# Patient Record
Sex: MALE | Race: WHITE | NOT HISPANIC OR LATINO | Employment: FULL TIME | URBAN - METROPOLITAN AREA
[De-identification: names, ages, dates, MRNs, and addresses within clinical notes are randomized per-mention and may not be internally consistent; named-entity substitution may affect disease eponyms.]

---

## 2017-01-09 ENCOUNTER — ALLSCRIPTS OFFICE VISIT (OUTPATIENT)
Dept: OTHER | Facility: OTHER | Age: 47
End: 2017-01-09

## 2017-05-22 ENCOUNTER — ALLSCRIPTS OFFICE VISIT (OUTPATIENT)
Dept: OTHER | Facility: OTHER | Age: 47
End: 2017-05-22

## 2017-05-24 LAB — PROSTATE SPECIFIC ANTIGEN (HISTORICAL): 0.7 NG/ML (ref 0–4)

## 2017-05-25 ENCOUNTER — GENERIC CONVERSION - ENCOUNTER (OUTPATIENT)
Dept: OTHER | Facility: OTHER | Age: 47
End: 2017-05-25

## 2017-12-11 ENCOUNTER — ALLSCRIPTS OFFICE VISIT (OUTPATIENT)
Dept: OTHER | Facility: OTHER | Age: 47
End: 2017-12-11

## 2017-12-13 NOTE — PROGRESS NOTES
Assessment    1  GERD without esophagitis (530 81) (K21 9)   2  Nj's esophagus (530 85) (K22 70)    Plan  Nj's esophagus, GERD without esophagitis    · Esomeprazole Magnesium 40 MG Oral Capsule Delayed Release (NexIUM);take 1 capsule daily   Rx By: Malini Jordan; Dispense: 90 Days ; #:90 Capsule Delayed Release; Refill: 3;Nj's esophagus, GERD without esophagitis; BHAVESH = N; Verified Transmission to [address] [place] [address]; Last Updated By: System, [employer]; 12/11/2017 4:43:30 PM    Discussion/Summary  Discussion Summary:   History of GERD, Nj's esophagus and family history of esophageal cancer in brother  continue Nexium regularly  repeat surveillance EGD next year  Patient was explained about the lifestyle and dietary modifications  Advised to avoid fatty foods, chocolates, caffeine, alcohol and any other triggering foods  Avoid eating for at least 3 hours before going to bed  Counseling Documentation With Imm: The patient was counseled regarding instructions for management,-- risk factor reductions,-- patient and family education,-- risks and benefits of treatment options,-- importance of compliance with treatment  Chief Complaint  Chief Complaint Free Text Note Form: of Nj's, GERD      History of Present Illness  HPI: Dennis Ballrad came in for follow-up  He has history of GERD and also a small Nj's for which he takes Nexium regularly  He reports having symptoms when he forgets to take the medication  Patient has regular bowel movements and denies any blood or mucus in the stool  Appetite is good and denies any recent weight loss  Denies any abdominal pain, nausea, or vomiting  Has no heartburn or acid reflux  Denies any difficulty swallowing  was positive for Nj's in the past but the last couple of EGDs with biopsies were negative for Nj's  History Reviewed: The history was obtained today from the patient and I agree with the documented history        Review of Systems  Complete-Male GI Adult:  Constitutional: No fever or chills, feels well, no tiredness, no recent weight gain or weight loss  Eyes: No complaints of eye pain, no red eyes, no discharge from eyes, no itchy eyes  ENT: no complaints of earache, no hearing loss, no nosebleeds, no nasal discharge, no sore throat, no hoarseness  Cardiovascular: No complaints of slow heart rate, no fast heart rate, no chest pain, no palpitations, no leg claudication, no lower extremity  Respiratory: No complaints of shortness of breath, no wheezing, no cough, no SOB on exertion, no orthopnea or PND  Gastrointestinal: as noted in HPI  Genitourinary: No complaints of dysuria, no incontinence, no hesitancy, no nocturia, no genital lesion, no testicular pain  Musculoskeletal: No complaints of arthralgia, no myalgias, no joint swelling or stiffness, no limb pain or swelling  Integumentary: No complaints of skin rash or skin lesions, no itching, no skin wound, no dry skin  Neurological: No compliants of headache, no confusion, no convulsions, no numbness or tingling, no dizziness or fainting, no limb weakness, no difficulty walking  Psychiatric: Is not suicidal, no sleep disturbances, no anxiety or depression, no change in personality, no emotional problems  Endocrine: No complaints of proptosis, no hot flashes, no muscle weakness, no erectile dysfunction, no deepening of the voice, no feelings of weakness  Hematologic/Lymphatic: No complaints of swollen glands, no swollen glands in the neck, does not bleed easily, no easy bruising  Active Problems    1  Allergic rhinitis (477 9) (J30 9)   2  Nj's esophagus (530 85) (K22 70)   3  Body mass index (BMI) of 32 0 to 32 9 in adult (V85 32) (Z68 32)   4  Cellulitis of toe of right foot (681 10) (L03 031)   5  Colon cancer screening (V76 51) (Z12 11)   6  Diaphragmatic hernia (553 3) (K44 9)   7  Family history of colon cancer (V16 0) (Z80 0)   8   GERD without esophagitis (530 81) (K21 9)   9  Immunization due (V05 9) (Z23)   10  Impaired fasting glucose (790 21) (R73 01)   11  Never A Smoker   12  Pain of right hand (729 5) (M79 641)   13  Prostate cancer screening (V76 44) (Z12 5)   14  Reported Family History Of Heart Disease (V17 4)   15  Screening for cardiovascular, respiratory, and genitourinary diseases  (V81 2,V81 4,V81 6) (Z13 6,Z13 83,Z13 89)   16  Screening for diabetes mellitus (DM) (V77 1) (Z13 1)   17  Well adult on routine health check (V70 0) (Z00 00)    Past Medical History  1  History of Arthralgia of ankle or foot (719 47) (M25 579)   2  Benign essential hypertension (401 1) (I10)   3  History of 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, Including Hip (216 7)   4  History of Difficulty in walking (719 7) (R26 2)   5  History of Dysphagia (787 20) (R13 10)   6  History of Globus sensation (306 4) (F45 8)   7  History of backache (V13 59) (Z87 39)   8  History of esophagitis (V12 79) (Z87 19)   9  History of left flank pain (V13 89) (Z87 898)   10  History of neoplasm of uncertain behavior of skin (V13 3) (Z86 03)   11  History of sebaceous cyst (V13 3) (Z87 2)   12  History of Increased thirst (783 5) (R63 1)   13  History of Intrinsic asthma without status asthmaticus (493 10) (J45 20)   14  History of Lateral epicondylitis of right elbow (726 32) (M77 11)   15  History of Limb pain (729 5) (M79 609)   16  History of Pes planus, unspecified laterality (734) (M21 40)   17  History of Plantar fascial fibromatosis (728 71) (M72 2)   18  History of Right knee pain (719 46) (M25 561)   19  History of Right patellofemoral syndrome (719 46) (M22 2X1)  Active Problems And Past Medical History Reviewed: The active problems and past medical history were reviewed and updated today  Surgical History  1  History of Diagnostic Esophagogastroduodenoscopy  Surgical History Reviewed: The surgical history was reviewed and updated today  Family History  Mother    1  Family history of hypertension (V17 49) (Z82 49)  Father    2  Family history of colon cancer (V16 0) (Z80 0)   3  Family history of hypertension (V17 49) (Z82 49)  Family History    4  Family history of colon cancer (V16 0) (Z80 0)   5  Denied: Family history of Crohn's disease   6  Denied: Family history of liver disease  Family History Reviewed: The family history was reviewed and updated today  Social History     · Never A Smoker   · Denied: History of Smoking   · Social alcohol use (Z78 9)  Social History Reviewed: The social history was reviewed and updated today  The social history was reviewed and is unchanged  Current Meds   1  Esomeprazole Magnesium 40 MG Oral Capsule Delayed Release; Take 1 capsule twice daily  Requested for: 38CBC4227; Last Rx:[id number] Ordered  Medication List Reviewed: The medication list was reviewed and updated today  Allergies  1  No Known Drug Allergies    Vitals  Vital Signs    Recorded: 38Rlx7715 04:30PM   Temperature 95 5 F   Heart Rate 71   Respiration 16   Systolic 959   Diastolic 86   Height 5 ft 10 in   Weight 229 lb 4 oz   BMI Calculated 32 89   BSA Calculated 2 21   O2 Saturation 98       Physical Exam   Constitutional  General appearance: No acute distress, well appearing and well nourished  Eyes  Conjunctiva and lids: No swelling, erythema, or discharge  Pupils and irises: Equal, round and reactive to light  Ears, Nose, Mouth, and Throat  External inspection of ears and nose: Normal    Nasal mucosa, septum, and turbinates: Normal without edema or erythema  Oropharynx: Normal with no erythema, edema, exudate or lesions  Pulmonary  Respiratory effort: No increased work of breathing or signs of respiratory distress  Auscultation of lungs: Clear to auscultation, equal breath sounds bilaterally, no wheezes, no rales, no rhonci  Cardiovascular  Palpation of heart: Normal PMI, no thrills     Auscultation of heart: Normal rate and rhythm, normal S1 and S2, without murmurs  Examination of extremities for edema and/or varicosities: Normal    Carotid pulses: Normal    Abdomen  Abdomen: Non-tender, no masses  Liver and spleen: No hepatomegaly or splenomegaly  Lymphatic  Palpation of lymph nodes in neck: No lymphadenopathy  Musculoskeletal  Gait and station: Normal    Digits and nails: Normal without clubbing or cyanosis  Inspection/palpation of joints, bones, and muscles: Normal    Skin  Skin and subcutaneous tissue: Normal without rashes or lesions  Psychiatric  Orientation to person, place and time: Normal    Mood and affect: Normal          Health Management  Nj's esophagus   EGD; every 2 years; Last 47SKG5335; Next Due: 22TTC5942; Active  Colon cancer screening   COLONOSCOPY; every 5 years; Last 24WXK0172; Next Due: 84MLA3172;  Active    Signatures   Electronically signed by : LORENA Wilkins ; Dec 12 2017 11:19AM EST                       (Author)

## 2018-01-10 NOTE — RESULT NOTES
Discussion/Summary   Prostate level is normal    Dr Jenifer Viveros        Verified Results  (1) PSA (SCREEN) (Dx V76 44 Screen for Prostate Cancer) 11ZIW6704 03:00PM Mayela Arredondo     Test Name Result Flag Reference   Prostate Specific Ag, Serum 0 7 ng/mL  0 0-4 0   Roche ECLIA methodology  According to the American Urological Association, Serum PSA should  decrease and remain at undetectable levels after radical  prostatectomy  The AUA defines biochemical recurrence as an initial  PSA value 0 2 ng/mL or greater followed by a subsequent confirmatory  PSA value 0 2 ng/mL or greater  Values obtained with different assay methods or kits cannot be used  interchangeably  Results cannot be interpreted as absolute evidence  of the presence or absence of malignant disease

## 2018-01-12 VITALS
HEIGHT: 70 IN | DIASTOLIC BLOOD PRESSURE: 80 MMHG | WEIGHT: 219 LBS | RESPIRATION RATE: 20 BRPM | SYSTOLIC BLOOD PRESSURE: 124 MMHG | TEMPERATURE: 97.3 F | BODY MASS INDEX: 31.35 KG/M2 | HEART RATE: 64 BPM

## 2018-01-12 NOTE — MISCELLANEOUS
Message  GI Reminder Recall Marton Halsted:   Date: 02/29/2016   Dear Wang Bullock:     Review of our records shows you are due for the following: EGD  Please call the following office to schedule your appointment:   [address] [place] [address], [address], [address] [place] [address]  [phone]  We look forward to hearing from you! Sincerely,         Signatures   Electronically signed by :  Yakov Crisostomo, ; Feb 29 2016  1:02PM EST                       (Author)

## 2018-01-13 VITALS
DIASTOLIC BLOOD PRESSURE: 70 MMHG | WEIGHT: 224 LBS | HEART RATE: 78 BPM | RESPIRATION RATE: 18 BRPM | TEMPERATURE: 98.6 F | SYSTOLIC BLOOD PRESSURE: 130 MMHG | BODY MASS INDEX: 32.07 KG/M2 | HEIGHT: 70 IN

## 2018-01-16 NOTE — RESULT NOTES
Message    Distal esophageal biopsies are benign and negative for Nj's mucosa this time  Patient to continue Nexium and schedule for repeat EGD in 2 years   called PT - Proivjean claude Stevens results - input reminder - [address]         Verified Results  (1) TISSUE EXAM [id number] 11:16AM Kamilah Door     Test Name Result Flag Reference   LAB AP CASE REPORT (Report)     Surgical Pathology Report             Case: [id number]                   Authorizing Provider: Jose Antonio Navarro MD     Collected:      10/07/2016 1116        Ordering Location:   [facility]  Received:      10/07/2016 3491 Tesfaye Huynh                                     Pathologist:      Milo Ramirez MD                             Specimen:  Esophagus, Cold bx  distal esophagus Barretts   LAB AP FINAL DIAGNOSIS (Report)     A  Distal esophagus, biopsy:    - Squamocolumnar gastroesophageal junction type mucosa with minimally   active chronic inflammation and      reactive epithelial changes  - Features suggestive of reflux     - Occasional squamous intraepithelial eosinophils are identified     - Negative for intestinal metaplasia, dysplasia and malignancy  Electronically signed by Milo Ramirez MD on 10/11/2016 at 1:12 PM   LAB AP NOTE      Interpretation performed at [facility], [address] [place] [address]  LAB AP SURGICAL ADDITIONAL INFORMATION (Report)     These tests were developed and their performance characteristics   determined by [facility] or [employer]  They may not be cleared or approved by the [place]  Food and   Drug Administration  The FDA has determined that such clearance or   approval is not necessary  These tests are used for clinical purposes  They should not be regarded as investigational or for research  This   laboratory has been approved by CLIA 88, designated as a high-complexity   laboratory and is qualified to perform these tests     LAB AP GROSS DESCRIPTION (Report)     A  The specimen is received in formalin, labeled with the patient's name   and hospital number, and is designated distal esophageal biopsy,   Nj's  The specimen consists of multiple white to tan soft tissue   fragments measuring in aggregate 1 0 x 0 7 x 0 2 cm in greatest dimension  Entirely submitted  One cassette  Note: The estimated total formalin fixation time based upon information   provided by the submitting clinician and the standard processing schedule   is over 72 hours        ACL

## 2018-01-22 VITALS
HEART RATE: 71 BPM | BODY MASS INDEX: 32.82 KG/M2 | HEIGHT: 70 IN | SYSTOLIC BLOOD PRESSURE: 130 MMHG | OXYGEN SATURATION: 98 % | RESPIRATION RATE: 16 BRPM | WEIGHT: 229.25 LBS | DIASTOLIC BLOOD PRESSURE: 86 MMHG | TEMPERATURE: 95.5 F

## 2018-05-22 ENCOUNTER — OFFICE VISIT (OUTPATIENT)
Dept: FAMILY MEDICINE CLINIC | Facility: CLINIC | Age: 48
End: 2018-05-22
Payer: COMMERCIAL

## 2018-05-22 VITALS
WEIGHT: 224 LBS | HEART RATE: 64 BPM | TEMPERATURE: 96.7 F | HEIGHT: 70 IN | RESPIRATION RATE: 16 BRPM | BODY MASS INDEX: 32.07 KG/M2 | SYSTOLIC BLOOD PRESSURE: 126 MMHG | DIASTOLIC BLOOD PRESSURE: 78 MMHG

## 2018-05-22 DIAGNOSIS — Z13.83 SCREENING FOR CARDIOVASCULAR, RESPIRATORY, AND GENITOURINARY DISEASES: ICD-10-CM

## 2018-05-22 DIAGNOSIS — Z13.89 SCREENING FOR CARDIOVASCULAR, RESPIRATORY, AND GENITOURINARY DISEASES: ICD-10-CM

## 2018-05-22 DIAGNOSIS — Z13.1 SCREENING FOR DIABETES MELLITUS (DM): ICD-10-CM

## 2018-05-22 DIAGNOSIS — E66.9 OBESITY (BMI 30-39.9): ICD-10-CM

## 2018-05-22 DIAGNOSIS — Z13.6 SCREENING FOR CARDIOVASCULAR, RESPIRATORY, AND GENITOURINARY DISEASES: ICD-10-CM

## 2018-05-22 DIAGNOSIS — Z12.5 PROSTATE CANCER SCREENING: ICD-10-CM
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
## 2018-05-22 PROCEDURE — 99213 OFFICE O/P EST LOW 20 MIN: CPT | Performed by: FAMILY MEDICINE

## 2018-05-22 RX ORDER — DOXYCYCLINE 100 MG/1
CAPSULE ORAL
Qty: 2 CAPSULE | Refills: 0 | Status: SHIPPED | OUTPATIENT
Start: 2018-05-22 | End: 2018-05-23

## 2018-05-22 RX ORDER — ESOMEPRAZOLE MAGNESIUM 40 MG/1
40 CAPSULE, DELAYED RELEASE ORAL DAILY
COMMUNITY
End: 2018-12-30 | Stop reason: SDUPTHER

## 2018-05-22 NOTE — PROGRESS NOTES
Assessment/Plan:    No problem-specific Assessment & Plan notes found for this encounter  Doxy for prevention  Labs for cpe given  gerd stable, could try to minimize dose for risk reduction  Recent data suggesting increased risk of ischemic CVA and chronic kidney damage with PPI use was advised  Diagnoses and all orders for this visit:    Tick bite, initial encounter    Screening for cardiovascular, respiratory, and genitourinary diseases    Screening for diabetes mellitus (DM)    Prostate cancer screening    Other orders  -     esomeprazole (NexIUM) 40 MG capsule; Take 1 capsule by mouth daily              No Follow-up on file  Subjective:      Patient ID: Veronique Stanley is a 51 y o  male  Chief Complaint   Patient presents with    Tick Removal     last Monday on lower right side of back, red and itchy       HPI  Bloodwork in past at [facility], last April    Tick bite  1w ago  On right buttock  Pulled off easily  In a hotel room? In [address] [place] [address] [address] at time  Not engorged  Less than 48hrs  No fevers/arthralgias  No hx of lyme pos in past  Could eat better  Exercises daily    The following portions of the patient's history were reviewed and updated as appropriate: allergies, current medications, past family history, past medical history, past social history, past surgical history and problem list     Review of Systems   Constitutional: Negative for fatigue  Cardiovascular: Negative for chest pain  Musculoskeletal: Negative for myalgias  Current Outpatient Prescriptions   Medication Sig Dispense Refill    esomeprazole (NexIUM) 40 MG capsule Take 1 capsule by mouth daily       No current facility-administered medications for this visit  Objective:    /78   Pulse 64   Temp (!) 96 7 °F (35 9 °C)   Resp 16   Ht 5' 10" (1 778 m)   Wt 102 kg (224 lb)   BMI 32 14 kg/m²        Physical Exam   Constitutional: He appears well-developed  HENT:   Head: Normocephalic     Eyes: Conjunctivae are normal    Neck: Neck supple  Cardiovascular: Normal rate and intact distal pulses  Pulmonary/Chest: Effort normal  No respiratory distress  Abdominal: Soft  Musculoskeletal: He exhibits no edema or deformity  Neurological: He is alert  Skin: Skin is warm and dry  Psychiatric: His behavior is normal  Thought content normal    Nursing note and vitals reviewed      local red area at tick site, no ecm         Joycelyn Kruger, DO

## 2018-05-26 LAB
ALBUMIN SERPL-MCNC: 4.5 G/DL (ref 3.5–5.5)
ALBUMIN/GLOB SERPL: 1.6 {RATIO} (ref 1.2–2.2)
ALP SERPL-CCNC: 78 IU/L (ref 39–117)
ALT SERPL-CCNC: 47 IU/L (ref 0–44)
AMBIG ABBREV DEFAULT: NORMAL
AST SERPL-CCNC: 33 IU/L (ref 0–40)
B BURGDOR IGG+IGM SER-ACNC: <0.91 ISR (ref 0–0.9)
B BURGDOR IGM SER IA-ACNC: <0.8 INDEX (ref 0–0.79)
BILIRUB SERPL-MCNC: 0.6 MG/DL (ref 0–1.2)
BUN SERPL-MCNC: 21 MG/DL (ref 6–24)
BUN/CREAT SERPL: 20 (ref 9–20)
CALCIUM SERPL-MCNC: 9.8 MG/DL (ref 8.7–10.2)
CHLORIDE SERPL-SCNC: 99 MMOL/L (ref 96–106)
CHOLEST SERPL-MCNC: 224 MG/DL (ref 100–199)
CO2 SERPL-SCNC: 23 MMOL/L (ref 18–29)
CREAT SERPL-MCNC: 1.06 MG/DL (ref 0.76–1.27)
GLOBULIN SER-MCNC: 2.8 G/DL (ref 1.5–4.5)
GLUCOSE SERPL-MCNC: 109 MG/DL (ref 65–99)
HDLC SERPL-MCNC: 63 MG/DL
LDLC SERPL CALC-MCNC: 147 MG/DL (ref 0–99)
MICRODELETION SYND BLD/T FISH: NORMAL
POTASSIUM SERPL-SCNC: 4.6 MMOL/L (ref 3.5–5.2)
PROT SERPL-MCNC: 7.3 G/DL (ref 6–8.5)
PSA SERPL-MCNC: 0.7 NG/ML (ref 0–4)
SL AMB EGFR AFRICAN AMERICAN: 95 ML/MIN/1.73
SL AMB EGFR NON AFRICAN AMERICAN: 82 ML/MIN/1.73
SODIUM SERPL-SCNC: 140 MMOL/L (ref 134–144)
TRIGL SERPL-MCNC: 72 MG/DL (ref 0–149)

## 2018-06-26 ENCOUNTER — OFFICE VISIT (OUTPATIENT)
Dept: FAMILY MEDICINE CLINIC | Facility: CLINIC | Age: 48
End: 2018-06-26
Payer: COMMERCIAL

## 2018-06-26 VITALS
WEIGHT: 216 LBS | BODY MASS INDEX: 30.92 KG/M2 | HEIGHT: 70 IN | SYSTOLIC BLOOD PRESSURE: 130 MMHG | TEMPERATURE: 96.7 F | DIASTOLIC BLOOD PRESSURE: 84 MMHG | RESPIRATION RATE: 16 BRPM | HEART RATE: 70 BPM

## 2018-06-26 DIAGNOSIS — Z91.89 FRAMINGHAM CARDIAC RISK <10% IN NEXT 10 YEARS: ICD-10-CM

## 2018-06-26 DIAGNOSIS — E66.9 OBESITY (BMI 30-39.9): ICD-10-CM

## 2018-06-26 DIAGNOSIS — R73.01 IMPAIRED FASTING GLUCOSE: ICD-10-CM

## 2018-06-26 DIAGNOSIS — Z00.00 HEALTHCARE MAINTENANCE: Primary | ICD-10-CM
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
## 2018-06-26 PROCEDURE — 99396 PREV VISIT EST AGE 40-64: CPT | Performed by: FAMILY MEDICINE

## 2018-06-26 NOTE — PROGRESS NOTES
Assessment/Plan:    No problem-specific Assessment & Plan notes found for this encounter  Diagnoses and all orders for this visit:    Healthcare maintenance    [place] cardiac risk <10% in next 10 years    Impaired fasting glucose    Obesity (BMI 30-39  9)              Return if symptoms worsen or fail to improve  Subjective:      Patient ID: Tito Stephenson is a 51 y o  male  Chief Complaint   Patient presents with    Physical Exam     no forms drhlpn       HPI    The following portions of the patient's history were reviewed and updated as appropriate: allergies, current medications, past family history, past medical history, past social history, past surgical history and problem list     Review of Systems      Current Outpatient Prescriptions   Medication Sig Dispense Refill    esomeprazole (NexIUM) 40 MG capsule Take 1 capsule by mouth daily       No current facility-administered medications for this visit          Objective:    /84   Pulse 70   Temp (!) 96 7 °F (35 9 °C)   Resp 16   Ht 5' 10" (1 778 m)   Wt 98 kg (216 lb)   BMI 30 99 kg/m²        Physical Exam           Jaylon Figueroa DO

## 2018-06-27 NOTE — PROGRESS NOTES
Assessment/Plan:    No problem-specific Assessment & Plan notes found for this encounter  Diet/exercise/weight loss is recommended for IFG  Cont PPI since tried wean but sx worse  Recent data suggesting increased risk of ischemic CVA and chronic kidney damage with PPI use was advised  Consider zantac 150mg bid  framingham score <7 5% this year     Diagnoses and all orders for this visit:    Healthcare maintenance    [place] cardiac risk <10% in next 10 years    Impaired fasting glucose    Obesity (BMI 30-39  9)              No Follow-up on file  Subjective:      Patient ID: Johanna Rinne is a 51 y o  male  Chief Complaint   Patient presents with    Physical Exam     no forms drhlpn       HPI  Feels ok  Could do more with diet  Exercises often  gerd worse w/o nexium even skipping doses   Had EGD at Dr Ryna Sandoval, no barretts per pt  IFG new but actually had in old reports  Slight elevation ALT, worse in past, suspect fatty liver    The following portions of the patient's history were reviewed and updated as appropriate: allergies, current medications, past family history, past medical history, past social history, past surgical history and problem list     Review of Systems   HENT: Negative for trouble swallowing  Respiratory: Negative for shortness of breath  Cardiovascular: Negative for chest pain  Gastrointestinal: Negative for abdominal pain  Current Outpatient Prescriptions   Medication Sig Dispense Refill    esomeprazole (NexIUM) 40 MG capsule Take 1 capsule by mouth daily       No current facility-administered medications for this visit  Objective:    /84   Pulse 70   Temp (!) 96 7 °F (35 9 °C)   Resp 16   Ht 5' 10" (1 778 m)   Wt 98 kg (216 lb)   BMI 30 99 kg/m²        Physical Exam   Constitutional: He appears well-developed  HENT:   Head: Normocephalic  Eyes: Conjunctivae are normal    Neck: Neck supple  Cardiovascular: Normal rate and intact distal pulses  Pulmonary/Chest: Effort normal  No respiratory distress  Abdominal: Soft  He exhibits no mass  There is no tenderness  There is no rebound and no guarding  No hernia  Hernia confirmed negative in the right inguinal area and confirmed negative in the left inguinal area  Genitourinary: Rectum normal, prostate normal and penis normal  No penile tenderness  Musculoskeletal: He exhibits no edema or deformity  Neurological: He is alert  Skin: Skin is warm and dry  No rash noted  No pallor  Psychiatric: His behavior is normal  Thought content normal    Nursing note and vitals reviewed               Pura Mckenna DO

## 2018-08-15 ENCOUNTER — HOSPITAL ENCOUNTER (INPATIENT)
Facility: HOSPITAL | Age: 48
LOS: 2 days | Discharge: HOME | DRG: 299 | End: 2018-08-17
Attending: EMERGENCY MEDICINE | Admitting: FAMILY MEDICINE
Payer: COMMERCIAL

## 2018-08-15 ENCOUNTER — APPOINTMENT (EMERGENCY)
Dept: RADIOLOGY | Facility: HOSPITAL | Age: 48
DRG: 299 | End: 2018-08-15
Payer: COMMERCIAL

## 2018-08-15 ENCOUNTER — HOSPITAL ENCOUNTER (OUTPATIENT)
Dept: RADIOLOGY | Facility: HOSPITAL | Age: 48
Discharge: HOME | DRG: 299 | End: 2018-08-15
Payer: COMMERCIAL

## 2018-08-15 ENCOUNTER — APPOINTMENT (OUTPATIENT)
Dept: RADIOLOGY | Facility: CLINIC | Age: 48
DRG: 299 | End: 2018-08-15
Payer: COMMERCIAL

## 2018-08-15 VITALS
HEIGHT: 70 IN | SYSTOLIC BLOOD PRESSURE: 135 MMHG | DIASTOLIC BLOOD PRESSURE: 94 MMHG | BODY MASS INDEX: 31.98 KG/M2 | HEART RATE: 105 BPM | WEIGHT: 223.4 LBS

## 2018-08-15 DIAGNOSIS — M79.89 LEFT LEG SWELLING: ICD-10-CM

## 2018-08-15 DIAGNOSIS — I26.99 PULMONARY EMBOLISM (HCC): Primary | ICD-10-CM

## 2018-08-15 DIAGNOSIS — M25.562 LEFT KNEE PAIN, UNSPECIFIED CHRONICITY: ICD-10-CM

## 2018-08-15 DIAGNOSIS — M22.2X2 PATELLOFEMORAL SYNDROME OF LEFT KNEE: Primary | ICD-10-CM

## 2018-08-15 DIAGNOSIS — M25.462 EFFUSION OF LEFT KNEE: ICD-10-CM

## 2018-08-15 PROBLEM — I82.409 DVT (DEEP VENOUS THROMBOSIS) (HCC): Status: ACTIVE | Noted: 2018-08-15

## 2018-08-15 LAB
ALBUMIN SERPL BCP-MCNC: 3.8 G/DL (ref 3.5–5)
ALP SERPL-CCNC: 97 U/L (ref 46–116)
ALT SERPL W P-5'-P-CCNC: 45 U/L (ref 12–78)
ANION GAP SERPL CALCULATED.3IONS-SCNC: 7 MMOL/L (ref 4–13)
APTT PPP: 25 SECONDS (ref 24–33)
AST SERPL W P-5'-P-CCNC: 29 U/L (ref 5–45)
BASOPHILS # BLD AUTO: 0.05 THOUSANDS/ΜL (ref 0–0.1)
BASOPHILS NFR BLD AUTO: 0 % (ref 0–1)
BILIRUB SERPL-MCNC: 0.5 MG/DL (ref 0.2–1)
BUN SERPL-MCNC: 16 MG/DL (ref 5–25)
CALCIUM SERPL-MCNC: 9.1 MG/DL (ref 8.3–10.1)
CHLORIDE SERPL-SCNC: 102 MMOL/L (ref 100–108)
CO2 SERPL-SCNC: 30 MMOL/L (ref 21–32)
CREAT SERPL-MCNC: 1.04 MG/DL (ref 0.6–1.3)
EOSINOPHIL # BLD AUTO: 0.49 THOUSAND/ΜL (ref 0–0.61)
EOSINOPHIL NFR BLD AUTO: 4 % (ref 0–6)
ERYTHROCYTE [DISTWIDTH] IN BLOOD BY AUTOMATED COUNT: 12.1 % (ref 11.6–15.1)
GFR SERPL CREATININE-BSD FRML MDRD: 84 ML/MIN/1.73SQ M
GLUCOSE SERPL-MCNC: 110 MG/DL (ref 65–140)
HCT VFR BLD AUTO: 42.7 % (ref 36.5–49.3)
HGB BLD-MCNC: 14.1 G/DL (ref 12–17)
IMM GRANULOCYTES # BLD AUTO: 0.04 THOUSAND/UL (ref 0–0.2)
IMM GRANULOCYTES NFR BLD AUTO: 0 % (ref 0–2)
INR PPP: 1.16 (ref 0.86–1.16)
LYMPHOCYTES # BLD AUTO: 2.32 THOUSANDS/ΜL (ref 0.6–4.47)
LYMPHOCYTES NFR BLD AUTO: 21 % (ref 14–44)
MCH RBC QN AUTO: 29.9 PG (ref 26.8–34.3)
MCHC RBC AUTO-ENTMCNC: 33 G/DL (ref 31.4–37.4)
MCV RBC AUTO: 91 FL (ref 82–98)
MONOCYTES # BLD AUTO: 1.04 THOUSAND/ΜL (ref 0.17–1.22)
MONOCYTES NFR BLD AUTO: 9 % (ref 4–12)
NEUTROPHILS # BLD AUTO: 7.33 THOUSANDS/ΜL (ref 1.85–7.62)
NEUTS SEG NFR BLD AUTO: 66 % (ref 43–75)
NRBC BLD AUTO-RTO: 0 /100 WBCS
PLATELET # BLD AUTO: 274 THOUSANDS/UL (ref 149–390)
PMV BLD AUTO: 9.7 FL (ref 8.9–12.7)
POTASSIUM SERPL-SCNC: 4.4 MMOL/L (ref 3.5–5.3)
PROT SERPL-MCNC: 8.1 G/DL (ref 6.4–8.2)
PROTHROMBIN TIME: 12.1 SECONDS (ref 9.4–11.7)
RBC # BLD AUTO: 4.72 MILLION/UL (ref 3.88–5.62)
SODIUM SERPL-SCNC: 139 MMOL/L (ref 136–145)
TROPONIN I SERPL-MCNC: <0.02 NG/ML
WBC # BLD AUTO: 11.27 THOUSAND/UL (ref 4.31–10.16)

## 2018-08-15 PROCEDURE — 80053 COMPREHEN METABOLIC PANEL: CPT | Performed by: EMERGENCY MEDICINE

## 2018-08-15 PROCEDURE — 1111F DSCHRG MED/CURRENT MED MERGE: CPT | Performed by: ORTHOPAEDIC SURGERY

## 2018-08-15 PROCEDURE — 85610 PROTHROMBIN TIME: CPT | Performed by: EMERGENCY MEDICINE

## 2018-08-15 PROCEDURE — 99222 1ST HOSP IP/OBS MODERATE 55: CPT | Performed by: NURSE PRACTITIONER

## 2018-08-15 PROCEDURE — 96372 THER/PROPH/DIAG INJ SC/IM: CPT

## 2018-08-15 PROCEDURE — 93970 EXTREMITY STUDY: CPT | Performed by: SURGERY

## 2018-08-15 PROCEDURE — 99285 EMERGENCY DEPT VISIT HI MDM: CPT

## 2018-08-15 PROCEDURE — 71275 CT ANGIOGRAPHY CHEST: CPT

## 2018-08-15 PROCEDURE — 99213 OFFICE O/P EST LOW 20 MIN: CPT | Performed by: ORTHOPAEDIC SURGERY

## 2018-08-15 PROCEDURE — 85025 COMPLETE CBC W/AUTO DIFF WBC: CPT | Performed by: EMERGENCY MEDICINE

## 2018-08-15 PROCEDURE — 87081 CULTURE SCREEN ONLY: CPT | Performed by: NURSE PRACTITIONER

## 2018-08-15 PROCEDURE — 73562 X-RAY EXAM OF KNEE 3: CPT

## 2018-08-15 PROCEDURE — 84484 ASSAY OF TROPONIN QUANT: CPT | Performed by: EMERGENCY MEDICINE

## 2018-08-15 PROCEDURE — 93005 ELECTROCARDIOGRAM TRACING: CPT

## 2018-08-15 PROCEDURE — 36415 COLL VENOUS BLD VENIPUNCTURE: CPT | Performed by: EMERGENCY MEDICINE

## 2018-08-15 PROCEDURE — 93970 EXTREMITY STUDY: CPT

## 2018-08-15 PROCEDURE — 85730 THROMBOPLASTIN TIME PARTIAL: CPT | Performed by: EMERGENCY MEDICINE

## 2018-08-15 RX ORDER — ACETAMINOPHEN 325 MG/1
650 TABLET ORAL EVERY 6 HOURS PRN
Status: DISCONTINUED | OUTPATIENT
Start: 2018-08-15 | End: 2018-08-17 | Stop reason: HOSPADM

## 2018-08-15 RX ORDER — PANTOPRAZOLE SODIUM 40 MG/1
40 TABLET, DELAYED RELEASE ORAL
Status: DISCONTINUED | OUTPATIENT
Start: 2018-08-16 | End: 2018-08-17 | Stop reason: HOSPADM

## 2018-08-15 RX ADMIN — ENOXAPARIN SODIUM 150 MG: 150 INJECTION SUBCUTANEOUS at 18:16

## 2018-08-15 RX ADMIN — IOHEXOL 85 ML: 350 INJECTION, SOLUTION INTRAVENOUS at 17:33

## 2018-08-15 NOTE — PROGRESS NOTES
Assessment/Plan:  1  Patellofemoral syndrome of left knee     2  Left leg swelling  VAS lower limb venous duplex study, unilateral/limited   3  Effusion of left knee  VAS lower limb venous duplex study, unilateral/limited   4  Left knee pain, unspecified chronicity  XR knee 3 vw left non injury    VAS lower limb venous duplex study, unilateral/limited     Henri Aponte is a very pleasant 50-year-old gentleman presenting for left knee pain and left lower extremity swelling  His knee pain is most likely due to patellofemoral symptoms, which she has had a past   It is unclear what the source of his left lower leg swelling is  We have referred him for a stat duplex ultrasound to rule out a DVT, as his calf is tender and he did recently have a long plate from [facility] ([place]   However, a ruptured Baker cyst is higher on our differential based on his history of a pop subsequent swelling  This could also be responsible for the paresthesias that are extending to his left foot  If his ultrasound does not show a DVT, we would like him to return in 2 days for re-evaluation with an aspiration of his effusion and steroid injection for both therapeutic and diagnostic purposes  In the interim, he can continue with ice, ibuprofen, and Tylenol  All of his questions were addressed today  Subjective: Left knee pain    Patient ID: Katia Bynum is a 51 y o  male  Henri Aponte is a pleasant 50-year-old gentleman presenting for evaluation of 1 month of left knee pain and lower extremity swelling  He does report a history of left knee patellofemoral symptoms that were treated conservatively by Dr Adrienne Mukherjee 2 years ago with a knee brace and home exercise program   He did very well up until a month ago when he began to notice aching in the anterior knee  Subsequently, he felt a pop in the anterior knee while walking up a step    He has had a toothache type discomfort in the knee since then as well as significant pain in the posterior aspect of the leg and calf  The pain does radiate up towards his groin  He does have occasional paresthesias in the left lower extremity  He denies any history of blood clots, cancer, smoking, familial clotting disorders  He did have a long plane flight from [facility] about a month ago when the symptoms began  Ibuprofen and ice if not been helping  Review of Systems   Constitutional: Negative  HENT: Negative  Eyes: Negative  Respiratory: Negative  Cardiovascular: Positive for leg swelling  Gastrointestinal: Negative  Endocrine: Negative  Genitourinary: Negative  Musculoskeletal: Positive for arthralgias, joint swelling and myalgias  Skin: Negative  Allergic/Immunologic: Negative  Neurological: Negative  Hematological: Negative  Psychiatric/Behavioral: Negative  Past Medical History:   Diagnosis Date    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 07/12/2006    Dysphagia     Last Assessed: 8/12/2014     GERD (gastroesophageal reflux disease)     Globus sensation     Last Assessed: 6/4/2014     H/O neoplasm of uncertain behavior of skin 06/06/2006    Hypertension 02/14/2006    Intrinsic asthma without status asthmaticus 05/02/2006    Lateral epicondylitis of right elbow     Last Assessed: 10/23/2015     Pes planus     last assessed: 10/23/2013     Plantar fascial fibromatosis     Last Assessed: 10/23/2013     Right patellofemoral syndrome     Last Assessed: 4/15/2016     Sebaceous cyst 11/03/2007       Past Surgical History:   Procedure Laterality Date    ESOPHAGOGASTRODUODENOSCOPY N/A 10/7/2016    Procedure: ESOPHAGOGASTRODUODENOSCOPY (EGD); Surgeon: Husam Bustos MD;  Location: [facility] GI [facility];   Service:     NASAL SEPTUM SURGERY  1995       Family History   Problem Relation Age of Onset    Heart disease Mother     Hypertension Mother     Cancer Father         colon    Colon cancer Father     Hypertension Father     Heart disease Brother         MI exp age 37    Cancer Brother         esophageal CA exp age 40    Colon cancer Family        Social History     Occupational History    Not on file  Social History Main Topics    Smoking status: Never Smoker    Smokeless tobacco: Never Used    Alcohol use Yes      Comment: socially    Drug use: No    Sexual activity: Not on file         Current Outpatient Prescriptions:     esomeprazole (NexIUM) 40 MG capsule, Take 1 capsule by mouth daily, Disp: , Rfl:     ibuprofen (MOTRIN) 100 mg/5 mL suspension, Take 200 mg by mouth every 4 (four) hours as needed for mild pain, Disp: , Rfl:     No Known Allergies    Objective:  Vitals:    08/15/18 1414   BP: 135/94   Pulse: 105       Body mass index is 32 05 kg/m²  Left Knee Exam     Tenderness   The patient is experiencing tenderness in the patella and patellar tendon (medial patellar facet)  Range of Motion   Extension:  0 (minor anterior discomfort) normal   Flexion:  120 (limited by effusion and leg edema) abnormal     Muscle Strength     The patient has normal left knee strength  Tests   Fernando:  Medial - negative Lateral - negative  Lachman:  Anterior - negative      Drawer:       Anterior - negative       Varus: negative  Valgus: negative  Patellar Apprehension: negative    Other   Erythema: absent  Scars: absent  Sensation: normal  Pulse: present  Swelling: moderate  Effusion: effusion (1+) present    Comments:  Calf tender to palpation  Fullness in popliteal space which is tender  Equivocal Valarie's  Ambulates with slightly antalgic gait on the left  Collateral ligaments stable at 0, 30 and 90  No tenderness of hamstring/quadricep          Observations   Left Knee   Positive for effusion (1+)  Physical Exam   Constitutional: He is oriented to person, place, and time  He appears well-developed and well-nourished  Body mass index is 32 05 kg/m²  HENT:   Head: Normocephalic and atraumatic  Eyes: EOM are normal    Neck: Normal range of motion  Cardiovascular: Intact distal pulses  Pulmonary/Chest: Effort normal    Musculoskeletal:        Left knee: He exhibits effusion (1+)  See ortho exam   Neurological: He is alert and oriented to person, place, and time  Skin: Skin is warm and dry  Psychiatric: He has a normal mood and affect  His behavior is normal  Judgment and thought content normal        I have personally reviewed pertinent films in PACS of the weightbearing x-rays of his left knee which are negative for fracture, dislocation, or evidence of degenerative changes

## 2018-08-15 NOTE — ED PROCEDURE NOTE
PROCEDURE  ECG 12 Lead Documentation  Date/Time: 8/15/2018 4:46 PM  Performed by: Frida Watson  Authorized by: Gaetano PINEDA     ECG reviewed by me, the ED Provider: yes    Patient location:  ED  Interpretation:     Interpretation: normal    Rate:     ECG rate:  90    ECG rate assessment: normal    Rhythm:     Rhythm: sinus rhythm    Ectopy:     Ectopy: none    QRS:     QRS axis:  Normal    QRS intervals:  Normal  Conduction:     Conduction: normal    ST segments:     ST segments:  Normal  T waves:     T waves: normal           Guerita Bergeron DO  08/15/18 1646

## 2018-08-15 NOTE — ASSESSMENT & PLAN NOTE
· Traveled to [facility] June 30th-July 14th for wedding, while on vacation noticed pain and swelling to left lower extremity  Attributed the discomfort to chronic knee problems  Went to the gym and tried exercises with no relief  He is does a lot of travel for work and frequently takes long car rides  Today, went to see ortho and had a venous doppler  Acute DVT in the right popliteal and posterior tibial veins; left proximal to distal femoral vein, popliteal, gastrocnemius, posterior tibial and peroneal veins    · He had an episode of dyspnea on exertion 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 so CT of the chest was done - bilateral pulmonary emboli, right basilar subpleural opacity likely a pulmonary infarct, hiatal hernia  · Admit to telemetry for acute PE/DVT  · Continue therapeutic lovenox  · Hematology consultation  · Echo to assess for RV strain

## 2018-08-15 NOTE — LETTER
[address] [facility] [id number]  Dept: [phone]    August 17, 2018     Patient: Micki Saez   YOB: 1970   Date of Visit: 8/15/2018       To Whom it May Concern:    Any Tyler is under my professional care  He was seen in the hospital from 8/15/2018   to 08/17/18  He may return to work on 8/27/18  If you have any questions or concerns, please don't hesitate to call           Sincerely,          MIRNA Villalba

## 2018-08-16 ENCOUNTER — APPOINTMENT (INPATIENT)
Dept: NON INVASIVE DIAGNOSTICS | Facility: HOSPITAL | Age: 48
DRG: 299 | End: 2018-08-16
Payer: COMMERCIAL

## 2018-08-16 LAB
ANION GAP SERPL CALCULATED.3IONS-SCNC: 9 MMOL/L (ref 4–13)
ATRIAL RATE: 90 BPM
BUN SERPL-MCNC: 18 MG/DL (ref 5–25)
CALCIUM SERPL-MCNC: 8.7 MG/DL (ref 8.3–10.1)
CHLORIDE SERPL-SCNC: 100 MMOL/L (ref 100–108)
CO2 SERPL-SCNC: 28 MMOL/L (ref 21–32)
CREAT SERPL-MCNC: 1.11 MG/DL (ref 0.6–1.3)
ERYTHROCYTE [DISTWIDTH] IN BLOOD BY AUTOMATED COUNT: 12.2 % (ref 11.6–15.1)
GFR SERPL CREATININE-BSD FRML MDRD: 78 ML/MIN/1.73SQ M
GLUCOSE SERPL-MCNC: 106 MG/DL (ref 65–140)
HCT VFR BLD AUTO: 40.8 % (ref 36.5–49.3)
HGB BLD-MCNC: 13.4 G/DL (ref 12–17)
MCH RBC QN AUTO: 29.7 PG (ref 26.8–34.3)
MCHC RBC AUTO-ENTMCNC: 32.8 G/DL (ref 31.4–37.4)
MCV RBC AUTO: 91 FL (ref 82–98)
P AXIS: 33 DEGREES
PLATELET # BLD AUTO: 251 THOUSANDS/UL (ref 149–390)
PMV BLD AUTO: 9.7 FL (ref 8.9–12.7)
POTASSIUM SERPL-SCNC: 4.1 MMOL/L (ref 3.5–5.3)
PR INTERVAL: 158 MS
QRS AXIS: -13 DEGREES
QRSD INTERVAL: 88 MS
QT INTERVAL: 344 MS
QTC INTERVAL: 420 MS
RBC # BLD AUTO: 4.51 MILLION/UL (ref 3.88–5.62)
SODIUM SERPL-SCNC: 137 MMOL/L (ref 136–145)
T WAVE AXIS: 45 DEGREES
VENTRICULAR RATE: 90 BPM
WBC # BLD AUTO: 10.05 THOUSAND/UL (ref 4.31–10.16)

## 2018-08-16 PROCEDURE — 85027 COMPLETE CBC AUTOMATED: CPT | Performed by: NURSE PRACTITIONER

## 2018-08-16 PROCEDURE — 99252 IP/OBS CONSLTJ NEW/EST SF 35: CPT | Performed by: INTERNAL MEDICINE

## 2018-08-16 PROCEDURE — 80048 BASIC METABOLIC PNL TOTAL CA: CPT | Performed by: NURSE PRACTITIONER

## 2018-08-16 PROCEDURE — 93010 ELECTROCARDIOGRAM REPORT: CPT | Performed by: INTERNAL MEDICINE

## 2018-08-16 PROCEDURE — 93306 TTE W/DOPPLER COMPLETE: CPT | Performed by: INTERNAL MEDICINE

## 2018-08-16 PROCEDURE — 93306 TTE W/DOPPLER COMPLETE: CPT

## 2018-08-16 PROCEDURE — 99232 SBSQ HOSP IP/OBS MODERATE 35: CPT | Performed by: FAMILY MEDICINE

## 2018-08-16 RX ADMIN — PANTOPRAZOLE SODIUM 40 MG: 40 TABLET, DELAYED RELEASE ORAL at 05:46

## 2018-08-16 RX ADMIN — RIVAROXABAN 15 MG: 15 TABLET, FILM COATED ORAL at 17:46

## 2018-08-16 RX ADMIN — ENOXAPARIN SODIUM 100 MG: 100 INJECTION SUBCUTANEOUS at 05:46

## 2018-08-16 RX ADMIN — ACETAMINOPHEN 650 MG: 325 TABLET, FILM COATED ORAL at 16:14

## 2018-08-16 NOTE — PROGRESS NOTES
Progress Note - Rolo Owens 1970, 51 y o  male MRN: [id number]    Unit/Bed#: [address] [place] [address] Encounter: [id number]    Primary Care Provider: Shante Santizo DO   Date and time admitted to hospital: 8/15/2018  4:20 PM        * Pulmonary embolism [facility])   Assessment & Plan    · Traveled to [place] [place] June 30th-July 14th for wedding, while on vacation noticed pain and swelling to left lower extremity  Attributed the discomfort to chronic knee problems  Went to the gym and tried exercises with no relief  He is does a lot of travel for work and frequently takes long car rides  Today, went to see ortho and had a venous doppler  Acute DVT in the right popliteal and posterior tibial veins; left proximal to distal femoral vein, popliteal, gastrocnemius, posterior tibial and peroneal veins  · He had an episode of dyspnea on exertion 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 so CT of the chest was done - bilateral pulmonary emboli, right basilar subpleural opacity likely a pulmonary infarct, hiatal hernia  · Admit to telemetry for acute PE/DVT  · Treated with therapeutic lovenox  · Hematology consultation appreciated   · Echo to assess for RV strain, pending   · Per hematology, discontinue Lovenox and start Xarelto tonight  · If patient tolerates Xarelto well, ok to discharge tomorrow         DVT (deep venous thrombosis) ([facility] [address] )   Assessment & Plan    · Plan as per above        Obesity (BMI 30-39  9)   Assessment & Plan    · Dietary and lifestyle modifications        GERD without esophagitis   Assessment & Plan    · Continue PPI            VTE Pharmacologic Prophylaxis:   Pharmacologic: Enoxaparin (Lovenox) - transition to Xarelto   Mechanical VTE Prophylaxis in Place: Yes    Patient Centered Rounds: I have performed bedside rounds with nursing staff today      Discussions with Specialists or Other Care Team Provider: Nursing, CM, hematology     Education and Discussions with Family / Patient: I have answered all questions to the best of my ability  Time Spent for Care: 20 minutes  More than 50% of total time spent on counseling and coordination of care as described above  Current Length of Stay: 1 day(s)    Current Patient Status: Inpatient   Certification Statement: The patient will continue to require additional inpatient hospital stay due to acute bilateral DVT and PE    Discharge Plan: Patient is not medically stable for discharge today, likely tomorrow am      Code Status: Level 1 - Full Code      Subjective:   Resting upright in bed on room air  Ambulating well on room air, 98%  Denies chest tightness, palpitations, or shortness of breath  Willing to try Xarelto or Eliquis on discharge  Denies HA, lightheadedness, dizziness, abdominal pain, N/V/D  Objective:     Vitals:   Temp (24hrs), Av 2 °F (36 8 °C), Min:97 2 °F (36 2 °C), Max:98 8 °F (37 1 °C)    HR:  [] 76  Resp:  [20-26] 20  BP: (126-149)/() 149/90  SpO2:  [94 %-98 %] 97 %  Body mass index is 33 58 kg/m²  Input and Output Summary (last 24 hours): Intake/Output Summary (Last 24 hours) at 18 1557  Last data filed at 18 1225   Gross per 24 hour   Intake              880 ml   Output              500 ml   Net              380 ml       Physical Exam:     Physical Exam   Constitutional: He is oriented to person, place, and time  He appears well-developed  No distress  HENT:   Head: Normocephalic  Neck: Normal range of motion  Cardiovascular: Normal rate and regular rhythm  Pulmonary/Chest: Effort normal  No respiratory distress  He has no decreased breath sounds  He has no wheezes  He has no rhonchi  He has no rales  Abdominal: Soft  Bowel sounds are normal  He exhibits no distension  There is no tenderness  Musculoskeletal: Normal range of motion  He exhibits no edema or tenderness  Neurological: He is alert and oriented to person, place, and time  Skin: Skin is warm and dry  He is not diaphoretic  Psychiatric: He has a normal mood and affect  His behavior is normal    Nursing note and vitals reviewed  Additional Data:     Labs:      Results from last 7 days  Lab Units 08/16/18  0528 08/15/18  1645   WBC Thousand/uL 10 05 11 27*   HEMOGLOBIN g/dL 13 4 14 1   HEMATOCRIT % 40 8 42 7   PLATELETS Thousands/uL 251 274   NEUTROS PCT %  --  66   LYMPHS PCT %  --  21   MONOS PCT %  --  9   EOS PCT %  --  4       Results from last 7 days  Lab Units 08/16/18  0528 08/15/18  1645   SODIUM mmol/L 137 139   POTASSIUM mmol/L 4 1 4 4   CHLORIDE mmol/L 100 102   CO2 mmol/L 28 30   BUN mg/dL 18 16   CREATININE mg/dL 1 11 1 04   CALCIUM mg/dL 8 7 9 1   TOTAL PROTEIN g/dL  --  8 1   BILIRUBIN TOTAL mg/dL  --  0 50   ALK PHOS U/L  --  97   ALT U/L  --  45   AST U/L  --  29   GLUCOSE RANDOM mg/dL 106 110       Results from last 7 days  Lab Units 08/15/18  1645   INR  1 16       * I Have Reviewed All Lab Data Listed Above  * Additional Pertinent Lab Tests Reviewed: All Labs Within Last 24 Hours Reviewed    Imaging:    Imaging Reports Reviewed Today Include: CTA chest  Imaging Personally Reviewed by Myself Includes:  none    Recent Cultures (last 7 days):           Last 24 Hours Medication List:     Current Facility-Administered Medications:  acetaminophen 650 mg Oral Q6H PRN MIRNA Ortiz   pantoprazole 40 mg Oral Early Morning MIRNA Ortiz   rivaroxaban 15 mg Oral BID With Meals MIRNA York        Today, Patient Was Seen By: MIRNA York    ** Please Note: Dictation voice to text software may have been used in the creation of this document   **

## 2018-08-16 NOTE — PLAN OF CARE
CARDIOVASCULAR - ADULT     Maintains optimal cardiac output and hemodynamic stability Progressing     Absence of cardiac dysrhythmias or at baseline rhythm Progressing        HEMATOLOGIC - ADULT     Maintains hematologic stability Progressing        PAIN - ADULT     Verbalizes/displays adequate comfort level or baseline comfort level Progressing        RESPIRATORY - ADULT     Achieves optimal ventilation and oxygenation Progressing

## 2018-08-16 NOTE — ED PROVIDER NOTES
History  Chief Complaint   Patient presents with    Leg Pain     c/o leg pain, seen by Dr Cici Whatley, sent for vascular test   dx with dvt in both legs, having exhertional shortness of breath     Patient presents for evaluation after positive outpatient DVT study  Doppler study showed bilateral DVT  Patient returned from [facility] on July 15th  States while there he had clicking and poping in his left knee  Upon return the pain progressed to his calf and he went to his orthopedist  Christa Vicente for DVT study  Patient also reports exertional dyspnea  Denies chest pain   used: No    Leg Pain       Prior to Admission Medications   Prescriptions Last Dose Informant Patient Reported? Taking?   esomeprazole (NexIUM) 40 MG capsule   Yes No   Sig: Take 1 capsule by mouth daily   ibuprofen (MOTRIN) 100 mg/5 mL suspension  Self Yes No   Sig: Take 200 mg by mouth every 4 (four) hours as needed for mild pain      Facility-Administered Medications: None       Past Medical History:   Diagnosis Date    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 07/12/2006    Dysphagia     Last Assessed: 8/12/2014     GERD (gastroesophageal reflux disease)     Globus sensation     Last Assessed: 6/4/2014     H/O neoplasm of uncertain behavior of skin 06/06/2006    Hyperlipidemia     Hypertension 02/14/2006    Intrinsic asthma without status asthmaticus 05/02/2006    Lateral epicondylitis of right elbow     Last Assessed: 10/23/2015     Pes planus     last assessed: 10/23/2013     Plantar fascial fibromatosis     Last Assessed: 10/23/2013     Right patellofemoral syndrome     Last Assessed: 4/15/2016     Sebaceous cyst 11/03/2007       Past Surgical History:   Procedure Laterality Date    ESOPHAGOGASTRODUODENOSCOPY N/A 10/7/2016    Procedure: ESOPHAGOGASTRODUODENOSCOPY (EGD); Surgeon: Carlotta Escalante MD;  Location: [facility] GI [facility];   Service:     NASAL SEPTUM SURGERY  1995       Family History   Problem Relation Age of Onset  Heart disease Mother         valve 76s    Hypertension Mother     Cancer Father         colon    Colon cancer Father     Hypertension Father     Coronary artery disease Father         CABG    Heart disease Brother         MI exp age 39    Colon cancer Family     Cancer Brother         esophageal CA exp age 40     I have reviewed and agree with the history as documented  Social History   Substance Use Topics    Smoking status: Never Smoker    Smokeless tobacco: Never Used    Alcohol use Yes      Comment: socially        Review of Systems   Respiratory: Positive for shortness of breath  Cardiovascular: Positive for leg swelling  Negative for chest pain  Musculoskeletal: Positive for arthralgias  All other systems reviewed and are negative  Physical Exam  Physical Exam   Constitutional: He is oriented to person, place, and time  No distress  HENT:   Mouth/Throat: Oropharynx is clear and moist    Eyes: Pupils are equal, round, and reactive to light  Neck: Normal range of motion  Cardiovascular: Regular rhythm and intact distal pulses  Tachycardia present  Pulmonary/Chest: Effort normal and breath sounds normal  No respiratory distress  Abdominal: Soft  There is no tenderness  Musculoskeletal: Normal range of motion  Neurological: He is alert and oriented to person, place, and time  Skin: Capillary refill takes less than 2 seconds  He is not diaphoretic  Nursing note and vitals reviewed        Vital Signs  ED Triage Vitals [08/15/18 1618]   Temperature Pulse Respirations Blood Pressure SpO2   98 5 °F (36 9 °C) (!) 110 (!) 24 (!) 142/104 98 %      Temp Source Heart Rate Source Patient Position - Orthostatic VS BP Location FiO2 (%)   Tympanic Monitor Sitting Right arm --      Pain Score       8           Vitals:    08/15/18 1900 08/15/18 1915 08/15/18 1930 08/15/18 2002   BP:    140/97   Pulse: 86 86 86 87   Patient Position - Orthostatic VS:    Sitting       Visual Acuity      ED Medications  Medications   pantoprazole (PROTONIX) EC tablet 40 mg (not administered)   enoxaparin (LOVENOX) subcutaneous injection 100 mg (not administered)   enoxaparin (LOVENOX) subcutaneous injection 150 mg (150 mg Subcutaneous Given 8/15/18 1816)   iohexol (OMNIPAQUE) 350 MG/ML injection (MULTI-DOSE) 85 mL (85 mL Intravenous Given 8/15/18 1733)       Diagnostic Studies  Results Reviewed     Procedure Component Value Units Date/Time    Protime-INR [[id number]]  (Abnormal) Collected:  08/15/18 1645    Lab Status:  Final result Specimen:  Blood from Arm, Left Updated:  08/15/18 1717     Protime 12 1 (H) seconds      INR 1 16    APTT [[id number]]  (Normal) Collected:  08/15/18 1645    Lab Status:  Final result Specimen:  Blood from Arm, Left Updated:  08/15/18 1717     PTT 25 seconds     Troponin I [[id number]]  (Normal) Collected:  08/15/18 1645    Lab Status:  Final result Specimen:  Blood from Arm, Left Updated:  08/15/18 1715     Troponin I <0 02 ng/mL     Comprehensive metabolic panel [[id number]] Collected:  08/15/18 1645    Lab Status:  Final result Specimen:  Blood from Arm, Left Updated:  08/15/18 1710     Sodium 139 mmol/L      Potassium 4 4 mmol/L      Chloride 102 mmol/L      CO2 30 mmol/L      Anion Gap 7 mmol/L      BUN 16 mg/dL      Creatinine 1 04 mg/dL      Glucose 110 mg/dL      Calcium 9 1 mg/dL      AST 29 U/L      ALT 45 U/L      Alkaline Phosphatase 97 U/L      Total Protein 8 1 g/dL      Albumin 3 8 g/dL      Total Bilirubin 0 50 mg/dL      eGFR 84 ml/min/1 73sq m     Narrative:         National Kidney Disease Education Program recommendations are as follows:  GFR calculation is accurate only with a steady state creatinine  Chronic Kidney disease less than 60 ml/min/1 73 sq  meters  Kidney failure less than 15 ml/min/1 73 sq  meters      CBC and differential [[id number]]  (Abnormal) Collected:  08/15/18 1645    Lab Status:  Final result Specimen:  Blood from Arm, Left Updated:  08/15/18 1654     WBC 11 27 (H) Thousand/uL      RBC 4 72 Million/uL      Hemoglobin 14 1 g/dL      Hematocrit 42 7 %      MCV 91 fL      MCH 29 9 pg      MCHC 33 0 g/dL      RDW 12 1 %      MPV 9 7 fL      Platelets 881 Thousands/uL      nRBC 0 /100 WBCs      Neutrophils Relative 66 %      Immat GRANS % 0 %      Lymphocytes Relative 21 %      Monocytes Relative 9 %      Eosinophils Relative 4 %      Basophils Relative 0 %      Neutrophils Absolute 7 33 Thousands/µL      Immature Grans Absolute 0 04 Thousand/uL      Lymphocytes Absolute 2 32 Thousands/µL      Monocytes Absolute 1 04 Thousand/µL      Eosinophils Absolute 0 49 Thousand/µL      Basophils Absolute 0 05 Thousands/µL                  CTA ED chest PE study   Final Result by Rasheed Valencia MD (08/15 1744)      Bilateral pulmonary emboli  No evidence of right heart strain  Moderate-sized hiatal hernia  2 cm right basilar subpleural opacity/consolidation likely pulmonary infarct           I personally discussed this study with Dr Gus Flores on 8/15/2018 at 5:40 PM                      Workstation performed: [id number]                    Procedures  Procedures       Phone Contacts  ED Phone Contact    ED Course                               MDM  Number of Diagnoses or Management Options  Pulmonary embolism [facility]):   Diagnosis management comments: Pulse ox 96% on RA indicating adequate oxygenation       Amount and/or Complexity of Data Reviewed  Clinical lab tests: ordered and reviewed  Tests in the radiology section of CPT®: ordered and reviewed  Decide to obtain previous medical records or to obtain history from someone other than the patient: yes  Review and summarize past medical records: yes    Patient Progress  Patient progress: stable    CritCare Time    Disposition  Final diagnoses:   Pulmonary embolism ([address] )     Time reflects when diagnosis was documented in both MDM as applicable and the Disposition within this note     Time User Action Codes Description Comment    8/15/2018  6:24 PM Henrry Aparicio Add [I26 99] Pulmonary embolism [facility])       ED Disposition     ED Disposition Condition Comment    Admit  Case was discussed with Dr Clementina Leyden and the patient's admission status was agreed to be admission to the service of Dr Clementina Leyden  Follow-up Information    None         Current Discharge Medication List      CONTINUE these medications which have NOT CHANGED    Details   esomeprazole (NexIUM) 40 MG capsule Take 1 capsule by mouth daily      ibuprofen (MOTRIN) 100 mg/5 mL suspension Take 200 mg by mouth every 4 (four) hours as needed for mild pain           No discharge procedures on file      ED Provider  Electronically Signed by           Jeevan Mitchell DO  08/15/18 3079

## 2018-08-16 NOTE — PLAN OF CARE
Problem: DISCHARGE PLANNING - CARE MANAGEMENT  Goal: Discharge to post-acute care or home with appropriate resources  INTERVENTIONS:  - Conduct assessment to determine patient/family and health care team treatment goals, and need for post-acute services based on payer coverage, community resources, and patient preferences, and barriers to discharge  - Address psychosocial, clinical, and financial barriers to discharge as identified in assessment in conjunction with the patient/family and health care team  - Arrange appropriate level of post-acute services according to patient's   needs and preference and payer coverage in collaboration with the physician and health care team  - Communicate with and update the patient/family, physician, and health care team regarding progress on the discharge plan  - Arrange appropriate transportation to post-acute venues  Outcome: Adequate for Discharge  Plan: Discharge to home with no needs

## 2018-08-16 NOTE — ASSESSMENT & PLAN NOTE
· Traveled to [place], [place] June 30th-July 14th for wedding, while on vacation noticed pain and swelling to left lower extremity  Attributed the discomfort to chronic knee problems  Went to the gym and tried exercises with no relief  He is does a lot of travel for work and frequently takes long car rides  Today, went to see ortho and had a venous doppler  Acute DVT in the right popliteal and posterior tibial veins; left proximal to distal femoral vein, popliteal, gastrocnemius, posterior tibial and peroneal veins    · He had an episode of dyspnea on exertion 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 so CT of the chest was done - bilateral pulmonary emboli, right basilar subpleural opacity likely a pulmonary infarct, hiatal hernia  · Admit to telemetry for acute PE/DVT  · Treated with therapeutic lovenox  · Hematology consultation appreciated   · Echo to assess for RV strain, pending   · Per hematology, discontinue Lovenox and start Xarelto tonight  · If patient tolerates Xarelto well, ok to discharge tomorrow

## 2018-08-16 NOTE — PROGRESS NOTES
08/16/18 1100   Vital Signs   Pulse 100  (while walking)   Oxygen Therapy   SpO2 98 %  (while walking)   SpO2 Activity During Exercise   O2 Device None (Room air)   Walked with patient around the unit for 10 minutes  Highest hear trate was 100 bpm, oxygen saturation between 95-98% on room air  Denied SOB,CP or dizziness

## 2018-08-16 NOTE — CONSULTS
Jasmina Navarrete  1970    HEMATOLOGY/ONCOLOGY CONSULTATION REPORT  [facility]    DISCUSSION/SUMMARY:    51year old male recently diagnosed with bilateral DVTs and PE  Mr Susannah Katz has a history of long car rides and a recent airplane ride  Patient also had lower extremity swelling and pain for number of weeks before pulmonary issues  Presently patient seems stable from a clinical standpoint; Lovenox is ongoing  From a hematology standpoint, patient can begin either a direct thrombin inhibitor or vitamin K antagonist   We discussed the differences between Coumadin verses Xarelto/Eliquis and their potential benefits and side effects  We also discussed what to monitor for in regard to worsening pulmonary issues, trouble breathing, worsening lower extremity pain or swelling, cords etc  Patient understands that he will need anticoagulation for a number of months  At this moment, I am not sure if a thrombophilia evaluation is necessary  There is no family history of thromboses and patient has a provoking incident  The above was discussed with the patient and wife; all questions were answered  Will follow with you  Please do not hesitate to contact me if you have any questions or need additional information  Thank you for this consult     ____________________________________________________________________________________________________    Chief Complaint   Patient presents with    Leg Pain     c/o leg pain, seen by Dr Yari Sweeney, sent for vascular test   dx with dvt in both legs, having exhertional shortness of breath     History of Present Illness:    43-year-old male with relatively good past medical history recently traveling to [facility]   Patient also takes long car rides  Mr Susannah Katz recently developed right lower extremity pain   Pain would come and go but not persist   Patient had taken some time off from working out at the gym but recently went back and began to have persistent right calf pain as well as shortness of breath and dyspnea on exertion  Patient was seen by Orthopedics and Dopplers were requested  Results demonstrated BL lower extremity DVTs  Patient was seen in the emergency room and found to have bilateral PE   Mr Bard Guido has been started on Lovenox  Presently patient states feeling slightly better than before  Still with some dyspnea on exertion and pain with deep breathing  No persistent chest pain or pressure  No cough, sputum or hemoptysis  No problems with excessive bruising or bleeding  Left knee pain is about the same as before  No other body aches  Appetite has been good, weight has been stable  No other GI or  issues  No recent fevers, chills or sweats  Review of Systems   Constitutional: Positive for fatigue  HENT: Negative  Eyes: Negative  Respiratory: Negative  Cardiovascular: Negative  Gastrointestinal: Negative  Endocrine: Negative  Genitourinary: Negative  Musculoskeletal: Positive for joint swelling  Skin: Negative  Allergic/Immunologic: Negative  Neurological: Negative  Hematological: Negative  Psychiatric/Behavioral: Negative  All other systems reviewed and are negative  Patient Active Problem List   Diagnosis    Allergic rhinitis    Diaphragmatic hernia    GERD without esophagitis    Impaired fasting glucose    Screening for cardiovascular, respiratory, and genitourinary diseases    Screening for diabetes mellitus (DM)    Prostate cancer screening    Obesity (BMI 30-39  9)    [place] cardiac risk <10% in next 10 years    Healthcare maintenance    Hyperlipidemia    GERD (gastroesophageal reflux disease)    Pulmonary embolism ([address] )    DVT (deep venous thrombosis) (HCC)     Past Medical History:   Diagnosis Date    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 07/12/2006    Dysphagia     Last Assessed: 8/12/2014     GERD (gastroesophageal reflux disease)     Globus sensation     Last Assessed: 6/4/2014     H/O neoplasm of uncertain behavior of skin 06/06/2006    Hyperlipidemia     Hypertension 02/14/2006    Intrinsic asthma without status asthmaticus 05/02/2006    Lateral epicondylitis of right elbow     Last Assessed: 10/23/2015     Pes planus     last assessed: 10/23/2013     Plantar fascial fibromatosis     Last Assessed: 10/23/2013     Right patellofemoral syndrome     Last Assessed: 4/15/2016     Sebaceous cyst 11/03/2007     Past Surgical History:   Procedure Laterality Date    ESOPHAGOGASTRODUODENOSCOPY N/A 10/7/2016    Procedure: ESOPHAGOGASTRODUODENOSCOPY (EGD); Surgeon: Sweta Davila MD;  Location: [facility]; Service:     NASAL SEPTUM SURGERY  1995     Family History   Problem Relation Age of Onset    Heart disease Mother         valve 76s    Hypertension Mother     Cancer Father         colon    Colon cancer Father     Hypertension Father     Coronary artery disease Father         CABG    Heart disease Brother         MI exp age 39   Danita Reddy Colon cancer Family     Cancer Brother         esophageal CA exp age 40     Social History     Social History    Marital status: /Civil Union     Spouse name: N/A    Number of children: N/A    Years of education: N/A     Occupational History    Not on file       Social History Main Topics    Smoking status: Never Smoker    Smokeless tobacco: Never Used    Alcohol use Yes      Comment: socially    Drug use: No    Sexual activity: Not on file     Other Topics Concern    Not on file     Social History Narrative    No narrative on file       Current Facility-Administered Medications:     acetaminophen (TYLENOL) tablet 650 mg, 650 mg, Oral, Q6H PRN, Richelle Scrivener, CRNP    enoxaparin (LOVENOX) subcutaneous injection 100 mg, 1 mg/kg, Subcutaneous, Q12H, Richelle Scrivener, CRNP, 100 mg at 08/16/18 0546    pantoprazole (PROTONIX) EC tablet 40 mg, 40 mg, Oral, Early Morning, Richelle Scrivener, CRNP, 40 mg at 08/16/18 0546    No Known Allergies    Vitals:    08/16/18 0357   BP: 126/84   Pulse: 76   Resp: 20   Temp: 98 8 °F (37 1 °C)   SpO2: 94%     Physical Exam   Constitutional: He is oriented to person, place, and time  He appears well-developed and well-nourished  HENT:   Head: Normocephalic and atraumatic  Right Ear: External ear normal    Left Ear: External ear normal    Nose: Nose normal    Mouth/Throat: Oropharynx is clear and moist    Eyes: Conjunctivae and EOM are normal  Pupils are equal, round, and reactive to light  Neck: Normal range of motion  Neck supple  Cardiovascular: Normal rate, regular rhythm, normal heart sounds and intact distal pulses  Pulmonary/Chest: Effort normal and breath sounds normal    Good air entry bilaterally, clear   Abdominal: Soft  Bowel sounds are normal    Obese, soft, nontender, cannot palpate liver or spleen   Musculoskeletal: Normal range of motion  Neurological: He is alert and oriented to person, place, and time  He has normal reflexes  Skin: Skin is warm  Warm, good color, moist, no petechiae or ecchymoses   Psychiatric: He has a normal mood and affect  His behavior is normal  Judgment and thought content normal    Extremities:  1+ BL lower extremity swelling, no obvious cords, pulses are 1+  Lymphatics:  No adenopathy in the neck, supraclavicular region, axilla and groin bilaterally    Labs:    Results for Jacquie Golden (MRN [id number]) as of 8/16/2018 18:43   Ref   Range 8/16/2018 05:28   WBC Latest Ref Range: 4 31 - 10 16 Thousand/uL 10 05   RBC Latest Ref Range: 3 88 - 5 62 Million/uL 4 51   Hemoglobin Latest Ref Range: 12 0 - 17 0 g/dL 13 4   HCT Latest Ref Range: 36 5 - 49 3 % 40 8   MCV Latest Ref Range: 82 - 98 fL 91   MCH Latest Ref Range: 26 8 - 34 3 pg 29 7   MCHC Latest Ref Range: 31 4 - 37 4 g/dL 32 8   RDW Latest Ref Range: 11 6 - 15 1 % 12 2   Platelets Latest Ref Range: 149 - 390 Thousands/uL 251       Results for Jacquie Golden (MRN [id number]) as of 8/16/2018 18:43 Ref  Range 8/15/2018 16:45   Sodium Latest Ref Range: 136 - 145 mmol/L 139   Potassium Latest Ref Range: 3 5 - 5 3 mmol/L 4 4   Chloride Latest Ref Range: 100 - 108 mmol/L 102   CO2 Latest Ref Range: 21 - 32 mmol/L 30   Anion Gap Latest Ref Range: 4 - 13 mmol/L 7   BUN Latest Ref Range: 5 - 25 mg/dL 16   Creatinine Latest Ref Range: 0 60 - 1 30 mg/dL 1 04   Glucose Latest Ref Range: 65 - 140 mg/dL 110   Calcium Latest Ref Range: 8 3 - 10 1 mg/dL 9 1   AST (SGOT) Latest Ref Range: 5 - 45 U/L 29   ALT Latest Ref Range: 12 - 78 U/L 45   ALK PHOS Latest Ref Range: 46 - 116 U/L 97   Total Protein Latest Ref Range: 6 4 - 8 2 g/dL 8 1   Albumin Latest Ref Range: 3 5 - 5 0 g/dL 3 8   Total Bilirubin Latest Ref Range: 0 20 - 1 00 mg/dL 0 50   eGFR Latest Units: ml/min/1 73sq m 84       Imaging    08/15/2018 CTA chest PE study    Bilateral pulmonary emboli  No evidence of right heart strain  Moderate-sized hiatal hernia  2 cm right basilar subpleural opacity/consolidation likely pulmonary infarct  08/15/2018 vascular lower limb venous duplex study bilateral    RIGHT LOWER LIMB:  Acute deep vein thrombosis is noted in the popliteal vein, and posterior tibial  veins  No evidence of superficial thrombophlebitis noted  Doppler evaluation shows a normal response to augmentation maneuvers  Popliteal, posterior tibial and anterior tibial arterial Doppler waveforms are  triphasic  LEFT LOWER LIMB:  Acute deep vein thrombosis is noted in the proximal to distal femoral vein,  popliteal vein, gastrocnemius veins, posterior tibial veins and peroneal veins  No evidence of superficial thrombophlebitis noted  Doppler evaluation shows a normal response to augmentation maneuvers    Popliteal, posterior tibial and anterior tibial arterial Doppler waveforms are  triphasic

## 2018-08-16 NOTE — CASE MANAGEMENT
Continued Stay Review    Date: 8/16/18    Vital Signs: /84 (BP Location: Right arm)   Pulse 76   Temp 98 8 °F (37 1 °C) (Oral)   Resp 20   Ht 5' 10" (1 778 m)   Wt 106 kg (234 lb)   SpO2 94%   BMI 33 58 kg/m²     Medications:   Scheduled Meds:   Current Facility-Administered Medications:  acetaminophen 650 mg Oral Q6H PRN Preston Shutter, CRNP   enoxaparin 1 mg/kg Subcutaneous Q12H Preston Shutter, CRNP   pantoprazole 40 mg Oral Early Morning Preston Shutter, CRNP     Continuous Infusions:    PRN Meds:   acetaminophen    Age/Sex: 51 y o  male       HEMATOLOGY  51year old male recently diagnosed with bilateral DVTs and PE  Mr Bard Guido has a history of long car rides and a recent airplane ride  Patient also had lower extremity swelling and pain for number of weeks before pulmonary issues  Presently patient seems stable from a clinical standpoint; Lovenox is ongoing    From a hematology standpoint, patient can begin either a direct thrombin inhibitor or vitamin K antagonist   We discussed the differences between Coumadin verses Xarelto/Eliquis and their potential benefits and side effects      ATTENDING  Pulmonary embolism   · Admit to telemetry for acute PE/DVT  · Treated with therapeutic lovenox  · Hematology consultation appreciated   · Echo to assess for RV strain, pending   · Per hematology, discontinue Lovenox and start Xarelto tonight     Certification Statement: The patient will continue to require additional inpatient hospital stay due to acute bilateral DVT and PE

## 2018-08-16 NOTE — SOCIAL WORK
Met with pt and wife Natalie  Resides in a house that pt has no difficulty with mobilization  Has no dme  No hhc  Has been independent and driving  Works  Explained cm may provide him with free 30 day supply of xarelto or eliquis if prescribed  Will also check copay for ordered medication  Will also have medication delivered to hospital from [facility] if not going home today  Explained role of cm, no other d/c needs  CM reviewed d/c planning process including the following: identifying help at home, patient preference for d/c planning needs, and availability of the treatment team to discuss questions or concerns patient and/or family may have regarding understanding of medications and recognizing signs and symptoms once discharged  CM also encouraged patient to follow up with all recommended appointments after discharge  Patient advised of importance for patient and family to participate in managing patient's medical well being

## 2018-08-16 NOTE — H&P
H&P- María Valera 1970, 51 y o  male MRN: [id number]    Unit/Bed#: [address] [place] [address] Encounter: [id number]    Primary Care Provider: Karla Leon DO   Date and time admitted to hospital: 8/15/2018  4:20 PM        * Pulmonary embolism [facility])   Assessment & Plan    · Traveled to [facility] June 30th-July 14th for wedding, while on vacation noticed pain and swelling to left lower extremity  Attributed the discomfort to chronic knee problems  Went to the gym and tried exercises with no relief  He is does a lot of travel for work and frequently takes long car rides  Today, went to see ortho and had a venous doppler  Acute DVT in the right popliteal and posterior tibial veins; left proximal to distal femoral vein, popliteal, gastrocnemius, posterior tibial and peroneal veins  · He had an episode of dyspnea on exertion 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 so CT of the chest was done - bilateral pulmonary emboli, right basilar subpleural opacity likely a pulmonary infarct, hiatal hernia  · Admit to telemetry for acute PE/DVT  · Continue therapeutic lovenox  · Hematology consultation  · Echo to assess for RV strain        DVT (deep venous thrombosis) ([facility] [address] )   Assessment & Plan    · Plan as per above        Obesity (BMI 30-39  9)   Assessment & Plan    · Dietary and lifestyle modifications        GERD without esophagitis   Assessment & Plan    · Continue PPI            VTE Prophylaxis: Enoxaparin (Lovenox)  / reason for no mechanical VTE prophylaxis acute DVT   Code Status: Level 1 - Full Code    Anticipated Length of Stay:  Patient will be admitted on an Inpatient basis with an anticipated length of stay of  GREATER THAN 2 midnights  Justification for Hospital Stay:  Acute PE, acute DVT    Total Time for Visit, including Counseling / Coordination of Care: 30 minutes  Greater than 50% of this total time spent on direct patient counseling and coordination of care      Chief Complaint:   Leg Pain (c/o leg pain, seen by Dr Uri Sanchez, sent for vascular test   dx with dvt in both legs, having exhertional shortness of breath)      History of Present Illness:    Tha Gibson is a 51 y o  male with a PMH of [place] esophagitis, obesity, diet controlled hyperlipidemia who presents with leg pain  Patient travel to [facility] from June 30th to July 14th for a wedding and after arrival to [facility] noticed that he had left lower extremity pain and swelling  The pain extended from his ankle all the way up to his thigh  He attributed it to an orthopedic cause and went to see an orthopedic doctor today who sent him for venous Dopplers to rule out DVT  Venous Dopplers revealed bilateral acute lower extremity DVTs  Patient also reports some dyspnea on 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 so CTA was done which also revealed bilateral pulmonary embolism  Patient is admitted for further management  Review of Systems:    Review of Systems   Constitutional: Negative  HENT: Negative  Eyes: Negative  Respiratory: Positive for shortness of breath  Cardiovascular: Positive for leg swelling  Gastrointestinal: Negative  Endocrine: Negative  Genitourinary: Negative  Musculoskeletal: Negative  Skin: Negative  Allergic/Immunologic: Negative  Neurological: Negative  Hematological: Negative  Psychiatric/Behavioral: Negative          Past Medical and Surgical History:     Past Medical History:   Diagnosis Date    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 07/12/2006    Dysphagia     Last Assessed: 8/12/2014     GERD (gastroesophageal reflux disease)     Globus sensation     Last Assessed: 6/4/2014     H/O neoplasm of uncertain behavior of skin 06/06/2006    Hyperlipidemia     Hypertension 02/14/2006    Intrinsic asthma without status asthmaticus 05/02/2006    Lateral epicondylitis of right elbow     Last Assessed: 10/23/2015     Pes planus     last assessed: 10/23/2013     Plantar fascial fibromatosis     Last Assessed: 10/23/2013     Right patellofemoral syndrome     Last Assessed: 4/15/2016     Sebaceous cyst 11/03/2007       Past Surgical History:   Procedure Laterality Date    ESOPHAGOGASTRODUODENOSCOPY N/A 10/7/2016    Procedure: ESOPHAGOGASTRODUODENOSCOPY (EGD); Surgeon: Jolanta Lorenzo MD;  Location: [facility]; Service:    [facility]       Meds/Allergies:    Prior to Admission medications    Medication Sig Start Date End Date Taking? Authorizing Provider   esomeprazole (NexIUM) 40 MG capsule Take 1 capsule by mouth daily    Historical Provider, MD   ibuprofen (MOTRIN) 100 mg/5 mL suspension Take 200 mg by mouth every 4 (four) hours as needed for mild pain    Historical Provider, MD       Allergies: No Known Allergies    Social History:     Marital Status: /Civil Union   Substance Use History:   History   Alcohol Use    Yes     Comment: socially     History   Smoking Status    Never Smoker   Smokeless Tobacco    Never Used     History   Drug Use No       Family History:    Family History   Problem Relation Age of Onset    Heart disease Mother         valve 76s    Hypertension Mother     Cancer Father         colon    Colon cancer Father     Hypertension Father     Coronary artery disease Father         CABG    Heart disease Brother         MI exp age 39    Colon cancer Family     Cancer Brother         esophageal CA exp age 40       Physical Exam:     Vitals:   Blood Pressure: 140/97 (08/15/18 2002)  Pulse: 87 (08/15/18 2002)  Temperature: 98 5 °F (36 9 °C) (08/15/18 2002)  Temp Source: Oral (08/15/18 2002)  Respirations: 20 (08/15/18 2002)  Height: 5' 10" (177 8 cm) (08/15/18 2002)  Weight - Scale: 106 kg (234 lb) (08/15/18 2002)  SpO2: 96 % (08/15/18 2002)    Physical Exam   Constitutional: He is oriented to person, place, and time  He appears well-developed and well-nourished  HENT:   Head: Normocephalic and atraumatic     Eyes: Pupils are equal, round, and reactive to light  Neck: Normal range of motion  Neck supple  Cardiovascular: Normal rate, regular rhythm and normal heart sounds  Pulmonary/Chest: Effort normal and breath sounds normal    Abdominal: Soft  Bowel sounds are normal    Musculoskeletal: Normal range of motion  2+ pedal pulses bilaterally   Neurological: He is alert and oriented to person, place, and time  Skin: Skin is warm and dry  Nursing note and vitals reviewed  Additional Data:     Lab Results: I have personally reviewed pertinent reports  Results from last 7 days  Lab Units 08/15/18  1645   WBC Thousand/uL 11 27*   HEMOGLOBIN g/dL 14 1   HEMATOCRIT % 42 7   PLATELETS Thousands/uL 274   NEUTROS PCT % 66   LYMPHS PCT % 21   MONOS PCT % 9   EOS PCT % 4       Results from last 7 days  Lab Units 08/15/18  1645   SODIUM mmol/L 139   POTASSIUM mmol/L 4 4   CHLORIDE mmol/L 102   CO2 mmol/L 30   BUN mg/dL 16   CREATININE mg/dL 1 04   CALCIUM mg/dL 9 1   TOTAL PROTEIN g/dL 8 1   BILIRUBIN TOTAL mg/dL 0 50   ALK PHOS U/L 97   ALT U/L 45   AST U/L 29   GLUCOSE RANDOM mg/dL 110       Results from last 7 days  Lab Units 08/15/18  1645   INR  1 16       Imaging: I have personally reviewed pertinent reports  CTA ED chest PE study   Final Result by Oliver Butler MD (08/15 0435)      Bilateral pulmonary emboli  No evidence of right heart strain  Moderate-sized hiatal hernia  2 cm right basilar subpleural opacity/consolidation likely pulmonary infarct  I personally discussed this study with Dr Emma Owens on 8/15/2018 at 5:40 PM                      Workstation performed: [id number]             CTA ED chest PE study   Final Result      Bilateral pulmonary emboli  No evidence of right heart strain  Moderate-sized hiatal hernia  2 cm right basilar subpleural opacity/consolidation likely pulmonary infarct           I personally discussed this study with Dr Emma Owens on 8/15/2018 at 5:40 PM                      Workstation performed: [id number]             EKG, Pathology, and Other Studies Reviewed on Admission:   · EKG: NSR     Allscripts / Epic Records Reviewed: Yes     ** Please Note: This note has been constructed using a voice recognition system   **

## 2018-08-16 NOTE — CASE MANAGEMENT
Initial Clinical Review    Admission: Date/Time/Statement: 8/15/18 @ 1825     Orders Placed This Encounter   Procedures    Inpatient Admission (expected length of stay for this patient is greater than two midnights)     Standing Status:   Standing     Number of Occurrences:   1     Order Specific Question:   Admitting Physician     Answer:   Ashley Caal [[id number]]     Order Specific Question:   Level of Care     Answer:   Med Surg [[id number]]     Order Specific Question:   Estimated length of stay     Answer:   More than 2 Midnights     Order Specific Question:   Certification     Answer:   I certify that inpatient services are medically necessary for this patient for a duration of greater than two midnights  See H&P and MD Progress Notes for additional information about the patient's course of treatment  ED: Date/Time/Mode of Arrival:   ED Arrival Information     Expected Arrival Acuity Means of Arrival Escorted By Service Admission Type    - 8/15/2018 16:02 Urgent Walk-In Self General Medicine Urgent    Arrival Complaint    Blood Clots (came from outpatient)          Chief Complaint:   Chief Complaint   Patient presents with    Leg Pain     c/o leg pain, seen by Dr Berna Greer, sent for vascular test   dx with dvt in both legs, having exhertional shortness of breath       History of Illness: Curt Rivas is a 51 y o  male with a PMH of Nj's esophagitis, obesity, diet controlled hyperlipidemia who presents with leg pain  Patient travel to [facility] from June 30th to July 14th for a wedding and after arrival to [facility] noticed that he had left lower extremity pain and swelling  The pain extended from his ankle all the way up to his thigh  He attributed it to an orthopedic cause and went to see an orthopedic doctor today who sent him for venous Dopplers to rule out DVT  Venous Dopplers revealed bilateral acute lower extremity DVTs    Patient also reports some dyspnea on 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 so CTA was done which also revealed bilateral pulmonary embolism  Patient is admitted for further management  2+ pedal pulses bilaterally   ED Vital Signs:   ED Triage Vitals [08/15/18 1618]   Temperature Pulse Respirations Blood Pressure SpO2   98 5 °F (36 9 °C) (!) 110 (!) 24 (!) 142/104 98 %      Temp Source Heart Rate Source Patient Position - Orthostatic VS BP Location FiO2 (%)   Tympanic Monitor Sitting Right arm --      Pain Score       8        Wt Readings from Last 1 Encounters:   08/15/18 106 kg (234 lb)       Abnormal Labs/Diagnostic Test Results: TROPONIN <0 02, PT 12 1   WBC Thousand/uL 11 27*     CTA ED chest PE study   Final Result by Payal Vital MD (08/15 8280)   Bilateral pulmonary emboli  No evidence of right heart strain  Moderate-sized hiatal hernia  2 cm right basilar subpleural opacity/consolidation likely pulmonary infarct  VASCULAR DUPLEX B/L  RIGHT LOWER LIMB:  Acute deep vein thrombosis is noted in the popliteal vein, and posterior tibial  veins  LEFT LOWER LIMB:  Acute deep vein thrombosis is noted in the proximal to distal femoral vein,  popliteal vein, gastrocnemius veins, posterior tibial veins and peroneal veins  ED Treatment:   Medication Administration from 08/15/2018 1602 to 08/15/2018 2000       Date/Time Order Dose Route Action Action by Comments     08/15/2018 1816 enoxaparin (LOVENOX) subcutaneous injection 150 mg 150 mg Subcutaneous Given Piter Simms RN      08/15/2018 1733 iohexol (OMNIPAQUE) 350 MG/ML injection (MULTI-DOSE) 85 mL 85 mL Intravenous Given Erika Cardenas           Past Medical/Surgical History:    Active Ambulatory Problems     Diagnosis Date Noted    Allergic rhinitis 09/04/2012    Diaphragmatic hernia 09/04/2012    GERD without esophagitis 09/04/2012    Impaired fasting glucose 09/04/2012    Screening for cardiovascular, respiratory, and genitourinary diseases 05/22/2018    Screening for diabetes mellitus (DM) 05/22/2018    Prostate cancer screening 05/22/2018    Obesity (BMI 30-39 9) 05/22/2018    [place] cardiac risk <10% in next 10 years 06/26/2018   [address] maintenance 06/26/2018    Hyperlipidemia     GERD (gastroesophageal reflux disease)      Resolved Ambulatory Problems     Diagnosis Date Noted    Nj's esophagus 08/12/2014    Tick bite 05/22/2018     Past Medical History:   Diagnosis Date    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 07/12/2006    Dysphagia     GERD (gastroesophageal reflux disease)     Globus sensation     H/O neoplasm of uncertain behavior of skin 06/06/2006    Hyperlipidemia     Hypertension 02/14/2006    Intrinsic asthma without status asthmaticus 05/02/2006    Lateral epicondylitis of right elbow     Pes planus     Plantar fascial fibromatosis     Right patellofemoral syndrome     Sebaceous cyst 11/03/2007       Admitting Diagnosis: Leg pain [M79 606]  Pulmonary embolism ([facility] ) [I26 99]    Age/Sex: 51 y o  male    Assessment/Plan:   [facility] ([facility] )   Assessment & Plan     · Traveled to [facility] June 30th-July 14th for wedding, while on vacation noticed pain and swelling to left lower extremity  Attributed the discomfort to chronic knee problems  Went to the gym and tried exercises with no relief  He is does a lot of travel for work and frequently takes long car rides  Today, went to see ortho and had a venous doppler  Acute DVT in the right popliteal and posterior tibial veins; left proximal to distal femoral vein, popliteal, gastrocnemius, posterior tibial and peroneal veins    · He had an episode of dyspnea on exertion 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 so CT of the chest was done - bilateral pulmonary emboli, right basilar subpleural opacity likely a pulmonary infarct, hiatal hernia  · Admit to telemetry for acute PE/DVT  · Continue therapeutic lovenox  · Hematology consultation  · Echo to assess for RV strain   DVT (deep venous thrombosis) ([facility] [address] ) Assessment & Plan     · Plan as per above   Obesity (BMI 30-39  9)   Assessment & Plan     · Dietary and lifestyle modifications   GERD without esophagitis   Assessment & Plan     · Continue PPI     VTE Prophylaxis: Enoxaparin (Lovenox)  / reason for no mechanical VTE prophylaxis acute DVT   Code Status: Level 1 - Full Code  Anticipated Length of Stay:  Patient will be admitted on an Inpatient basis with an anticipated length of stay of  GREATER THAN 2 midnights     Justification for Hospital Stay:  Acute PE, acute DVT    Admission Orders:  TELE MON  CONSULT HEMATOLOGY  ECHO  NEUROVASCULAR CHECKS    Scheduled Meds:   Current Facility-Administered Medications:  acetaminophen 650 mg Oral Q6H PRN TONIE HuntleyNP   enoxaparin 1 mg/kg Subcutaneous Q12H TONIE HuntleyNP   pantoprazole 40 mg Oral Early Morning MIRNA Huntley     Continuous Infusions:    PRN Meds:   acetaminophen

## 2018-08-17 ENCOUNTER — TRANSITIONAL CARE MANAGEMENT (OUTPATIENT)
Dept: FAMILY MEDICINE CLINIC | Facility: CLINIC | Age: 48
End: 2018-08-17

## 2018-08-17 VITALS
RESPIRATION RATE: 20 BRPM | DIASTOLIC BLOOD PRESSURE: 86 MMHG | SYSTOLIC BLOOD PRESSURE: 138 MMHG | BODY MASS INDEX: 33.5 KG/M2 | HEART RATE: 76 BPM | HEIGHT: 70 IN | WEIGHT: 234 LBS | TEMPERATURE: 97.8 F | OXYGEN SATURATION: 94 %

## 2018-08-17 LAB
ERYTHROCYTE [DISTWIDTH] IN BLOOD BY AUTOMATED COUNT: 12.5 % (ref 11.6–15.1)
HCT VFR BLD AUTO: 41.4 % (ref 36.5–49.3)
HGB BLD-MCNC: 13.4 G/DL (ref 12–17)
MCH RBC QN AUTO: 29.6 PG (ref 26.8–34.3)
MCHC RBC AUTO-ENTMCNC: 32.4 G/DL (ref 31.4–37.4)
MCV RBC AUTO: 91 FL (ref 82–98)
MRSA NOSE QL CULT: NORMAL
PLATELET # BLD AUTO: 255 THOUSANDS/UL (ref 149–390)
PMV BLD AUTO: 9.4 FL (ref 8.9–12.7)
RBC # BLD AUTO: 4.53 MILLION/UL (ref 3.88–5.62)
WBC # BLD AUTO: 8.69 THOUSAND/UL (ref 4.31–10.16)

## 2018-08-17 PROCEDURE — 99239 HOSP IP/OBS DSCHRG MGMT >30: CPT | Performed by: NURSE PRACTITIONER

## 2018-08-17 PROCEDURE — 99232 SBSQ HOSP IP/OBS MODERATE 35: CPT | Performed by: INTERNAL MEDICINE

## 2018-08-17 PROCEDURE — 85027 COMPLETE CBC AUTOMATED: CPT | Performed by: NURSE PRACTITIONER

## 2018-08-17 RX ADMIN — RIVAROXABAN 15 MG: 15 TABLET, FILM COATED ORAL at 08:32

## 2018-08-17 NOTE — PLAN OF CARE
CARDIOVASCULAR - ADULT     Maintains optimal cardiac output and hemodynamic stability Progressing     Absence of cardiac dysrhythmias or at baseline rhythm Progressing        DISCHARGE PLANNING - CARE MANAGEMENT     Discharge to post-acute care or home with appropriate resources Progressing        HEMATOLOGIC - ADULT     Maintains hematologic stability Progressing        PAIN - ADULT     Verbalizes/displays adequate comfort level or baseline comfort level Progressing        RESPIRATORY - ADULT     Achieves optimal ventilation and oxygenation Progressing

## 2018-08-17 NOTE — DISCHARGE INSTRUCTIONS
Bilateral DVT and PE:  Start Xarelto 15 mg twice daily with meals for 21 days followed by 20 mg daily with largest meal of day   You may return to work a week from Monday   Follow-up with Dr Aditi Danielle       Deep Venous Thrombosis   CARLA Oconnell:   Deep venous thrombosis (DVT) is a blood clot that forms in a deep vein of the body  The deep veins in the legs, thighs, and hips are the most common sites for DVT  DVT can also occur in a deep vein within your arms  The clot prevents the normal flow of blood in the vein  The blood backs up and causes pain and swelling  The DVT can break into smaller pieces and travel to your lungs and cause a blockage called a pulmonary embolism  A pulmonary embolism can become life-threatening  DISCHARGE INSTRUCTIONS:   Call 911 for any of the following:   · You feel lightheaded, short of breath, and have chest pain  · You cough up blood  Seek care immediately if:   · Your symptoms get worse  · You develop new DVT symptoms in another leg or arm  Contact your healthcare provider if:   · Your gums or nose bleed  · You see blood in your urine or bowel movements  · Your bowel movements are black or darker than normal     · You have questions or concerns about your conditions or care  Medicines:   · Blood thinners  help prevent the DVT from getting bigger and prevent new clots from forming  Examples of blood thinners include heparin, rivaroxaban, apixiban, and warfarin  The following are general safety guidelines to follow while you are taking a blood thinner:     ¨ Watch for bleeding and bruising  Watch for bleeding from your gums or nose  Watch for blood in your urine and bowel movements  Use a soft washcloth on your skin, and a soft toothbrush to brush your teeth  This can keep your skin and gums from bleeding  If you shave, use an electric shaver  Do not play contact sports  ¨ Tell your dentist and other healthcare providers that you take a blood thinner  Wear a bracelet or necklace that says you take this medicine  ¨ Do not start or stop any medicines unless your healthcare provider tells you to  Many medicines cannot be used with blood thinners  ¨ Tell your healthcare provider right away if you forget to take the blood thinner , or if you take too much  ¨ Warfarin  is a blood thinner that you may need to take  The following are additional things you should be aware of if you take warfarin:    § Foods and medicines can affect the amount of warfarin in your blood  Do not make major changes to your diet  Warfarin works best when you eat about the same amount of vitamin K every day  Vitamin K is found in green leafy vegetables and certain other foods  Ask for more information about what to eat or not to eat  § You will need to see your healthcare provider for follow-up visits  You will need regular blood tests to decide how much warfarin you need  · Take your medicine as directed  Contact your healthcare provider if you think your medicine is not helping or if you have side effects  Tell him or her if you are allergic to any medicine  Keep a list of the medicines, vitamins, and herbs you take  Include the amounts, and when and why you take them  Bring the list or the pill bottles to follow-up visits  Carry your medicine list with you in case of an emergency  Manage your DVT:   · Wear pressure stockings  The stockings are tight and put pressure on your legs  This improves blood flow and helps prevent clots  Wear the stockings during the day  Do not wear them when you sleep  · Elevate your legs  above the level of your heart as often as you can  This will help decrease swelling and pain  Prop your legs on pillows or blankets to keep them elevated comfortably  · Exercise regularly  to help increase your blood flow  Walking is a good low-impact exercise  Talk to your healthcare provider about the best exercise plan for you       · Change body positions often  If you travel by car or work at a desk, move and stretch in your seat several times each hour  In an airplane, get up and walk every hour  If you are bedridden, ask for help to change your position every 1 to 2 hours  · Maintain a healthy weight  Ask your healthcare provider how much you should weigh  Ask him to help you create a weight loss plan if you are overweight  · Do not smoke  Nicotine and other chemicals in cigarettes and cigars can damage blood vessels and make it more difficult to manage your DVT  Ask your healthcare provider for information if you currently smoke and need help to quit  E-cigarettes or smokeless tobacco still contain nicotine  Talk to your healthcare provider before you use these products  Follow up with your healthcare provider as directed:  Write down your questions so you remember to ask them during your visits  © 2017 [facility] Information is for End User's use only and may not be sold, redistributed or otherwise used for commercial purposes  All illustrations and images included in CareNotes® are the copyrighted property of A D A M [employer] Inc  or Bill Szymanski  The above information is an  only  It is not intended as medical advice for individual conditions or treatments  Talk to your doctor, nurse or pharmacist before following any medical regimen to see if it is safe and effective for you  Pulmonary Embolism   WHAT YOU NEED TO KNOW:   What is a pulmonary embolism? A pulmonary embolism (PE) is the sudden blockage of a blood vessel in the lungs by an embolus  An embolus is a small piece of blood clot, fat, air, or tumor cells  The embolus cuts off the blood supply to your lungs  A pulmonary embolism can become life-threatening         What increases my risk for a PE?   · Obesity    · Smoking    · Birth control pills    · Certain blood diseases, such as thrombophilia and hyperhomocysteinemia    · Medical conditions, such as a deep venous thrombosis (DVT) or cancer    · Pregnancy and childbirth    · Recent surgery    · Sitting or lying in one position for a long time, such as when you travel by plane  What are the signs and symptoms of a PE?   · Sudden shortness of breath or fast breathing    · Sudden chest pain that is worse when you take a deep breath    · Fast heartbeat    · Fever and coughing up blood    · Bluish nails    · Cold, pale, clammy skin    · Fainting  How is a PE diagnosed? Ask your healthcare provider about these and other tests you may need:  · Blood tests  may show signs of the PE or how well your organs are working  · An EKG  test records your heart rhythm and how fast your heart beats  It is used to check for abnormal heart function  · A chest x-ray  may show signs of a lung infection or other damage  · A CT scan  may show the PE  You may be given contrast liquid to help your lungs show up better in the pictures  Tell the healthcare provider if you have ever had an allergic reaction to contrast liquid  · A lung scan , or V/Q scan, may show how well blood and oxygen flow in your lungs  A small amount of contrast liquid is used to study your airflow (V) and blood flow (Q)  First, you breathe in medical gas  Then, contrast liquid is injected into a vein  Pictures are taken to see how well your lungs take in oxygen  How is a PE treated? · Medicines:      ¨ Clot busters  are emergency medicines that work to dissolve blood clots  They cannot be used during pregnancy or in people with medical conditions that increase their risk of bleeding  ¨ Blood thinners  help treat the PE and prevent new clots from forming  Examples of blood thinners include heparin, rivaroxaban, apixiban, and warfarin  The following are general safety guidelines to follow while you are taking a blood thinner:     § Watch for bleeding and bruising  Watch for bleeding from your gums or nose   Watch for blood in your urine and bowel movements  Use a soft washcloth on your skin, and a soft toothbrush to brush your teeth  This can keep your skin and gums from bleeding  If you shave, use an electric shaver  Do not play contact sports  § Tell your dentist and other healthcare providers that you take a blood thinner  Wear a bracelet or necklace that says you take this medicine  § Do not start or stop any medicines unless your healthcare provider tells you to  Many medicines cannot be used with blood thinners  § Tell your healthcare provider right away if you forget to take the blood thinner , or if you take too much  § Warfarin  is a blood thinner that you may need to take  The following are additional things you should be aware of if you take warfarin:    § Foods and medicines can affect the amount of warfarin in your blood  Do not make major changes to your diet  Warfarin works best when you eat about the same amount of vitamin K every day  Vitamin K is found in green leafy vegetables and certain other foods  Ask for more information about what to eat or not to eat  § You will need to see your healthcare provider for follow-up visits  You will need regular blood tests to decide how much warfarin you need  · A vena cava filter  may be placed inside your vena cava to prevent another PE  The vena cava is a large vein that brings blood from your lower body up to your heart  The filter traps blood clots and prevents them from going into your lungs  · Surgery , called a thrombectomy, may be done to remove the PE  A procedure called thrombolysis may instead be done to inject a clot buster that helps break the clot apart  How can I decrease my risk for another PE? · Wear pressure stockings  The stockings are tight and put pressure on your legs  This improves blood flow and helps prevent clots  Wear the stockings during the day  Do not wear them when you sleep  · Exercise regularly    Ask about the best exercise plan for you  When you travel by car or work at a desk, take breaks to stand up and move around as much as possible  Rotate your feet in circles often if you sit for a long period of time  · Maintain a healthy weight  Ask your healthcare provider how much you should weigh  Ask him to help you create a weight loss plan if you are overweight  · Do not smoke  Nicotine and other chemicals in cigarettes and cigars can damage blood vessels and increase your risk for another PE  Ask your healthcare provider for information if you currently smoke and need help to quit  E-cigarettes or smokeless tobacco still contain nicotine  Talk to your healthcare provider before you use these products  Call 911 for any of the following:   · You feel lightheaded, short of breath, and have chest pain  · You cough up blood  · You have a seizure  · You have slurred speech, increased sleepiness, or problems seeing, talking, or thinking  · You have weakness or cannot move your arm or leg on one side of your body  When should I seek immediate care? · You feel faint  · You have a severe headache  · Your heart is beating faster than normal   When should I contact my healthcare provider? · The skin on any part of your legs or hips turns purple  · Your gums or nose bleed  · You see blood in your urine or bowel movements  · Your bowel movements are black or darker than normal     · You have questions or concerns about your condition or care  CARE AGREEMENT:   You have the right to help plan your care  Learn about your health condition and how it may be treated  Discuss treatment options with your caregivers to decide what care you want to receive  You always have the right to refuse treatment  The above information is an  only  It is not intended as medical advice for individual conditions or treatments   Talk to your doctor, nurse or pharmacist before following any medical regimen to see if it is safe and effective for you  © 2017 [address] Information is for End User's use only and may not be sold, redistributed or otherwise used for commercial purposes  All illustrations and images included in CareNotes® are the copyrighted property of A D A M , Inc  or Bill Szymanski

## 2018-08-17 NOTE — NURSING NOTE
Pt discharged at this time with wife  Ambulatory to [facility], gait steady  IV d/c'd, intact  Left with all belongings  Discharge instructions reviewed and explained, Encouraged to keep follow up appointments, New RX given, written materials reviewed and explained, verbalized understanding  Work note provided

## 2018-08-17 NOTE — DISCHARGE SUMMARY
Discharge- Yrn Juan 1970, 51 y o  male MRN: [id number]    Unit/Bed#: [address] 419-02 Encounter: [id number]    Primary Care Provider: Dmitri Reyes DO   Date and time admitted to hospital: 8/15/2018  4:20 PM        * Pulmonary embolism [facility])   Assessment & Plan    Traveled to [facility] June 30th-July 14th for wedding, while on vacation noticed pain and swelling to left lower extremity  Attributed the discomfort to chronic knee problems  Went to the gym and tried exercises with no relief  He is does a lot of travel for work and frequently takes long car rides  Went to see ortho and had a venous doppler  Lower extremity Doppler, acute DVT in the right popliteal and posterior tibial veins; left proximal to distal femoral vein, popliteal, gastrocnemius, posterior tibial and peroneal veins  CTA chest, bilateral pulmonary emboli, right basilar subpleural opacity likely a pulmonary infarct, hiatal hernia  No signs of right heart strain  Echo, LVEF 60%  · Admitted to telemetry for acute PE/DVT  · Hematology consultation appreciated   · Treated with therapeutic lovenox then later transitioned to Xarelto   · Patient tolerated 2 doses of Xarelto and discharged home with follow-up with outpatient Hematology        DVT (deep venous thrombosis) (HCC)   Assessment & Plan    · Plan as per above        Obesity (BMI 30-39  9)   Assessment & Plan    · Dietary and lifestyle modifications        GERD without esophagitis   Assessment & Plan    · Continue PPI            Discharging Physician / Practitioner: MIRNA Monterroso  PCP: Dmitri Reyes DO  Admission Date:   Admission Orders     Ordered        08/15/18 1825  Inpatient Admission (expected length of stay for this patient is greater than two midnights)  Once             Discharge Date: 08/17/18    Resolved Problems  Date Reviewed: 8/17/2018    None          Consultations During Hospital Stay:  · Hematology - Dr Burroughs Salvage    Procedures Performed:     Ashtyn Busch extremity doppler:   RIGHT LOWER LIMB:  Acute deep vein thrombosis is noted in the popliteal vein, and posterior tibial  veins  LEFT LOWER LIMB:  Acute deep vein thrombosis is noted in the proximal to distal femoral vein,  popliteal vein, gastrocnemius veins, posterior tibial veins and peroneal veins  CTA chest: Bilateral pulmonary emboli  No evidence of right heart strain  Moderate-sized hiatal hernia  2 cm right basilar subpleural opacity/consolidation likely pulmonary infarct  Significant Findings / Test Results:     Bilateral pulmonary emboli  Bilateral acute DVTs    Incidental Findings:   · None     Test Results Pending at Discharge (will require follow up): · None     Outpatient Tests Requested:  · None    Complications:  None     Reason for Admission: Leg pain, c/o leg swelling, seen by ortho and referred for doppler  Dx with dvt in both legs  Hospital Course:     Ailyn Stack is a 51 y o  male patient who originally presented to the hospital on 8/15/2018 due to bilateral DVT and PE  Patient frequently takes long car rides for his job and recently traveled to [facility]   Patient was admitted for further evaluation and treatment  He was managed with therapeutic Lovenox  A Hematology consultation was obtained  Hematology discussed the plan with the patient and his wife to either begin a direct thrombin inhibitor or vitamin K antagonist   Patient and his wife discuss the differences between Coumadin versus Xarelto/Eliquis with Hematology  The patient opted for Xarelto or Eliquis  Patient was transitioned to Xarelto for which he had 2 doses prior to discharge  Patient is to follow up with Hematology  Hematology will determine if a thrombophilia evaluation is necessary  The patient was given a work excuse for the next week and advised to rest and take it easy  Please see above list of diagnoses and related plan for additional information       Condition at Discharge: stable Discharge Day Visit / Exam:     Subjective:  Overall, patient declines to offer any complaints  Denies HA, dizziness, shortness of breath at rest or with exertion, chest pain, abdominal pain, nausea, vomiting, or diarrhea  All questions answered with patient and wife  Vitals: Blood Pressure: 138/86 (08/17/18 1220)  Pulse: 76 (08/17/18 1220)  Temperature: 97 8 °F (36 6 °C) (08/17/18 0845)  Temp Source: Oral (08/17/18 0845)  Respirations: 20 (08/17/18 0845)  Height: 5' 10" (177 8 cm) (08/15/18 2002)  Weight - Scale: 106 kg (234 lb) (08/15/18 2002)  SpO2: 94 % (08/17/18 0845)  Exam:   Physical Exam   Constitutional: He is oriented to person, place, and time  He appears well-developed  No distress  HENT:   Head: Normocephalic  Neck: Normal range of motion  Cardiovascular: Normal rate and regular rhythm  Pulmonary/Chest: Effort normal and breath sounds normal  No respiratory distress  He has no wheezes  He has no rhonchi  He has no rales  Abdominal: Soft  Bowel sounds are normal  He exhibits no distension  There is no tenderness  Musculoskeletal: Normal range of motion  He exhibits no edema or tenderness  Neurological: He is alert and oriented to person, place, and time  Skin: Skin is warm and dry  No rash noted  He is not diaphoretic  Psychiatric: He has a normal mood and affect  Judgment normal    Nursing note and vitals reviewed  Discussion with Family: Wife at bedside     Discharge instructions/Information to patient and family:   See after visit summary for information provided to patient and family  Provisions for Follow-Up Care:  See after visit summary for information related to follow-up care and any pertinent home health orders  Disposition:     Home    For Discharges to [facility]:   · Not Applicable to this Patient - Not Applicable to this Patient    Planned Readmission: None     Discharge Statement:  I spent > 30 minutes discharging the patient   This time was spent on the day of discharge  I had direct contact with the patient on the day of discharge  Greater than 50% of the total time was spent examining patient, answering all patient questions, arranging and discussing plan of care with patient as well as directly providing post-discharge instructions  Additional time then spent on discharge activities  Discharge Medications:  See after visit summary for reconciled discharge medications provided to patient and family        ** Please Note: This note has been constructed using a voice recognition system **

## 2018-08-17 NOTE — PROGRESS NOTES
Hematology - Oncology Progress Note    Edra [facility] 1970, [id number]  [address] [place] [address] 419/4 [address] 18-*      Impression and plan:    51year old male recently diagnosed with bilateral DVTs and PE  Mr Bob Camacho has a history of long car rides and a recent airplane ride  Patient also had lower extremity swelling and pain for number of weeks before pulmonary issues  Presently patient seems stable from a clinical standpoint; patient is now on Xarelto  Mr Bob Camacho is scheduled for discharge  We rediscussed the reason for the anticoagulation and why patient needs to monitor for any worsening pulmonary symptoms, lower extremity swelling or problems with excessive bruising or bleeding  Patient will follow up in the office  Discussions regarding performing a thrombophilia evaluation will take place in the office     __________________________________________________________________________________________      Chief complaint:  Leg pain, acute pulmonary issues, DVT, P    History of present illness: 79-year-old male with relatively good past medical history recently traveling to [facility]   Patient also takes long car rides  Mr Bob Camacho recently developed right lower extremity pain  Pain would come and go but not persist   Patient had taken some time off from working out at the gym but recently went back and began to have persistent right calf pain as well as shortness of breath and dyspnea on exertion  Patient was seen by Orthopedics and Dopplers were requested  Results demonstrated BL lower extremity DVTs  Patient was seen in the emergency room and found to have bilateral PE   Mr Bob Camacho has been started on Lovenox      Presently patient states feeling better than before  No problems with excessive bruising or bleeding  No splinting, no chest pain or pressure  No cough, sputum or hemoptysis  Knee pain is the same as before, activities are better than before  No GI or  symptoms  Activities are better    No shortness of breath or dyspnea on exertion  Hospital medications list:    Current Facility-Administered Medications:  acetaminophen 650 mg Oral Q6H PRN MIRNA Manzanares   pantoprazole 40 mg Oral Early Morning MIRNA Manzanares   rivaroxaban 15 mg Oral BID With Meals MIRNA Mccall     Physical exam  /86   Pulse 76   Temp 97 8 °F (36 6 °C) (Oral)   Resp 20   Ht 5' 10" (1 778 m)   Wt 106 kg (234 lb)   SpO2 94%   BMI 33 58 kg/m²   Constitutional: Well formed, well-nourished  in no apparent distress  HENT:   Head: Normocephalic and atraumatic  Right Ear: External ear normal    Left Ear: External ear normal    Nose: Nose normal    Mouth/Throat: Oropharynx is clear and moist    Eyes: Conjunctivae and EOM are normal  Pupils are equal, round, and reactive to light  Neck: Normal range of motion  Neck supple  Cardiovascular: Normal rate, regular rhythm, normal heart sounds and intact distal pulses  Pulmonary/Chest: Effort normal and breath sounds normal    Good air entry bilaterally, clear   Abdominal: Soft  Bowel sounds are normal    Obese, soft, nontender, cannot palpate liver or spleen   Musculoskeletal: Normal range of motion  Neurological: He is alert and oriented to person, place, and time  He has normal reflexes  Skin: Skin is warm  Warm, good color, moist, no petechiae or ecchymoses   Psychiatric: He has a normal mood and affect  His behavior is normal  Judgment and thought content normal    Extremities:  1+ BL lower extremity swelling, no obvious cords, pulses are 1+  Lymphatics:  No adenopathy in the neck, supraclavicular region, axilla and groin bilaterally    Imaging     08/15/2018 CTA chest PE study     Bilateral pulmonary emboli     No evidence of right heart strain  Moderate-sized hiatal hernia    2 cm right basilar subpleural opacity/consolidation likely pulmonary infarct        08/15/2018 vascular lower limb venous duplex study bilateral     RIGHT LOWER LIMB:  Acute deep vein thrombosis is noted in the popliteal vein, and posterior tibial  veins  No evidence of superficial thrombophlebitis noted  Doppler evaluation shows a normal response to augmentation maneuvers  Popliteal, posterior tibial and anterior tibial arterial Doppler waveforms are  triphasic      LEFT LOWER LIMB:  Acute deep vein thrombosis is noted in the proximal to distal femoral vein,  popliteal vein, gastrocnemius veins, posterior tibial veins and peroneal veins  No evidence of superficial thrombophlebitis noted  Doppler evaluation shows a normal response to augmentation maneuvers    Popliteal, posterior tibial and anterior tibial arterial Doppler waveforms are  triphasic

## 2018-08-17 NOTE — ASSESSMENT & PLAN NOTE
Traveled to [facility] June 30th-July 14th for wedding, while on vacation noticed pain and swelling to left lower extremity  Attributed the discomfort to chronic knee problems  Went to the gym and tried exercises with no relief  He is does a lot of travel for work and frequently takes long car rides  Went to see ortho and had a venous doppler  Lower extremity Doppler, acute DVT in the right popliteal and posterior tibial veins; left proximal to distal femoral vein, popliteal, gastrocnemius, posterior tibial and peroneal veins  CTA chest, bilateral pulmonary emboli, right basilar subpleural opacity likely a pulmonary infarct, hiatal hernia  No signs of right heart strain    Echo, LVEF 60%  · Admitted to telemetry for acute PE/DVT  · Hematology consultation appreciated   · Treated with therapeutic lovenox then later transitioned to Xarelto   · Patient tolerated 2 doses of Xarelto and discharged home with follow-up with outpatient Hematology

## 2018-08-20 NOTE — CASE MANAGEMENT
Auth:   [id number]    Notification of Discharge  This is a Notification of Discharge from our facility [address]  Please be advised that this patient has been discharge from our facility  Below you will find the admission and discharge date and time including the patients disposition  PRESENTATION DATE: 8/15/2018  4:20 PM  IP ADMISSION DATE: 8/15/18 1825  DISCHARGE DATE: 8/17/2018  1:15 PM  DISPOSITION: [address] [place] [address] in the [facility] Utilization Review Department  Phone: [phone]; Fax [phone]  ATTENTION: The Network Utilization Review Department is now centralized for our 9 Facilities  Make a note that we have a new phone and fax numbers for our Department  Please call with any questions or concerns to [phone] and carefully follow the prompts so that you are directed to the right person  All voicemails are confidential  Fax any determinations, approvals, denials, and requests for initial or continue stay review clinical to [phone]  Due to HIGH CALL volume, it would be easier if you could please send faxed requests to expedite your requests and in part, help us provide discharge notifications faster

## 2018-08-22 ENCOUNTER — OFFICE VISIT (OUTPATIENT)
Dept: FAMILY MEDICINE CLINIC | Facility: CLINIC | Age: 48
End: 2018-08-22
Payer: COMMERCIAL

## 2018-08-22 VITALS
DIASTOLIC BLOOD PRESSURE: 82 MMHG | WEIGHT: 220 LBS | SYSTOLIC BLOOD PRESSURE: 130 MMHG | BODY MASS INDEX: 31.5 KG/M2 | HEART RATE: 76 BPM | HEIGHT: 70 IN | RESPIRATION RATE: 16 BRPM | TEMPERATURE: 98.2 F

## 2018-08-22 DIAGNOSIS — I26.99 OTHER ACUTE PULMONARY EMBOLISM WITHOUT ACUTE COR PULMONALE (HCC): ICD-10-CM

## 2018-08-22 DIAGNOSIS — I82.403 ACUTE DEEP VEIN THROMBOSIS (DVT) OF BOTH LOWER EXTREMITIES, UNSPECIFIED VEIN (HCC): Primary | ICD-10-CM

## 2018-08-22 PROCEDURE — 99496 TRANSJ CARE MGMT HIGH F2F 7D: CPT | Performed by: FAMILY MEDICINE

## 2018-08-22 NOTE — PROGRESS NOTES
Assessment/Plan:    No problem-specific Assessment & Plan notes found for this encounter  Records reviewed  Await hematology eval for NOAC duration for current problem but if has a long term indication  Tylenol prn pain suggested  Feels more short of breath lately  ER if much worse, r/o PE extension but already on anticoagulation     Diagnoses and all orders for this visit:    Acute deep vein thrombosis (DVT) of both lower extremities, unspecified vein ([address] )    Other acute pulmonary embolism without acute cor pulmonale (HCC)      will be on 20mg/d of xarelto after first 21 days    Return if symptoms worsen or fail to improve  Subjective:      Patient ID: Tha Gibson is a 51 y o  male      Chief Complaint   Patient presents with    Transition of Care Management     SLW Blood Clot prcma       HPI  Date and time hospital follow up call was made:  8/17/2018  2:13 PM  Hospital care reviewed:  Records reviewed  Patient was hopsitalized at:  [facility]  Date of admission:  8/15/18  Date of discharge:  8/17/18  Diagnosis:  PE  Disposition:  Home  Were the patients medicaitons reviewed and updated:  Yes  Current symptoms:  None  Post hospital issues:  None  Should patient be enrolled in anticoag monitoring?:  No  Scheduled for follow up?:  Yes  Patients specialists:  Other (comment)  Other specialists Name:  Dr Silvano Mcdonald Hematology- he will call to make an appt  Did you obtain your prescribed medications:  Yes  Do you need help managing your perscriptions or medications:  No  Is transportation to your appointments needed:  No  I have advised the patient to call PCP with any new or worsening symptoms (please type in name along with any credentials):  Tee Douglas LPN  Living Arrangements:  Spouse or Significiant other  Support System:  Family, Friends  The type of support provided:  Physical, Emotional  Do you have social support:  Yes, as much as I need  Are you recieving outpatient services:  No  Are you recieving home care services:  No  Are you using any community resources:  No  Current waiver service:  No  Have you fallen in the last 12 months:  No  Interperter language line required?:  No  Counseling:  Patient  Counseling topics:  Activities of daily living, Importance of RX compliance, patient and family education, instructions for management, Risk factor reduction  Comments:  Cheri Parkinson states that he is doing fine  He denies any complaints at this time  He knows to go to the ER if any chest pain, dyspnea, weakness, dizziness, palpitations, fevers, severe leg pains, etc Catrachita SHEPPARD       Femoral DVT  On xarelto  Actually had sx for 4w  No bleeding noted  Stool is brown  No fam hx of DVT, just esophageal CA  Did have a distant abdominal wall venous thrombosis 9/22/2009  Still some sob at rest  Still with LOWRY  Has been on treadmill    The following portions of the patient's history were reviewed and updated as appropriate: allergies, current medications, past family history, past medical history, past social history, past surgical history and problem list     Review of Systems   Gastrointestinal: Negative for abdominal pain, anal bleeding and blood in stool  Current Outpatient Prescriptions   Medication Sig Dispense Refill    esomeprazole (NexIUM) 40 MG capsule Take 1 capsule by mouth daily      rivaroxaban (XARELTO) 15 mg tablet Take 1 tablet (15 mg total) by mouth 2 (two) times a day with meals for 21 days 42 tablet 0    [START ON 9/7/2018] rivaroxaban (XARELTO) 20 mg tablet Take 1 tablet (20 mg total) by mouth daily with breakfast 30 tablet 0    ibuprofen (MOTRIN) 100 mg/5 mL suspension Take 200 mg by mouth every 4 (four) hours as needed for mild pain       No current facility-administered medications for this visit          Objective:    /82   Pulse 76   Temp 98 2 °F (36 8 °C)   Resp 16   Ht 5' 10" (1 778 m)   Wt 99 8 kg (220 lb)   BMI 31 57 kg/m²        Physical Exam   Constitutional: He appears well-developed  HENT:   Head: Normocephalic  Mouth/Throat: No oropharyngeal exudate  Eyes: Conjunctivae are normal  No scleral icterus  Neck: Neck supple  Cardiovascular: Normal rate and intact distal pulses  No murmur heard  Pulmonary/Chest: Effort normal  No respiratory distress  He has no wheezes  He has no rales  Abdominal: Soft  He exhibits no mass  There is no tenderness  Musculoskeletal: He exhibits no edema or deformity  Neurological: He is alert  Skin: Skin is warm and dry  No rash noted  No pallor  Psychiatric: His behavior is normal  Thought content normal    Nursing note and vitals reviewed      no conversational dyspnea           Adolfo Anderson DO

## 2018-08-27 ENCOUNTER — APPOINTMENT (EMERGENCY)
Dept: RADIOLOGY | Facility: HOSPITAL | Age: 48
End: 2018-08-27
Payer: COMMERCIAL

## 2018-08-27 ENCOUNTER — HOSPITAL ENCOUNTER (EMERGENCY)
Facility: HOSPITAL | Age: 48
Discharge: HOME | End: 2018-08-27
Attending: EMERGENCY MEDICINE | Admitting: EMERGENCY MEDICINE
Payer: COMMERCIAL

## 2018-08-27 VITALS
WEIGHT: 223 LBS | OXYGEN SATURATION: 97 % | HEART RATE: 70 BPM | TEMPERATURE: 98.8 F | RESPIRATION RATE: 20 BRPM | BODY MASS INDEX: 32 KG/M2 | SYSTOLIC BLOOD PRESSURE: 144 MMHG | DIASTOLIC BLOOD PRESSURE: 92 MMHG

## 2018-08-27 DIAGNOSIS — I82.409 DVT (DEEP VENOUS THROMBOSIS) (HCC): Primary | ICD-10-CM

## 2018-08-27 LAB
ANION GAP SERPL CALCULATED.3IONS-SCNC: 7 MMOL/L (ref 4–13)
APTT PPP: 26 SECONDS (ref 24–33)
BASOPHILS # BLD AUTO: 0.06 THOUSANDS/ΜL (ref 0–0.1)
BASOPHILS NFR BLD AUTO: 1 % (ref 0–1)
BUN SERPL-MCNC: 19 MG/DL (ref 5–25)
CALCIUM SERPL-MCNC: 9.2 MG/DL (ref 8.3–10.1)
CHLORIDE SERPL-SCNC: 101 MMOL/L (ref 100–108)
CO2 SERPL-SCNC: 28 MMOL/L (ref 21–32)
CREAT SERPL-MCNC: 1.05 MG/DL (ref 0.6–1.3)
EOSINOPHIL # BLD AUTO: 0.51 THOUSAND/ΜL (ref 0–0.61)
EOSINOPHIL NFR BLD AUTO: 6 % (ref 0–6)
ERYTHROCYTE [DISTWIDTH] IN BLOOD BY AUTOMATED COUNT: 12 % (ref 11.6–15.1)
GFR SERPL CREATININE-BSD FRML MDRD: 83 ML/MIN/1.73SQ M
GLUCOSE SERPL-MCNC: 99 MG/DL (ref 65–140)
HCT VFR BLD AUTO: 42.3 % (ref 36.5–49.3)
HGB BLD-MCNC: 13.7 G/DL (ref 12–17)
IMM GRANULOCYTES # BLD AUTO: 0.02 THOUSAND/UL (ref 0–0.2)
IMM GRANULOCYTES NFR BLD AUTO: 0 % (ref 0–2)
INR PPP: 1.15 (ref 0.86–1.16)
LYMPHOCYTES # BLD AUTO: 2.9 THOUSANDS/ΜL (ref 0.6–4.47)
LYMPHOCYTES NFR BLD AUTO: 33 % (ref 14–44)
MCH RBC QN AUTO: 29.4 PG (ref 26.8–34.3)
MCHC RBC AUTO-ENTMCNC: 32.4 G/DL (ref 31.4–37.4)
MCV RBC AUTO: 91 FL (ref 82–98)
MONOCYTES # BLD AUTO: 0.89 THOUSAND/ΜL (ref 0.17–1.22)
MONOCYTES NFR BLD AUTO: 10 % (ref 4–12)
NEUTROPHILS # BLD AUTO: 4.45 THOUSANDS/ΜL (ref 1.85–7.62)
NEUTS SEG NFR BLD AUTO: 50 % (ref 43–75)
NRBC BLD AUTO-RTO: 0 /100 WBCS
PLATELET # BLD AUTO: 302 THOUSANDS/UL (ref 149–390)
PMV BLD AUTO: 9.7 FL (ref 8.9–12.7)
POTASSIUM SERPL-SCNC: 4.3 MMOL/L (ref 3.5–5.3)
PROTHROMBIN TIME: 12 SECONDS (ref 9.4–11.7)
RBC # BLD AUTO: 4.66 MILLION/UL (ref 3.88–5.62)
SODIUM SERPL-SCNC: 136 MMOL/L (ref 136–145)
TROPONIN I SERPL-MCNC: <0.02 NG/ML
WBC # BLD AUTO: 8.83 THOUSAND/UL (ref 4.31–10.16)

## 2018-08-27 PROCEDURE — 85610 PROTHROMBIN TIME: CPT | Performed by: EMERGENCY MEDICINE

## 2018-08-27 PROCEDURE — 93005 ELECTROCARDIOGRAM TRACING: CPT

## 2018-08-27 PROCEDURE — 36415 COLL VENOUS BLD VENIPUNCTURE: CPT | Performed by: EMERGENCY MEDICINE

## 2018-08-27 PROCEDURE — 96360 HYDRATION IV INFUSION INIT: CPT

## 2018-08-27 PROCEDURE — 80048 BASIC METABOLIC PNL TOTAL CA: CPT | Performed by: EMERGENCY MEDICINE

## 2018-08-27 PROCEDURE — 84484 ASSAY OF TROPONIN QUANT: CPT | Performed by: EMERGENCY MEDICINE

## 2018-08-27 PROCEDURE — 96361 HYDRATE IV INFUSION ADD-ON: CPT

## 2018-08-27 PROCEDURE — 99284 EMERGENCY DEPT VISIT MOD MDM: CPT

## 2018-08-27 PROCEDURE — 71275 CT ANGIOGRAPHY CHEST: CPT

## 2018-08-27 PROCEDURE — 93971 EXTREMITY STUDY: CPT

## 2018-08-27 PROCEDURE — 85025 COMPLETE CBC W/AUTO DIFF WBC: CPT | Performed by: EMERGENCY MEDICINE

## 2018-08-27 PROCEDURE — 85730 THROMBOPLASTIN TIME PARTIAL: CPT | Performed by: EMERGENCY MEDICINE

## 2018-08-27 RX ADMIN — SODIUM CHLORIDE 1000 ML: 0.9 INJECTION, SOLUTION INTRAVENOUS at 19:16

## 2018-08-27 RX ADMIN — IOHEXOL 85 ML: 350 INJECTION, SOLUTION INTRAVENOUS at 20:23

## 2018-08-27 RX ADMIN — RIVAROXABAN 15 MG: 15 TABLET, FILM COATED ORAL at 20:00

## 2018-08-28 ENCOUNTER — VBI (OUTPATIENT)
Dept: ADMINISTRATIVE | Facility: OTHER | Age: 48
End: 2018-08-28

## 2018-08-28 LAB
ATRIAL RATE: 62 BPM
P AXIS: 33 DEGREES
PR INTERVAL: 182 MS
QRS AXIS: -1 DEGREES
QRSD INTERVAL: 94 MS
QT INTERVAL: 402 MS
QTC INTERVAL: 408 MS
T WAVE AXIS: 31 DEGREES
VENTRICULAR RATE: 62 BPM

## 2018-08-28 PROCEDURE — 93010 ELECTROCARDIOGRAM REPORT: CPT | Performed by: INTERNAL MEDICINE

## 2018-08-28 PROCEDURE — 93971 EXTREMITY STUDY: CPT | Performed by: SURGERY

## 2018-08-28 NOTE — ED PROVIDER NOTES
History  Chief Complaint   Patient presents with    Leg Swelling     august 15 was diagnosed with bilateral DVT and PE  today has left calf swelling  also has been having SOB for several days     HPI    This is a 51 year male that presents today with left leg swelling along with shortness of breath  Patient was recently admitted to the hospital on the 15th was discharged was diagnosed with bilateral DVTs along with bilateral PEs  Patient was put on Xarelto which she tolerated well  Patient states he has been doing well for the past week but today noticed shortness of breath has worsened along wit the swelling of left catheterization has significantly gotten worse  He states pain localized to the calf area  States today he feels very winded and short of breath  States he went to work today and could not walk up stairs and had taken elevator up stairs  States he has not missed any doses of his relative  States he has been very compliant  No with complaints of chest pain  No abdominal pain  This is a 51 year male male that has already diagnosed DVT and PE  Patient comes in with shortness of breath worsening along with worsening leg swelling  I spoke with Dr Laquita Romano from [facility]ology [facility] who was worried and sent him over to the ED regarding propagation of his clot  He is still needs workup for cutting disorders  Dr Laquita Romano was worried about failed anticoagulation  I would swelling has worsened  Patient does have prominent swelling of his left calf with tenderness to palpation  He does have soft compartments  Normal cap refill normal color  Normal strength and sensation  Will repeat scan since patient is experiencing dyspnea on exertion/shortness of breath  Prior to Admission Medications   Prescriptions Last Dose Informant Patient Reported?  Taking?   esomeprazole (NexIUM) 40 MG capsule 8/26/2018 at Unknown time  Yes Yes   Sig: Take 1 capsule by mouth daily   rivaroxaban (XARELTO) 15 mg tablet 8/27/2018 at Unknown time  No Yes   Sig: Take 1 tablet (15 mg total) by mouth 2 (two) times a day with meals for 21 days   rivaroxaban (XARELTO) 20 mg tablet   No No   Sig: Take 1 tablet (20 mg total) by mouth daily with breakfast      Facility-Administered Medications: None       Past Medical History:   Diagnosis Date    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 07/12/2006    Dysphagia     Last Assessed: 8/12/2014     GERD (gastroesophageal reflux disease)     Globus sensation     Last Assessed: 6/4/2014     H/O neoplasm of uncertain behavior of skin 06/06/2006    Hyperlipidemia     Hypertension 02/14/2006    Intrinsic asthma without status asthmaticus 05/02/2006    Lateral epicondylitis of right elbow     Last Assessed: 10/23/2015     Pes planus     last assessed: 10/23/2013     Plantar fascial fibromatosis     Last Assessed: 10/23/2013     Right patellofemoral syndrome     Last Assessed: 4/15/2016     Sebaceous cyst 11/03/2007       Past Surgical History:   Procedure Laterality Date    ESOPHAGOGASTRODUODENOSCOPY N/A 10/7/2016    Procedure: ESOPHAGOGASTRODUODENOSCOPY (EGD); Surgeon: Valerie Caal MD;  [facility]; Service:     NASAL SEPTUM SURGERY  1995       Family History   Problem Relation Age of Onset    Heart disease Mother         valve 76s    Hypertension Mother     Cancer Father         colon    Colon cancer Father     Hypertension Father     Coronary artery disease Father         CABG    Heart disease Brother         MI exp age 39    Colon cancer Family     Cancer Brother         esophageal CA exp age 40     I have reviewed and agree with the history as documented  Social History   Substance Use Topics    Smoking status: Never Smoker    Smokeless tobacco: Never Used    Alcohol use Yes      Comment: socially        Review of Systems   Constitutional: Negative  Negative for diaphoresis and fever  HENT: Negative  Respiratory: Positive for shortness of breath  Negative for cough and wheezing  Cardiovascular: Positive for leg swelling  Negative for chest pain and palpitations  Gastrointestinal: Negative for abdominal distention, abdominal pain, nausea and vomiting  Genitourinary: Negative  Musculoskeletal: Negative  Skin: Negative  Neurological: Negative  Psychiatric/Behavioral: Negative  All other systems reviewed and are negative  Physical Exam  Physical Exam   Constitutional: He is oriented to person, place, and time  He appears well-developed and well-nourished  No distress  HENT:   Head: Normocephalic and atraumatic  Nose: Nose normal    Mouth/Throat: Oropharynx is clear and moist    Eyes: Conjunctivae and EOM are normal  Pupils are equal, round, and reactive to light  Neck: Normal range of motion  Neck supple  Cardiovascular: Normal rate, regular rhythm and normal heart sounds  No murmur heard  Pulmonary/Chest: Effort normal and breath sounds normal  No respiratory distress  He has no wheezes  He has no rales  Abdominal: Soft  Bowel sounds are normal  He exhibits no distension  There is no tenderness  There is no rebound and no guarding  Musculoskeletal: Normal range of motion  He exhibits edema  He exhibits no tenderness or deformity  Left calf significant swelling of the calf with tenderness  Soft compartments 2+ DP pulses  Normal strength and station  No discoloration  Normal color and cap refill  Neurological: He is alert and oriented to person, place, and time  No cranial nerve deficit  Skin: Skin is warm and dry  No rash noted  He is not diaphoretic  No pallor  Psychiatric: He has a normal mood and affect  Vitals reviewed        Vital Signs  ED Triage Vitals [08/27/18 1841]   Temperature Pulse Respirations Blood Pressure SpO2   98 8 °F (37 1 °C) 70 20 144/100 97 %      Temp src Heart Rate Source Patient Position - Orthostatic VS BP Location FiO2 (%)   -- -- -- -- --      Pain Score       4           Vitals: 08/27/18 1841 08/27/18 1930 08/27/18 2000   BP: 144/100 141/100 144/92   Pulse: 70 66 70       Visual Acuity      ED Medications  Medications   sodium chloride 0 9 % bolus 1,000 mL (0 mL Intravenous Stopped 8/27/18 2100)   rivaroxaban (XARELTO) tablet 15 mg (15 mg Oral Given 8/27/18 2000)   iohexol (OMNIPAQUE) 350 MG/ML injection (MULTI-DOSE) 85 mL (85 mL Intravenous Given 8/27/18 2023)       Diagnostic Studies  Results Reviewed     Procedure Component Value Units Date/Time    Troponin I [[id number]]  (Normal) Collected:  08/27/18 1910    Lab Status:  Final result Specimen:  Blood from Arm, Left Updated:  08/27/18 1944     Troponin I <0 02 ng/mL     Protime-INR [[id number]]  (Abnormal) Collected:  08/27/18 1910    Lab Status:  Final result Specimen:  Blood from Arm, Left Updated:  08/27/18 1939     Protime 12 0 (H) seconds      INR 1 15    APTT [[id number]]  (Normal) Collected:  08/27/18 1910    Lab Status:  Final result Specimen:  Blood from Arm, Left Updated:  08/27/18 1938     PTT 26 seconds     Basic metabolic panel [[id number]] Collected:  08/27/18 1910    Lab Status:  Final result Specimen:  Blood from Arm, Left Updated:  08/27/18 1934     Sodium 136 mmol/L      Potassium 4 3 mmol/L      Chloride 101 mmol/L      CO2 28 mmol/L      ANION GAP 7 mmol/L      BUN 19 mg/dL      Creatinine 1 05 mg/dL      Glucose 99 mg/dL      Calcium 9 2 mg/dL      eGFR 83 ml/min/1 73sq m     Narrative:         National Kidney Disease Education Program recommendations are as follows:  GFR calculation is accurate only with a steady state creatinine  Chronic Kidney disease less than 60 ml/min/1 73 sq  meters  Kidney failure less than 15 ml/min/1 73 sq  meters      CBC and differential [[id number]] Collected:  08/27/18 1910    Lab Status:  Final result Specimen:  Blood from Arm, Left Updated:  08/27/18 1924     WBC 8 83 Thousand/uL      RBC 4 66 Million/uL      Hemoglobin 13 7 g/dL      Hematocrit 42 3 %      MCV 91 fL      MCH 29 4 pg MCHC 32 4 g/dL      RDW 12 0 %      MPV 9 7 fL      Platelets 457 Thousands/uL      nRBC 0 /100 WBCs      Neutrophils Relative 50 %      Immat GRANS % 0 %      Lymphocytes Relative 33 %      Monocytes Relative 10 %      Eosinophils Relative 6 %      Basophils Relative 1 %      Neutrophils Absolute 4 45 Thousands/µL      Immature Grans Absolute 0 02 Thousand/uL      Lymphocytes Absolute 2 90 Thousands/µL      Monocytes Absolute 0 89 Thousand/µL      Eosinophils Absolute 0 51 Thousand/µL      Basophils Absolute 0 06 Thousands/µL                  VAS lower limb venous duplex study, unilateral/limited   Final Result by DO Courtney (08/28 1035)      CTA ED chest PE study   Final Result by Bhupendra Umanzor MD (08/27 2040)      Resolution of all previously seen pulmonary emboli with the exception of a right lower lobe segmental pulmonary embolism seen on image 2/130  Stable scarring versus pulmonary infarct right lower lobe  Stable hiatal hernia  Workstation performed: [id number]                    Procedures  Procedures       Phone Contacts  ED Phone Contact    ED Course  ED Course as of Aug 28 1501   Mon Aug 27, 2018   2059 Vascular duplex study:     venous duplex: much improved from prior  No clot in proximal femoral vein  Normal flow    Will dishcarge patient since improving and no worsing clot progression   Advised to return if any worsening of symptoms                             MDM  CritCare Time    Disposition  Final diagnoses:   DVT (deep venous thrombosis) ([facility] ) left leg     Time reflects when diagnosis was documented in both MDM as applicable and the Disposition within this note     Time User Action Codes Description Comment    8/27/2018  9:17 PM July Salinas U  8  [I82 409] DVT (deep venous thrombosis) ([facility] )     8/27/2018  9:17 PM Ajit Trotter Modify [I82 409] DVT (deep venous thrombosis) ([facility] ) left leg       ED Disposition     ED Disposition Condition Comment    Discharge  Vessie Meigs [place] discharge to home/self care  Condition at discharge: Good        Follow-up Information     Follow up With Specialties Details Why [facility],  Family Medicine Schedule an appointment as soon as possible for a visit  [address] [place] [address]  [phone]            Discharge Medication List as of 8/27/2018  9:18 PM      CONTINUE these medications which have NOT CHANGED    Details   esomeprazole (NexIUM) 40 MG capsule Take 1 capsule by mouth daily, Historical Med      !! rivaroxaban (XARELTO) 15 mg tablet Take 1 tablet (15 mg total) by mouth 2 (two) times a day with meals for 21 days, Starting Thu 8/16/2018, Until Thu 9/6/2018, Normal      !! rivaroxaban (XARELTO) 20 mg tablet Take 1 tablet (20 mg total) by mouth daily with breakfast, Starting Fri 9/7/2018, Normal       !! - Potential duplicate medications found  Please discuss with provider  No discharge procedures on file      ED Provider  Electronically Signed by           Koffi Jacobson MD  08/28/18 4282

## 2018-08-28 NOTE — DISCHARGE INSTRUCTIONS
Continue taking your Xarelto  followup with pcp     Deep Venous Thrombosis   WHAT YOU NEED TO KNOW:   Deep venous thrombosis (DVT) is a blood clot that forms in a deep vein of the body  The deep veins in the legs, thighs, and hips are the most common sites for DVT  DVT can also occur in a deep vein within your arms  The clot prevents the normal flow of blood in the vein  The blood backs up and causes pain and swelling  The DVT can break into smaller pieces and travel to your lungs and cause a blockage called a pulmonary embolism  A pulmonary embolism can become life-threatening  DISCHARGE INSTRUCTIONS:   Call 911 for any of the following:   · You feel lightheaded, short of breath, and have chest pain  · You cough up blood  Seek care immediately if:   · Your symptoms get worse  · You develop new DVT symptoms in another leg or arm  Contact your healthcare provider if:   · Your gums or nose bleed  · You see blood in your urine or bowel movements  · Your bowel movements are black or darker than normal     · You have questions or concerns about your conditions or care  Medicines:   · Blood thinners  help prevent the DVT from getting bigger and prevent new clots from forming  Examples of blood thinners include heparin, rivaroxaban, apixiban, and warfarin  The following are general safety guidelines to follow while you are taking a blood thinner:     ¨ Watch for bleeding and bruising  Watch for bleeding from your gums or nose  Watch for blood in your urine and bowel movements  Use a soft washcloth on your skin, and a soft toothbrush to brush your teeth  This can keep your skin and gums from bleeding  If you shave, use an electric shaver  Do not play contact sports  ¨ Tell your dentist and other healthcare providers that you take a blood thinner  Wear a bracelet or necklace that says you take this medicine  ¨ Do not start or stop any medicines unless your healthcare provider tells you to   Many medicines cannot be used with blood thinners  ¨ Tell your healthcare provider right away if you forget to take the blood thinner , or if you take too much  ¨ Warfarin  is a blood thinner that you may need to take  The following are additional things you should be aware of if you take warfarin:    § Foods and medicines can affect the amount of warfarin in your blood  Do not make major changes to your diet  Warfarin works best when you eat about the same amount of vitamin K every day  Vitamin K is found in green leafy vegetables and certain other foods  Ask for more information about what to eat or not to eat  § You will need to see your healthcare provider for follow-up visits  You will need regular blood tests to decide how much warfarin you need  · Take your medicine as directed  Contact your healthcare provider if you think your medicine is not helping or if you have side effects  Tell him or her if you are allergic to any medicine  Keep a list of the medicines, vitamins, and herbs you take  Include the amounts, and when and why you take them  Bring the list or the pill bottles to follow-up visits  Carry your medicine list with you in case of an emergency  Manage your DVT:   · Wear pressure stockings  The stockings are tight and put pressure on your legs  This improves blood flow and helps prevent clots  Wear the stockings during the day  Do not wear them when you sleep  · Elevate your legs  above the level of your heart as often as you can  This will help decrease swelling and pain  Prop your legs on pillows or blankets to keep them elevated comfortably  · Exercise regularly  to help increase your blood flow  Walking is a good low-impact exercise  Talk to your healthcare provider about the best exercise plan for you  · Change body positions often  If you travel by car or work at a desk, move and stretch in your seat several times each hour   In an airplane, get up and walk every hour  If you are bedridden, ask for help to change your position every 1 to 2 hours  · Maintain a healthy weight  Ask your healthcare provider how much you should weigh  Ask him to help you create a weight loss plan if you are overweight  · Do not smoke  Nicotine and other chemicals in cigarettes and cigars can damage blood vessels and make it more difficult to manage your DVT  Ask your healthcare provider for information if you currently smoke and need help to quit  E-cigarettes or smokeless tobacco still contain nicotine  Talk to your healthcare provider before you use these products  Follow up with your healthcare provider as directed:  Write down your questions so you remember to ask them during your visits  © 2017 [facility] Information is for End User's use only and may not be sold, redistributed or otherwise used for commercial purposes  All illustrations and images included in CareNotes® are the copyrighted property of [employer]elia Fel[employer] , [employer]  or Bill Szymanski  The above information is an  only  It is not intended as medical advice for individual conditions or treatments  Talk to your doctor, nurse or pharmacist before following any medical regimen to see if it is safe and effective for you

## 2018-08-30 ENCOUNTER — OFFICE VISIT (OUTPATIENT)
Dept: HEMATOLOGY ONCOLOGY | Facility: MEDICAL CENTER | Age: 48
End: 2018-08-30
Payer: COMMERCIAL

## 2018-08-30 VITALS
RESPIRATION RATE: 17 BRPM | DIASTOLIC BLOOD PRESSURE: 78 MMHG | TEMPERATURE: 97.9 F | WEIGHT: 225 LBS | HEART RATE: 87 BPM | HEIGHT: 70 IN | SYSTOLIC BLOOD PRESSURE: 138 MMHG | BODY MASS INDEX: 32.21 KG/M2 | OXYGEN SATURATION: 96 %

## 2018-08-30 DIAGNOSIS — I26.99 OTHER ACUTE PULMONARY EMBOLISM WITHOUT ACUTE COR PULMONALE (HCC): ICD-10-CM

## 2018-08-30 DIAGNOSIS — I82.4Y2 ACUTE DEEP VEIN THROMBOSIS (DVT) OF PROXIMAL VEIN OF LEFT LOWER EXTREMITY (HCC): Primary | ICD-10-CM

## 2018-08-30 PROCEDURE — 99214 OFFICE O/P EST MOD 30 MIN: CPT | Performed by: INTERNAL MEDICINE

## 2018-08-30 NOTE — PROGRESS NOTES
Jasmina Navarrete  1970  [facility] [place] [facility] [place] [address] [id number]    DISCUSSION/SUMMARY:    76-year-old male recently seen and evaluated in the hospital   Patient presented with leg pain and acute pulmonary issues  Workup demonstrated bilateral lower extremity DVTs and bilateral PE  Patient was started on Xarelto  Mr Susannah Katz was discharged and had 1 issue with left lower extremity swelling  This may have been due to the fact that the patient went back to work for the 1st time since being discharged and was more active/on his feet  A trip to the emergency room did not demonstrate any evidence of progression and in fact there was resolution of the thrombotic lesions in the lungs as well as in the right lower extremity  Mr Susannah Katz is to continue with Xarelto; at day 22 he will begin 20 mg a day  Patient will monitor for excessive bruising or bleeding  We also discussed what to monitor for in regard to acute pulmonary symptoms  Patient has a wedding to go to an approximately 4 weeks; from hematology standpoint patient can go on the airplane  Patient will be diligent about being as active as possible  Patient is also to be fitted for compression stockings  Patient is to return in 2 months  At that time, patient will be sent for a thrombophilia evaluation (non provoking incident)  Patient knows to call the hematology/oncology office if there are any other questions or concerns  Carefully review your medication list and verify that the list is accurate and up-to-date  Please call the hematology/oncology office if there are medications missing from the list, medications on the list that you are not currently taking or if there is a dosage or instruction that is different from how you're taking that medication      Patient goals and areas of care:   Start wearing compression stocking, continue with anticoagulation  Patient is able to self-care   _____________________________________________________________________________________    Chief Complaint   Patient presents with    Follow-up     Bilateral lower extremity DVT, bilateral PE     History of Present Illness:    65-year-old male recently admitted to the hospital with BL LE DVTs and BL PE   Mr Romeo Gudino could not remember any specific traumatic episode or provoking incident but remembers having lower extremity pain for a number of weeks before having the pulmonary issues  Patient was placed on Xarelto and discharged  Mr Romeo Gudino called 2 nights ago complaining of worsening lower extremity swelling  Patient was sent to the emergency room  Repeat Dopplers on August 27, 2018 did not demonstrate any evidence within the right lower extremity (limited)  The left lower limb demonstrated acute occlusive deep vein thrombosis in the distal femoral vein, popliteal vein, gastroc veins, soleal veins and posterior tibial veins  No evidence of superficial thrombophlebitis  In comparison to the prior study, there had been resolution of thrombus in the proximal to mid femoral vein and peroneal veins  Presently Mr Conrad states feeling okay, close to baseline  Patient is wondering if part of the issue was anxiety  Lower extremity swelling is minimal, patient continues to be active  No shortness of breath or dyspnea on exertion  No chest pain or pressure  No problems with excessive bruising or bleeding  No signs of infection or fevers  Review of Systems   Constitutional: Negative  HENT: Negative  Eyes: Negative  Respiratory: Negative  Cardiovascular: Negative  Gastrointestinal: Negative  Endocrine: Negative  Genitourinary: Negative  Musculoskeletal: Negative  Skin: Negative  Allergic/Immunologic: Negative  Neurological: Negative  Hematological: Negative  Psychiatric/Behavioral: Negative  All other systems reviewed and are negative       Patient Active Problem List   Diagnosis    Allergic rhinitis    Diaphragmatic hernia    GERD without esophagitis    Impaired fasting glucose    Screening for cardiovascular, respiratory, and genitourinary diseases    Screening for diabetes mellitus (DM)    Prostate cancer screening    Obesity (BMI 30-39  9)    [place] cardiac risk <10% in next 10 years   [address] maintenance    Hyperlipidemia    GERD (gastroesophageal reflux disease)    Pulmonary embolism ([address] )    DVT [facility]deep venous thrombosis) ([facility])     Past Medical History:   Diagnosis Date    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 07/12/2006    Dysphagia     Last Assessed: 8/12/2014     GERD (gastroesophageal reflux disease)     Globus sensation     Last Assessed: 6/4/2014     H/O neoplasm of uncertain behavior of skin 06/06/2006    Hyperlipidemia     Hypertension 02/14/2006    Intrinsic asthma without status asthmaticus 05/02/2006    Lateral epicondylitis of right elbow     Last Assessed: 10/23/2015     Pes planus     last assessed: 10/23/2013     Plantar fascial fibromatosis     Last Assessed: 10/23/2013     Right patellofemoral syndrome     Last Assessed: 4/15/2016     Sebaceous cyst 11/03/2007     Past Surgical History:   Procedure Laterality Date    ESOPHAGOGASTRODUODENOSCOPY N/A 10/7/2016    Procedure: ESOPHAGOGASTRODUODENOSCOPY (EGD); Surgeon: Sri Patel MD;  Location: [facility];   Service:     NASAL SEPTUM SURGERY  1995     Family History   Problem Relation Age of Onset    Heart disease Mother         valve 76s    Hypertension Mother     Cancer Father         colon    Colon cancer Father     Hypertension Father     Coronary artery disease Father         CABG    Heart disease Brother         MI exp age 39   Alicja Sicks Colon cancer Family     Cancer Brother         esophageal CA exp age 40     Social History     Social History    Marital status: /Civil Union     Spouse name: N/A    Number of children: N/A    Years of education: N/A     Occupational History    Not on file  Social History Main Topics    Smoking status: Never Smoker    Smokeless tobacco: Never Used    Alcohol use Yes      Comment: socially    Drug use: No    Sexual activity: Not on file     Other Topics Concern    Not on file     Social History Narrative    No narrative on file       Current Outpatient Prescriptions:     Acetaminophen (TYLENOL) 325 MG CAPS, Take by mouth as needed For headaches, Disp: , Rfl:     esomeprazole (NexIUM) 40 MG capsule, Take 1 capsule by mouth daily, Disp: , Rfl:     rivaroxaban (XARELTO) 15 mg tablet, Take 1 tablet (15 mg total) by mouth 2 (two) times a day with meals for 21 days, Disp: 42 tablet, Rfl: 0    [START ON 9/7/2018] rivaroxaban (XARELTO) 20 mg tablet, Take 1 tablet (20 mg total) by mouth daily with breakfast (Patient not taking: Reported on 8/30/2018 ), Disp: 30 tablet, Rfl: 0    Allergies   Allergen Reactions    Seasonal Ic [Cholestatin]        Vitals:    08/30/18 1545   BP: 138/78   Pulse: 87   Resp: 17   Temp: 97 9 °F (36 6 °C)   SpO2: 96%     Physical Exam   Constitutional: He is oriented to person, place, and time  He appears well-developed and well-nourished  HENT:   Head: Normocephalic and atraumatic  Right Ear: External ear normal    Left Ear: External ear normal    Nose: Nose normal    Mouth/Throat: Oropharynx is clear and moist    Eyes: Conjunctivae and EOM are normal  Pupils are equal, round, and reactive to light  Neck: Normal range of motion  Neck supple  Cardiovascular: Normal rate, regular rhythm, normal heart sounds and intact distal pulses  Pulmonary/Chest: Effort normal and breath sounds normal    Abdominal: Soft  Bowel sounds are normal    Soft, nontender, +bowel sounds, no hepatosplenomegaly   Musculoskeletal: Normal range of motion  Neurological: He is alert and oriented to person, place, and time  He has normal reflexes  Skin: Skin is warm     Good color, warm, moist, no petechiae or ecchymoses   Psychiatric: He has a normal mood and affect  His behavior is normal  Judgment and thought content normal    Extremities:  Very minimal left lower extremity edema, no right lower extremity edema, no cords, pulses are 2+  Lymphatics:  No adenopathy in the neck, supraclavicular region, axilla and groin bilaterally    Labs:    08/27/2018 WBC = 8 8 hemoglobin = 13 7 hematocrit = 42 3 platelet = 414 neutrophil = 50%  08/15/2018 BUN = 16 creatinine = 1 04 calcium = 9 1 AST = 29 ALT = 45 alkaline phosphatase = 97 total bilirubin = 0 50 PTT = 25 PT = 12 1 INR = 1 16    Imaging    08/27/2018 vascular lower limb venous duplex study    RIGHT LOWER LIMB LIMITED:  Evaluation shows no evidence of thrombus in the common femoral vein  Doppler evaluation shows a normal response to augmentation maneuvers  LEFT LOWER LIMB:  Acute, occlusive deep vein thrombosis is noted in the distal femoral vein,  popliteal vein, gastrocnemius veins, soleal veins and posterior tibial veins  No evidence of superficial thrombophlebitis noted  Doppler evaluation shows a normal response to augmentation maneuvers  Popliteal, posterior tibial and anterior tibial arterial Doppler waveforms are  triphasic     08/27/2018 CTA chest PE study    Resolution of all previously seen pulmonary emboli with the exception of a right lower lobe segmental pulmonary embolism seen on image 2/130  Stable scarring versus pulmonary infarct right lower lobe  Stable hiatal hernia  08/15/2018 CTA chest PE study    Bilateral pulmonary emboli  No evidence of right heart strain  Moderate-sized hiatal hernia  2 cm right basilar subpleural opacity/consolidation likely pulmonary infarct  08/15/2018 vascular lower limb venous duplex study bilateral    RIGHT LOWER LIMB:  Acute deep vein thrombosis is noted in the popliteal vein, and posterior tibial  veins  No evidence of superficial thrombophlebitis noted    Doppler evaluation shows a normal response to augmentation maneuvers  Popliteal, posterior tibial and anterior tibial arterial Doppler waveforms are  triphasic  LEFT LOWER LIMB:  Acute deep vein thrombosis is noted in the proximal to distal femoral vein,  popliteal vein, gastrocnemius veins, posterior tibial veins and peroneal veins  No evidence of superficial thrombophlebitis noted  Doppler evaluation shows a normal response to augmentation maneuvers    Popliteal, posterior tibial and anterior tibial arterial Doppler waveforms are  triphasic

## 2018-08-31 ENCOUNTER — TELEPHONE (OUTPATIENT)
Dept: HEMATOLOGY ONCOLOGY | Facility: MEDICAL CENTER | Age: 48
End: 2018-08-31

## 2018-09-24 DIAGNOSIS — I26.99 OTHER PULMONARY EMBOLISM WITHOUT ACUTE COR PULMONALE, UNSPECIFIED CHRONICITY (HCC): ICD-10-CM

## 2018-10-31 ENCOUNTER — OFFICE VISIT (OUTPATIENT)
Dept: HEMATOLOGY ONCOLOGY | Facility: MEDICAL CENTER | Age: 48
End: 2018-10-31
Payer: COMMERCIAL

## 2018-10-31 VITALS
OXYGEN SATURATION: 96 % | HEIGHT: 70 IN | DIASTOLIC BLOOD PRESSURE: 100 MMHG | SYSTOLIC BLOOD PRESSURE: 150 MMHG | TEMPERATURE: 96.9 F | BODY MASS INDEX: 33.64 KG/M2 | WEIGHT: 235 LBS | HEART RATE: 88 BPM | RESPIRATION RATE: 18 BRPM

## 2018-10-31 DIAGNOSIS — I26.99 OTHER ACUTE PULMONARY EMBOLISM WITHOUT ACUTE COR PULMONALE (HCC): Primary | ICD-10-CM

## 2018-10-31 PROCEDURE — 99213 OFFICE O/P EST LOW 20 MIN: CPT | Performed by: INTERNAL MEDICINE

## 2018-10-31 NOTE — PROGRESS NOTES
Melony Skaggs  1970  [facility] [place] [facility] [place]  [address] [facility] [id number]    DISCUSSION/SUMMARY:    41-year-old male recently seen and evaluated in the hospital   Patient presented with leg pain and acute pulmonary issues  Workup demonstrated bilateral lower extremity DVTs and bilateral PE  There was no clear provoking incident  Patient was started on Xarelto  Mr Skylar Brothers feels relatively well but still has trouble taking deep breaths and has some dyspnea on exertion  Left lower extremity swelling is less/better than before but still persistent  We discussed options  Mr Skylar Brothers is to continue with Xarelto 20 mg daily  Patient will continue to monitor for excessive bruising/bleeding  Patient will also monitor for any acute pulmonary symptoms  From hematology standpoint, patient can go on an airplane  Patient is to continue with the compression stockings while at work  Mr Skylar Brothers is to return in approximately 2 months with a thrombophilia evaluation and repeat scanning before  Patient knows to call the hematology/oncology office if there are any other questions or concerns  Carefully review your medication list and verify that the list is accurate and up-to-date  Please call the hematology/oncology office if there are medications missing from the list, medications on the list that you are not currently taking or if there is a dosage or instruction that is different from how you're taking that medication      Patient goals and areas of care:   Start wearing compression stocking, continue with anticoagulation  Patient is able to self-care   _____________________________________________________________________________________    Chief Complaint   Patient presents with    Follow-up     Bilateral DVT, bilateral PE on Xarelto     History of Present Illness:    41-year-old male recently admitted to the hospital with BL LE DVTs and BL PE   Mr Laureano Echols could not remember any specific traumatic episode or provoking incident but remembers having lower extremity pain for a number of weeks before having the pulmonary issues  Patient was placed on Xarelto and discharged  Mr Laureano Echols called 2 nights ago complaining of worsening lower extremity swelling  Patient was sent to the emergency room  Repeat Dopplers on August 27, 2018 did not demonstrate any evidence within the right lower extremity (limited)  The left lower limb demonstrated acute occlusive deep vein thrombosis in the distal femoral vein, popliteal vein, gastroc veins, soleal veins and posterior tibial veins  No evidence of superficial thrombophlebitis  In comparison to the prior study, there had been resolution of thrombus in the proximal to mid femoral vein and peroneal veins  Presently Mr Conrad states feeling okay  Patient still has some dyspnea on exertion and difficulty taking a deep breath  No acute pulmonary issues or chest pain  No problems with excessive bruising or bleeding  Left lower extremity swells towards the end of the day  Appetite is good, weight is stable  Patient's activities are not back to baseline  Mr Laureano Echols used to IV miles a day before the diagnosis - patient is finding it hard not to be as active as he was before  Review of Systems   Constitutional: Positive for fatigue  HENT: Negative  Eyes: Negative  Respiratory: Negative          +dyspnea on exertion   Cardiovascular: Positive for leg swelling  Gastrointestinal: Negative  Endocrine: Negative  Genitourinary: Negative  Musculoskeletal: Negative  Skin: Negative  Allergic/Immunologic: Negative  Neurological: Negative  Hematological: Negative  Psychiatric/Behavioral: Negative  All other systems reviewed and are negative       Patient Active Problem List   Diagnosis    Allergic rhinitis    Diaphragmatic hernia    GERD without esophagitis    Impaired fasting glucose    Screening for cardiovascular, respiratory, and genitourinary diseases    Screening for diabetes mellitus (DM)    Prostate cancer screening    Obesity (BMI 30-39  9)    [place] cardiac risk <10% in next 10 years   [address] maintenance    Hyperlipidemia    GERD (gastroesophageal reflux disease)    Pulmonary embolism ([address] )    DVT (deep venous thrombosis) (HCC)     Past Medical History:   Diagnosis Date    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 07/12/2006    Dysphagia     Last Assessed: 8/12/2014     GERD (gastroesophageal reflux disease)     Globus sensation     Last Assessed: 6/4/2014     H/O neoplasm of uncertain behavior of skin 06/06/2006    Hyperlipidemia     Hypertension 02/14/2006    Intrinsic asthma without status asthmaticus 05/02/2006    Lateral epicondylitis of right elbow     Last Assessed: 10/23/2015     Pes planus     last assessed: 10/23/2013     Plantar fascial fibromatosis     Last Assessed: 10/23/2013     Right patellofemoral syndrome     Last Assessed: 4/15/2016     Sebaceous cyst 11/03/2007     Past Surgical History:   Procedure Laterality Date    ESOPHAGOGASTRODUODENOSCOPY N/A 10/7/2016    Procedure: ESOPHAGOGASTRODUODENOSCOPY (EGD); Surgeon: Dakota Veras MD;  Location: [facility]; Service:     NASAL SEPTUM SURGERY  1995     Family History   Problem Relation Age of Onset    Heart disease Mother         valve 76s    Hypertension Mother     Cancer Father         colon    Colon cancer Father     Hypertension Father     Coronary artery disease Father         CABG    Heart disease Brother         MI exp age 39   Jose Polio Colon cancer Family     Cancer Brother         esophageal CA exp age 40     Social History     Social History    Marital status: /Civil Union     Spouse name: N/A    Number of children: N/A    Years of education: N/A     Occupational History    Not on file       Social History Main Topics    Smoking status: Never Smoker    Smokeless tobacco: Never Used    Alcohol use Yes      Comment: socially    Drug use: No    Sexual activity: Not on file     Other Topics Concern    Not on file     Social History Narrative    No narrative on file       Current Outpatient Prescriptions:     esomeprazole (NexIUM) 40 MG capsule, Take 1 capsule by mouth daily, Disp: , Rfl:     rivaroxaban (XARELTO) 20 mg tablet, Take 1 tablet (20 mg total) by mouth daily with breakfast, Disp: 30 tablet, Rfl: 2    Acetaminophen (TYLENOL) 325 MG CAPS, Take by mouth as needed For headaches, Disp: , Rfl:     rivaroxaban (XARELTO) 15 mg tablet, Take 1 tablet (15 mg total) by mouth 2 (two) times a day with meals for 21 days, Disp: 42 tablet, Rfl: 0    Allergies   Allergen Reactions    Seasonal Ic [Cholestatin]        Vitals:    10/31/18 1558   BP: 150/100   Pulse: 88   Resp: 18   Temp: (!) 96 9 °F (36 1 °C)   SpO2: 96%     Physical Exam   Constitutional: He is oriented to person, place, and time  He appears well-developed and well-nourished  HENT:   Head: Normocephalic and atraumatic  Right Ear: External ear normal    Left Ear: External ear normal    Nose: Nose normal    Mouth/Throat: Oropharynx is clear and moist    Eyes: Pupils are equal, round, and reactive to light  Conjunctivae and EOM are normal    Neck: Normal range of motion  Neck supple  Cardiovascular: Normal rate, regular rhythm, normal heart sounds and intact distal pulses  Pulmonary/Chest: Effort normal and breath sounds normal    Abdominal: Soft  Bowel sounds are normal    Soft, nontender, +bowel sounds, no hepatosplenomegaly   Musculoskeletal: Normal range of motion  Neurological: He is alert and oriented to person, place, and time  He has normal reflexes  Skin: Skin is warm  Good color, warm, moist, no petechiae or ecchymoses   Psychiatric: He has a normal mood and affect   His behavior is normal  Judgment and thought content normal    Extremities:  1+ left lower extremity edema, no right lower extremity edema, no cords, pulses are 2+  Lymphatics:  No adenopathy in the neck, supraclavicular region, axilla and groin bilaterally    Labs:    08/27/2018 WBC = 8 8 hemoglobin = 13 7 hematocrit = 42 3 platelet = 594 neutrophil = 50%  08/15/2018 BUN = 16 creatinine = 1 04 calcium = 9 1 AST = 29 ALT = 45 alkaline phosphatase = 97 total bilirubin = 0 50 PTT = 25 PT = 12 1 INR = 1 16    Imaging    08/27/2018 vascular lower limb venous duplex study    RIGHT LOWER LIMB LIMITED:  Evaluation shows no evidence of thrombus in the common femoral vein  Doppler evaluation shows a normal response to augmentation maneuvers  LEFT LOWER LIMB:  Acute, occlusive deep vein thrombosis is noted in the distal femoral vein,  popliteal vein, gastrocnemius veins, soleal veins and posterior tibial veins  No evidence of superficial thrombophlebitis noted  Doppler evaluation shows a normal response to augmentation maneuvers  Popliteal, posterior tibial and anterior tibial arterial Doppler waveforms are  triphasic     08/27/2018 CTA chest PE study    Resolution of all previously seen pulmonary emboli with the exception of a right lower lobe segmental pulmonary embolism seen on image 2/130  Stable scarring versus pulmonary infarct right lower lobe  Stable hiatal hernia  08/15/2018 CTA chest PE study    Bilateral pulmonary emboli  No evidence of right heart strain  Moderate-sized hiatal hernia  2 cm right basilar subpleural opacity/consolidation likely pulmonary infarct  08/15/2018 vascular lower limb venous duplex study bilateral    RIGHT LOWER LIMB:  Acute deep vein thrombosis is noted in the popliteal vein, and posterior tibial  veins  No evidence of superficial thrombophlebitis noted  Doppler evaluation shows a normal response to augmentation maneuvers  Popliteal, posterior tibial and anterior tibial arterial Doppler waveforms are  triphasic       LEFT LOWER LIMB:  Acute deep vein thrombosis is noted in the proximal to distal femoral vein,  popliteal vein, gastrocnemius veins, posterior tibial veins and peroneal veins  No evidence of superficial thrombophlebitis noted  Doppler evaluation shows a normal response to augmentation maneuvers    Popliteal, posterior tibial and anterior tibial arterial Doppler waveforms are  triphasic

## 2018-11-02 ENCOUNTER — TELEPHONE (OUTPATIENT)
Dept: HEMATOLOGY ONCOLOGY | Facility: MEDICAL CENTER | Age: 48
End: 2018-11-02

## 2018-11-02 NOTE — TELEPHONE ENCOUNTER
I called and explained to [facility] that I have to check with insurance to see if the patient is contracted to a certain lab for blood work  Once I have that information I will generate the order and contact the patient with instructions

## 2018-11-06 ENCOUNTER — TRANSCRIBE ORDERS (OUTPATIENT)
Dept: HEMATOLOGY ONCOLOGY | Facility: MEDICAL CENTER | Age: 48
End: 2018-11-06

## 2018-11-06 DIAGNOSIS — I26.99 BILATERAL PULMONARY EMBOLISM (HCC): Primary | ICD-10-CM

## 2018-11-06 DIAGNOSIS — I82.403 DEEP VEIN THROMBOSIS (DVT) OF BOTH LOWER EXTREMITIES, UNSPECIFIED CHRONICITY, UNSPECIFIED VEIN (HCC): ICD-10-CM

## 2018-11-06 NOTE — PROGRESS NOTES
Rolan Dupont at [facility] by [facility] is not required for thrombophilia profile  CPT codes provided  Patient is contracted to [facility]  Call reference # [id number]  Left message informing patient and asked that he call me with the location of the [facility] he would like to use so I can send the script

## 2018-11-14 ENCOUNTER — TELEPHONE (OUTPATIENT)
Dept: GASTROENTEROLOGY | Facility: AMBULARY SURGERY CENTER | Age: 48
End: 2018-11-14

## 2018-11-15 PROBLEM — K22.70 BARRETT'S ESOPHAGUS WITHOUT DYSPLASIA: Status: ACTIVE | Noted: 2018-11-15

## 2018-11-23 DIAGNOSIS — I26.99 OTHER PULMONARY EMBOLISM WITHOUT ACUTE COR PULMONALE, UNSPECIFIED CHRONICITY (HCC): ICD-10-CM

## 2018-11-26 ENCOUNTER — TELEPHONE (OUTPATIENT)
Dept: HEMATOLOGY ONCOLOGY | Facility: MEDICAL CENTER | Age: 48
End: 2018-11-26

## 2018-11-26 RX ORDER — RIVAROXABAN 20 MG/1
20 TABLET, FILM COATED ORAL
Qty: 30 TABLET | Refills: 2 | Status: SHIPPED | OUTPATIENT
Start: 2018-11-26 | End: 2019-02-21 | Stop reason: SDUPTHER

## 2018-11-26 NOTE — TELEPHONE ENCOUNTER
Returned call, left message for patient  He will need to stop xarelto 48 hours prior to procedure and may restart the next day if no surgical complications  Dr Ana Foster should fax surgical clearance to our office for Dr Yecenia Longo

## 2018-12-05 ENCOUNTER — ANESTHESIA EVENT (OUTPATIENT)
Dept: GASTROENTEROLOGY | Facility: AMBULARY SURGERY CENTER | Age: 48
End: 2018-12-05
Payer: COMMERCIAL

## 2018-12-05 DIAGNOSIS — I82.4Y9 DEEP VEIN THROMBOSIS (DVT) OF PROXIMAL LOWER EXTREMITY, UNSPECIFIED CHRONICITY, UNSPECIFIED LATERALITY (HCC): Primary | ICD-10-CM

## 2018-12-05 NOTE — PRE-PROCEDURE INSTRUCTIONS
Pre-Surgery Instructions:   Medication Instructions    esomeprazole (NexIUM) 40 MG capsule Patient was instructed by Physician and understands   XARELTO 20 MG tablet Patient was instructed by Physician and understands

## 2018-12-06 ENCOUNTER — HOSPITAL ENCOUNTER (OUTPATIENT)
Facility: AMBULARY SURGERY CENTER | Age: 48
Setting detail: OUTPATIENT SURGERY
Discharge: HOME | End: 2018-12-06
Attending: INTERNAL MEDICINE | Admitting: INTERNAL MEDICINE
Payer: COMMERCIAL

## 2018-12-06 ENCOUNTER — ANESTHESIA (OUTPATIENT)
Dept: GASTROENTEROLOGY | Facility: AMBULARY SURGERY CENTER | Age: 48
End: 2018-12-06
Payer: COMMERCIAL

## 2018-12-06 VITALS
SYSTOLIC BLOOD PRESSURE: 125 MMHG | TEMPERATURE: 96.6 F | WEIGHT: 230 LBS | HEIGHT: 70 IN | RESPIRATION RATE: 16 BRPM | OXYGEN SATURATION: 96 % | DIASTOLIC BLOOD PRESSURE: 91 MMHG | BODY MASS INDEX: 32.93 KG/M2 | HEART RATE: 69 BPM

## 2018-12-06 DIAGNOSIS — K22.70 BARRETT'S ESOPHAGUS WITHOUT DYSPLASIA: ICD-10-CM

## 2018-12-06 PROCEDURE — 88305 TISSUE EXAM BY PATHOLOGIST: CPT | Performed by: PATHOLOGY

## 2018-12-06 PROCEDURE — 43239 EGD BIOPSY SINGLE/MULTIPLE: CPT | Performed by: INTERNAL MEDICINE

## 2018-12-06 RX ORDER — PROPOFOL 10 MG/ML
INJECTION, EMULSION INTRAVENOUS CONTINUOUS PRN
Status: DISCONTINUED | OUTPATIENT
Start: 2018-12-06 | End: 2018-12-06 | Stop reason: SURG

## 2018-12-06 RX ORDER — PROPOFOL 10 MG/ML
INJECTION, EMULSION INTRAVENOUS AS NEEDED
Status: DISCONTINUED | OUTPATIENT
Start: 2018-12-06 | End: 2018-12-06 | Stop reason: SURG

## 2018-12-06 RX ORDER — SODIUM CHLORIDE 9 MG/ML
125 INJECTION, SOLUTION INTRAVENOUS CONTINUOUS
Status: DISCONTINUED | OUTPATIENT
Start: 2018-12-06 | End: 2018-12-06 | Stop reason: HOSPADM

## 2018-12-06 RX ADMIN — PROPOFOL 100 MG: 10 INJECTION, EMULSION INTRAVENOUS at 08:47

## 2018-12-06 RX ADMIN — PROPOFOL 150 MCG/KG/MIN: 10 INJECTION, EMULSION INTRAVENOUS at 08:47

## 2018-12-06 RX ADMIN — SODIUM CHLORIDE 125 ML/HR: 0.9 INJECTION, SOLUTION INTRAVENOUS at 08:10

## 2018-12-06 NOTE — H&P
History and Physical - [facility]  Amairani Daja 51 y o  male MRN: [id number]        HPI:  40-year-old male with history of GERD and Nj's esophagus came in for surveillance EGD  His brother  of esophageal cancer  Patient's last couple of EGDs were negative for Nj's on biopsies  He takes Nexium regularly  Historical Information   Past Medical History:   Diagnosis Date    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 2006    Dysphagia     Last Assessed: 2014     GERD (gastroesophageal reflux disease)     Globus sensation     Last Assessed: 2014     H/O neoplasm of uncertain behavior of skin 2006    Hyperlipidemia     Hypertension 2006    Lateral epicondylitis of right elbow     Last Assessed: 10/23/2015     Pes planus     last assessed: 10/23/2013     Plantar fascial fibromatosis     Last Assessed: 10/23/2013     Right patellofemoral syndrome     Last Assessed: 4/15/2016     Sebaceous cyst 2007     Past Surgical History:   Procedure Laterality Date    ESOPHAGOGASTRODUODENOSCOPY N/A 10/7/2016    Procedure: ESOPHAGOGASTRODUODENOSCOPY (EGD); Surgeon: William Torrez MD;  Location: [facility];   Service:    [address] [place] [address]     Social History   History   Alcohol Use    Yes     Comment: socially     History   Drug Use No     History   Smoking Status    Never Smoker   Smokeless Tobacco    Never Used     Family History   Problem Relation Age of Onset    Heart disease Mother         valve 76s    Hypertension Mother     Cancer Father         colon    Colon cancer Father     Hypertension Father     Coronary artery disease Father         CABG    Heart disease Brother         MI exp age 39    Colon cancer Family     Cancer Brother         esophageal CA exp age 40       Meds/Allergies     Prescriptions Prior to Admission   Medication    esomeprazole (NexIUM) 40 MG capsule    XARELTO 20 MG tablet       No Known Allergies    Objective     Blood pressure 156/98, pulse 64, temperature (!) 96 6 °F (35 9 °C), temperature source Tympanic, resp  rate 16, height 5' 9 5" (1 765 m), weight 104 kg (230 lb), SpO2 96 %      PHYSICAL EXAM:    Gen: NAD  CV: S1 & S2 normal, RRR  CHEST: Clear to auscultate  ABD: soft, NT/ND, good bowel sounds  EXT: no edema    ASSESSMENT:     History of Nj's    PLAN:    EGD

## 2018-12-06 NOTE — ANESTHESIA PREPROCEDURE EVALUATION
Review of Systems/Medical History  Patient summary reviewed  Chart reviewed  No history of anesthetic complications     Cardiovascular  Hyperlipidemia, Hypertension , DVT  PE,  Pulmonary       GI/Hepatic    GERD ,   Comment: Diaphragmatic hernia  GERD without esophagitis              Endo/Other    Obesity    GYN       Hematology   Musculoskeletal    Arthritis     Neurology   Psychology           Physical Exam    Airway    Mallampati score: II  TM Distance: >3 FB  Neck ROM: full     Dental   No notable dental hx     Cardiovascular  Cardiovascular exam normal    Pulmonary  Pulmonary exam normal     Other Findings        Anesthesia Plan  ASA Score- 2     Anesthesia Type- IV sedation with anesthesia with ASA Monitors  Additional Monitors:   Airway Plan:         Plan Factors-    Induction-     Postoperative Plan-     Informed Consent- Anesthetic plan and risks discussed with patient

## 2018-12-06 NOTE — OP NOTE
ESOPHAGOGASTRODUODENOSCOPY    PROCEDURE: EGD/ Biopsy    INDICATIONS: Barretts Esophagus, Surveillance    POST-OP DIAGNOSIS: See the impression below    SEDATION: Monitored anesthesia care, check anesthesia records    PHYSICAL EXAM:    Vitals:    12/06/18 0757   BP: 156/98   Pulse: 64   Resp: 16   Temp: (!) 96 6 °F (35 9 °C)   SpO2: 96%    Body mass index is 33 48 kg/m²  General: NAD  Heart: S1 & S2 normal, RRR  Lungs: CTA, No rales or rhonchi  Abdomen: Soft, nontender, nondistended, good bowel sounds    CONSENT:  Informed consent was obtained for the procedure, including sedation after explaining the risks and benefits of the procedure  Risks including but not limited to bleeding, perforation, infection, aspiration were discussed in detail  Also explained about less than 100% sensitivity with the exam and other alternatives  PREPARATION:   EKG tracing, pulse oximetry, blood pressure were monitored throughout the procedure  Patient was identified by myself both verbally and by visual inspection of ID band  DESCRIPTION:   Patient was placed in the left lateral decubitus position and was sedated with the above medication  The gastroscope was introduced in to the oropharynx and the esophagus was intubated under direct visualization  Scope was passed down the esophagus up to 2nd part of the duodenum  A careful inspection was made as the gastroscope was withdrawn, including a retroflexed view of the stomach; findings and interventions are described below  FINDINGS:    #1  Esophagus and GEJ- about 4 cm hiatal hernia was seen along with columnar mucosal extensions for about 1 cm from which multiple biopsies were obtained  #2  Stomach- normal mucosa    #3  Duodenum- normal         IMPRESSIONS:      As above    RECOMMENDATIONS:     Check pathology    Continue Nexium    COMPLICATIONS:  None; patient tolerated the procedure well            DISPOSITION: PACU           CONDITION: Stable

## 2018-12-18 ENCOUNTER — TELEPHONE (OUTPATIENT)
Dept: HEMATOLOGY ONCOLOGY | Facility: MEDICAL CENTER | Age: 48
End: 2018-12-18

## 2018-12-19 NOTE — TELEPHONE ENCOUNTER
Per Dr Raz Hancock we will hold off on getting the CTA at this time  Patient will keep his follow up as scheduled

## 2018-12-27 ENCOUNTER — APPOINTMENT (OUTPATIENT)
Dept: RADIOLOGY | Facility: HOSPITAL | Age: 48
End: 2018-12-27
Attending: INTERNAL MEDICINE
Payer: COMMERCIAL

## 2018-12-27 ENCOUNTER — HOSPITAL ENCOUNTER (OUTPATIENT)
Dept: RADIOLOGY | Facility: HOSPITAL | Age: 48
Discharge: HOME | End: 2018-12-27
Attending: INTERNAL MEDICINE
Payer: COMMERCIAL

## 2018-12-27 DIAGNOSIS — I26.99 OTHER ACUTE PULMONARY EMBOLISM WITHOUT ACUTE COR PULMONALE (HCC): ICD-10-CM

## 2018-12-27 PROCEDURE — 93970 EXTREMITY STUDY: CPT | Performed by: SURGERY

## 2018-12-27 PROCEDURE — 93970 EXTREMITY STUDY: CPT

## 2018-12-30 DIAGNOSIS — K22.70 BARRETT'S ESOPHAGUS WITHOUT DYSPLASIA: Primary | ICD-10-CM

## 2019-01-03 RX ORDER — ESOMEPRAZOLE MAGNESIUM 40 MG/1
CAPSULE, DELAYED RELEASE ORAL
Qty: 90 CAPSULE | Refills: 3 | Status: SHIPPED | OUTPATIENT
Start: 2019-01-03 | End: 2019-02-21 | Stop reason: SDUPTHER

## 2019-01-10 ENCOUNTER — OFFICE VISIT (OUTPATIENT)
Dept: HEMATOLOGY ONCOLOGY | Facility: MEDICAL CENTER | Age: 49
End: 2019-01-10
Payer: COMMERCIAL

## 2019-01-10 VITALS
HEART RATE: 79 BPM | DIASTOLIC BLOOD PRESSURE: 72 MMHG | RESPIRATION RATE: 16 BRPM | OXYGEN SATURATION: 99 % | WEIGHT: 230 LBS | SYSTOLIC BLOOD PRESSURE: 142 MMHG | BODY MASS INDEX: 32.93 KG/M2 | HEIGHT: 70 IN | TEMPERATURE: 97.1 F

## 2019-01-10 DIAGNOSIS — I26.99 OTHER PULMONARY EMBOLISM WITHOUT ACUTE COR PULMONALE, UNSPECIFIED CHRONICITY (HCC): ICD-10-CM

## 2019-01-10 PROCEDURE — 99213 OFFICE O/P EST LOW 20 MIN: CPT | Performed by: INTERNAL MEDICINE

## 2019-01-10 NOTE — PROGRESS NOTES
Gregg Koehler  1970  [facility] [place] [id number]    DISCUSSION/SUMMARY:    70-year-old male recently seen and evaluated in the hospital   Patient presented with leg pain and acute pulmonary issues  Workup demonstrated bilateral lower extremity DVTs and bilateral PE  There was no clear provoking incident  Patient was started on Xarelto - has completed 6 months of treatment  Mr Rhonda Sheffield feels relatively well but still has trouble taking deep breaths and has some dyspnea on exertion  Left lower extremity swelling is less/better than before but still persistent  We discussed options  The thrombophilia evaluation is not presently available  For the time being, patient is to continue with the Xarelto 20 mg a day  Patient will continue to monitor for excessive bruising/bleeding  From hematology standpoint, patient can take long trips, airplane rides etc as long as he continues to be active  Patient will continue with the compression stockings while at work or on his feet for any length of time  The plan was to call the patient with the thrombophilia evaluation results when they were available  Mr Rhonda Sheffield would rather come back to the office with his wife so that we can discuss the results face to face  Additionally, the plan had been to repeat the CTA/chest demonstrated resolution  Patient's insurance would not allow an additional CT scan  If the pulmonary issues continue, patient will need to be referred to Pulmonary  Patient knows to call the hematology/oncology office if there are any other questions or concerns  Carefully review your medication list and verify that the list is accurate and up-to-date   Please call the hematology/oncology office if there are medications missing from the list, medications on the list that you are not currently taking or if there is a dosage or instruction that is different from how you're taking that medication  Patient goals and areas of care:   Start wearing compression stocking, continue with anticoagulation  Barriers to care:  None  Patient is able to self-care   ___________________________________________________________________________________________________________    Chief Complaint   Patient presents with    Follow-up     Bilateral lower extremity DVTs, bilateral PEs, on Xarelto     History of Present Illness:    76-year-old male recently admitted to the hospital with BL LE DVTs and BL PE   Mr Joaquina Avalos could not remember any specific traumatic episode or provoking incident but remembers having lower extremity pain for a number of weeks before having the pulmonary issues  Patient was placed on Xarelto and discharged  Mr Joaquina Avalos called 2 nights ago complaining of worsening lower extremity swelling  Patient was sent to the emergency room  Repeat Dopplers on August 27, 2018 did not demonstrate any evidence within the right lower extremity (limited)  The left lower limb demonstrated acute occlusive deep vein thrombosis in the distal femoral vein, popliteal vein, gastroc veins, soleal veins and posterior tibial veins  No evidence of superficial thrombophlebitis  In comparison to the prior study, there had been resolution of thrombus in the proximal to mid femoral vein and peroneal veins  Presently Mr Conrad states feeling more less okay, same as before  Patient's biggest complaint is continued left > right lower extremity swelling especially towards the end of the day  Compression stockings are very uncomfortable and patient is concerned that he gets a very bad odor after wearing the compression stockings all day  Patient last were the compression stockings approximately 2 weeks ago  No shortness of breath or dyspnea on exertion, no chest pain or pressure  Patient has not been very active for the last 6 months other than walking on the treadmill    No problems with excessive bruising or bleeding  No headaches, blurred vision or dizziness  No fevers, chills or sweats  Patient is working full-time  Review of Systems   Constitutional: Positive for fatigue  HENT: Negative  Eyes: Negative  Respiratory: Negative  Cardiovascular: Positive for leg swelling  Gastrointestinal: Negative  Endocrine: Negative  Genitourinary: Negative  Musculoskeletal: Negative  Skin: Negative  Allergic/Immunologic: Negative  Neurological: Negative  Hematological: Negative  Psychiatric/Behavioral: Negative  All other systems reviewed and are negative  Patient Active Problem List   Diagnosis    Allergic rhinitis    Diaphragmatic hernia    GERD without esophagitis    Impaired fasting glucose    Screening for cardiovascular, respiratory, and genitourinary diseases    Screening for diabetes mellitus (DM)    Prostate cancer screening    Obesity (BMI 30-39  9)    Kersey cardiac risk <10% in next 10 years   [address] maintenance    Hyperlipidemia    GERD (gastroesophageal reflux disease)    [facility] embolism ([facility]    DVT (deep venous thrombosis) ([facility]    Nj's esophagus without dysplasia     Past Medical History:   Diagnosis Date    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 07/12/2006    Dysphagia     Last Assessed: 8/12/2014     GERD (gastroesophageal reflux disease)     Globus sensation     Last Assessed: 6/4/2014     H/O neoplasm of uncertain behavior of skin 06/06/2006    Hyperlipidemia     Hypertension 02/14/2006    Lateral epicondylitis of right elbow     Last Assessed: 10/23/2015     Pes planus     last assessed: 10/23/2013     Plantar fascial fibromatosis     Last Assessed: 10/23/2013     Right patellofemoral syndrome     Last Assessed: 4/15/2016     Sebaceous cyst 11/03/2007     Past Surgical History:   Procedure Laterality Date    ESOPHAGOGASTRODUODENOSCOPY N/A 10/7/2016    Procedure: ESOPHAGOGASTRODUODENOSCOPY (EGD); Surgeon: Linda Malone MD;  Location: [facility]; Service:     NASAL SEPTUM SURGERY  1995    [place] ESOPHAGOGASTRODUODENOSCOPY TRANSORAL DIAGNOSTIC N/A 12/6/2018    Procedure: ESOPHAGOGASTRODUODENOSCOPY (EGD); Surgeon: Linda Malone MD;  Location: [facility]; Service: Gastroenterology     Family History   Problem Relation Age of Onset    Heart disease Mother         valve 76s    Hypertension Mother     Cancer Father         colon    Colon cancer Father     Hypertension Father     Coronary artery disease Father         CABG    Heart disease Brother         MI exp age 39   [place] Colon cancer Family     Cancer Brother         esophageal CA exp age 40     Social History     Social History    Marital status: /Civil Union     Spouse name: N/A    Number of children: N/A    Years of education: N/A     Occupational History    Not on file  Social History Main Topics    Smoking status: Never Smoker    Smokeless tobacco: Never Used    Alcohol use Yes      Comment: socially    Drug use: No    Sexual activity: Not on file     Other Topics Concern    Not on file     Social History Narrative    No narrative on file       Current Outpatient Prescriptions:     esomeprazole (NexIUM) 40 MG capsule, TAKE ONE CAPSULE DAILY, Disp: 90 capsule, Rfl: 3    XARELTO 20 MG tablet, TAKE 1 TABLET (20 MG TOTAL) BY MOUTH DAILY WITH BREAKFAST, Disp: 30 tablet, Rfl: 2    No Known Allergies    Vitals:    01/10/19 1635   BP: 142/72   Pulse: 79   Resp: 16   Temp: (!) 97 1 °F (36 2 °C)   SpO2: 99%     Physical Exam   Constitutional: He is oriented to person, place, and time  He appears well-developed and well-nourished  HENT:   Head: Normocephalic and atraumatic  Right Ear: External ear normal    Left Ear: External ear normal    Nose: Nose normal    Mouth/Throat: Oropharynx is clear and moist    Eyes: Pupils are equal, round, and reactive to light   Conjunctivae and EOM are normal  Neck: Normal range of motion  Neck supple  Cardiovascular: Normal rate, regular rhythm, normal heart sounds and intact distal pulses  Pulmonary/Chest: Effort normal and breath sounds normal    Abdominal: Soft  Bowel sounds are normal    Soft, nontender, +bowel sounds, no hepatosplenomegaly   Musculoskeletal: Normal range of motion  Neurological: He is alert and oriented to person, place, and time  He has normal reflexes  Skin: Skin is warm  Good color, warm, moist, no petechiae or ecchymoses   Psychiatric: He has a normal mood and affect  His behavior is normal  Judgment and thought content normal    Extremities:  1+ left lower extremity edema, no right lower extremity edema, no cords, pulses are 2+  Lymphatics:  No adenopathy in the neck, supraclavicular region, axilla and groin bilaterally    Labs    08/27/2018 WBC = 8 8 hemoglobin = 13 7 hematocrit = 42 3 platelet = 074 neutrophil = 50%  08/15/2018 BUN = 16 creatinine = 1 04 calcium = 9 1 AST = 29 ALT = 45 alkaline phosphatase = 97 total bilirubin = 0 50 PTT = 25 PT = 12 1 INR = 1 16    Imaging    12/27/2018 vascular lower limb venous duplex study bilateral    RIGHT LOWER LIMB:  Evidence of subacute or chronic deep vein thrombosis in the popliteal vein and  posterior tibial vein  No evidence of superficial thrombophlebitis noted  Doppler evaluation shows a normal response to augmentation maneuvers  Popliteal, posterior tibial and anterior tibial arterial Doppler waveforms are  triphasic  LEFT LOWER LIMB:  Evidence of subacute or chronic deep vein thrombosis in the mid/distal femoral  vein, popliteal vein, 1 gastrucnemius vein and posterior tibial vein  Resolution of the soleal deep vein  No evidence of superficial thrombophlebitis noted  Doppler evaluation shows a normal response to augmentation maneuvers    Popliteal, posterior tibial and anterior tibial arterial Doppler waveforms are  triphasic     08/27/2018 vascular lower limb venous duplex study    RIGHT LOWER LIMB LIMITED:  Evaluation shows no evidence of thrombus in the common femoral vein  Doppler evaluation shows a normal response to augmentation maneuvers  LEFT LOWER LIMB:  Acute, occlusive deep vein thrombosis is noted in the distal femoral vein,  popliteal vein, gastrocnemius veins, soleal veins and posterior tibial veins  No evidence of superficial thrombophlebitis noted  Doppler evaluation shows a normal response to augmentation maneuvers  Popliteal, posterior tibial and anterior tibial arterial Doppler waveforms are  triphasic     08/27/2018 CTA chest PE study    Resolution of all previously seen pulmonary emboli with the exception of a right lower lobe segmental pulmonary embolism seen on image 2/130  Stable scarring versus pulmonary infarct right lower lobe  Stable hiatal hernia  08/15/2018 CTA chest PE study    Bilateral pulmonary emboli  No evidence of right heart strain  Moderate-sized hiatal hernia  2 cm right basilar subpleural opacity/consolidation likely pulmonary infarct  08/15/2018 vascular lower limb venous duplex study bilateral    RIGHT LOWER LIMB:  Acute deep vein thrombosis is noted in the popliteal vein, and posterior tibial  veins  No evidence of superficial thrombophlebitis noted  Doppler evaluation shows a normal response to augmentation maneuvers  Popliteal, posterior tibial and anterior tibial arterial Doppler waveforms are  triphasic  LEFT LOWER LIMB:  Acute deep vein thrombosis is noted in the proximal to distal femoral vein,  popliteal vein, gastrocnemius veins, posterior tibial veins and peroneal veins  No evidence of superficial thrombophlebitis noted  Doppler evaluation shows a normal response to augmentation maneuvers    Popliteal, posterior tibial and anterior tibial arterial Doppler waveforms are  triphasic

## 2019-01-15 ENCOUNTER — OFFICE VISIT (OUTPATIENT)
Dept: HEMATOLOGY ONCOLOGY | Facility: MEDICAL CENTER | Age: 49
End: 2019-01-15
Payer: COMMERCIAL

## 2019-01-15 VITALS
BODY MASS INDEX: 33.79 KG/M2 | DIASTOLIC BLOOD PRESSURE: 74 MMHG | RESPIRATION RATE: 17 BRPM | HEART RATE: 78 BPM | WEIGHT: 236 LBS | SYSTOLIC BLOOD PRESSURE: 126 MMHG | OXYGEN SATURATION: 98 % | TEMPERATURE: 98.7 F | HEIGHT: 70 IN

## 2019-01-15 DIAGNOSIS — D68.51 FACTOR 5 LEIDEN MUTATION, HETEROZYGOUS (HCC): ICD-10-CM

## 2019-01-15 DIAGNOSIS — I26.99 OTHER ACUTE PULMONARY EMBOLISM WITHOUT ACUTE COR PULMONALE (HCC): Primary | ICD-10-CM

## 2019-01-15 PROCEDURE — 99213 OFFICE O/P EST LOW 20 MIN: CPT | Performed by: INTERNAL MEDICINE

## 2019-01-15 NOTE — PROGRESS NOTES
David Bueno  1970  [facility] [place] [facility] [id number]    DISCUSSION/SUMMARY:    77-year-old male recently seen and evaluated in the hospital   Patient presented with leg pain and acute pulmonary issues  Workup demonstrated bilateral lower extremity DVTs and bilateral PE  There was no clear provoking incident  Patient was started on Xarelto - has completed 6 months of treatment  Mr Beba Oseguera feels relatively well but still has trouble taking deep breaths and has some dyspnea on exertion  Left lower extremity swelling is less/better than before but still persistent  We discussed options  The thrombophilia evaluation demonstrated that patient was heterozygous for the factor 5 Leiden mutation; APC resistance was elevated  Anti cardiolipin IgM level was 17 (elevated); antiphosphatidylserine IgM level was also elevated (49; 0-25)  Mr Beba Oseguera understands that he is at increased risk for future thrombotic events especially if he should discontinue the anticoagulation  The plan is to continue with the Xarelto, 20 mg a day  We discussed a recent study that demonstrated the Xarelto can be decreased to 10 mg a day after 6 months of treatment  Patient states that since he has no bleeding or bruising issues or other complications with the 20 mg, he would rather keep it at the same dose  Mr Beba Oseguera is to continues routine health maintenance and medical care with Dr John Morales  Patient believes that he and Dr John Morales can monitor for any bruising or bleeding issues or any other thrombotic complications so hematology follow-up is now on a prn basis  I have stated to the patient and wife that he can return here at any time if he has any hematology questions or issues regarding anticoagulation  Patient knows to speak to his PCP or here if he is in need of any invasive procedure or surgery   As before, patient will use the compression stockings while at work or on his feet for any length of time  We rediscussed what to monitor for in regard to lower extremity swelling, pain, cords as well as any acute pulmonary issues  Patient has 1 son, age 22 in good general health and very physically active  A copy of the thrombophilia was given to Mr Nika Ly - he will give a copy to his son to show to his PCP  The reason for the continual inability to take deep breaths and occasional dyspnea on exertion is not clear, possibly lack of recent activity  This has patient and wife concerned  We discussed options - patient has been referred to Pulmonary  Patient knows to call the hematology/oncology office if there are any other questions or concerns  Carefully review your medication list and verify that the list is accurate and up-to-date  Please call the hematology/oncology office if there are medications missing from the list, medications on the list that you are not currently taking or if there is a dosage or instruction that is different from how you're taking that medication  Patient goals and areas of care:  Continue using the compression stockings, continue with anticoagulation, see pulmonary  Barriers to care:  None  Patient is able to self-care   ________________________________________________________________________________________________________    Chief Complaint   Patient presents with    Follow-up     Bilateral lower extremity DVT, bilateral PE     History of Present Illness:    40-year-old male recently admitted to the hospital with BL LE DVTs and BL PE   Mr Nika Ly could not remember any specific traumatic episode or provoking incident but remembers having lower extremity pain for a number of weeks before having the pulmonary issues  Patient was placed on Xarelto and discharged  Mr Nika Ly called 2 nights ago complaining of worsening lower extremity swelling  Patient was sent to the emergency room    Repeat Dopplers on August 27, 2018 did not demonstrate any evidence within the right lower extremity (limited)  The left lower limb demonstrated acute occlusive deep vein thrombosis in the distal femoral vein, popliteal vein, gastroc veins, soleal veins and posterior tibial veins  No evidence of superficial thrombophlebitis  In comparison to the prior study, there had been resolution of thrombus in the proximal to mid femoral vein and peroneal veins  Mr Karla Irizarry states okay, about the same as before  Lower extremity swelling is about the same as before patient uses the compression stockings if he is active  No shortness of breath at rest, occasional minimal dyspnea on exertion  Patient also states that he finds it hard to take deep breaths  No chest pain or pressure  No cough, sputum or hemoptysis  No headaches, blurred vision, dizziness or syncopal episodes  No GI or  issues  No problems with excessive bruising or bleeding  Patient is back to work full-time  Review of Systems   Constitutional: Positive for fatigue  HENT: Negative  Eyes: Negative  Respiratory: Negative  +LOWRY   Cardiovascular: Positive for leg swelling  Gastrointestinal: Negative  Endocrine: Negative  Genitourinary: Negative  Musculoskeletal: Negative  Skin: Negative  Allergic/Immunologic: Negative  Neurological: Negative  Hematological: Negative  Psychiatric/Behavioral: Negative  All other systems reviewed and are negative  Patient Active Problem List   Diagnosis    Allergic rhinitis    Diaphragmatic hernia    GERD without esophagitis    Impaired fasting glucose    Screening for cardiovascular, respiratory, and genitourinary diseases    Screening for diabetes mellitus (DM)    Prostate cancer screening    Obesity (BMI 30-39  9)    [place] cardiac risk <10% in next 10 years    Healthcare maintenance    Hyperlipidemia    GERD (gastroesophageal reflux disease)    [facility]monary embol[facility] ([facility] [address] )    [facility]    Nj's esophagus without dysplasia    Factor 5 Leiden mutation, heterozygous [facility])     Past Medical History:   Diagnosis Date    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 07/12/2006    Dysphagia     Last Assessed: 8/12/2014     GERD (gastroesophageal reflux disease)     Globus sensation     Last Assessed: 6/4/2014     H/O neoplasm of uncertain behavior of skin 06/06/2006    Hyperlipidemia     Hypertension 02/14/2006    Lateral epicondylitis of right elbow     Last Assessed: 10/23/2015     Pes planus     last assessed: 10/23/2013     Plantar fascial fibromatosis     Last Assessed: 10/23/2013     Right patellofemoral syndrome     Last Assessed: 4/15/2016     Sebaceous cyst 11/03/2007     Past Surgical History:   Procedure Laterality Date    ESOPHAGOGASTRODUODENOSCOPY N/A 10/7/2016    Procedure: ESOPHAGOGASTRODUODENOSCOPY (EGD); Surgeon: Kymberly Mello MD;  Location: [facility] GI [facility]; Service:     NASAL SEPTUM SURGERY  1995    GA ESOPHAGOGASTRODUODENOSCOPY TRANSORAL DIAGNOSTIC N/A 12/6/2018    Procedure: ESOPHAGOGASTRODUODENOSCOPY (EGD); Surgeon: Kymberly Mello MD;  Location: [facility]; Service: Gastroenterology     Family History   Problem Relation Age of Onset    Heart disease Mother         valve 76s    Hypertension Mother     Cancer Father         colon    Colon cancer Father     Hypertension Father     Coronary artery disease Father         CABG    Heart disease Brother         MI exp age 39   Syliva Hernan Colon cancer Family     Cancer Brother         esophageal CA exp age 40     Social History     Social History    Marital status: /Civil Union     Spouse name: N/A    Number of children: N/A    Years of education: N/A     Occupational History    Not on file       Social History Main Topics    Smoking status: Never Smoker    Smokeless tobacco: Never Used    Alcohol use Yes      Comment: socially    Drug use: No    Sexual activity: Not on file     Other Topics Concern    Not on file     Social History Narrative    No narrative on file       Current Outpatient Prescriptions:     esomeprazole (NexIUM) 40 MG capsule, TAKE ONE CAPSULE DAILY, Disp: 90 capsule, Rfl: 3    XARELTO 20 MG tablet, TAKE 1 TABLET (20 MG TOTAL) BY MOUTH DAILY WITH BREAKFAST, Disp: 30 tablet, Rfl: 2    No Known Allergies    Vitals:    01/15/19 1554   BP: 126/74   Pulse: 78   Resp: 17   Temp: 98 7 °F (37 1 °C)   SpO2: 98%     Physical Exam   Constitutional: He is oriented to person, place, and time  He appears well-developed and well-nourished  HENT:   Head: Normocephalic and atraumatic  Right Ear: External ear normal    Left Ear: External ear normal    Nose: Nose normal    Mouth/Throat: Oropharynx is clear and moist    Eyes: Pupils are equal, round, and reactive to light  Conjunctivae and EOM are normal    Neck: Normal range of motion  Neck supple  Cardiovascular: Normal rate, regular rhythm, normal heart sounds and intact distal pulses  Pulmonary/Chest: Effort normal and breath sounds normal    Abdominal: Soft  Bowel sounds are normal    Soft, nontender, +bowel sounds, no hepatosplenomegaly   Musculoskeletal: Normal range of motion  Neurological: He is alert and oriented to person, place, and time  He has normal reflexes  Skin: Skin is warm  Good color, warm, moist, no petechiae or ecchymoses   Psychiatric: He has a normal mood and affect   His behavior is normal  Judgment and thought content normal    Extremities:  1+ left lower extremity edema, no right lower extremity edema, no cords, pulses are 2+  Lymphatics:  No adenopathy in the neck, supraclavicular region, axilla and groin bilaterally    Labs    08/27/2018 WBC = 8 8 hemoglobin = 13 7 hematocrit = 42 3 platelet = 266 neutrophil = 50%  08/15/2018 BUN = 16 creatinine = 1 04 calcium = 9 1 AST = 29 ALT = 45 alkaline phosphatase = 97 total bilirubin = 0 50 PTT = 25 PT = 12 1 INR = 1 16    Imaging    12/27/2018 vascular lower limb venous duplex study bilateral    RIGHT LOWER LIMB:  Evidence of subacute or chronic deep vein thrombosis in the popliteal vein and  posterior tibial vein  No evidence of superficial thrombophlebitis noted  Doppler evaluation shows a normal response to augmentation maneuvers  Popliteal, posterior tibial and anterior tibial arterial Doppler waveforms are  triphasic  LEFT LOWER LIMB:  Evidence of subacute or chronic deep vein thrombosis in the mid/distal femoral  vein, popliteal vein, 1 gastrucnemius vein and posterior tibial vein  Resolution of the soleal deep vein  No evidence of superficial thrombophlebitis noted  Doppler evaluation shows a normal response to augmentation maneuvers  Popliteal, posterior tibial and anterior tibial arterial Doppler waveforms are  triphasic     08/27/2018 vascular lower limb venous duplex study    RIGHT LOWER LIMB LIMITED:  Evaluation shows no evidence of thrombus in the common femoral vein  Doppler evaluation shows a normal response to augmentation maneuvers  LEFT LOWER LIMB:  Acute, occlusive deep vein thrombosis is noted in the distal femoral vein,  popliteal vein, gastrocnemius veins, soleal veins and posterior tibial veins  No evidence of superficial thrombophlebitis noted  Doppler evaluation shows a normal response to augmentation maneuvers  Popliteal, posterior tibial and anterior tibial arterial Doppler waveforms are  triphasic     08/27/2018 CTA chest PE study    Resolution of all previously seen pulmonary emboli with the exception of a right lower lobe segmental pulmonary embolism seen on image 2/130  Stable scarring versus pulmonary infarct right lower lobe  Stable hiatal hernia  08/15/2018 CTA chest PE study    Bilateral pulmonary emboli  No evidence of right heart strain  Moderate-sized hiatal hernia  2 cm right basilar subpleural opacity/consolidation likely pulmonary infarct        08/15/2018 vascular lower limb venous duplex study bilateral    RIGHT LOWER LIMB:  Acute deep vein thrombosis is noted in the popliteal vein, and posterior tibial  veins  No evidence of superficial thrombophlebitis noted  Doppler evaluation shows a normal response to augmentation maneuvers  Popliteal, posterior tibial and anterior tibial arterial Doppler waveforms are  triphasic  LEFT LOWER LIMB:  Acute deep vein thrombosis is noted in the proximal to distal femoral vein,  popliteal vein, gastrocnemius veins, posterior tibial veins and peroneal veins  No evidence of superficial thrombophlebitis noted  Doppler evaluation shows a normal response to augmentation maneuvers    Popliteal, posterior tibial and anterior tibial arterial Doppler waveforms are  triphasic      Pathology    12/15/2018 [facility] thrombophilia evaluation    Homocystine = 13 0  Plasminogen = 114%  Antithrombin activity = 108%  Protein C, functional = 123%  Protein S, free = 134%  Activated protein C resistance = 1 7 = low (2 2-3 5)  Factor 5 Leiden demonstrated single R506Q mutation (heterozygote)  Lupus anticoagulant profile did not demonstrated a LA  Dilute PT = 66 6 = elevated  Dilute PT confirm ratio = 0 76 = WNL  Anti cardiolipin IgM = 17 = elevated  Anti cardiolipin IgA WNL  Beta 2 glycoprotein 1 antibody IgG, IgM and IgA WNL  Prothrombin IgG WNL  Antiphosphatidylserine IgM = 49 = elevated (0-25)  Antiphosphatidylserine IgG and IgA WNL  Factor 2 DNA analysis = no mutation identified

## 2019-01-21 ENCOUNTER — OFFICE VISIT (OUTPATIENT)
Dept: PODIATRY | Facility: CLINIC | Age: 49
End: 2019-01-21
Payer: COMMERCIAL

## 2019-01-21 VITALS
DIASTOLIC BLOOD PRESSURE: 86 MMHG | WEIGHT: 236 LBS | HEIGHT: 70 IN | RESPIRATION RATE: 17 BRPM | HEART RATE: 76 BPM | SYSTOLIC BLOOD PRESSURE: 139 MMHG | BODY MASS INDEX: 33.79 KG/M2

## 2019-01-21 DIAGNOSIS — M21.969 ACQUIRED DEFORMITY OF FOOT, UNSPECIFIED LATERALITY: Primary | ICD-10-CM

## 2019-01-21 DIAGNOSIS — M79.671 PAIN IN BOTH FEET: ICD-10-CM

## 2019-01-21 DIAGNOSIS — B35.3 TINEA PEDIS OF BOTH FEET: ICD-10-CM

## 2019-01-21 DIAGNOSIS — Q66.52 CONGENITAL PES PLANUS OF LEFT FOOT: ICD-10-CM

## 2019-01-21 DIAGNOSIS — Q66.51 CONGENITAL PES PLANUS OF RIGHT FOOT: ICD-10-CM

## 2019-01-21 DIAGNOSIS — B35.1 ONYCHOMYCOSIS: ICD-10-CM

## 2019-01-21 DIAGNOSIS — M79.672 PAIN IN BOTH FEET: ICD-10-CM

## 2019-01-21 PROCEDURE — 99203 OFFICE O/P NEW LOW 30 MIN: CPT | Performed by: PODIATRIST

## 2019-01-21 PROCEDURE — L3000 FT INSERT UCB BERKELEY SHELL: HCPCS | Performed by: PODIATRIST

## 2019-01-21 RX ORDER — CLOTRIMAZOLE AND BETAMETHASONE DIPROPIONATE 10; .64 MG/G; MG/G
CREAM TOPICAL 2 TIMES DAILY
Qty: 30 G | Refills: 2 | Status: SHIPPED | OUTPATIENT
Start: 2019-01-21 | End: 2019-06-11 | Stop reason: ALTCHOICE

## 2019-01-21 RX ORDER — TERBINAFINE HYDROCHLORIDE 250 MG/1
250 TABLET ORAL DAILY
Qty: 30 TABLET | Refills: 0 | Status: SHIPPED | OUTPATIENT
Start: 2019-01-21 | End: 2019-02-25 | Stop reason: SDUPTHER

## 2019-01-21 NOTE — PROGRESS NOTES
Assessment/Plan:  Plantar fasciitis  Pes planus  Foot deformity  Foot pain bilateral   Mycosis of nail  Plan  Patient educated on condition  Patient's feet have been casted for custom molded foot orthotics  Patient will be placed on terbinafine  We will add topical antifungal   He will stretch daily  No problem-specific Assessment & Plan notes found for this encounter  There are no diagnoses linked to this encounter  Subjective:  Patient has pain in his feet with ambulation  He has pain in his heels upon rising  No history of trauma  This has been ongoing for several weeks  Patient is also concerned with discoloration of toenail  Past Medical History:   Diagnosis Date    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 07/12/2006    Dysphagia     Last Assessed: 8/12/2014     GERD (gastroesophageal reflux disease)     Globus sensation     Last Assessed: 6/4/2014     H/O neoplasm of uncertain behavior of skin 06/06/2006    Hyperlipidemia     Hypertension 02/14/2006    Lateral epicondylitis of right elbow     Last Assessed: 10/23/2015     Pes planus     last assessed: 10/23/2013     Plantar fascial fibromatosis     Last Assessed: 10/23/2013     Right patellofemoral syndrome     Last Assessed: 4/15/2016     Sebaceous cyst 11/03/2007       Past Surgical History:   Procedure Laterality Date    ESOPHAGOGASTRODUODENOSCOPY N/A 10/7/2016    Procedure: ESOPHAGOGASTRODUODENOSCOPY (EGD); Surgeon: William Jiménez MD;  Location: [facility]; Service:     NASAL SEPTUM SURGERY  1995    [place] ESOPHAGOGASTRODUODENOSCOPY TRANSORAL DIAGNOSTIC N/A 12/6/2018    Procedure: ESOPHAGOGASTRODUODENOSCOPY (EGD); Surgeon: William Jiménez MD;  Location: [facility];   Service: Gastroenterology       No Known Allergies      Current Outpatient Prescriptions:     esomeprazole (NexIUM) 40 MG capsule, TAKE ONE CAPSULE DAILY, Disp: 90 capsule, Rfl: 3    XARELTO 20 MG tablet, TAKE 1 TABLET (20 MG TOTAL) BY MOUTH DAILY WITH BREAKFAST, Disp: 30 tablet, Rfl: 2    Patient Active Problem List   Diagnosis    Allergic rhinitis    Diaphragmatic hernia    GERD without esophagitis    Impaired fasting glucose    Screening for cardiovascular, respiratory, and genitourinary diseases    Screening for diabetes mellitus (DM)    Prostate cancer screening    Obesity (BMI 30-39  9)    [place] cardiac risk <10% in next 10 years   [address] maintenance    Hyperlipidemia    GERD (gastroesophageal reflux disease)    Pulmonary embolism (HCC)    DVT (deep venous thrombosis) ([place] [address] )    Nj's esophagus without dysplasia    Factor 5 Leiden mutation, heterozygous ([place] [address] )          Patient ID: Que Randolph is a 51 y o  male  HPI    The following portions of the patient's history were reviewed and updated as appropriate: allergies, current medications, past family history, past medical history, past social history, past surgical history and problem list     Review of Systems      Objective:  Patient's shoes and socks removed  Foot Exam    General  General Appearance: appears stated age and healthy   Orientation: alert and oriented to person, place, and time   Affect: appropriate   Gait: unimpaired       Right Foot/Ankle     Inspection and Palpation  Ecchymosis: none  Tenderness: calcaneus tenderness, bony tenderness and plantar fascia   Swelling: plantar fascia   Arch: pes planus  Hammertoes: fifth toe  Hallux limitus: yes  Skin Exam: callus, dry skin and tinea;      Neurovascular  Dorsalis pedis: 3+  Posterior tibial: 3+  Saphenous nerve sensation: normal  Tibial nerve sensation: normal  Superficial peroneal nerve sensation: normal  Deep peroneal nerve sensation: normal  Sural nerve sensation: normal  Achilles reflex: 2+  Babinski reflex: 2+    Muscle Strength  Ankle dorsiflexion: 4+    Tests  PT Tinel's sign: negative    Too many toes: positive       Left Foot/Ankle      Inspection and Palpation  Ecchymosis: none  Tenderness: bony tenderness, plantar fascia and calcaneus tenderness   Swelling: plantar fascia   Arch: pes planus  Hammertoes: fifth toe  Hallux valgus: no  Skin Exam: callus, dry skin and tinea; Neurovascular  Dorsalis pedis: 3+  Posterior tibial: 3+  Saphenous nerve sensation: normal  Tibial nerve sensation: normal  Superficial peroneal nerve sensation: normal  Deep peroneal nerve sensation: normal  Sural nerve sensation: normal  Achilles reflex: 2+  Babinski reflex: 2+    Muscle Strength  Ankle dorsiflexion: 4+    Tests  PT Tinel's sign: negative  Too many toes: positive         Physical Exam   Constitutional: He appears well-developed and well-nourished  Cardiovascular: Normal rate and regular rhythm  Pulses:       Dorsalis pedis pulses are 3+ on the right side, and 3+ on the left side  Posterior tibial pulses are 3+ on the right side, and 3+ on the left side  Musculoskeletal:        Right foot: There is bony tenderness  Left foot: There is bony tenderness  Feet:   Right Foot:   Skin Integrity: Positive for callus and dry skin  Left Foot:   Skin Integrity: Positive for callus and dry skin  Neurological:   Reflex Scores:       Achilles reflexes are 2+ on the right side and 2+ on the left side  Vitals reviewed

## 2019-02-18 ENCOUNTER — CONSULT (OUTPATIENT)
Dept: PULMONOLOGY | Facility: MEDICAL CENTER | Age: 49
End: 2019-02-18
Payer: COMMERCIAL

## 2019-02-18 VITALS
WEIGHT: 235 LBS | RESPIRATION RATE: 12 BRPM | OXYGEN SATURATION: 96 % | SYSTOLIC BLOOD PRESSURE: 122 MMHG | HEIGHT: 71 IN | BODY MASS INDEX: 32.9 KG/M2 | DIASTOLIC BLOOD PRESSURE: 82 MMHG | HEART RATE: 70 BPM

## 2019-02-18 DIAGNOSIS — R06.02 SOBOE (SHORTNESS OF BREATH ON EXERTION): Primary | ICD-10-CM

## 2019-02-18 DIAGNOSIS — I26.99 OTHER ACUTE PULMONARY EMBOLISM WITHOUT ACUTE COR PULMONALE (HCC): ICD-10-CM

## 2019-02-18 PROCEDURE — 99244 OFF/OP CNSLTJ NEW/EST MOD 40: CPT | Performed by: INTERNAL MEDICINE

## 2019-02-18 PROCEDURE — 94010 BREATHING CAPACITY TEST: CPT | Performed by: INTERNAL MEDICINE

## 2019-02-18 NOTE — PROGRESS NOTES
Assessment/Plan:       Problem List Items Addressed This Visit        Respiratory    Pulmonary embolism [facility])     Patient has a history of bilateral pulmonary emboli  Last CT was performed in August of 2018 that showed right lower lobe pulmonary embolus with pulmonary infarct  Patient has ongoing shortness of breath and occasional chest pain  This may be in relation to patient's pulmonary infarct  Patient also has evidence of factor 5 Leiden abnormality  Therefore recommend he undergo a CT angiogram to reassess pulmonary emboli and pulmonary infarct and rule out progression of emboli  Relevant Orders    CTA chest pe study    Basic metabolic panel       Other    SOBOE (shortness of breath on exertion) - Primary     Patient also has evidence of obstructive lung disease on pulmonary function testing done here in the office today  Prior pulmonary function testing is reviewed and patient had similar lung function at that time  Will obtain complete pulmonary function test with bronchodilator response and diffusion capacity to completely assess underlying lung function and rule out small airways disease and bronchodilator response at the level of small airways disease  Depending on CT angiogram results and pulmonary function testing will assess need for inhalers  Relevant Orders    POCT spirometry (Completed)    Complete pulmonary function test            F/u 2 weeks  All questions are answered to the patient's satisfaction and understanding  He verbalizes understanding  He is encouraged to call with any further questions or concerns  Portions of the record may have been created with voice recognition software  Occasional wrong word or "sound a like" substitutions may have occurred due to the inherent limitations of voice recognition software  Read the chart carefully and recognize, using context, where substitutions have occurred  a    Electronically Signed by Sury Bill MD    ______________________________________________________________________    Chief Complaint:   Chief Complaint   Patient presents with    Pulmonary Embolism     per Dr Hernandez Blowers of Breath     occurs during exercising     Cough     dry cough        Patient ID: Esperanza Joseph is a 51 y o  y o  male has a past medical history of 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 (07/12/2006), Dysphagia, GERD (gastroesophageal reflux disease), Globus sensation, H/O neoplasm of uncertain behavior of skin (06/06/2006), Hyperlipidemia, Hypertension (02/14/2006), Lateral epicondylitis of right elbow, Pes planus, Plantar fascial fibromatosis, Right patellofemoral syndrome, and Sebaceous cyst (11/03/2007)  2/18/2019  Patient presents today for initial visit  Has a history of Factor V Leiden  Was very active prior to travel to [facility]- this past August 2018  Now he is not able to do anything    +shortness of breath- when he exerts himself and on an incline  No wheezing  No cough  No chest tightness    +obstructive lung disease- was given asthma medication  Was not short of breath at that time  He was given Pulmicort- did not feel any different  No nighttime awakening with shortness of breath      +snoring, does not feel refreshed in the morning  No daytime sleepiness  Seasonal allergies- spring- Claritin, nasal spray- as needed  GERD- occasionally- takes Nexium and attempting to wean  Might be diet related- Nexium is because he was seen by GI- shai Dave  Brother had esophageal cancer  No family history of lung disease  HPI    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  Pets/Enviroment: none    Review of Systems   Constitutional: Negative for appetite change and fever  HENT: Negative for ear pain, postnasal drip, rhinorrhea, sneezing, sore throat and trouble swallowing  Respiratory: Positive for cough and shortness of breath      Cardiovascular: Positive for chest pain  Musculoskeletal: Negative for myalgias  Neurological: Positive for headaches  All other systems reviewed and are negative  Answers for HPI/ROS submitted by the patient on 2/12/2019   Primary symptoms  Do you have difficulty breathing?: Yes  Chronicity: new  When did you first notice your symptoms?: more than 1 month ago  How often do your symptoms occur?: every several days  Since you first noticed this problem, how has it changed?: unchanged  Do you have shortness of breath that occurs with effort or exertion?: Yes  Do you have ear congestion?: No  Do you have heartburn?: Yes  Do you have fatigue?: Yes  Do you have nasal congestion?: No  Do you have shortness of breath when lying flat?: No  Do you have shortness of breath when you wake up?: No  Do you have sweats?: No  Have you experienced weight loss?: No  Which of the following makes your symptoms worse?: exercise, strenuous activity  Which of the following makes your symptoms better?: rest      Social history: He reports that he has never smoked  He quit smokeless tobacco use about 24 years ago  He reports that he drinks alcohol  He reports that he does not use drugs  + second hand smoke exposure as a child    Past surgical history:   Past Surgical History:   Procedure Laterality Date    ESOPHAGOGASTRODUODENOSCOPY N/A 10/7/2016    Procedure: ESOPHAGOGASTRODUODENOSCOPY (EGD); Surgeon: Guicho Guerin MD;  Location: [facility] GI LAB; Service:     NASAL SEPTUM SURGERY  1995    [place] ESOPHAGOGASTRODUODENOSCOPY TRANSORAL DIAGNOSTIC N/A 12/6/2018    Procedure: ESOPHAGOGASTRODUODENOSCOPY (EGD); Surgeon: Guicho Guerin MD;  Location: [facility] GI LAB;   Service: Gastroenterology     Family history:   Family History   Problem Relation Age of Onset    Heart disease Mother         valve 76s    Hypertension Mother     Cancer Father         colon    Colon cancer Father     Hypertension Father     Coronary artery disease Father         CABG    Heart disease Brother         MI exp age 39    Colon cancer Family     Cancer Brother         esophageal CA exp age 40       Immunization History   Administered Date(s) Administered    Tdap 01/01/2014     Current Outpatient Medications   Medication Sig Dispense Refill    clotrimazole-betamethasone (LOTRISONE) 1-0 05 % cream Apply topically 2 (two) times a day for 30 days 30 g 2    esomeprazole (NexIUM) 40 MG capsule TAKE ONE CAPSULE DAILY 90 capsule 3    terbinafine (LamISIL) 250 mg tablet Take 1 tablet (250 mg total) by mouth daily for 30 days 30 tablet 0    XARELTO 20 MG tablet TAKE 1 TABLET (20 MG TOTAL) BY MOUTH DAILY WITH BREAKFAST 30 tablet 2     No current facility-administered medications for this visit  Allergies: Patient has no known allergies  Objective:  Vitals:    02/18/19 1406   BP: 122/82   BP Location: Right arm   Patient Position: Sitting   Cuff Size: Extra-Large   Pulse: 70   Resp: 12   SpO2: 96%   Weight: 107 kg (235 lb)   Height: 5' 10 5" (1 791 m)   Oxygen Therapy  SpO2: 96 %    Wt Readings from Last 3 Encounters:   02/18/19 107 kg (235 lb)   01/21/19 107 kg (236 lb)   01/15/19 107 kg (236 lb)     Body mass index is 33 24 kg/m²  Physical Exam   Constitutional: He is oriented to person, place, and time  He appears well-developed and well-nourished  No distress  HENT:   Head: Normocephalic and atraumatic  Mouth/Throat: Oropharynx is clear and moist  No oropharyngeal exudate  Eyes: Pupils are equal, round, and reactive to light  EOM are normal    Neck: Normal range of motion  Neck supple  Cardiovascular: Normal rate and regular rhythm  No murmur heard  Pulmonary/Chest: Effort normal and breath sounds normal  No respiratory distress  He has no wheezes  He has no rales  He exhibits no tenderness  Abdominal: Soft  Bowel sounds are normal  He exhibits no distension  There is no tenderness  Musculoskeletal: Normal range of motion  He exhibits no edema     Neurological: He is alert and oriented to person, place, and time  No cranial nerve deficit  Skin: Skin is warm and dry  He is not diaphoretic  Psychiatric: He has a normal mood and affect  His behavior is normal    Vitals reviewed  Lab Review:   Reviewed    Diagnostics:  I have personally reviewed pertinent reports     and I have personally reviewed pertinent films in PACS  ESS: Total score: 8

## 2019-02-18 NOTE — ASSESSMENT & PLAN NOTE
Patient also has evidence of obstructive lung disease on pulmonary function testing done here in the office today  Prior pulmonary function testing is reviewed and patient had similar lung function at that time  Will obtain complete pulmonary function test with bronchodilator response and diffusion capacity to completely assess underlying lung function and rule out small airways disease and bronchodilator response at the level of small airways disease  Depending on CT angiogram results and pulmonary function testing will assess need for inhalers

## 2019-02-18 NOTE — LETTER
February 18, 2019     Oleg Atkinson, [address] [facility][1] [id number]    Patient: Vishnu Silva   YOB: 1970   Date of Visit: 2/18/2019       Dear Dr Gely Kiran: Thank you for referring Bubba White to me for evaluation  Below are my notes for this consultation  If you have questions, please do not hesitate to call me  I look forward to following your patient along with you  Sincerely,        Antonio Solano MD        CC: MD Antonio Guzman MD  2/18/2019  3:12 PM  Sign at close encounter  Assessment/Plan:       Problem List Items Addressed This Visit        Respiratory    Pulmonary embolism [facility])     Patient has a history of bilateral pulmonary emboli  Last CT was performed in August of 2018 that showed right lower lobe pulmonary embolus with pulmonary infarct  Patient has ongoing shortness of breath and occasional chest pain  This may be in relation to patient's pulmonary infarct  Patient also has evidence of factor 5 Leiden abnormality  Therefore recommend he undergo a CT angiogram to reassess pulmonary emboli and pulmonary infarct and rule out progression of emboli  Relevant Orders    CTA chest pe study    Basic metabolic panel       Other    SOBOE (shortness of breath on exertion) - Primary     Patient also has evidence of obstructive lung disease on pulmonary function testing done here in the office today  Prior pulmonary function testing is reviewed and patient had similar lung function at that time  Will obtain complete pulmonary function test with bronchodilator response and diffusion capacity to completely assess underlying lung function and rule out small airways disease and bronchodilator response at the level of small airways disease  Depending on CT angiogram results and pulmonary function testing will assess need for inhalers           Relevant Orders    POCT spirometry (Completed)    Complete pulmonary function test            F/u 2 weeks  All questions are answered to the patient's satisfaction and understanding  He verbalizes understanding  He is encouraged to call with any further questions or concerns  Portions of the record may have been created with voice recognition software  Occasional wrong word or "sound a like" substitutions may have occurred due to the inherent limitations of voice recognition software  Read the chart carefully and recognize, using context, where substitutions have occurred  a    Electronically Signed by Jose Antonio Fabian MD    ______________________________________________________________________    Chief Complaint:   Chief Complaint   Patient presents with    Pulmonary Embolism     per Dr Bjorn Humphrey of Breath     occurs during exercising     Cough     dry cough        Patient ID: Debra Restrepo is a 51 y o  y o  male has a past medical history of 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 (07/12/2006), Dysphagia, GERD (gastroesophageal reflux disease), Globus sensation, H/O neoplasm of uncertain behavior of skin (06/06/2006), Hyperlipidemia, Hypertension (02/14/2006), Lateral epicondylitis of right elbow, Pes planus, Plantar fascial fibromatosis, Right patellofemoral syndrome, and Sebaceous cyst (11/03/2007)  2/18/2019  Patient presents today for initial visit  Has a history of Factor V Leiden  Was very active prior to travel to [facility]- this past August 2018  Now he is not able to do anything    +shortness of breath- when he exerts himself and on an incline  No wheezing  No cough  No chest tightness    +obstructive lung disease- was given asthma medication  Was not short of breath at that time  He was given Pulmicort- did not feel any different  No nighttime awakening with shortness of breath      +snoring, does not feel refreshed in the morning  No daytime sleepiness  Seasonal allergies- spring- Claritin, nasal spray- as needed  GERD- occasionally- takes Nexium and attempting to wean   Might be diet related- Nexium is because he was seen by GI- had Barretts  Brother had esophageal cancer  No family history of lung disease  HPI    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  Pets/Enviroment: none    Review of Systems   Constitutional: Negative for appetite change and fever  HENT: Negative for ear pain, postnasal drip, rhinorrhea, sneezing, sore throat and trouble swallowing  Respiratory: Positive for cough and shortness of breath  Cardiovascular: Positive for chest pain  Musculoskeletal: Negative for myalgias  Neurological: Positive for headaches  All other systems reviewed and are negative  Answers for HPI/ROS submitted by the patient on 2/12/2019   Primary symptoms  Do you have difficulty breathing?: Yes  Chronicity: new  When did you first notice your symptoms?: more than 1 month ago  How often do your symptoms occur?: every several days  Since you first noticed this problem, how has it changed?: unchanged  Do you have shortness of breath that occurs with effort or exertion?: Yes  Do you have ear congestion?: No  Do you have heartburn?: Yes  Do you have fatigue?: Yes  Do you have nasal congestion?: No  Do you have shortness of breath when lying flat?: No  Do you have shortness of breath when you wake up?: No  Do you have sweats?: No  Have you experienced weight loss?: No  Which of the following makes your symptoms worse?: exercise, strenuous activity  Which of the following makes your symptoms better?: rest      Social history: He reports that he has never smoked  He quit smokeless tobacco use about 24 years ago  He reports that he drinks alcohol  He reports that he does not use drugs  + second hand smoke exposure as a child    Past surgical history:   Past Surgical History:   Procedure Laterality Date    ESOPHAGOGASTRODUODENOSCOPY N/A 10/7/2016    Procedure: ESOPHAGOGASTRODUODENOSCOPY (EGD);   Surgeon: Aliya Franklin MD;  Location: [facility] GI LAB; Service:     NASAL SEPTUM SURGERY  1995    [place] ESOPHAGOGASTRODUODENOSCOPY TRANSORAL DIAGNOSTIC N/A 12/6/2018    Procedure: ESOPHAGOGASTRODUODENOSCOPY (EGD); Surgeon: Acacia Monroe MD;  Location: [facility] GI LAB; Service: Gastroenterology     Family history:   Family History   Problem Relation Age of Onset    Heart disease Mother         valve 76s    Hypertension Mother     Cancer Father         colon    Colon cancer Father     Hypertension Father     Coronary artery disease Father         CABG    Heart disease Brother         MI exp age 39    Colon cancer Family     Cancer Brother         esophageal CA exp age 40       Immunization History   Administered Date(s) Administered    Tdap 01/01/2014     Current Outpatient Medications   Medication Sig Dispense Refill    clotrimazole-betamethasone (LOTRISONE) 1-0 05 % cream Apply topically 2 (two) times a day for 30 days 30 g 2    esomeprazole (NexIUM) 40 MG capsule TAKE ONE CAPSULE DAILY 90 capsule 3    terbinafine (LamISIL) 250 mg tablet Take 1 tablet (250 mg total) by mouth daily for 30 days 30 tablet 0    XARELTO 20 MG tablet TAKE 1 TABLET (20 MG TOTAL) BY MOUTH DAILY WITH BREAKFAST 30 tablet 2     No current facility-administered medications for this visit  Allergies: Patient has no known allergies  Objective:  Vitals:    02/18/19 1406   BP: 122/82   BP Location: Right arm   Patient Position: Sitting   Cuff Size: Extra-Large   Pulse: 70   Resp: 12   SpO2: 96%   Weight: 107 kg (235 lb)   Height: 5' 10 5" (1 791 m)   Oxygen Therapy  SpO2: 96 %    Wt Readings from Last 3 Encounters:   02/18/19 107 kg (235 lb)   01/21/19 107 kg (236 lb)   01/15/19 107 kg (236 lb)     Body mass index is 33 24 kg/m²  Physical Exam   Constitutional: He is oriented to person, place, and time  He appears well-developed and well-nourished  No distress  HENT:   Head: Normocephalic and atraumatic     Mouth/Throat: Oropharynx is clear and moist  No oropharyngeal exudate  Eyes: Pupils are equal, round, and reactive to light  EOM are normal    Neck: Normal range of motion  Neck supple  Cardiovascular: Normal rate and regular rhythm  No murmur heard  Pulmonary/Chest: Effort normal and breath sounds normal  No respiratory distress  He has no wheezes  He has no rales  He exhibits no tenderness  Abdominal: Soft  Bowel sounds are normal  He exhibits no distension  There is no tenderness  Musculoskeletal: Normal range of motion  He exhibits no edema  Neurological: He is alert and oriented to person, place, and time  No cranial nerve deficit  Skin: Skin is warm and dry  He is not diaphoretic  Psychiatric: He has a normal mood and affect  His behavior is normal    Vitals reviewed  Lab Review:   Reviewed    Diagnostics:  I have personally reviewed pertinent reports     and I have personally reviewed pertinent films in PACS  ESS: Total score: 8

## 2019-02-18 NOTE — ASSESSMENT & PLAN NOTE
Patient has a history of bilateral pulmonary emboli  Last CT was performed in August of 2018 that showed right lower lobe pulmonary embolus with pulmonary infarct  Patient has ongoing shortness of breath and occasional chest pain  This may be in relation to patient's pulmonary infarct  Patient also has evidence of factor 5 Leiden abnormality  Therefore recommend he undergo a CT angiogram to reassess pulmonary emboli and pulmonary infarct and rule out progression of emboli

## 2019-02-21 DIAGNOSIS — I26.99 OTHER PULMONARY EMBOLISM WITHOUT ACUTE COR PULMONALE, UNSPECIFIED CHRONICITY (HCC): ICD-10-CM

## 2019-02-21 DIAGNOSIS — K22.70 BARRETT'S ESOPHAGUS WITHOUT DYSPLASIA: ICD-10-CM

## 2019-02-22 RX ORDER — RIVAROXABAN 20 MG/1
20 TABLET, FILM COATED ORAL
Qty: 30 TABLET | Refills: 2 | Status: SHIPPED | OUTPATIENT
Start: 2019-02-22 | End: 2019-03-29 | Stop reason: SDUPTHER

## 2019-02-22 RX ORDER — ESOMEPRAZOLE MAGNESIUM 40 MG/1
CAPSULE, DELAYED RELEASE ORAL
Qty: 90 CAPSULE | Refills: 3 | Status: SHIPPED | OUTPATIENT
Start: 2019-02-22 | End: 2020-04-17 | Stop reason: SDUPTHER

## 2019-02-25 ENCOUNTER — OFFICE VISIT (OUTPATIENT)
Dept: PODIATRY | Facility: CLINIC | Age: 49
End: 2019-02-25
Payer: COMMERCIAL

## 2019-02-25 VITALS
WEIGHT: 235 LBS | DIASTOLIC BLOOD PRESSURE: 98 MMHG | HEIGHT: 71 IN | BODY MASS INDEX: 32.9 KG/M2 | SYSTOLIC BLOOD PRESSURE: 150 MMHG

## 2019-02-25 DIAGNOSIS — M79.672 PAIN IN BOTH FEET: ICD-10-CM

## 2019-02-25 DIAGNOSIS — B35.1 ONYCHOMYCOSIS: ICD-10-CM

## 2019-02-25 DIAGNOSIS — M21.969 ACQUIRED DEFORMITY OF FOOT, UNSPECIFIED LATERALITY: Primary | ICD-10-CM

## 2019-02-25 DIAGNOSIS — B35.3 TINEA PEDIS OF BOTH FEET: ICD-10-CM

## 2019-02-25 DIAGNOSIS — M79.671 PAIN IN BOTH FEET: ICD-10-CM

## 2019-02-25 PROCEDURE — 99213 OFFICE O/P EST LOW 20 MIN: CPT | Performed by: PODIATRIST

## 2019-02-25 RX ORDER — TERBINAFINE HYDROCHLORIDE 250 MG/1
250 TABLET ORAL DAILY
Qty: 30 TABLET | Refills: 0 | Status: SHIPPED | OUTPATIENT
Start: 2019-02-25 | End: 2019-03-27

## 2019-02-25 NOTE — PROGRESS NOTES
Procedures   Foot Exam     Signed  Encounter Date: 1/21/2019   Assessment/Plan:  Plantar fasciitis  Pes planus  Foot deformity  Foot pain bilateral   Mycosis of nail      Plan    patient educated on conditions  He will use orthotics daily     Patient will be placed on terbinafine  We will add topical antifungal   He will stretch daily      No problem-specific Assessment & Plan notes found for this encounter          There are no diagnoses linked to this encounter        Subjective:  Patient has pain in his feet with ambulation  He has pain in his heels upon rising  No history of trauma  This has been ongoing for several weeks  Patient is also concerned with discoloration of toenail           Past Medical History:   Diagnosis Date    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 07/12/2006    Dysphagia       Last Assessed: 8/12/2014     GERD (gastroesophageal reflux disease)      Globus sensation       Last Assessed: 6/4/2014     H/O neoplasm of uncertain behavior of skin 06/06/2006    Hyperlipidemia      Hypertension 02/14/2006    Lateral epicondylitis of right elbow       Last Assessed: 10/23/2015     Pes planus       last assessed: 10/23/2013     Plantar fascial fibromatosis       Last Assessed: 10/23/2013     Right patellofemoral syndrome       Last Assessed: 4/15/2016     Sebaceous cyst 11/03/2007               Past Surgical History:   Procedure Laterality Date    ESOPHAGOGASTRODUODENOSCOPY N/A 10/7/2016     Procedure: ESOPHAGOGASTRODUODENOSCOPY (EGD); Surgeon: Efrem Stock MD;  Location: [facility] GI LAB; Service:     NASAL SEPTUM SURGERY   1995    [place] ESOPHAGOGASTRODUODENOSCOPY TRANSORAL DIAGNOSTIC N/A 12/6/2018     Procedure: ESOPHAGOGASTRODUODENOSCOPY (EGD); Surgeon: Efrem Stock MD;  Location: [facility] GI LAB;   Service: Gastroenterology         No Known Allergies        Current Outpatient Prescriptions:     esomeprazole (NexIUM) 40 MG capsule, TAKE ONE CAPSULE DAILY, Disp: 90 capsule, Rfl: 3    XARELTO 20 MG tablet, TAKE 1 TABLET (20 MG TOTAL) BY MOUTH DAILY WITH BREAKFAST, Disp: 30 tablet, Rfl: 2         Patient Active Problem List   Diagnosis    Allergic rhinitis    Diaphragmatic hernia    GERD without esophagitis    Impaired fasting glucose    Screening for cardiovascular, respiratory, and genitourinary diseases    Screening for diabetes mellitus (DM)    Prostate cancer screening    Obesity (BMI 30-39  9)    [place] cardiac risk <10% in next 10 years   [address] maintenance    Hyperlipidemia    GERD (gastroesophageal reflux disease)    Pulmonary embolism (HCC)    DVT (deep venous thrombosis) ([facility] )    Nj's esophagus without dysplasia    Factor 5 Leiden mutation, heterozygous [facility])             Patient ID: Alta Adams is a 51 y o  male      HPI     The following portions of the patient's history were reviewed and updated as appropriate: allergies, current medications, past family history, past medical history, past social history, past surgical history and problem list      Review of Systems       Objective:  Patient's shoes and socks removed     Foot Exam     General  General Appearance: appears stated age and healthy   Orientation: alert and oriented to person, place, and time   Affect: appropriate   Gait: unimpaired         Right Foot/Ankle      Inspection and Palpation  Ecchymosis: none  Tenderness: calcaneus tenderness, bony tenderness and plantar fascia   Swelling: plantar fascia   Arch: pes planus  Hammertoes: fifth toe  Hallux limitus: yes  Skin Exam: callus, dry skin and tinea;      Neurovascular  Dorsalis pedis: 3+  Posterior tibial: 3+  Saphenous nerve sensation: normal  Tibial nerve sensation: normal  Superficial peroneal nerve sensation: normal  Deep peroneal nerve sensation: normal  Sural nerve sensation: normal  Achilles reflex: 2+  Babinski reflex: 2+     Muscle Strength  Ankle dorsiflexion: 4+     Tests  PT Tinel's sign: negative    Too many toes: positive       Left Foot/Ankle       Inspection and Palpation  Ecchymosis: none  Tenderness: bony tenderness, plantar fascia and calcaneus tenderness   Swelling: plantar fascia   Arch: pes planus  Hammertoes: fifth toe  Hallux valgus: no  Skin Exam: callus, dry skin and tinea;      Neurovascular  Dorsalis pedis: 3+  Posterior tibial: 3+  Saphenous nerve sensation: normal  Tibial nerve sensation: normal  Superficial peroneal nerve sensation: normal  Deep peroneal nerve sensation: normal  Sural nerve sensation: normal  Achilles reflex: 2+  Babinski reflex: 2+     Muscle Strength  Ankle dorsiflexion: 4+     Tests  PT Tinel's sign: negative  Too many toes: positive            Physical Exam   Constitutional: He appears well-developed and well-nourished  Cardiovascular: Normal rate and regular rhythm  Pulses:       Dorsalis pedis pulses are 3+ on the right side, and 3+ on the left side  Posterior tibial pulses are 3+ on the right side, and 3+ on the left side  Musculoskeletal:        Right foot: There is bony tenderness  Left foot: There is bony tenderness  Feet:   Right Foot:   Skin Integrity: Positive for callus and dry skin  Left Foot:   Skin Integrity: Positive for callus and dry skin  Neurological:   Reflex Scores:       Achilles reflexes are 2+ on the right side and 2+ on the left side  Vitals reviewed    Mycosis of nail resolving slowly

## 2019-03-05 ENCOUNTER — HOSPITAL ENCOUNTER (OUTPATIENT)
Dept: PULMONOLOGY | Facility: HOSPITAL | Age: 49
Discharge: HOME | End: 2019-03-05
Attending: INTERNAL MEDICINE
Payer: COMMERCIAL

## 2019-03-05 ENCOUNTER — HOSPITAL ENCOUNTER (OUTPATIENT)
Dept: RADIOLOGY | Facility: HOSPITAL | Age: 49
Discharge: HOME | End: 2019-03-05
Attending: INTERNAL MEDICINE
Payer: COMMERCIAL

## 2019-03-05 DIAGNOSIS — R06.02 SOBOE (SHORTNESS OF BREATH ON EXERTION): ICD-10-CM

## 2019-03-05 DIAGNOSIS — I26.99 OTHER ACUTE PULMONARY EMBOLISM WITHOUT ACUTE COR PULMONALE (HCC): ICD-10-CM

## 2019-03-05 PROCEDURE — 71275 CT ANGIOGRAPHY CHEST: CPT

## 2019-03-05 PROCEDURE — 94760 N-INVAS EAR/PLS OXIMETRY 1: CPT

## 2019-03-05 PROCEDURE — 94726 PLETHYSMOGRAPHY LUNG VOLUMES: CPT | Performed by: INTERNAL MEDICINE

## 2019-03-05 PROCEDURE — 94726 PLETHYSMOGRAPHY LUNG VOLUMES: CPT

## 2019-03-05 PROCEDURE — 94060 EVALUATION OF WHEEZING: CPT | Performed by: INTERNAL MEDICINE

## 2019-03-05 PROCEDURE — 94060 EVALUATION OF WHEEZING: CPT

## 2019-03-05 PROCEDURE — 94729 DIFFUSING CAPACITY: CPT

## 2019-03-05 PROCEDURE — 94729 DIFFUSING CAPACITY: CPT | Performed by: INTERNAL MEDICINE

## 2019-03-05 RX ORDER — ALBUTEROL SULFATE 2.5 MG/3ML
2.5 SOLUTION RESPIRATORY (INHALATION) ONCE
Status: DISCONTINUED | OUTPATIENT
Start: 2019-03-05 | End: 2019-03-09 | Stop reason: HOSPADM

## 2019-03-05 RX ADMIN — IOHEXOL 85 ML: 350 INJECTION, SOLUTION INTRAVENOUS at 17:19

## 2019-03-06 ENCOUNTER — TELEPHONE (OUTPATIENT)
Dept: PULMONOLOGY | Facility: MEDICAL CENTER | Age: 49
End: 2019-03-06

## 2019-03-29 DIAGNOSIS — I26.99 OTHER PULMONARY EMBOLISM WITHOUT ACUTE COR PULMONALE, UNSPECIFIED CHRONICITY (HCC): ICD-10-CM

## 2019-05-13 DIAGNOSIS — I26.99 OTHER PULMONARY EMBOLISM WITHOUT ACUTE COR PULMONALE, UNSPECIFIED CHRONICITY (HCC): ICD-10-CM

## 2019-05-13 RX ORDER — RIVAROXABAN 20 MG/1
20 TABLET, FILM COATED ORAL
Qty: 30 TABLET | Refills: 2 | Status: SHIPPED | OUTPATIENT
Start: 2019-05-13 | End: 2019-06-11 | Stop reason: SDUPTHER

## 2019-06-09 PROBLEM — M21.969 ACQUIRED DEFORMITY OF FOOT: Status: RESOLVED | Noted: 2019-01-21 | Resolved: 2019-06-09

## 2019-06-09 PROBLEM — B35.3 TINEA PEDIS OF BOTH FEET: Status: RESOLVED | Noted: 2019-01-21 | Resolved: 2019-06-09

## 2019-06-11 ENCOUNTER — OFFICE VISIT (OUTPATIENT)
Dept: FAMILY MEDICINE CLINIC | Facility: CLINIC | Age: 49
End: 2019-06-11
Payer: COMMERCIAL

## 2019-06-11 VITALS
HEART RATE: 76 BPM | RESPIRATION RATE: 16 BRPM | HEIGHT: 71 IN | DIASTOLIC BLOOD PRESSURE: 80 MMHG | WEIGHT: 229 LBS | TEMPERATURE: 97.9 F | SYSTOLIC BLOOD PRESSURE: 138 MMHG | BODY MASS INDEX: 32.06 KG/M2

## 2019-06-11 DIAGNOSIS — Z13.1 SCREENING FOR DIABETES MELLITUS (DM): ICD-10-CM

## 2019-06-11 DIAGNOSIS — Z00.00 HEALTHCARE MAINTENANCE: Primary | ICD-10-CM

## 2019-06-11 DIAGNOSIS — R73.01 IMPAIRED FASTING GLUCOSE: ICD-10-CM

## 2019-06-11 DIAGNOSIS — Z13.83 SCREENING FOR CARDIOVASCULAR, RESPIRATORY, AND GENITOURINARY DISEASES: ICD-10-CM

## 2019-06-11 DIAGNOSIS — Z13.89 SCREENING FOR CARDIOVASCULAR, RESPIRATORY, AND GENITOURINARY DISEASES: ICD-10-CM

## 2019-06-11 DIAGNOSIS — Z12.5 PROSTATE CANCER SCREENING: ICD-10-CM

## 2019-06-11 DIAGNOSIS — K21.9 GERD WITHOUT ESOPHAGITIS: ICD-10-CM

## 2019-06-11 DIAGNOSIS — D68.59 HYPERCOAGULABLE STATE (HCC): ICD-10-CM

## 2019-06-11 DIAGNOSIS — I26.99 OTHER PULMONARY EMBOLISM WITHOUT ACUTE COR PULMONALE, UNSPECIFIED CHRONICITY (HCC): ICD-10-CM

## 2019-06-11 DIAGNOSIS — Z13.6 SCREENING FOR CARDIOVASCULAR, RESPIRATORY, AND GENITOURINARY DISEASES: ICD-10-CM

## 2019-06-11 DIAGNOSIS — E66.9 OBESITY (BMI 30-39.9): ICD-10-CM

## 2019-06-11 PROBLEM — D68.51 ACTIVATED PROTEIN C RESISTANCE (HCC): Status: ACTIVE | Noted: 2019-06-11

## 2019-06-11 PROCEDURE — 99396 PREV VISIT EST AGE 40-64: CPT | Performed by: FAMILY MEDICINE

## 2019-08-23 LAB
ALBUMIN SERPL-MCNC: 4.5 G/DL (ref 3.5–5.5)
ALBUMIN/GLOB SERPL: 1.7 {RATIO} (ref 1.2–2.2)
ALP SERPL-CCNC: 85 IU/L (ref 39–117)
ALT SERPL-CCNC: 47 IU/L (ref 0–44)
AST SERPL-CCNC: 34 IU/L (ref 0–40)
BILIRUB SERPL-MCNC: 0.5 MG/DL (ref 0–1.2)
BUN SERPL-MCNC: 17 MG/DL (ref 6–24)
BUN/CREAT SERPL: 17 (ref 9–20)
CALCIUM SERPL-MCNC: 9.7 MG/DL (ref 8.7–10.2)
CHLORIDE SERPL-SCNC: 101 MMOL/L (ref 96–106)
CHOLEST SERPL-MCNC: 234 MG/DL (ref 100–199)
CO2 SERPL-SCNC: 20 MMOL/L (ref 20–29)
CREAT SERPL-MCNC: 1.03 MG/DL (ref 0.76–1.27)
GLOBULIN SER-MCNC: 2.7 G/DL (ref 1.5–4.5)
GLUCOSE SERPL-MCNC: 127 MG/DL (ref 65–99)
HBA1C MFR BLD: 6.1 % (ref 4.8–5.6)
HDLC SERPL-MCNC: 65 MG/DL
LDLC SERPL CALC-MCNC: 150 MG/DL (ref 0–99)
MICRODELETION SYND BLD/T FISH: NORMAL
POTASSIUM SERPL-SCNC: 4.3 MMOL/L (ref 3.5–5.2)
PROT SERPL-MCNC: 7.2 G/DL (ref 6–8.5)
PSA SERPL-MCNC: 0.7 NG/ML (ref 0–4)
SL AMB EGFR AFRICAN AMERICAN: 97 ML/MIN/1.73
SL AMB EGFR NON AFRICAN AMERICAN: 84 ML/MIN/1.73
SODIUM SERPL-SCNC: 137 MMOL/L (ref 134–144)
TRIGL SERPL-MCNC: 93 MG/DL (ref 0–149)

## 2019-11-29 DIAGNOSIS — I26.99 OTHER PULMONARY EMBOLISM WITHOUT ACUTE COR PULMONALE, UNSPECIFIED CHRONICITY (HCC): ICD-10-CM

## 2019-12-01 RX ORDER — RIVAROXABAN 20 MG/1
TABLET, FILM COATED ORAL
Qty: 90 TABLET | Refills: 1 | Status: SHIPPED | OUTPATIENT
Start: 2019-12-01 | End: 2019-12-10 | Stop reason: SDUPTHER

## 2019-12-10 ENCOUNTER — OFFICE VISIT (OUTPATIENT)
Dept: FAMILY MEDICINE CLINIC | Facility: CLINIC | Age: 49
End: 2019-12-10
Payer: COMMERCIAL

## 2019-12-10 VITALS
BODY MASS INDEX: 31.64 KG/M2 | WEIGHT: 226 LBS | TEMPERATURE: 97.2 F | HEIGHT: 71 IN | HEART RATE: 76 BPM | RESPIRATION RATE: 16 BRPM | SYSTOLIC BLOOD PRESSURE: 118 MMHG | DIASTOLIC BLOOD PRESSURE: 84 MMHG

## 2019-12-10 DIAGNOSIS — I26.99 OTHER ACUTE PULMONARY EMBOLISM WITHOUT ACUTE COR PULMONALE (HCC): Primary | ICD-10-CM

## 2019-12-10 DIAGNOSIS — I26.99 OTHER PULMONARY EMBOLISM WITHOUT ACUTE COR PULMONALE, UNSPECIFIED CHRONICITY (HCC): ICD-10-CM

## 2019-12-10 DIAGNOSIS — Z12.5 PROSTATE CANCER SCREENING: ICD-10-CM

## 2019-12-10 DIAGNOSIS — Z13.89 SCREENING FOR CARDIOVASCULAR, RESPIRATORY, AND GENITOURINARY DISEASES: ICD-10-CM

## 2019-12-10 DIAGNOSIS — Z13.6 SCREENING FOR CARDIOVASCULAR, RESPIRATORY, AND GENITOURINARY DISEASES: ICD-10-CM

## 2019-12-10 DIAGNOSIS — R73.01 IMPAIRED FASTING GLUCOSE: ICD-10-CM

## 2019-12-10 DIAGNOSIS — Z13.83 SCREENING FOR CARDIOVASCULAR, RESPIRATORY, AND GENITOURINARY DISEASES: ICD-10-CM

## 2019-12-10 DIAGNOSIS — Z13.1 SCREENING FOR DIABETES MELLITUS (DM): ICD-10-CM

## 2019-12-10 DIAGNOSIS — D68.51 ACTIVATED PROTEIN C RESISTANCE (HCC): ICD-10-CM

## 2019-12-10 PROCEDURE — 1036F TOBACCO NON-USER: CPT | Performed by: FAMILY MEDICINE

## 2019-12-10 PROCEDURE — 3008F BODY MASS INDEX DOCD: CPT | Performed by: FAMILY MEDICINE

## 2019-12-10 PROCEDURE — 99214 OFFICE O/P EST MOD 30 MIN: CPT | Performed by: FAMILY MEDICINE

## 2019-12-10 RX ORDER — RIVAROXABAN 20 MG/1
20 TABLET, FILM COATED ORAL DAILY
Qty: 90 TABLET | Refills: 1 | Status: SHIPPED | OUTPATIENT
Start: 2019-12-10 | End: 2019-12-11 | Stop reason: SDUPTHER

## 2019-12-10 NOTE — PROGRESS NOTES
Assessment/Plan:    No problem-specific Assessment & Plan notes found for this encounter  PE with FVL, cont NOAC started by hematology  CBC yearly  GERD, PPI risks aware, f/u GI  Pt advised to contact GI as due for colonoscopy per records  Abnormal glucose aware, TLC strongly advised     Diagnoses and all orders for this visit:    Other acute pulmonary embolism without acute cor pulmonale (HCC)  -     CBC and differential; Future    Activated protein C resistance ([facility] [address] )    Screening for cardiovascular, respiratory, and genitourinary diseases  -     Lipid Panel with Direct LDL reflex; Future    Screening for diabetes mellitus (DM)    Prostate cancer screening  -     PSA, Total Screen; Future    Impaired fasting glucose  -     Comprehensive metabolic panel; Future  -     Hemoglobin A1C; Future  -     Comprehensive metabolic panel; Future  -     Hemoglobin A1C; Future    Other pulmonary embolism without acute cor pulmonale, unspecified chronicity (HCC)  -     XARELTO 20 MG tablet; Take 1 tablet (20 mg total) by mouth daily              Return in about 9 months (around 8/31/2020) for Annual physical     Subjective:      Patient ID: Chiquita Winters is a 47 y o  male  Chief Complaint   Patient presents with    Follow-up     6month f/u   prcma       HPI  On xarelto for life per hematology  On nexium  egd by Dr Forest Dixon  Last egd was normal per pt  Gets gerd sx with missing dose of PPI  On xarelto, no bleeding or bruising noted    Eating w/o restriction  Last a1c and fbs reviewed    The following portions of the patient's history were reviewed and updated as appropriate: allergies, current medications, past family history, past medical history, past social history, past surgical history and problem list     Review of Systems   Constitutional: Negative for fever  Respiratory: Positive for shortness of breath  Cardiovascular: Negative for chest pain           Current Outpatient Medications   Medication Sig Dispense Refill    esomeprazole (NexIUM) 40 MG capsule TAKE ONE CAPSULE DAILY 90 capsule 3    XARELTO 20 MG tablet Take 1 tablet (20 mg total) by mouth daily 90 tablet 1     No current facility-administered medications for this visit  Objective:    /84   Pulse 76   Temp (!) 97 2 °F (36 2 °C)   Resp 16   Ht 5' 10 5" (1 791 m)   Wt 103 kg (226 lb)   BMI 31 97 kg/m²        Physical Exam   Constitutional: He appears well-developed  No distress  HENT:   Head: Normocephalic  Right Ear: External ear normal    Left Ear: External ear normal    Mouth/Throat: No oropharyngeal exudate  Eyes: Conjunctivae are normal  No scleral icterus  Neck: Neck supple  Cardiovascular: Normal rate, regular rhythm, normal heart sounds and intact distal pulses  No murmur heard  Pulmonary/Chest: Effort normal and breath sounds normal  No respiratory distress  He has no wheezes  Abdominal: Soft  Bowel sounds are normal  There is no tenderness  There is no guarding  Musculoskeletal: He exhibits edema  He exhibits no deformity  B/L LE   Neurological: He is alert  He exhibits normal muscle tone  Skin: Skin is warm and dry  No pallor  Psychiatric: His behavior is normal  Thought content normal    Nursing note and vitals reviewed               Doneta Bolus, DO

## 2019-12-11 DIAGNOSIS — I26.99 OTHER PULMONARY EMBOLISM WITHOUT ACUTE COR PULMONALE, UNSPECIFIED CHRONICITY (HCC): ICD-10-CM

## 2019-12-11 RX ORDER — RIVAROXABAN 20 MG/1
20 TABLET, FILM COATED ORAL DAILY
Qty: 90 TABLET | Refills: 1 | Status: SHIPPED | OUTPATIENT
Start: 2019-12-11 | End: 2020-03-12 | Stop reason: SDUPTHER

## 2019-12-29 LAB
ALBUMIN SERPL-MCNC: 4.3 G/DL (ref 3.5–5.5)
ALBUMIN/GLOB SERPL: 1.5 {RATIO} (ref 1.2–2.2)
ALP SERPL-CCNC: 85 IU/L (ref 39–117)
ALT SERPL-CCNC: 33 IU/L (ref 0–44)
AST SERPL-CCNC: 25 IU/L (ref 0–40)
BILIRUB SERPL-MCNC: 0.5 MG/DL (ref 0–1.2)
BUN SERPL-MCNC: 21 MG/DL (ref 6–24)
BUN/CREAT SERPL: 20 (ref 9–20)
CALCIUM SERPL-MCNC: 9.4 MG/DL (ref 8.7–10.2)
CHLORIDE SERPL-SCNC: 101 MMOL/L (ref 96–106)
CO2 SERPL-SCNC: 23 MMOL/L (ref 20–29)
CREAT SERPL-MCNC: 1.03 MG/DL (ref 0.76–1.27)
GLOBULIN SER-MCNC: 2.8 G/DL (ref 1.5–4.5)
GLUCOSE SERPL-MCNC: 119 MG/DL (ref 65–99)
HBA1C MFR BLD: 6.2 % (ref 4.8–5.6)
POTASSIUM SERPL-SCNC: 4.2 MMOL/L (ref 3.5–5.2)
PROT SERPL-MCNC: 7.1 G/DL (ref 6–8.5)
SL AMB EGFR AFRICAN AMERICAN: 97 ML/MIN/1.73
SL AMB EGFR NON AFRICAN AMERICAN: 84 ML/MIN/1.73
SODIUM SERPL-SCNC: 138 MMOL/L (ref 134–144)

## 2020-01-20 ENCOUNTER — HOSPITAL ENCOUNTER (OUTPATIENT)
Dept: RADIOLOGY | Facility: HOSPITAL | Age: 50
Discharge: HOME | End: 2020-01-20
Attending: PODIATRIST
Payer: COMMERCIAL

## 2020-01-20 ENCOUNTER — OFFICE VISIT (OUTPATIENT)
Dept: PODIATRY | Facility: CLINIC | Age: 50
End: 2020-01-20
Payer: COMMERCIAL

## 2020-01-20 ENCOUNTER — TRANSCRIBE ORDERS (OUTPATIENT)
Dept: ADMINISTRATIVE | Facility: HOSPITAL | Age: 50
End: 2020-01-20

## 2020-01-20 VITALS
HEIGHT: 71 IN | WEIGHT: 226 LBS | RESPIRATION RATE: 16 BRPM | DIASTOLIC BLOOD PRESSURE: 76 MMHG | SYSTOLIC BLOOD PRESSURE: 128 MMHG | BODY MASS INDEX: 31.64 KG/M2 | HEART RATE: 76 BPM

## 2020-01-20 DIAGNOSIS — M54.16 RADICULOPATHY OF LUMBAR REGION: ICD-10-CM

## 2020-01-20 DIAGNOSIS — M79.671 PAIN IN BOTH FEET: ICD-10-CM

## 2020-01-20 DIAGNOSIS — Q66.51 CONGENITAL PES PLANUS OF RIGHT FOOT: ICD-10-CM

## 2020-01-20 DIAGNOSIS — M79.672 PAIN IN BOTH FEET: ICD-10-CM

## 2020-01-20 DIAGNOSIS — Q66.52 CONGENITAL PES PLANUS OF LEFT FOOT: ICD-10-CM

## 2020-01-20 DIAGNOSIS — M21.969 ACQUIRED DEFORMITY OF FOOT, UNSPECIFIED LATERALITY: Primary | ICD-10-CM

## 2020-01-20 PROCEDURE — 72114 X-RAY EXAM L-S SPINE BENDING: CPT

## 2020-01-20 PROCEDURE — L3000 FT INSERT UCB BERKELEY SHELL: HCPCS | Performed by: PODIATRIST

## 2020-01-20 PROCEDURE — 99213 OFFICE O/P EST LOW 20 MIN: CPT | Performed by: PODIATRIST

## 2020-01-20 RX ORDER — PREGABALIN 75 MG/1
75 CAPSULE ORAL 2 TIMES DAILY
Qty: 60 CAPSULE | Refills: 0 | Status: SHIPPED | OUTPATIENT
Start: 2020-01-20 | End: 2020-09-08

## 2020-01-20 NOTE — PROGRESS NOTES
Assessment/Plan:  Deformity of foot  Pes planus  Pain upon ambulation  Rule out radiculopathy  Plan  Foot exam performed  Spine x-rays ordered  Patient has been referred to neurology to rule out radiculopathy  With patient will be started on empiric course of Lyrica  We will control is abnormal pronation with orthotics  No problem-specific Assessment & Plan notes found for this encounter  Diagnoses and all orders for this visit:    Acquired deformity of foot, unspecified laterality    Congenital pes planus of right foot    Pain in both feet  -     pregabalin (LYRICA) 75 mg capsule; Take 1 capsule (75 mg total) by mouth 2 (two) times a day  -     XR spine lumbar complete w bending minimum 6 views; Future    Congenital pes planus of left foot    Radiculopathy of lumbar region  -     pregabalin (LYRICA) 75 mg capsule; Take 1 capsule (75 mg total) by mouth 2 (two) times a day  -     XR spine lumbar complete w bending minimum 6 views; Future  -     Ambulatory referral to Neurology; Future          Subjective:  Patient presents for foot evaluation  Patient now has discomfort in the ball of the left foot  This has been ongoing for weeks  No history of trauma      Past Medical History:   Diagnosis Date    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 07/12/2006    Dysphagia     Last Assessed: 8/12/2014     GERD (gastroesophageal reflux disease)     Globus sensation     Last Assessed: 6/4/2014     H/O neoplasm of uncertain behavior of skin 06/06/2006    Hyperlipidemia     Hypertension 02/14/2006    Lateral epicondylitis of right elbow     Last Assessed: 10/23/2015     Pes planus     last assessed: 10/23/2013     Plantar fascial fibromatosis     Last Assessed: 10/23/2013     Right patellofemoral syndrome     Last Assessed: 4/15/2016     Sebaceous cyst 11/03/2007       Past Surgical History:   Procedure Laterality Date    ESOPHAGOGASTRODUODENOSCOPY N/A 10/7/2016    Procedure: ESOPHAGOGASTRODUODENOSCOPY (EGD); Surgeon: Rocio Quesada MD;  Location: [facility]; Service:     NASAL SEPTUM SURGERY  1995    [place] ESOPHAGOGASTRODUODENOSCOPY TRANSORAL DIAGNOSTIC N/A 12/6/2018    Procedure: ESOPHAGOGASTRODUODENOSCOPY (EGD); Surgeon: Rocio Quesada MD;  Location: [facility]; Service: Gastroenterology       No Known Allergies      Current Outpatient Medications:     esomeprazole (NexIUM) 40 MG capsule, TAKE ONE CAPSULE DAILY, Disp: 90 capsule, Rfl: 3    pregabalin (LYRICA) 75 mg capsule, Take 1 capsule (75 mg total) by mouth 2 (two) times a day, Disp: 60 capsule, Rfl: 0    XARELTO 20 MG tablet, Take 1 tablet (20 mg total) by mouth daily, Disp: 90 tablet, Rfl: 1    Patient Active Problem List   Diagnosis    Allergic rhinitis    Diaphragmatic hernia    GERD without esophagitis    Impaired fasting glucose    Screening for cardiovascular, respiratory, and genitourinary diseases    Screening for diabetes mellitus (DM)    Prostate cancer screening    Obesity (BMI 30-39  9)    [place] cardiac risk <10% in next 10 years   [address] maintenance    Hyperlipidemia    Pulmonary embolism ([address] )    DVT (deep venous thrombosis) ([facility] [address] )    Nj's esophagus without dysplasia    Factor 5 Leiden mutation, heterozygous ([facility] [address] )    Pain in both feet    Congenital pes planus of right foot    Congenital pes planus of left foot    Onychomycosis    SOBOE (shortness of breath on exertion)    Hypercoagulable state ([facility] [address] )    Activated protein C resistance (HCC)          Patient ID: Pieter Sun is a 47 y o  male  HPI    The following portions of the patient's history were reviewed and updated as appropriate:     family history includes Cancer in his brother and father; Colon cancer in his family and father; Coronary artery disease in his father; Heart disease in his brother and mother; Hypertension in his father and mother  reports that he has never smoked   He quit smokeless tobacco use about 24 years ago  He reports that he drinks alcohol  He reports that he does not use drugs  Vitals:    01/20/20 1431   BP: 128/76   Pulse: 76   Resp: 16       Review of Systems      Objective:  Patient's shoes and socks removed  Foot Exam    General  General Appearance: appears stated age and healthy   Orientation: alert and oriented to person, place, and time   Affect: appropriate   Gait: unimpaired       Right Foot/Ankle     Inspection and Palpation  Ecchymosis: none  Swelling: dorsum and metatarsals   Arch: pes planus  Hammertoes: fifth toe  Hallux valgus: no  Hallux limitus: yes    Neurovascular  Dorsalis pedis: 3+  Posterior tibial: 3+  Achilles reflex: 2+    Muscle Strength  Ankle dorsiflexion: 4+      Left Foot/Ankle      Inspection and Palpation  Ecchymosis: none  Swelling: dorsum and metatarsals   Arch: pes planus  Hammertoes: fifth toe  Hallux valgus: no  Hallux limitus: yes    Neurovascular  Dorsalis pedis: 3+  Posterior tibial: 3+  Superficial peroneal nerve sensation: diminished  Deep peroneal nerve sensation: diminished  Achilles reflex: 1+    Muscle Strength  Ankle dorsiflexion: 4        Physical Exam   Constitutional: He is oriented to person, place, and time  He appears well-developed and well-nourished  Cardiovascular: Normal rate and regular rhythm  Pulses:       Dorsalis pedis pulses are 3+ on the right side, and 3+ on the left side  Posterior tibial pulses are 3+ on the right side, and 3+ on the left side  Musculoskeletal: He exhibits deformity  Patient is pronated in stance and gait  Bilateral pes planus  Early hallux limitus noted bilateral   Neurological: He is alert and oriented to person, place, and time  Reflex Scores:       Achilles reflexes are 2+ on the right side and 1+ on the left side  Skin: Skin is warm  Capillary refill takes less than 2 seconds  Psychiatric: He has a normal mood and affect   His behavior is normal  Judgment and thought content normal    Vitals reviewed          Procedures

## 2020-01-28 ENCOUNTER — TELEPHONE (OUTPATIENT)
Dept: PODIATRY | Facility: CLINIC | Age: 50
End: 2020-01-28

## 2020-02-27 ENCOUNTER — APPOINTMENT (EMERGENCY)
Dept: RADIOLOGY | Facility: HOSPITAL | Age: 50
End: 2020-02-27
Payer: COMMERCIAL

## 2020-02-27 ENCOUNTER — TELEPHONE (OUTPATIENT)
Dept: FAMILY MEDICINE CLINIC | Facility: CLINIC | Age: 50
End: 2020-02-27

## 2020-02-27 ENCOUNTER — HOSPITAL ENCOUNTER (EMERGENCY)
Facility: HOSPITAL | Age: 50
Discharge: HOME | End: 2020-02-27
Attending: EMERGENCY MEDICINE | Admitting: EMERGENCY MEDICINE
Payer: COMMERCIAL

## 2020-02-27 VITALS
HEIGHT: 69 IN | OXYGEN SATURATION: 97 % | RESPIRATION RATE: 16 BRPM | HEART RATE: 53 BPM | DIASTOLIC BLOOD PRESSURE: 89 MMHG | BODY MASS INDEX: 29.92 KG/M2 | SYSTOLIC BLOOD PRESSURE: 135 MMHG | WEIGHT: 202 LBS

## 2020-02-27 DIAGNOSIS — R42 VERTIGO: Primary | ICD-10-CM

## 2020-02-27 LAB
ALBUMIN SERPL BCP-MCNC: 4.2 G/DL (ref 3.5–5)
ALP SERPL-CCNC: 89 U/L (ref 46–116)
ALT SERPL W P-5'-P-CCNC: 44 U/L (ref 12–78)
ANION GAP SERPL CALCULATED.3IONS-SCNC: 8 MMOL/L (ref 4–13)
AST SERPL W P-5'-P-CCNC: 26 U/L (ref 5–45)
BASOPHILS # BLD AUTO: 0.03 THOUSANDS/ΜL (ref 0–0.1)
BASOPHILS NFR BLD AUTO: 1 % (ref 0–1)
BILIRUB SERPL-MCNC: 0.8 MG/DL (ref 0.2–1)
BUN SERPL-MCNC: 21 MG/DL (ref 5–25)
CALCIUM SERPL-MCNC: 9 MG/DL (ref 8.3–10.1)
CHLORIDE SERPL-SCNC: 102 MMOL/L (ref 100–108)
CO2 SERPL-SCNC: 29 MMOL/L (ref 21–32)
CREAT SERPL-MCNC: 1.03 MG/DL (ref 0.6–1.3)
EOSINOPHIL # BLD AUTO: 0.22 THOUSAND/ΜL (ref 0–0.61)
EOSINOPHIL NFR BLD AUTO: 4 % (ref 0–6)
ERYTHROCYTE [DISTWIDTH] IN BLOOD BY AUTOMATED COUNT: 13.6 % (ref 11.6–15.1)
GFR SERPL CREATININE-BSD FRML MDRD: 84 ML/MIN/1.73SQ M
GLUCOSE SERPL-MCNC: 133 MG/DL (ref 65–140)
HCT VFR BLD AUTO: 44.9 % (ref 36.5–49.3)
HGB BLD-MCNC: 14.3 G/DL (ref 12–17)
IMM GRANULOCYTES # BLD AUTO: 0.01 THOUSAND/UL (ref 0–0.2)
IMM GRANULOCYTES NFR BLD AUTO: 0 % (ref 0–2)
LYMPHOCYTES # BLD AUTO: 1.37 THOUSANDS/ΜL (ref 0.6–4.47)
LYMPHOCYTES NFR BLD AUTO: 25 % (ref 14–44)
MCH RBC QN AUTO: 28.8 PG (ref 26.8–34.3)
MCHC RBC AUTO-ENTMCNC: 31.8 G/DL (ref 31.4–37.4)
MCV RBC AUTO: 90 FL (ref 82–98)
MONOCYTES # BLD AUTO: 0.47 THOUSAND/ΜL (ref 0.17–1.22)
MONOCYTES NFR BLD AUTO: 9 % (ref 4–12)
NEUTROPHILS # BLD AUTO: 3.37 THOUSANDS/ΜL (ref 1.85–7.62)
NEUTS SEG NFR BLD AUTO: 61 % (ref 43–75)
NRBC BLD AUTO-RTO: 0 /100 WBCS
PLATELET # BLD AUTO: 220 THOUSANDS/UL (ref 149–390)
PMV BLD AUTO: 10.3 FL (ref 8.9–12.7)
POTASSIUM SERPL-SCNC: 3.9 MMOL/L (ref 3.5–5.3)
PROT SERPL-MCNC: 8 G/DL (ref 6.4–8.2)
RBC # BLD AUTO: 4.97 MILLION/UL (ref 3.88–5.62)
SODIUM SERPL-SCNC: 139 MMOL/L (ref 136–145)
TROPONIN I SERPL-MCNC: <0.02 NG/ML
WBC # BLD AUTO: 5.47 THOUSAND/UL (ref 4.31–10.16)

## 2020-02-27 PROCEDURE — 96360 HYDRATION IV INFUSION INIT: CPT

## 2020-02-27 PROCEDURE — 36415 COLL VENOUS BLD VENIPUNCTURE: CPT | Performed by: EMERGENCY MEDICINE

## 2020-02-27 PROCEDURE — 70496 CT ANGIOGRAPHY HEAD: CPT

## 2020-02-27 PROCEDURE — 85025 COMPLETE CBC W/AUTO DIFF WBC: CPT | Performed by: EMERGENCY MEDICINE

## 2020-02-27 PROCEDURE — 99285 EMERGENCY DEPT VISIT HI MDM: CPT | Performed by: EMERGENCY MEDICINE

## 2020-02-27 PROCEDURE — 96361 HYDRATE IV INFUSION ADD-ON: CPT

## 2020-02-27 PROCEDURE — 70498 CT ANGIOGRAPHY NECK: CPT

## 2020-02-27 PROCEDURE — 84484 ASSAY OF TROPONIN QUANT: CPT | Performed by: EMERGENCY MEDICINE

## 2020-02-27 PROCEDURE — 80053 COMPREHEN METABOLIC PANEL: CPT | Performed by: EMERGENCY MEDICINE

## 2020-02-27 PROCEDURE — 93005 ELECTROCARDIOGRAM TRACING: CPT

## 2020-02-27 PROCEDURE — 71045 X-RAY EXAM CHEST 1 VIEW: CPT

## 2020-02-27 PROCEDURE — 99285 EMERGENCY DEPT VISIT HI MDM: CPT

## 2020-02-27 RX ORDER — MECLIZINE HYDROCHLORIDE 25 MG/1
25 TABLET ORAL ONCE
Status: COMPLETED | OUTPATIENT
Start: 2020-02-27 | End: 2020-02-27

## 2020-02-27 RX ORDER — DIAZEPAM 5 MG/1
5 TABLET ORAL 2 TIMES DAILY
Qty: 20 TABLET | Refills: 0 | Status: SHIPPED | OUTPATIENT
Start: 2020-02-27 | End: 2020-09-08

## 2020-02-27 RX ORDER — MECLIZINE HYDROCHLORIDE 25 MG/1
25 TABLET ORAL 3 TIMES DAILY PRN
Qty: 30 TABLET | Refills: 0 | Status: SHIPPED | OUTPATIENT
Start: 2020-02-27 | End: 2020-09-08

## 2020-02-27 RX ORDER — DIAZEPAM 5 MG/1
5 TABLET ORAL ONCE
Status: COMPLETED | OUTPATIENT
Start: 2020-02-27 | End: 2020-02-27

## 2020-02-27 RX ADMIN — MECLIZINE HYDROCHLORIDE 25 MG: 25 TABLET ORAL at 07:17

## 2020-02-27 RX ADMIN — DIAZEPAM 5 MG: 5 TABLET ORAL at 08:26

## 2020-02-27 RX ADMIN — IOHEXOL 85 ML: 350 INJECTION, SOLUTION INTRAVENOUS at 07:51

## 2020-02-27 RX ADMIN — SODIUM CHLORIDE 1000 ML: 0.9 INJECTION, SOLUTION INTRAVENOUS at 07:27

## 2020-02-27 NOTE — TELEPHONE ENCOUNTER
Edward's wife Natalie calling stating Danita Donato was in ER today for vertigo and they are recommending [facility]  Wife is calling for an Order to be put in chart  I told her her I would send Dr Melanie Giang a message and whether or not he needs to see patient before doing an order      Please call wife back   Thank you

## 2020-02-27 NOTE — ED PROVIDER NOTES
History  Chief Complaint   Patient presents with    Dizziness     Pt c/o sudden onset of dizziness, nausea and diaphoresis this am      HPI    47 year male that presents with dizziness  Patient states last night he had some dizziness which she in [place] with this morning also he had some dizziness when he was getting up along with nausea and became diaphoretic  Patient states this is never happened before  Dizziness he describes as the room is spinning  Patient states no chest pain shortness of breath with this  Patient states he has a strong history of blood clots and currently on anticoagulation  Patient states whenever he turns his head it feels like the room is spinning  Patient states he also feels very nauseous  Also states his vision is a little blurry  Currently his vision is back to normal   States it happened when he gets up  79-year-old male that presents with dizziness    Prior to Admission Medications   Prescriptions Last Dose Informant Patient Reported? Taking?    XARELTO 20 MG tablet 2/26/2020 at Unknown time  No Yes   Sig: Take 1 tablet (20 mg total) by mouth daily   esomeprazole (NexIUM) 40 MG capsule Not Taking at Unknown time  No No   Sig: TAKE ONE CAPSULE DAILY   Patient not taking: Reported on 2/27/2020   pregabalin (LYRICA) 75 mg capsule 2/26/2020 at Unknown time  No Yes   Sig: Take 1 capsule (75 mg total) by mouth 2 (two) times a day      Facility-Administered Medications: None       Past Medical History:   Diagnosis Date    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 07/12/2006    Dysphagia     Last Assessed: 8/12/2014     GERD (gastroesophageal reflux disease)     Globus sensation     Last Assessed: 6/4/2014     H/O neoplasm of uncertain behavior of skin 06/06/2006    Hyperlipidemia     Hypertension 02/14/2006    Lateral epicondylitis of right elbow     Last Assessed: 10/23/2015     Pes planus     last assessed: 10/23/2013     Plantar fascial fibromatosis     Last Assessed: 10/23/2013     Right patellofemoral syndrome     Last Assessed: 4/15/2016     Sebaceous cyst 11/03/2007       Past Surgical History:   Procedure Laterality Date    ESOPHAGOGASTRODUODENOSCOPY N/A 10/7/2016    Procedure: ESOPHAGOGASTRODUODENOSCOPY (EGD); Surgeon: Tiesha White MD;  Location: [facility]; Service:     NASAL SEPTUM SURGERY  1995    [place] ESOPHAGOGASTRODUODENOSCOPY TRANSORAL DIAGNOSTIC N/A 12/6/2018    Procedure: ESOPHAGOGASTRODUODENOSCOPY (EGD); Surgeon: Tiesha White MD;  Location: [facility]; Service: Gastroenterology       Family History   Problem Relation Age of Onset    Heart disease Mother         valve 76s    Hypertension Mother     Cancer Father         colon    Colon cancer Father     Hypertension Father     Coronary artery disease Father         CABG    Heart disease Brother         MI exp age 39    Colon cancer Family     Cancer Brother         esophageal CA exp age 40     I have reviewed and agree with the history as documented  E-Cigarette/Vaping     E-Cigarette/Vaping Substances     Social History     Tobacco Use    Smoking status: Never Smoker    Smokeless tobacco: Former User   Substance Use Topics    Alcohol use: Yes     Comment: socially    Drug use: No       Review of Systems   Constitutional: Negative  Negative for diaphoresis and fever  HENT: Negative  Respiratory: Negative  Negative for cough, shortness of breath and wheezing  Cardiovascular: Negative  Negative for chest pain, palpitations and leg swelling  Gastrointestinal: Positive for nausea  Negative for abdominal distention, abdominal pain and vomiting  Genitourinary: Negative  Musculoskeletal: Negative  Skin: Negative  Neurological: Positive for dizziness  Psychiatric/Behavioral: Negative  All other systems reviewed and are negative  Physical Exam  Physical Exam   Constitutional: He is oriented to person, place, and time   He appears well-developed and well-nourished  No distress  HENT:   Head: Normocephalic and atraumatic  Nose: Nose normal    Mouth/Throat: Oropharynx is clear and moist    Eyes: Pupils are equal, round, and reactive to light  Conjunctivae and EOM are normal    Neck: Normal range of motion  Neck supple  Cardiovascular: Normal rate, regular rhythm and normal heart sounds  No murmur heard  Pulmonary/Chest: Effort normal and breath sounds normal  No respiratory distress  He has no wheezes  He has no rales  Abdominal: Soft  Bowel sounds are normal  He exhibits no distension  There is no tenderness  There is no rebound and no guarding  Musculoskeletal: Normal range of motion  He exhibits no edema, tenderness or deformity  Neurological: He is alert and oriented to person, place, and time  No cranial nerve deficit  Normal motor and sensory exam   Skin: Skin is warm and dry  No rash noted  He is not diaphoretic  No pallor  Psychiatric: He has a normal mood and affect  Vitals reviewed        Vital Signs  ED Triage Vitals [02/27/20 0654]   Temp Pulse Respirations Blood Pressure SpO2   -- 62 16 154/99 96 %      Temp src Heart Rate Source Patient Position - Orthostatic VS BP Location FiO2 (%)   -- Monitor Lying Left arm --      Pain Score       No Pain           Vitals:    02/27/20 0710 02/27/20 0713 02/27/20 0826 02/27/20 0936   BP: 136/88 137/92 137/86 135/89   Pulse: 74 71 (!) 54 (!) 53   Patient Position - Orthostatic VS: Standing - Orthostatic VS Standing for 3 minutes - Orthostatic VS Lying Sitting         Visual Acuity  Visual Acuity      Most Recent Value   L Pupil Size (mm)  3   R Pupil Size (mm)  3          ED Medications  Medications   meclizine (ANTIVERT) tablet 25 mg (25 mg Oral Given 2/27/20 0717)   sodium chloride 0 9 % bolus 1,000 mL (0 mL Intravenous Stopped 2/27/20 0936)   iohexol (OMNIPAQUE) 350 MG/ML injection (MULTI-DOSE) 85 mL (85 mL Intravenous Given 2/27/20 0751)   diazepam (VALIUM) tablet 5 mg (5 mg Oral Given 2/27/20 0826)       Diagnostic Studies  Results Reviewed     Procedure Component Value Units Date/Time    Troponin I [[id number]]  (Normal) Collected:  02/27/20 0700    Lab Status:  Final result Specimen:  Blood from Arm, Right Updated:  02/27/20 0736     Troponin I <0 02 ng/mL     Comprehensive metabolic panel [[id number]] Collected:  02/27/20 0700    Lab Status:  Final result Specimen:  Blood from Arm, Right Updated:  02/27/20 0732     Sodium 139 mmol/L      Potassium 3 9 mmol/L      Chloride 102 mmol/L      CO2 29 mmol/L      ANION GAP 8 mmol/L      BUN 21 mg/dL      Creatinine 1 03 mg/dL      Glucose 133 mg/dL      Calcium 9 0 mg/dL      AST 26 U/L      ALT 44 U/L      Alkaline Phosphatase 89 U/L      Total Protein 8 0 g/dL      Albumin 4 2 g/dL      Total Bilirubin 0 80 mg/dL      eGFR 84 ml/min/1 73sq m     Narrative:       [place] guidelines for Chronic Kidney Disease (CKD):     Stage 1 with normal or high GFR (GFR > 90 mL/min/1 73 square meters)    Stage 2 Mild CKD (GFR = 60-89 mL/min/1 73 square meters)    Stage 3A Moderate CKD (GFR = 45-59 mL/min/1 73 square meters)    Stage 3B Moderate CKD (GFR = 30-44 mL/min/1 73 square meters)    Stage 4 Severe CKD (GFR = 15-29 mL/min/1 73 square meters)    Stage 5 End Stage CKD (GFR <15 mL/min/1 73 square meters)  Note: GFR calculation is accurate only with a steady state creatinine    CBC and differential [[id number]] Collected:  02/27/20 0700    Lab Status:  Final result Specimen:  Blood from Arm, Right Updated:  02/27/20 0712     WBC 5 47 Thousand/uL      RBC 4 97 Million/uL      Hemoglobin 14 3 g/dL      Hematocrit 44 9 %      MCV 90 fL      MCH 28 8 pg      MCHC 31 8 g/dL      RDW 13 6 %      MPV 10 3 fL      Platelets 962 Thousands/uL      nRBC 0 /100 WBCs      Neutrophils Relative 61 %      Immat GRANS % 0 %      Lymphocytes Relative 25 %      Monocytes Relative 9 %      Eosinophils Relative 4 %      Basophils Relative 1 % Neutrophils Absolute 3 37 Thousands/µL      Immature Grans Absolute 0 01 Thousand/uL      Lymphocytes Absolute 1 37 Thousands/µL      Monocytes Absolute 0 47 Thousand/µL      Eosinophils Absolute 0 22 Thousand/µL      Basophils Absolute 0 03 Thousands/µL                  CTA head and neck with and without contrast   Final Result by Yousuf Turk MD (02/27 4222)      1  No acute intracranial CT abnormality  2   Unremarkable CT angiogram of the head and neck          Workstation performed: [id number]         XR chest 1 view portable   Final Result by Saida Lim MD (02/27 0802)      No acute cardiopulmonary disease  Workstation performed: [id number]                    Procedures  Procedures         ED Course  ED Course as of Feb 29 2057   Thu Feb 27, 2020   0715 Procedure Note: EKG  Date/Time: 02/27/20 7:15 AM   Performed by: Rachel Ramirez   Authorized by: Rachel Ramirez   Indications / Diagnosis: dizziness  ECG reviewed by me, the ED Provider: yes   The EKG demonstrates:  Rhythm: normal sinus  Intervals: normal intervals  Axis: normal axis  QRS/Blocks: normal QRS  ST Changes: No acute ST Changes, no STD/LIZY       8878 Spoke with Neurology Dr Aruna Mishra who recommended treat vertigo with meclizine and valium  Patient already on anticoagulation  5641 Discussed with patient to admit which patient prefers to go home  Will discharge       4475 Patient advised to return if any worsening of symptoms                                  MDM      Disposition  Final diagnoses:   Vertigo     Time reflects when diagnosis was documented in both MDM as applicable and the Disposition within this note     Time User Action Codes Description Comment    2/27/2020  9:14 AM Carlton Reason Add [R42] Vertigo       ED Disposition     ED Disposition Condition Date/Time Comment    Discharge Stable u Feb 27, 2020  9:14 AM Tressa Pickett discharge to home/self care              Follow-up Information     Follow up With Specialties Details Why [address] Schedule an appointment as soon as possible for a visit   [address] [facility]            Discharge Medication List as of 2/27/2020  9:15 AM      START taking these medications    Details   diazepam (VALIUM) 5 mg tablet Take 1 tablet (5 mg total) by mouth 2 (two) times a day for 10 days, Starting Thu 2/27/2020, Until Sun 3/8/2020, Print      meclizine (ANTIVERT) 25 mg tablet Take 1 tablet (25 mg total) by mouth 3 (three) times a day as needed for dizziness, Starting Thu 2/27/2020, Print         CONTINUE these medications which have NOT CHANGED    Details   esomeprazole (NexIUM) 40 MG capsule TAKE ONE CAPSULE DAILY, Normal      pregabalin (LYRICA) 75 mg capsule Take 1 capsule (75 mg total) by mouth 2 (two) times a day, Starting Mon 1/20/2020, Until Thu 2/27/2020, Normal      XARELTO 20 MG tablet Take 1 tablet (20 mg total) by mouth daily, Starting Wed 12/11/2019, Normal           No discharge procedures on file      PDMP Review     None          ED Provider  Electronically Signed by           Grupo Yang MD  02/29/20 2144

## 2020-02-28 ENCOUNTER — EVALUATION (OUTPATIENT)
Dept: PHYSICAL THERAPY | Facility: CLINIC | Age: 50
End: 2020-02-28
Payer: COMMERCIAL

## 2020-02-28 DIAGNOSIS — R42 DIZZINESS: ICD-10-CM

## 2020-02-28 DIAGNOSIS — H81.10 BENIGN PAROXYSMAL POSITIONAL VERTIGO, UNSPECIFIED LATERALITY: Primary | ICD-10-CM

## 2020-02-28 PROCEDURE — 97163 PT EVAL HIGH COMPLEX 45 MIN: CPT

## 2020-02-28 NOTE — PROGRESS NOTES
PT Evaluation     Today's date: 2020  Patient name: Catrina Alegria  : 1970  MRN: [id number]  Referring provider: Clive Rock DO  Dx:   Encounter Diagnosis     ICD-10-CM    1  Benign paroxysmal positional vertigo, unspecified laterality H81 10    2  Dizziness R42                   Assessment  Assessment details: Patient is a 47 y o  male who presents to skilled PT with complaints of "spinning" dizziness that began 2 nights ago as a result of varying positional changes, especially with turning his head in bed and getting up out of bed  Patient coordination is Normal per alterate toe tapping and heel to shin test  Patient cervical spine integrity intact per normal and negative results of VBI and Sharp Sterling respectively  Patient's oculomotor screen was unremarkable with no increase in dizziness with any aspect of the VOR screen  Patient was negative for all positional testing  Patient subjective report notes the following functional limitation with positional changes especially turning his head in bed and getting up out of bed, however notes he has not had "an episode" since yesterday morning  Due to patient report in lightheadedness with positional changes, PT took BP in supine and sitting in opposite UEs with 5 minutes between due to patient's Factor 5 diagnosis; his BP values were as follows:122/79 (L arm - supine) and 133/88 (R arm - sitting)  Due to inability to reproduce patient's symptoms, PT advised patient to notify her PCP and give the clinic a call to schedule an appointment same day if symptoms are to arise again and she was in good verbal understanding  Please contact me if you have any questions or recommendations  Thank you for the referral and the opportunity to share in Edward's care      Plan  Patient would benefit from: skilled physical therapy  Planned modality interventions: biofeedback, cryotherapy, TENS and thermotherapy: hydrocollator packs  Planned therapy interventions: abdominal trunk stabilization, balance, breathing training, coordination, therapeutic activities, stretching, strengthening, postural training, patient education, neuromuscular re-education, motor coordination training, Black taping, manual therapy, joint mobilization, therapeutic exercise, therapeutic training, transfer training, home exercise program, graded motor, graded exercise, graded activity, gait training, functional ROM exercises and flexibility  Frequency: 2x week  Duration in weeks: 12  Plan of Care beginning date: 2/28/2020  Plan of Care expiration date: 5/22/2020  Treatment plan discussed with: patient and family        Subjective Evaluation    History of Present Illness  Mechanism of injury: Patient is a 47 y o  Male who reports to skilled outpatient PT with complaints of "spinning" dizziness that started 2 nights ago when he turned his head to watch TV   He noted a second occurrence when he stood up from the couch yesterday morning and reported "the whole room went whacky " Patient's wife states that "he broke out into a sweat and the color drained from his face and they went to the ED "   Pain  No pain reported    Social Support  Steps to enter house: yes (0 HR)  Stairs in house: yes (1 HR)   Lives in: multiple-level home  Lives with: spouse    Employment status: working  Hand dominance: right      Diagnostic Tests  X-ray: normal  CT scan: normal  Treatments  Current treatment: physical therapy        Objective     General Comments:      Cervical/Thoracic Comments  VBI: normal bilateral  Sharp Sterling: negative  Cervical Spine AROM: WFL    Oculomotor Screen   -Smooth Pursuits- Normal Bilaterally  -[place] Convergence- Normal  -Saccades- Normal  -VOR Screen- Normal Tracking  -VOR Cancel- Normal  -Head Thrust- Normal bilaterally    Coordination Screen- All Tests intact  -Dysmetria  -Dysdiadochokinesia  -Alternating Toe Taps    Positional Testing  -[place]-Hallpike- Negative Bilaterally  -Roll Test- Negative Bilaterally    Orthostatics:  Supine: 122/79  Supine to sittin/88             Precautions:   Past Medical History:   Diagnosis Date    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 2006    Dysphagia     Last Assessed: 2014     GERD (gastroesophageal reflux disease)     Globus sensation     Last Assessed: 2014     H/O neoplasm of uncertain behavior of skin 2006    Hyperlipidemia     Hypertension 2006    Lateral epicondylitis of right elbow     Last Assessed: 10/23/2015     Pes planus     last assessed: 10/23/2013     Plantar fascial fibromatosis     Last Assessed: 10/23/2013     Right patellofemoral syndrome     Last Assessed: 4/15/2016     Sebaceous cyst 2007 no

## 2020-03-02 LAB
ATRIAL RATE: 54 BPM
ATRIAL RATE: 62 BPM
P AXIS: 16 DEGREES
P AXIS: 45 DEGREES
PR INTERVAL: 168 MS
PR INTERVAL: 184 MS
QRS AXIS: 14 DEGREES
QRS AXIS: 15 DEGREES
QRSD INTERVAL: 90 MS
QRSD INTERVAL: 92 MS
QT INTERVAL: 416 MS
QT INTERVAL: 438 MS
QTC INTERVAL: 415 MS
QTC INTERVAL: 422 MS
T WAVE AXIS: 31 DEGREES
T WAVE AXIS: 36 DEGREES
VENTRICULAR RATE: 54 BPM
VENTRICULAR RATE: 62 BPM

## 2020-03-02 PROCEDURE — 93010 ELECTROCARDIOGRAM REPORT: CPT | Performed by: INTERNAL MEDICINE

## 2020-03-12 DIAGNOSIS — I26.99 OTHER PULMONARY EMBOLISM WITHOUT ACUTE COR PULMONALE, UNSPECIFIED CHRONICITY (HCC): ICD-10-CM

## 2020-03-12 RX ORDER — RIVAROXABAN 20 MG/1
20 TABLET, FILM COATED ORAL DAILY
Qty: 90 TABLET | Refills: 1 | Status: SHIPPED | OUTPATIENT
Start: 2020-03-12 | End: 2020-05-30

## 2020-03-25 NOTE — PROGRESS NOTES
Self Discharge     Patient has not participated in therapy for approximately 1 month, attempts to call back and schedule have previously been made but no scheduling has occurred  Patient will be considered a self discharge at this time and will be discharged from skilled PT services per hospital policy

## 2020-04-17 ENCOUNTER — TELEPHONE (OUTPATIENT)
Dept: GASTROENTEROLOGY | Facility: AMBULARY SURGERY CENTER | Age: 50
End: 2020-04-17

## 2020-04-17 DIAGNOSIS — K22.70 BARRETT'S ESOPHAGUS WITHOUT DYSPLASIA: ICD-10-CM

## 2020-04-17 RX ORDER — ESOMEPRAZOLE MAGNESIUM 40 MG/1
40 CAPSULE, DELAYED RELEASE ORAL DAILY
Qty: 90 CAPSULE | Refills: 3 | Status: SHIPPED | OUTPATIENT
Start: 2020-04-17 | End: 2020-04-24 | Stop reason: SDUPTHER

## 2020-04-21 ENCOUNTER — TELEPHONE (OUTPATIENT)
Dept: GASTROENTEROLOGY | Facility: CLINIC | Age: 50
End: 2020-04-21

## 2020-04-24 ENCOUNTER — TELEMEDICINE (OUTPATIENT)
Dept: GASTROENTEROLOGY | Facility: CLINIC | Age: 50
End: 2020-04-24
Payer: COMMERCIAL

## 2020-04-24 DIAGNOSIS — Z80.0 FAMILY HISTORY OF COLON CANCER: ICD-10-CM

## 2020-04-24 DIAGNOSIS — K22.70 BARRETT'S ESOPHAGUS WITHOUT DYSPLASIA: Primary | ICD-10-CM

## 2020-04-24 DIAGNOSIS — Z79.01 ANTICOAGULANT LONG-TERM USE: ICD-10-CM

## 2020-04-24 PROCEDURE — 99213 OFFICE O/P EST LOW 20 MIN: CPT | Performed by: INTERNAL MEDICINE

## 2020-04-24 RX ORDER — ESOMEPRAZOLE MAGNESIUM 40 MG/1
40 CAPSULE, DELAYED RELEASE ORAL DAILY
Qty: 90 CAPSULE | Refills: 3 | Status: SHIPPED | OUTPATIENT
Start: 2020-04-24 | End: 2021-12-17 | Stop reason: SDUPTHER

## 2020-05-30 DIAGNOSIS — I26.99 OTHER PULMONARY EMBOLISM WITHOUT ACUTE COR PULMONALE, UNSPECIFIED CHRONICITY (HCC): ICD-10-CM

## 2020-05-30 RX ORDER — RIVAROXABAN 20 MG/1
TABLET, FILM COATED ORAL
Qty: 90 TABLET | Refills: 0 | Status: SHIPPED | OUTPATIENT
Start: 2020-05-30 | End: 2020-08-22

## 2020-06-19 ENCOUNTER — TELEPHONE (OUTPATIENT)
Dept: GASTROENTEROLOGY | Facility: AMBULARY SURGERY CENTER | Age: 50
End: 2020-06-19

## 2020-08-22 DIAGNOSIS — I26.99 OTHER PULMONARY EMBOLISM WITHOUT ACUTE COR PULMONALE, UNSPECIFIED CHRONICITY (HCC): ICD-10-CM

## 2020-08-22 RX ORDER — RIVAROXABAN 20 MG/1
TABLET, FILM COATED ORAL
Qty: 90 TABLET | Refills: 1 | Status: SHIPPED | OUTPATIENT
Start: 2020-08-22 | End: 2020-09-22 | Stop reason: SDUPTHER

## 2020-09-08 ENCOUNTER — OFFICE VISIT (OUTPATIENT)
Dept: FAMILY MEDICINE CLINIC | Facility: CLINIC | Age: 50
End: 2020-09-08
Payer: COMMERCIAL

## 2020-09-08 VITALS
OXYGEN SATURATION: 97 % | WEIGHT: 203 LBS | HEART RATE: 73 BPM | TEMPERATURE: 96 F | RESPIRATION RATE: 18 BRPM | HEIGHT: 69 IN | DIASTOLIC BLOOD PRESSURE: 84 MMHG | BODY MASS INDEX: 30.07 KG/M2 | SYSTOLIC BLOOD PRESSURE: 138 MMHG

## 2020-09-08 DIAGNOSIS — Z00.00 HEALTHCARE MAINTENANCE: Primary | ICD-10-CM

## 2020-09-08 DIAGNOSIS — K21.9 GERD WITHOUT ESOPHAGITIS: ICD-10-CM

## 2020-09-08 DIAGNOSIS — K22.70 BARRETT'S ESOPHAGUS WITHOUT DYSPLASIA: ICD-10-CM

## 2020-09-08 DIAGNOSIS — D68.51 FACTOR 5 LEIDEN MUTATION, HETEROZYGOUS (HCC): ICD-10-CM

## 2020-09-08 PROCEDURE — 1036F TOBACCO NON-USER: CPT | Performed by: FAMILY MEDICINE

## 2020-09-08 PROCEDURE — 99396 PREV VISIT EST AGE 40-64: CPT | Performed by: FAMILY MEDICINE

## 2020-09-08 PROCEDURE — 3725F SCREEN DEPRESSION PERFORMED: CPT | Performed by: FAMILY MEDICINE

## 2020-09-08 NOTE — PROGRESS NOTES
Assessment/Plan:    No problem-specific Assessment & Plan notes found for this encounter  cpe done    GI f/u  Barretts? Colonoscopy f/u planned     Diagnoses and all orders for this visit:    Healthcare maintenance    GERD without esophagitis    Nj's esophagus without dysplasia    Factor 5 Leiden mutation, heterozygous ([address] )              Return if symptoms worsen or fail to improve  Subjective:      Patient ID: Melony Skaggs is a 45 y o  male  Chief Complaint   Patient presents with    Physical Exam     Nikko Pike       HPI  Had colonoscopy 2015 per pt, due this year  Dr Chriss Maldonado is good per pt  Lean meat  Limits carbs  Avoids late meals and fast food  Wt same  Occasional beer    Exercising per pt  Walking for exercise    The following portions of the patient's history were reviewed and updated as appropriate: allergies, current medications, past family history, past medical history, past social history, past surgical history and problem list     Review of Systems   Respiratory: Negative for shortness of breath  Cardiovascular: Negative for chest pain  Current Outpatient Medications   Medication Sig Dispense Refill    esomeprazole (NexIUM) 40 MG capsule Take 1 capsule (40 mg total) by mouth daily 90 capsule 3    Xarelto 20 MG tablet TAKE 1 TABLET BY MOUTH EVERY DAY 90 tablet 1     No current facility-administered medications for this visit  Objective:    /84   Pulse 73   Temp (!) 96 °F (35 6 °C)   Resp 18   Ht 5' 9" (1 753 m)   Wt 92 1 kg (203 lb)   SpO2 97%   BMI 29 98 kg/m²        Physical Exam  Vitals signs and nursing note reviewed  Constitutional:       Appearance: Normal appearance  He is well-developed  He is not diaphoretic  HENT:      Head: Normocephalic  Right Ear: Tympanic membrane normal       Left Ear: Tympanic membrane normal       Nose: No rhinorrhea  Mouth/Throat:      Pharynx: No posterior oropharyngeal erythema     Eyes:      General: No scleral icterus  Conjunctiva/sclera: Conjunctivae normal    Neck:      Musculoskeletal: Neck supple  Vascular: No carotid bruit  Cardiovascular:      Rate and Rhythm: Normal rate and regular rhythm  Heart sounds: No murmur  Pulmonary:      Effort: Pulmonary effort is normal  No respiratory distress  Abdominal:      General: There is no distension  Palpations: Abdomen is soft  There is no mass  Tenderness: There is no abdominal tenderness  Hernia: No hernia is present  Genitourinary:     Penis: Normal        Scrotum/Testes: Normal       Prostate: Normal       Rectum: Normal    Musculoskeletal:         General: No deformity  Skin:     General: Skin is warm and dry  Coloration: Skin is not pale  Neurological:      Mental Status: He is alert  Gait: Gait normal    Psychiatric:         Mood and Affect: Mood normal          Behavior: Behavior normal          Thought Content: Thought content normal        BMI Counseling: Body mass index is 29 98 kg/m²  The BMI is above normal  Nutrition recommendations include decreasing portion sizes and moderation in carbohydrate intake  Exercise recommendations include exercising 3-5 times per week  No pharmacotherapy was ordered                  Kyleigh Montaño DO

## 2020-09-09 ENCOUNTER — TELEPHONE (OUTPATIENT)
Dept: ADMINISTRATIVE | Facility: OTHER | Age: 50
End: 2020-09-09

## 2020-09-09 NOTE — TELEPHONE ENCOUNTER
----- Message from Torres Mora DO sent at 9/8/2020 11:03 PM EDT -----  09/08/20 11:03 PM    Linda, our patient Greg Friday has had CRC: Colonoscopy completed/performed  Please assist in updating the patient chart by making an External outreach to Dr Shyla Gaspar facility located in [place]  The date of service is 1      Thank you,  Torres Mora DO  [place] [phone]

## 2020-09-09 NOTE — TELEPHONE ENCOUNTER
----- Message from Tracee Chambers DO sent at 9/8/2020 11:03 PM EDT -----  09/08/20 11:03 PM    Hello, our patient Gilmar Man has had CRC: Colonoscopy completed/performed  Please assist in updating the patient chart by making an External outreach to Dr Blas Anders facility located in [place]  The date of service is 1      Thank you,  Tracee Chambers DO  [place] [phone]

## 2020-09-09 NOTE — TELEPHONE ENCOUNTER
Upon review of your request/inquiry, we  Have a colon resulted in chart by Dr Anne Lam 6/13/20 - found in media sections under operative note 6/13/14    Any additional questions or concerns should be emailed to the Practice Liaisons via [email or web address] email, please do not reply via In Basket       Thank you  Haley Dodge MA

## 2020-09-16 LAB
ALBUMIN SERPL-MCNC: 4.7 G/DL (ref 3.8–4.9)
ALBUMIN/GLOB SERPL: 1.6 {RATIO} (ref 1.2–2.2)
ALP SERPL-CCNC: 82 IU/L (ref 39–117)
ALT SERPL-CCNC: 26 IU/L (ref 0–44)
AST SERPL-CCNC: 24 IU/L (ref 0–40)
BASOPHILS # BLD AUTO: 0 X10E3/UL (ref 0–0.2)
BASOPHILS NFR BLD AUTO: 1 %
BILIRUB SERPL-MCNC: 0.6 MG/DL (ref 0–1.2)
BUN SERPL-MCNC: 16 MG/DL (ref 6–24)
BUN/CREAT SERPL: 16 (ref 9–20)
CALCIUM SERPL-MCNC: 9.9 MG/DL (ref 8.7–10.2)
CHLORIDE SERPL-SCNC: 100 MMOL/L (ref 96–106)
CHOLEST SERPL-MCNC: 250 MG/DL (ref 100–199)
CO2 SERPL-SCNC: 25 MMOL/L (ref 20–29)
CREAT SERPL-MCNC: 1 MG/DL (ref 0.76–1.27)
EOSINOPHIL # BLD AUTO: 0.3 X10E3/UL (ref 0–0.4)
EOSINOPHIL NFR BLD AUTO: 5 %
ERYTHROCYTE [DISTWIDTH] IN BLOOD BY AUTOMATED COUNT: 13 % (ref 11.6–15.4)
GLOBULIN SER-MCNC: 2.9 G/DL (ref 1.5–4.5)
GLUCOSE SERPL-MCNC: 112 MG/DL (ref 65–99)
HBA1C MFR BLD: 5.9 % (ref 4.8–5.6)
HCT VFR BLD AUTO: 43 % (ref 37.5–51)
HDLC SERPL-MCNC: 73 MG/DL
HGB BLD-MCNC: 14.6 G/DL (ref 13–17.7)
IMM GRANULOCYTES # BLD: 0 X10E3/UL (ref 0–0.1)
IMM GRANULOCYTES NFR BLD: 0 %
LDLC SERPL CALC-MCNC: 156 MG/DL (ref 0–99)
LYMPHOCYTES # BLD AUTO: 2.3 X10E3/UL (ref 0.7–3.1)
LYMPHOCYTES NFR BLD AUTO: 33 %
MCH RBC QN AUTO: 29.8 PG (ref 26.6–33)
MCHC RBC AUTO-ENTMCNC: 34 G/DL (ref 31.5–35.7)
MCV RBC AUTO: 88 FL (ref 79–97)
MICRODELETION SYND BLD/T FISH: NORMAL
MONOCYTES # BLD AUTO: 0.7 X10E3/UL (ref 0.1–0.9)
MONOCYTES NFR BLD AUTO: 11 %
NEUTROPHILS # BLD AUTO: 3.5 X10E3/UL (ref 1.4–7)
NEUTROPHILS NFR BLD AUTO: 50 %
PLATELET # BLD AUTO: 254 X10E3/UL (ref 150–450)
POTASSIUM SERPL-SCNC: 4.6 MMOL/L (ref 3.5–5.2)
PROT SERPL-MCNC: 7.6 G/DL (ref 6–8.5)
PSA SERPL-MCNC: 0.7 NG/ML (ref 0–4)
RBC # BLD AUTO: 4.9 X10E6/UL (ref 4.14–5.8)
SL AMB EGFR AFRICAN AMERICAN: 100 ML/MIN/1.73
SL AMB EGFR NON AFRICAN AMERICAN: 87 ML/MIN/1.73
SODIUM SERPL-SCNC: 137 MMOL/L (ref 134–144)
TRIGL SERPL-MCNC: 121 MG/DL (ref 0–149)
WBC # BLD AUTO: 6.9 X10E3/UL (ref 3.4–10.8)

## 2020-09-22 DIAGNOSIS — I26.99 OTHER PULMONARY EMBOLISM WITHOUT ACUTE COR PULMONALE, UNSPECIFIED CHRONICITY (HCC): ICD-10-CM

## 2020-09-22 RX ORDER — RIVAROXABAN 20 MG/1
20 TABLET, FILM COATED ORAL DAILY
Qty: 90 TABLET | Refills: 1 | Status: SHIPPED | OUTPATIENT
Start: 2020-09-22 | End: 2021-03-30 | Stop reason: SDUPTHER

## 2020-12-30 ENCOUNTER — TRANSCRIBE ORDERS (OUTPATIENT)
Dept: ADMINISTRATIVE | Facility: HOSPITAL | Age: 50
End: 2020-12-30

## 2020-12-30 DIAGNOSIS — N50.819 TESTICULAR PAIN, UNSPECIFIED: ICD-10-CM

## 2020-12-30 DIAGNOSIS — N45.1 EPIDIDYMITIS: Primary | ICD-10-CM

## 2021-01-04 ENCOUNTER — HOSPITAL ENCOUNTER (OUTPATIENT)
Dept: RADIOLOGY | Facility: HOSPITAL | Age: 51
Discharge: HOME | End: 2021-01-04
Attending: UROLOGY
Payer: COMMERCIAL

## 2021-01-04 DIAGNOSIS — N45.1 EPIDIDYMITIS: ICD-10-CM

## 2021-01-04 DIAGNOSIS — N50.819 TESTICULAR PAIN, UNSPECIFIED: ICD-10-CM

## 2021-01-04 PROCEDURE — 76870 US EXAM SCROTUM: CPT

## 2021-03-23 ENCOUNTER — APPOINTMENT (EMERGENCY)
Dept: RADIOLOGY | Facility: HOSPITAL | Age: 51
End: 2021-03-23
Payer: COMMERCIAL

## 2021-03-23 ENCOUNTER — HOSPITAL ENCOUNTER (EMERGENCY)
Facility: HOSPITAL | Age: 51
Discharge: HOME | End: 2021-03-23
Attending: EMERGENCY MEDICINE
Payer: COMMERCIAL

## 2021-03-23 VITALS
SYSTOLIC BLOOD PRESSURE: 127 MMHG | RESPIRATION RATE: 18 BRPM | HEART RATE: 71 BPM | DIASTOLIC BLOOD PRESSURE: 79 MMHG | OXYGEN SATURATION: 97 % | WEIGHT: 210 LBS | TEMPERATURE: 96.7 F | BODY MASS INDEX: 31.01 KG/M2

## 2021-03-23 DIAGNOSIS — N20.0 KIDNEY STONE: Primary | ICD-10-CM

## 2021-03-23 LAB
ALBUMIN SERPL BCP-MCNC: 4 G/DL (ref 3.5–5)
ALP SERPL-CCNC: 74 U/L (ref 46–116)
ALT SERPL W P-5'-P-CCNC: 45 U/L (ref 12–78)
ANION GAP SERPL CALCULATED.3IONS-SCNC: 9 MMOL/L (ref 4–13)
AST SERPL W P-5'-P-CCNC: 30 U/L (ref 5–45)
BASOPHILS # BLD AUTO: 0.05 THOUSANDS/ΜL (ref 0–0.1)
BASOPHILS NFR BLD AUTO: 1 % (ref 0–1)
BILIRUB SERPL-MCNC: 0.6 MG/DL (ref 0.2–1)
BUN SERPL-MCNC: 19 MG/DL (ref 5–25)
CALCIUM SERPL-MCNC: 9.3 MG/DL (ref 8.3–10.1)
CHLORIDE SERPL-SCNC: 101 MMOL/L (ref 100–108)
CO2 SERPL-SCNC: 28 MMOL/L (ref 21–32)
CREAT SERPL-MCNC: 1.01 MG/DL (ref 0.6–1.3)
EOSINOPHIL # BLD AUTO: 0.38 THOUSAND/ΜL (ref 0–0.61)
EOSINOPHIL NFR BLD AUTO: 4 % (ref 0–6)
ERYTHROCYTE [DISTWIDTH] IN BLOOD BY AUTOMATED COUNT: 14.5 % (ref 11.6–15.1)
GFR SERPL CREATININE-BSD FRML MDRD: 86 ML/MIN/1.73SQ M
GLUCOSE SERPL-MCNC: 122 MG/DL (ref 65–140)
HCT VFR BLD AUTO: 38.3 % (ref 36.5–49.3)
HGB BLD-MCNC: 11.4 G/DL (ref 12–17)
IMM GRANULOCYTES # BLD AUTO: 0.03 THOUSAND/UL (ref 0–0.2)
IMM GRANULOCYTES NFR BLD AUTO: 0 % (ref 0–2)
LYMPHOCYTES # BLD AUTO: 4.27 THOUSANDS/ΜL (ref 0.6–4.47)
LYMPHOCYTES NFR BLD AUTO: 42 % (ref 14–44)
MCH RBC QN AUTO: 26 PG (ref 26.8–34.3)
MCHC RBC AUTO-ENTMCNC: 29.8 G/DL (ref 31.4–37.4)
MCV RBC AUTO: 87 FL (ref 82–98)
MONOCYTES # BLD AUTO: 1.21 THOUSAND/ΜL (ref 0.17–1.22)
MONOCYTES NFR BLD AUTO: 12 % (ref 4–12)
NEUTROPHILS # BLD AUTO: 4.29 THOUSANDS/ΜL (ref 1.85–7.62)
NEUTS SEG NFR BLD AUTO: 41 % (ref 43–75)
NRBC BLD AUTO-RTO: 0 /100 WBCS
PLATELET # BLD AUTO: 299 THOUSANDS/UL (ref 149–390)
PMV BLD AUTO: 10.3 FL (ref 8.9–12.7)
POTASSIUM SERPL-SCNC: 3.9 MMOL/L (ref 3.5–5.3)
PROT SERPL-MCNC: 8.1 G/DL (ref 6.4–8.2)
RBC # BLD AUTO: 4.38 MILLION/UL (ref 3.88–5.62)
SODIUM SERPL-SCNC: 138 MMOL/L (ref 136–145)
WBC # BLD AUTO: 10.23 THOUSAND/UL (ref 4.31–10.16)

## 2021-03-23 PROCEDURE — 99284 EMERGENCY DEPT VISIT MOD MDM: CPT

## 2021-03-23 PROCEDURE — G1004 CDSM NDSC: HCPCS

## 2021-03-23 PROCEDURE — 85025 COMPLETE CBC W/AUTO DIFF WBC: CPT | Performed by: EMERGENCY MEDICINE

## 2021-03-23 PROCEDURE — 36415 COLL VENOUS BLD VENIPUNCTURE: CPT | Performed by: EMERGENCY MEDICINE

## 2021-03-23 PROCEDURE — 99285 EMERGENCY DEPT VISIT HI MDM: CPT | Performed by: EMERGENCY MEDICINE

## 2021-03-23 PROCEDURE — 96375 TX/PRO/DX INJ NEW DRUG ADDON: CPT

## 2021-03-23 PROCEDURE — 80053 COMPREHEN METABOLIC PANEL: CPT | Performed by: EMERGENCY MEDICINE

## 2021-03-23 PROCEDURE — 96361 HYDRATE IV INFUSION ADD-ON: CPT

## 2021-03-23 PROCEDURE — 74176 CT ABD & PELVIS W/O CONTRAST: CPT

## 2021-03-23 PROCEDURE — 96374 THER/PROPH/DIAG INJ IV PUSH: CPT

## 2021-03-23 RX ORDER — KETOROLAC TROMETHAMINE 30 MG/ML
15 INJECTION, SOLUTION INTRAMUSCULAR; INTRAVENOUS ONCE
Status: COMPLETED | OUTPATIENT
Start: 2021-03-23 | End: 2021-03-23

## 2021-03-23 RX ORDER — OXYCODONE HYDROCHLORIDE AND ACETAMINOPHEN 5; 325 MG/1; MG/1
2 TABLET ORAL ONCE
Status: COMPLETED | OUTPATIENT
Start: 2021-03-23 | End: 2021-03-23

## 2021-03-23 RX ORDER — OXYCODONE HYDROCHLORIDE AND ACETAMINOPHEN 5; 325 MG/1; MG/1
1 TABLET ORAL EVERY 6 HOURS PRN
Qty: 10 TABLET | Refills: 0 | Status: SHIPPED | OUTPATIENT
Start: 2021-03-23 | End: 2021-04-02

## 2021-03-23 RX ORDER — ONDANSETRON 2 MG/ML
4 INJECTION INTRAMUSCULAR; INTRAVENOUS ONCE
Status: COMPLETED | OUTPATIENT
Start: 2021-03-23 | End: 2021-03-23

## 2021-03-23 RX ORDER — HYDROMORPHONE HCL/PF 1 MG/ML
1 SYRINGE (ML) INJECTION ONCE
Status: COMPLETED | OUTPATIENT
Start: 2021-03-23 | End: 2021-03-23

## 2021-03-23 RX ADMIN — ONDANSETRON 4 MG: 2 INJECTION INTRAMUSCULAR; INTRAVENOUS at 20:51

## 2021-03-23 RX ADMIN — OXYCODONE HYDROCHLORIDE AND ACETAMINOPHEN 2 TABLET: 5; 325 TABLET ORAL at 22:54

## 2021-03-23 RX ADMIN — MORPHINE SULFATE 2 MG: 2 INJECTION, SOLUTION INTRAMUSCULAR; INTRAVENOUS at 20:47

## 2021-03-23 RX ADMIN — HYDROMORPHONE HYDROCHLORIDE 1 MG: 1 INJECTION, SOLUTION INTRAMUSCULAR; INTRAVENOUS; SUBCUTANEOUS at 21:51

## 2021-03-23 RX ADMIN — KETOROLAC TROMETHAMINE 15 MG: 30 INJECTION, SOLUTION INTRAMUSCULAR at 20:46

## 2021-03-23 RX ADMIN — SODIUM CHLORIDE 1000 ML: 0.9 INJECTION, SOLUTION INTRAVENOUS at 20:53

## 2021-03-23 NOTE — Clinical Note
accompanied Mirella Jenkins to the emergency department on 3/23/2021  Return date if applicable: 06/33/7006        If you have any questions or concerns, please don't hesitate to call        Michael Almanza RN

## 2021-03-24 NOTE — ED NOTES
Patient now sitting up in bed talking with son on the phone   States his pain went from 10/10 to 2/10      Noel Marroquin RN  03/23/21 2125

## 2021-03-24 NOTE — DISCHARGE INSTRUCTIONS
Strain your urine  Drink extra fluids  Use the pain medicine as needed and you can also take Advil in addition

## 2021-03-24 NOTE — ED PROVIDER NOTES
History  Chief Complaint   Patient presents with    Flank Pain     L sided pain started today  46 yo male c/o sudden onset left flank pain 2 hours ago   + difficulty urinating and associated nausea, dry mouth  No vomiting or diarrhea  No fever, cough  Never had before  Pain is severe and radiates around to front of abdomen  History provided by:  Patient   used: No    Flank Pain  Associated symptoms: nausea    Associated symptoms: no chest pain, no chills, no cough, no diarrhea, no dysuria, no fever, no hematuria, no shortness of breath, no sore throat and no vomiting        Prior to Admission Medications   Prescriptions Last Dose Informant Patient Reported? Taking? Xarelto 20 MG tablet 3/23/2021 at 1700  No Yes   Sig: Take 1 tablet (20 mg total) by mouth daily   esomeprazole (NexIUM) 40 MG capsule 3/23/2021 at Unknown time  No Yes   Sig: Take 1 capsule (40 mg total) by mouth daily      Facility-Administered Medications: None       Past Medical History:   Diagnosis Date    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 07/12/2006    Dysphagia     Last Assessed: 8/12/2014     GERD (gastroesophageal reflux disease)     Globus sensation     Last Assessed: 6/4/2014     H/O neoplasm of uncertain behavior of skin 06/06/2006    Hyperlipidemia     Hypertension 02/14/2006    Lateral epicondylitis of right elbow     Last Assessed: 10/23/2015     Pes planus     last assessed: 10/23/2013     Plantar fascial fibromatosis     Last Assessed: 10/23/2013     Right patellofemoral syndrome     Last Assessed: 4/15/2016     Sebaceous cyst 11/03/2007       Past Surgical History:   Procedure Laterality Date    ESOPHAGOGASTRODUODENOSCOPY N/A 10/7/2016    Procedure: ESOPHAGOGASTRODUODENOSCOPY (EGD); Surgeon: Frank Mensah MD;  Location: [facility] GI LAB;   Service:    [address] [place] [address] ESOPHAGOGASTRODUODENOSCOPY TRANSORAL DIAGNOSTIC N/A 12/6/2018    Procedure: ESOPHAGOGASTRODUODENOSCOPY (EGD); Surgeon: Sanam Lam MD;  Location: [facility] GI LAB; Service: Gastroenterology       Family History   Problem Relation Age of Onset    Heart disease Mother         valve 76s    Hypertension Mother     Cancer Father         colon    Colon cancer Father     Hypertension Father     Coronary artery disease Father         CABG    Heart disease Brother         MI exp age 39    Colon cancer Family     Cancer Brother         esophageal CA exp age 40     I have reviewed and agree with the history as documented  E-Cigarette/Vaping    E-Cigarette Use Never User      E-Cigarette/Vaping Substances     Social History     Tobacco Use    Smoking status: Never Smoker    Smokeless tobacco: Former User   Substance Use Topics    Alcohol use: Yes     Comment: socially    Drug use: No       Review of Systems   Constitutional: Negative  Negative for chills and fever  HENT: Negative  Negative for congestion and sore throat  Eyes: Negative  Respiratory: Negative  Negative for cough and shortness of breath  Cardiovascular: Negative  Negative for chest pain and leg swelling  Gastrointestinal: Positive for nausea  Negative for abdominal pain, diarrhea and vomiting  Genitourinary: Positive for difficulty urinating and flank pain  Negative for dysuria and hematuria  Musculoskeletal: Negative for back pain and myalgias  Skin: Negative  Negative for rash and wound  Neurological: Negative  Negative for dizziness and headaches  Psychiatric/Behavioral: Negative  Negative for confusion and hallucinations  The patient is not nervous/anxious  All other systems reviewed and are negative  Physical Exam  Physical Exam  Vitals signs and nursing note reviewed  Constitutional:       General: He is in acute distress  Appearance: He is well-developed  He is not ill-appearing, toxic-appearing or diaphoretic  HENT:      Head: Normocephalic and atraumatic     Eyes: General: No scleral icterus  Conjunctiva/sclera: Conjunctivae normal    Neck:      Musculoskeletal: Neck supple  Cardiovascular:      Rate and Rhythm: Normal rate and regular rhythm  Heart sounds: Normal heart sounds  No murmur  Pulmonary:      Effort: Pulmonary effort is normal  No respiratory distress  Breath sounds: Normal breath sounds  Abdominal:      General: Bowel sounds are normal  There is no distension  Palpations: Abdomen is soft  Tenderness: There is no abdominal tenderness  There is right CVA tenderness  Musculoskeletal: Normal range of motion  General: No tenderness or deformity  Right lower leg: No edema  Left lower leg: No edema  Skin:     General: Skin is warm and dry  Coloration: Skin is not pale  Findings: No erythema or rash  Neurological:      General: No focal deficit present  Mental Status: He is alert and oriented to person, place, and time  Cranial Nerves: No cranial nerve deficit     Psychiatric:         Mood and Affect: Mood normal          Behavior: Behavior normal          Vital Signs  ED Triage Vitals [03/23/21 1952]   Temperature Pulse Respirations Blood Pressure SpO2   (!) 96 7 °F (35 9 °C) 65 20 (!) 181/104 99 %      Temp Source Heart Rate Source Patient Position - Orthostatic VS BP Location FiO2 (%)   Tympanic Monitor Lying Right arm --      Pain Score       Worst Possible Pain           Vitals:    03/23/21 1952 03/23/21 2155 03/23/21 2215   BP: (!) 181/104 155/88    Pulse: 65 84 65   Patient Position - Orthostatic VS: Lying Lying          Visual Acuity      ED Medications  Medications   oxyCODONE-acetaminophen (PERCOCET) 5-325 mg per tablet 2 tablet (has no administration in time range)   sodium chloride 0 9 % bolus 1,000 mL (0 mL Intravenous Stopped 3/23/21 2153)   ondansetron (ZOFRAN) injection 4 mg (4 mg Intravenous Given 3/23/21 2051)   ketorolac (TORADOL) injection 15 mg (15 mg Intravenous Given 3/23/21 2046)   morphine injection 2 mg (2 mg Intravenous Given 3/23/21 2047)   HYDROmorphone (DILAUDID) injection 1 mg (1 mg Intravenous Given 3/23/21 2151)       Diagnostic Studies  Results Reviewed     Procedure Component Value Units Date/Time    Comprehensive metabolic panel [[id number]] Collected: 03/23/21 2053    Lab Status: Final result Specimen: Blood from Arm, Right Updated: 03/23/21 2115     Sodium 138 mmol/L      Potassium 3 9 mmol/L      Chloride 101 mmol/L      CO2 28 mmol/L      ANION GAP 9 mmol/L      BUN 19 mg/dL      Creatinine 1 01 mg/dL      Glucose 122 mg/dL      Calcium 9 3 mg/dL      AST 30 U/L      ALT 45 U/L      Alkaline Phosphatase 74 U/L      Total Protein 8 1 g/dL      Albumin 4 0 g/dL      Total Bilirubin 0 60 mg/dL      eGFR 86 ml/min/1 73sq m     Narrative:      [place] guidelines for Chronic Kidney Disease (CKD):     Stage 1 with normal or high GFR (GFR > 90 mL/min/1 73 square meters)    Stage 2 Mild CKD (GFR = 60-89 mL/min/1 73 square meters)    Stage 3A Moderate CKD (GFR = 45-59 mL/min/1 73 square meters)    Stage 3B Moderate CKD (GFR = 30-44 mL/min/1 73 square meters)    Stage 4 Severe CKD (GFR = 15-29 mL/min/1 73 square meters)    Stage 5 End Stage CKD (GFR <15 mL/min/1 73 square meters)  Note: GFR calculation is accurate only with a steady state creatinine    CBC and differential [[id number]]  (Abnormal) Collected: 03/23/21 2053    Lab Status: Final result Specimen: Blood from Arm, Right Updated: 03/23/21 2100     WBC 10 23 Thousand/uL      RBC 4 38 Million/uL      Hemoglobin 11 4 g/dL      Hematocrit 38 3 %      MCV 87 fL      MCH 26 0 pg      MCHC 29 8 g/dL      RDW 14 5 %      MPV 10 3 fL      Platelets 176 Thousands/uL      nRBC 0 /100 WBCs      Neutrophils Relative 41 %      Immat GRANS % 0 %      Lymphocytes Relative 42 %      Monocytes Relative 12 %      Eosinophils Relative 4 %      Basophils Relative 1 %      Neutrophils Absolute 4 29 Thousands/µL      Immature Grans Absolute 0 03 Thousand/uL      Lymphocytes Absolute 4 27 Thousands/µL      Monocytes Absolute 1 21 Thousand/µL      Eosinophils Absolute 0 38 Thousand/µL      Basophils Absolute 0 05 Thousands/µL     UA (URINE) with reflex to Scope [[id number]]     Lab Status: No result Specimen: Urine                  CT renal stone study abdomen pelvis without contrast   Final Result by Manuelito Skaggs MD (03/23 2147)      1   3 mm calculus at the left ureterovesicular junction with associated moderate left hydronephrosis  2   2 cm right bladder diverticulum  3   Moderate hiatal hernia  4   Diverticulosis without evidence of diverticulitis  Workstation performed: [id number]                    Procedures  Procedures         ED Course                             SBIRT 22yo+      Most Recent Value   SBIRT (22 yo +)   In order to provide better care to our patients, we are screening all of our patients for alcohol and drug use  Would it be okay to ask you these screening questions? No Filed at: 03/23/2021 2127                    MDM  Number of Diagnoses or Management Options  Kidney stone:   Diagnosis management comments: Pt  Feels better after pain medicine and fluids  CT confirms kidney stone  Will discharge on pain med, advised fluids, follow up Dr Pete Leavitt outpt  Disposition  Final diagnoses:   Kidney stone     Time reflects when diagnosis was documented in both MDM as applicable and the Disposition within this note     Time User Action Codes Description Comment    8/04/3696 18:90 PM Alyson Conway Add [H20 6] Kidney stone       ED Disposition     ED Disposition Condition Date/Time Comment    Discharge Stable Tue Mar 23, 2021 10:39 PM Ian Klinefelter discharge to home/self care              Follow-up Information     Follow up With Specialties Details Why Melany Yin MD Urology Schedule an appointment as soon as possible for a visit   [address] [facility]  [phone]            Patient's Medications   Discharge Prescriptions    OXYCODONE-ACETAMINOPHEN (PERCOCET) 5-325 MG PER TABLET    Take 1 tablet by mouth every 6 (six) hours as needed for moderate pain for up to 10 daysMax Daily Amount: 4 tablets       Start Date: 3/23/2021 End Date: 4/2/2021       Order Dose: 1 tablet       Quantity: 10 tablet    Refills: 0     No discharge procedures on file      PDMP Review     None          ED Provider  Electronically Signed by           Cedric Faust MD  63/02/03 0910

## 2021-03-30 DIAGNOSIS — I26.99 OTHER PULMONARY EMBOLISM WITHOUT ACUTE COR PULMONALE, UNSPECIFIED CHRONICITY (HCC): ICD-10-CM

## 2021-03-30 RX ORDER — RIVAROXABAN 20 MG/1
20 TABLET, FILM COATED ORAL DAILY
Qty: 90 TABLET | Refills: 1 | Status: SHIPPED | OUTPATIENT
Start: 2021-03-30 | End: 2021-09-14 | Stop reason: SDUPTHER

## 2021-04-03 PROBLEM — M79.671 PAIN IN BOTH FEET: Status: RESOLVED | Noted: 2019-01-21 | Resolved: 2021-04-03

## 2021-04-03 PROBLEM — Z86.711 HISTORY OF PULMONARY EMBOLUS (PE): Status: ACTIVE | Noted: 2018-08-15

## 2021-04-03 PROBLEM — B35.1 ONYCHOMYCOSIS: Status: RESOLVED | Noted: 2019-01-21 | Resolved: 2021-04-03

## 2021-04-03 PROBLEM — N43.40 SPERMATOCELE: Status: ACTIVE | Noted: 2021-01-26

## 2021-04-03 PROBLEM — M79.672 PAIN IN BOTH FEET: Status: RESOLVED | Noted: 2019-01-21 | Resolved: 2021-04-03

## 2021-04-03 PROBLEM — Q66.52 CONGENITAL PES PLANUS OF LEFT FOOT: Status: RESOLVED | Noted: 2019-01-21 | Resolved: 2021-04-03

## 2021-04-03 PROBLEM — Q66.51 CONGENITAL PES PLANUS OF RIGHT FOOT: Status: RESOLVED | Noted: 2019-01-21 | Resolved: 2021-04-03

## 2021-04-05 ENCOUNTER — RA CDI HCC (OUTPATIENT)
Dept: OTHER | Facility: HOSPITAL | Age: 51
End: 2021-04-05

## 2021-04-05 NOTE — PROGRESS NOTES
[facility]  coding oppertunities             Chart reviewed, (number of) suggestions sent to provider: 1                 [place] [phone]  coding oppertunities             Chart reviewed, (number of) suggestions sent to provider: 1  D68 51  Activated protein C resistance

## 2021-04-09 ENCOUNTER — OFFICE VISIT (OUTPATIENT)
Dept: FAMILY MEDICINE CLINIC | Facility: CLINIC | Age: 51
End: 2021-04-09
Payer: COMMERCIAL

## 2021-04-09 VITALS
DIASTOLIC BLOOD PRESSURE: 76 MMHG | WEIGHT: 211 LBS | TEMPERATURE: 96.7 F | HEIGHT: 69 IN | BODY MASS INDEX: 31.25 KG/M2 | OXYGEN SATURATION: 98 % | HEART RATE: 77 BPM | SYSTOLIC BLOOD PRESSURE: 108 MMHG | RESPIRATION RATE: 18 BRPM

## 2021-04-09 DIAGNOSIS — R73.03 PREDIABETES: ICD-10-CM

## 2021-04-09 DIAGNOSIS — Z13.1 SCREENING FOR DIABETES MELLITUS (DM): ICD-10-CM

## 2021-04-09 DIAGNOSIS — D68.59 HYPERCOAGULABLE STATE (HCC): ICD-10-CM

## 2021-04-09 DIAGNOSIS — Z12.5 PROSTATE CANCER SCREENING: ICD-10-CM

## 2021-04-09 DIAGNOSIS — K21.9 GERD WITHOUT ESOPHAGITIS: ICD-10-CM

## 2021-04-09 DIAGNOSIS — Z79.01 ANTICOAGULANT LONG-TERM USE: ICD-10-CM

## 2021-04-09 DIAGNOSIS — Z13.89 SCREENING FOR CARDIOVASCULAR, RESPIRATORY, AND GENITOURINARY DISEASES: ICD-10-CM

## 2021-04-09 DIAGNOSIS — E66.9 OBESITY (BMI 30-39.9): ICD-10-CM

## 2021-04-09 DIAGNOSIS — N20.0 CALCIUM OXALATE KIDNEY STONES: ICD-10-CM

## 2021-04-09 DIAGNOSIS — Z13.83 SCREENING FOR CARDIOVASCULAR, RESPIRATORY, AND GENITOURINARY DISEASES: ICD-10-CM

## 2021-04-09 DIAGNOSIS — Z13.6 SCREENING FOR CARDIOVASCULAR, RESPIRATORY, AND GENITOURINARY DISEASES: ICD-10-CM

## 2021-04-09 DIAGNOSIS — D68.51 FACTOR 5 LEIDEN MUTATION, HETEROZYGOUS (HCC): Primary | ICD-10-CM

## 2021-04-09 DIAGNOSIS — Z12.11 COLON CANCER SCREENING: ICD-10-CM

## 2021-04-09 PROBLEM — N32.3 ACQUIRED BLADDER DIVERTICULUM: Status: ACTIVE | Noted: 2021-04-05

## 2021-04-09 PROBLEM — K22.70 BARRETT'S ESOPHAGUS WITHOUT DYSPLASIA: Status: RESOLVED | Noted: 2018-11-15 | Resolved: 2021-04-09

## 2021-04-09 PROBLEM — Z86.718 HISTORY OF DVT (DEEP VEIN THROMBOSIS): Status: ACTIVE | Noted: 2018-08-15

## 2021-04-09 PROCEDURE — 3008F BODY MASS INDEX DOCD: CPT | Performed by: FAMILY MEDICINE

## 2021-04-09 PROCEDURE — 3725F SCREEN DEPRESSION PERFORMED: CPT | Performed by: FAMILY MEDICINE

## 2021-04-09 PROCEDURE — 99214 OFFICE O/P EST MOD 30 MIN: CPT | Performed by: FAMILY MEDICINE

## 2021-04-09 NOTE — PROGRESS NOTES
Assessment/Plan:    No problem-specific Assessment & Plan notes found for this encounter  FVL stable, tolerating NOAC, cbc yearly  Kidney stones, f/u urology, calcium oxalate, hydrate  bmi aware, suggest diet and wt loss  Colonoscopy recommended, he is not certain if he had one but none in records  GERD stable, diet and prn PPI, PPI risks advised  covid vaccines were discussed, he is not convinced it is worthwhile or safe     Diagnoses and all orders for this visit:    Factor 5 Leiden mutation, heterozygous ([facility] )    Hypercoagulable state ([facility] )  -     CBC and differential; Future    Screening for cardiovascular, respiratory, and genitourinary diseases  -     Lipid Panel with Direct LDL reflex; Future    Screening for diabetes mellitus (DM)  -     Comprehensive metabolic panel; Future    Prostate cancer screening  -     PSA, Total Screen; Future    Prediabetes  -     Hemoglobin A1C; Future    Colon cancer screening  -     Ambulatory referral to Gastroenterology; Future    GERD without esophagitis    Obesity (BMI 30-39  9)    Anticoagulant long-term use    Calcium oxalate kidney stones            Return in about 5 months (around 9/23/2021) for Annual physical     Subjective:      Patient ID: Brian Root is a 45 y o  male  Chief Complaint   Patient presents with    Follow-up     6 month   sas/cma       HPI  FVL on NOAC  No bleeding/bruising noted  nexium 3x/w  Prn helps  Low acid diet mostly    The following portions of the patient's history were reviewed and updated as appropriate: allergies, current medications, past family history, past medical history, past social history, past surgical history and problem list     Review of Systems   Respiratory: Negative for shortness of breath  Cardiovascular: Negative for chest pain           Current Outpatient Medications   Medication Sig Dispense Refill    esomeprazole (NexIUM) 40 MG capsule Take 1 capsule (40 mg total) by mouth daily 90 capsule 3    Xarelto 20 MG tablet Take 1 tablet (20 mg total) by mouth daily 90 tablet 1     No current facility-administered medications for this visit  Objective:    /76   Pulse 77   Temp (!) 96 7 °F (35 9 °C)   Resp 18   Ht 5' 9" (1 753 m)   Wt 95 7 kg (211 lb)   SpO2 98%   BMI 31 16 kg/m²        Physical Exam  Vitals signs and nursing note reviewed  Constitutional:       General: He is not in acute distress  Appearance: He is well-developed  He is not ill-appearing  HENT:      Head: Normocephalic  Eyes:      General: No scleral icterus  Conjunctiva/sclera: Conjunctivae normal    Neck:      Musculoskeletal: Neck supple  Cardiovascular:      Rate and Rhythm: Normal rate and regular rhythm  Heart sounds: No murmur  Pulmonary:      Effort: Pulmonary effort is normal  No respiratory distress  Abdominal:      Palpations: Abdomen is soft  Musculoskeletal:         General: No deformity  Skin:     General: Skin is warm and dry  Coloration: Skin is not pale  Neurological:      Mental Status: He is alert  Motor: No weakness  Gait: Gait normal    Psychiatric:         Behavior: Behavior normal          Thought Content: Thought content normal        BMI Counseling: Body mass index is 31 16 kg/m²  The BMI is above normal  Nutrition recommendations include decreasing portion sizes and moderation in carbohydrate intake  Exercise recommendations include exercising 3-5 times per week  No pharmacotherapy was ordered                  Vijaya Oscar DO

## 2021-04-21 ENCOUNTER — TELEMEDICINE (OUTPATIENT)
Dept: FAMILY MEDICINE CLINIC | Facility: CLINIC | Age: 51
End: 2021-04-21
Payer: COMMERCIAL

## 2021-04-21 DIAGNOSIS — Z20.822 EXPOSURE TO COVID-19 VIRUS: ICD-10-CM

## 2021-04-21 DIAGNOSIS — Z20.822 EXPOSURE TO COVID-19 VIRUS: Primary | ICD-10-CM

## 2021-04-21 PROCEDURE — 99213 OFFICE O/P EST LOW 20 MIN: CPT | Performed by: FAMILY MEDICINE

## 2021-04-21 PROCEDURE — U0003 INFECTIOUS AGENT DETECTION BY NUCLEIC ACID (DNA OR RNA); SEVERE ACUTE RESPIRATORY SYNDROME CORONAVIRUS 2 (SARS-COV-2) (CORONAVIRUS DISEASE [COVID-19]), AMPLIFIED PROBE TECHNIQUE, MAKING USE OF HIGH THROUGHPUT TECHNOLOGIES AS DESCRIBED BY CMS-2020-01-R: HCPCS | Performed by: FAMILY MEDICINE

## 2021-04-21 PROCEDURE — 1036F TOBACCO NON-USER: CPT | Performed by: FAMILY MEDICINE

## 2021-04-21 PROCEDURE — U0005 INFEC AGEN DETEC AMPLI PROBE: HCPCS | Performed by: FAMILY MEDICINE

## 2021-04-21 NOTE — PROGRESS NOTES
COVID-19 Outpatient Progress Note    Assessment/Plan:    Problem List Items Addressed This Visit     None      Visit Diagnoses     Exposure to COVID-19 virus    -  Primary    Relevant Orders    Novel Coronavirus (Covid-19),PCR [facility] - Collected at   [facility] or [facility]         Disposition:     I referred patient to one of our centralized sites for a COVID-19 swab  I have spent 15 minutes directly with the patient  Encounter provider Jose Daniel Baldwin MD    Provider located at [address] [address]    Recent Visits  No visits were found meeting these conditions  Showing recent visits within past 7 days and meeting all other requirements     Today's Visits  Date Type Provider Dept   04/21/21 Telemedicine Jose Daniel Baldwin, [facility] today's visits and meeting all other requirements     Future Appointments  No visits were found meeting these conditions  Showing future appointments within next 150 days and meeting all other requirements      This virtual check-in was done via [employer] and patient was informed that this is not a secure, HIPAA-compliant platform  He agrees to proceed  Patient agrees to participate in a virtual check in via telephone or video visit instead of presenting to the office to address urgent/immediate medical needs  Patient is aware this is a billable service  After connecting through Arthur, the patient was identified by name and date of birth  Oliveron  was informed that this was a telemedicine visit and that the exam was being conducted confidentially over secure lines  My office door was closed  No one else was in the room  Que  acknowledged consent and understanding of privacy and security of the telemedicine visit  I informed the patient that I have reviewed his record in Epic and presented the opportunity for him to ask any questions regarding the visit today   The patient agreed to participate  Subjective:   Anthony Griffith is a 45 y o  male who is concerned about COVID-19  Patient's symptoms include fever, chills, fatigue, malaise, myalgias and headache  Patient denies congestion, rhinorrhea, sore throat, anosmia, loss of taste, cough, shortness of breath, chest tightness, abdominal pain, nausea, vomiting and diarrhea       Date of symptom onset: 4/19/2021  Date of exposure: 4/18/2021    Exposure:   Contact with a person who is under investigation (PUI) for or who is positive for COVID-19 within the last 14 days?: Yes    Hospitalized recently for fever and/or lower respiratory symptoms?: No      Currently a healthcare worker that is involved in direct patient care?: No      Works in a special setting where the risk of COVID-19 transmission may be high? (this may include long-term care, correctional and intermediate facilities; homeless shelters; assisted-living facilities and group homes ): No      Resident in a special setting where the risk of COVID-19 transmission may be high? (this may include long-term care, correctional and intermediate facilities; homeless shelters; assisted-living facilities and group homes ): No      No results found for: Domonique Montilla, [address] [place] [address], [address] [place] [address],[address] [address]  Past Medical History:   Diagnosis Date    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 07/12/2006    Dysphagia     Last Assessed: 8/12/2014     GERD (gastroesophageal reflux disease)     Globus sensation     Last Assessed: 6/4/2014     H/O neoplasm of uncertain behavior of skin 06/06/2006    Hyperlipidemia     Hypertension 02/14/2006    Lateral epicondylitis of right elbow     Last Assessed: 10/23/2015     Pes planus     last assessed: 10/23/2013     Plantar fascial fibromatosis     Last Assessed: 10/23/2013     Right patellofemoral syndrome     Last Assessed: 4/15/2016     Sebaceous cyst 11/03/2007     Past Surgical History:   Procedure Laterality Date    ESOPHAGOGASTRODUODENOSCOPY N/A 10/7/2016    Procedure: ESOPHAGOGASTRODUODENOSCOPY (EGD); Surgeon: Syeda Diaz MD;  Location: [facility]; Service:     NASAL SEPTUM SURGERY  1995    [place] ESOPHAGOGASTRODUODENOSCOPY TRANSORAL DIAGNOSTIC N/A 12/6/2018    Procedure: ESOPHAGOGASTRODUODENOSCOPY (EGD); Surgeon: Syeda Diaz MD;  Location: [facility]; Service: Gastroenterology     Current Outpatient Medications   Medication Sig Dispense Refill    esomeprazole (NexIUM) 40 MG capsule Take 1 capsule (40 mg total) by mouth daily 90 capsule 3    Xarelto 20 MG tablet Take 1 tablet (20 mg total) by mouth daily 90 tablet 1     No current facility-administered medications for this visit  No Known Allergies    Review of Systems   Constitutional: Positive for chills, fatigue and fever  HENT: Negative for congestion, rhinorrhea and sore throat  Respiratory: Negative for cough, chest tightness and shortness of breath  Gastrointestinal: Negative for abdominal pain, diarrhea, nausea and vomiting  Musculoskeletal: Positive for myalgias  Neurological: Positive for headaches  Objective: There were no vitals filed for this visit  Physical Exam  Constitutional:       Appearance: Normal appearance  HENT:      Head: Normocephalic and atraumatic  Pulmonary:      Effort: Pulmonary effort is normal    Musculoskeletal: Normal range of motion  Neurological:      Mental Status: He is alert  Psychiatric:         Mood and Affect: Mood normal          Behavior: Behavior normal          Thought Content: Thought content normal          Judgment: Judgment normal         VIRTUAL VISIT DISCLAIMER    Edda Montenegro acknowledges that he has consented to an online visit or consultation   He understands that the online visit is based solely on information provided by him, and that, in the absence of a face-to-face physical evaluation by the physician, the diagnosis he receives is both limited and provisional in terms of accuracy and completeness  This is not intended to replace a full medical face-to-face evaluation by the physician  Malou Healy understands and accepts these terms

## 2021-04-22 LAB — SARS-COV-2 RNA RESP QL NAA+PROBE: POSITIVE

## 2021-04-23 ENCOUNTER — TELEPHONE (OUTPATIENT)
Dept: FAMILY MEDICINE CLINIC | Facility: CLINIC | Age: 51
End: 2021-04-23

## 2021-04-23 NOTE — TELEPHONE ENCOUNTER
RIKKI    Patient needs a note for work stating he is positive for covid  Patients wife will  outside

## 2021-05-06 ENCOUNTER — TELEPHONE (OUTPATIENT)
Dept: GASTROENTEROLOGY | Facility: CLINIC | Age: 51
End: 2021-05-06

## 2021-05-06 NOTE — TELEPHONE ENCOUNTER
Received order from Dr Dustin Deshpande for pt to be scheduled for a colon screening  Pt is a new pt to us and would need an ov consult prior  I lmom for pt to please call back to schedule an appt per Dr Dustin Deshpande  Will call pt again in one week if do not hear back from her

## 2021-05-13 NOTE — TELEPHONE ENCOUNTER
I lmom for pt to please call back to schedule an appt per Dr Vidya Capone  Will call pt again in two weeks if do not hear back from him

## 2021-05-27 NOTE — TELEPHONE ENCOUNTER
I lmom for pt to please call back to schedule an appt per Dr Rusty Riley  Will wait for pt's call back at this time

## 2021-09-14 DIAGNOSIS — I26.99 OTHER PULMONARY EMBOLISM WITHOUT ACUTE COR PULMONALE, UNSPECIFIED CHRONICITY (HCC): ICD-10-CM

## 2021-09-14 RX ORDER — RIVAROXABAN 20 MG/1
20 TABLET, FILM COATED ORAL DAILY
Qty: 90 TABLET | Refills: 1 | Status: SHIPPED | OUTPATIENT
Start: 2021-09-14 | End: 2022-02-25

## 2021-09-24 LAB
ALBUMIN SERPL-MCNC: 4.6 G/DL (ref 3.8–4.9)
ALBUMIN/GLOB SERPL: 1.6 {RATIO} (ref 1.2–2.2)
ALP SERPL-CCNC: 70 IU/L (ref 44–121)
ALT SERPL-CCNC: 23 IU/L (ref 0–44)
AST SERPL-CCNC: 21 IU/L (ref 0–40)
BASOPHILS # BLD AUTO: 0 X10E3/UL (ref 0–0.2)
BASOPHILS NFR BLD AUTO: 1 %
BILIRUB SERPL-MCNC: 0.6 MG/DL (ref 0–1.2)
BUN SERPL-MCNC: 17 MG/DL (ref 6–24)
BUN/CREAT SERPL: 16 (ref 9–20)
CALCIUM SERPL-MCNC: 9.5 MG/DL (ref 8.7–10.2)
CHLORIDE SERPL-SCNC: 100 MMOL/L (ref 96–106)
CHOLEST SERPL-MCNC: 225 MG/DL (ref 100–199)
CO2 SERPL-SCNC: 23 MMOL/L (ref 20–29)
CREAT SERPL-MCNC: 1.09 MG/DL (ref 0.76–1.27)
EOSINOPHIL # BLD AUTO: 0.3 X10E3/UL (ref 0–0.4)
EOSINOPHIL NFR BLD AUTO: 4 %
ERYTHROCYTE [DISTWIDTH] IN BLOOD BY AUTOMATED COUNT: 16.2 % (ref 11.6–15.4)
GLOBULIN SER-MCNC: 2.8 G/DL (ref 1.5–4.5)
GLUCOSE SERPL-MCNC: 109 MG/DL (ref 65–99)
HBA1C MFR BLD: 5.9 % (ref 4.8–5.6)
HCT VFR BLD AUTO: 30.2 % (ref 37.5–51)
HDLC SERPL-MCNC: 71 MG/DL
HGB BLD-MCNC: 9.3 G/DL (ref 13–17.7)
IMM GRANULOCYTES # BLD: 0 X10E3/UL (ref 0–0.1)
IMM GRANULOCYTES NFR BLD: 0 %
LDLC SERPL CALC-MCNC: 140 MG/DL (ref 0–99)
LYMPHOCYTES # BLD AUTO: 1.6 X10E3/UL (ref 0.7–3.1)
LYMPHOCYTES NFR BLD AUTO: 27 %
MCH RBC QN AUTO: 22.4 PG (ref 26.6–33)
MCHC RBC AUTO-ENTMCNC: 30.8 G/DL (ref 31.5–35.7)
MCV RBC AUTO: 73 FL (ref 79–97)
MICRODELETION SYND BLD/T FISH: NORMAL
MONOCYTES # BLD AUTO: 0.8 X10E3/UL (ref 0.1–0.9)
MONOCYTES NFR BLD AUTO: 13 %
NEUTROPHILS # BLD AUTO: 3.2 X10E3/UL (ref 1.4–7)
NEUTROPHILS NFR BLD AUTO: 55 %
PLATELET # BLD AUTO: 303 X10E3/UL (ref 150–450)
POTASSIUM SERPL-SCNC: 4.8 MMOL/L (ref 3.5–5.2)
PROT SERPL-MCNC: 7.4 G/DL (ref 6–8.5)
PSA SERPL-MCNC: 0.6 NG/ML (ref 0–4)
RBC # BLD AUTO: 4.15 X10E6/UL (ref 4.14–5.8)
SL AMB EGFR AFRICAN AMERICAN: 90 ML/MIN/1.73
SL AMB EGFR NON AFRICAN AMERICAN: 78 ML/MIN/1.73
SODIUM SERPL-SCNC: 137 MMOL/L (ref 134–144)
TRIGL SERPL-MCNC: 80 MG/DL (ref 0–149)
WBC # BLD AUTO: 5.9 X10E3/UL (ref 3.4–10.8)

## 2021-09-27 ENCOUNTER — TELEPHONE (OUTPATIENT)
Dept: FAMILY MEDICINE CLINIC | Facility: CLINIC | Age: 51
End: 2021-09-27

## 2021-09-27 DIAGNOSIS — D64.9 ANEMIA, UNSPECIFIED TYPE: Primary | ICD-10-CM

## 2021-09-28 ENCOUNTER — TELEPHONE (OUTPATIENT)
Dept: FAMILY MEDICINE CLINIC | Facility: CLINIC | Age: 51
End: 2021-09-28

## 2021-09-28 ENCOUNTER — OFFICE VISIT (OUTPATIENT)
Dept: FAMILY MEDICINE CLINIC | Facility: CLINIC | Age: 51
End: 2021-09-28
Payer: COMMERCIAL

## 2021-09-28 ENCOUNTER — TELEPHONE (OUTPATIENT)
Dept: HEMATOLOGY ONCOLOGY | Facility: CLINIC | Age: 51
End: 2021-09-28

## 2021-09-28 VITALS
OXYGEN SATURATION: 98 % | SYSTOLIC BLOOD PRESSURE: 130 MMHG | BODY MASS INDEX: 32.29 KG/M2 | RESPIRATION RATE: 16 BRPM | HEART RATE: 86 BPM | WEIGHT: 218 LBS | TEMPERATURE: 97.7 F | DIASTOLIC BLOOD PRESSURE: 90 MMHG | HEIGHT: 69 IN

## 2021-09-28 DIAGNOSIS — D64.9 ANEMIA, UNSPECIFIED TYPE: Primary | ICD-10-CM

## 2021-09-28 DIAGNOSIS — E66.9 OBESITY (BMI 30-39.9): ICD-10-CM

## 2021-09-28 DIAGNOSIS — D68.59 HYPERCOAGULABLE STATE (HCC): ICD-10-CM

## 2021-09-28 DIAGNOSIS — D68.51 FACTOR 5 LEIDEN MUTATION, HETEROZYGOUS (HCC): ICD-10-CM

## 2021-09-28 PROCEDURE — 99214 OFFICE O/P EST MOD 30 MIN: CPT | Performed by: FAMILY MEDICINE

## 2021-09-28 PROCEDURE — 3008F BODY MASS INDEX DOCD: CPT | Performed by: FAMILY MEDICINE

## 2021-09-28 PROCEDURE — 1036F TOBACCO NON-USER: CPT | Performed by: FAMILY MEDICINE

## 2021-09-28 NOTE — PATIENT INSTRUCTIONS
Since no blood was detected in your stool, we should see how your iron level are  If they are low, you may need to take iron supplements and we will follow your levels  If the iron is not low, more investigation will be needed to see why you may not be producing as much blood as you should

## 2021-09-28 NOTE — TELEPHONE ENCOUNTER
Spoke to wife   Patient is being worked up by GI for drop in hg  Patient asking Dr Sue Freeman advice if PCP decides to adjust eliquis  Wife aware that Dr Flaquita Ortiz will need to reestablish care and then can give recommendations  Wife verbalizes understanding  Emotional support given

## 2021-09-28 NOTE — TELEPHONE ENCOUNTER
Called pt but sleeping  Wife answered  Suggested he go for some labs tomorrow (iron) and make appt to be seen by me for heme stool test   He should miss work tomorrow if possible  She will let him know

## 2021-09-28 NOTE — TELEPHONE ENCOUNTER
Patient was last seen in Jan 2019:  elevated  Anti cardiolipin IgM level was 17 (elevated); antiphosphatidylserine IgM level was also elevated (49; 0-25)  Mr Hillary Ibarra understands that he is at increased risk for future thrombotic events especially if he should discontinue the anticoagulation  The plan is to continue with the Xarelto, 20 mg a day  We discussed a recent study that demonstrated the Xarelto can be decreased to 10 mg a day after 6 months of treatment  Patient states that since he has no bleeding or bruising issues or other complications with the 20 mg, he would rather keep it at the same dose      Mr Hillary Ibarra is to continues routine health maintenance and medical care with Dr Eloy Alonzo  Patient believes that he and Dr Eloy Alonzo can monitor for any bruising or bleeding issues or any other thrombotic complications so hematology follow-up is now on a prn basis  I have stated to the patient and wife that he can return here at any time if he has any hematology questions or issues regarding anticoagulation  Patient knows to speak to his PCP or here if he is in need of any invasive procedure or surgery  As before, patient will use the compression stockings while at work or on his feet for any length of time         Left voicemail message for wife to call back to discuss concerns

## 2021-09-28 NOTE — TELEPHONE ENCOUNTER
KAY to call [email or web address]   Patients wife Natalie calling to see if Dr Danae aVlentin can review patients labs as his hgb is low  Natalie would like a call back to discuss   Natalie can be reached at [phone], Dennis Ballard can be reached at [phone]

## 2021-09-28 NOTE — PROGRESS NOTES
Assessment/Plan:    No problem-specific Assessment & Plan notes found for this encounter  Heme neg stool in office  Anemia discussed  Check iron levels    No abdominal pain or splenomegaly    If low iron then will plan oral supplement and f/u    If iron normal, may need further testing or hematology eval for issue with production  Bilirubin is normal, no sign of lysis or increased destruction     Diagnoses and all orders for this visit:    Anemia, unspecified type    Factor 5 Leiden mutation, heterozygous ([facility] )    Hypercoagulable state ([facility] )    Obesity (BMI 30-39  9)        Return if symptoms worsen or fail to improve  Subjective:      Patient ID: Matilde Evans is a 45 y o  male  Chief Complaint   Patient presents with    Follow-up     on labs jlopezcma        HPI  No blood in stool seen  Denies any bleeding episodes or surgeries  No black stool  No abdominal pain or distention  Labs reviewed  hgb 9 3 this year, was 14 6 last year  Does report having covid in April 2021 and had sob/fatigue afterwards    The following portions of the patient's history were reviewed and updated as appropriate: allergies, current medications, past family history, past medical history, past social history, past surgical history and problem list     Review of Systems   Constitutional: Negative for chills and fever  Current Outpatient Medications   Medication Sig Dispense Refill    esomeprazole (NexIUM) 40 MG capsule Take 1 capsule (40 mg total) by mouth daily 90 capsule 3    Xarelto 20 MG tablet Take 1 tablet (20 mg total) by mouth daily 90 tablet 1     No current facility-administered medications for this visit  Objective:    /90   Pulse 86   Temp 97 7 °F (36 5 °C)   Resp 16   Ht 5' 9" (1 753 m)   Wt 98 9 kg (218 lb)   SpO2 98%   BMI 32 19 kg/m²        Physical Exam  Vitals and nursing note reviewed  Constitutional:       General: He is not in acute distress  Appearance: He is well-developed  HENT:      Head: Normocephalic  Right Ear: Tympanic membrane normal       Left Ear: Tympanic membrane normal    Eyes:      General: No scleral icterus  Conjunctiva/sclera: Conjunctivae normal    Cardiovascular:      Rate and Rhythm: Normal rate and regular rhythm  Heart sounds: No murmur heard  Pulmonary:      Effort: Pulmonary effort is normal  No respiratory distress  Breath sounds: No wheezing  Abdominal:      General: There is no distension  Palpations: Abdomen is soft  There is no mass  Tenderness: There is no abdominal tenderness  There is no rebound  Genitourinary:     Rectum: Normal  Guaiac result negative  Musculoskeletal:         General: No deformity  Cervical back: Neck supple  Skin:     General: Skin is warm and dry  Coloration: Skin is not jaundiced or pale  Neurological:      Mental Status: He is alert  Psychiatric:         Behavior: Behavior normal          Thought Content:  Thought content normal                 Dmitri Reyes DO

## 2021-10-02 LAB
IRON SATN MFR SERPL: 3 % (ref 15–55)
IRON SERPL-MCNC: 16 UG/DL (ref 38–169)
TIBC SERPL-MCNC: 497 UG/DL (ref 250–450)
UIBC SERPL-MCNC: 481 UG/DL (ref 111–343)

## 2021-10-04 NOTE — TELEPHONE ENCOUNTER
Spoke with patient's wife  Ruben Chianging appt this week to reestablish care based on recent lab values  Natalie will wait for call from PCP and get back to office with appt availability

## 2021-10-04 NOTE — TELEPHONE ENCOUNTER
Natalie called back to speak with a nurse about recent labs to see what he recommends, call back # [phone]

## 2021-10-05 ENCOUNTER — TELEPHONE (OUTPATIENT)
Dept: HEMATOLOGY ONCOLOGY | Facility: CLINIC | Age: 51
End: 2021-10-05

## 2021-10-18 ENCOUNTER — OFFICE VISIT (OUTPATIENT)
Dept: FAMILY MEDICINE CLINIC | Facility: CLINIC | Age: 51
End: 2021-10-18
Payer: COMMERCIAL

## 2021-10-18 VITALS
SYSTOLIC BLOOD PRESSURE: 120 MMHG | WEIGHT: 218 LBS | TEMPERATURE: 96.1 F | DIASTOLIC BLOOD PRESSURE: 86 MMHG | HEIGHT: 69 IN | BODY MASS INDEX: 32.29 KG/M2 | RESPIRATION RATE: 16 BRPM | HEART RATE: 72 BPM

## 2021-10-18 DIAGNOSIS — Z00.00 WELL ADULT EXAM: Primary | ICD-10-CM

## 2021-10-18 DIAGNOSIS — D64.9 ANEMIA, UNSPECIFIED TYPE: ICD-10-CM

## 2021-10-18 DIAGNOSIS — Z71.85 VACCINE COUNSELING: ICD-10-CM

## 2021-10-18 DIAGNOSIS — Z86.16 HISTORY OF COVID-19: ICD-10-CM

## 2021-10-18 PROCEDURE — 1036F TOBACCO NON-USER: CPT | Performed by: INTERNAL MEDICINE

## 2021-10-18 PROCEDURE — 99396 PREV VISIT EST AGE 40-64: CPT | Performed by: INTERNAL MEDICINE

## 2021-10-18 PROCEDURE — 3008F BODY MASS INDEX DOCD: CPT | Performed by: INTERNAL MEDICINE

## 2021-10-23 LAB
BASOPHILS # BLD AUTO: 0.1 X10E3/UL (ref 0–0.2)
BASOPHILS NFR BLD AUTO: 1 %
EOSINOPHIL # BLD AUTO: 0.3 X10E3/UL (ref 0–0.4)
EOSINOPHIL NFR BLD AUTO: 4 %
ERYTHROCYTE [DISTWIDTH] IN BLOOD BY AUTOMATED COUNT: 24.8 % (ref 11.6–15.4)
HCT VFR BLD AUTO: 39.6 % (ref 37.5–51)
HGB BLD-MCNC: 12.3 G/DL (ref 13–17.7)
IMM GRANULOCYTES # BLD: 0 X10E3/UL (ref 0–0.1)
IMM GRANULOCYTES NFR BLD: 0 %
LYMPHOCYTES # BLD AUTO: 1.6 X10E3/UL (ref 0.7–3.1)
LYMPHOCYTES NFR BLD AUTO: 26 %
MCH RBC QN AUTO: 25.2 PG (ref 26.6–33)
MCHC RBC AUTO-ENTMCNC: 31.1 G/DL (ref 31.5–35.7)
MCV RBC AUTO: 81 FL (ref 79–97)
MONOCYTES # BLD AUTO: 0.8 X10E3/UL (ref 0.1–0.9)
MONOCYTES NFR BLD AUTO: 13 %
MORPHOLOGY BLD-IMP: ABNORMAL
NEUTROPHILS # BLD AUTO: 3.3 X10E3/UL (ref 1.4–7)
NEUTROPHILS NFR BLD AUTO: 56 %
PLATELET # BLD AUTO: 290 X10E3/UL (ref 150–450)
RBC # BLD AUTO: 4.89 X10E6/UL (ref 4.14–5.8)
SARS-COV-2 IGG SERPL QL IA: POSITIVE
WBC # BLD AUTO: 6.1 X10E3/UL (ref 3.4–10.8)

## 2021-10-25 ENCOUNTER — TELEPHONE (OUTPATIENT)
Dept: GASTROENTEROLOGY | Facility: AMBULARY SURGERY CENTER | Age: 51
End: 2021-10-25

## 2021-11-03 ENCOUNTER — TELEPHONE (OUTPATIENT)
Dept: FAMILY MEDICINE CLINIC | Facility: HOSPITAL | Age: 51
End: 2021-11-03

## 2021-11-03 DIAGNOSIS — Z20.822 EXPOSURE TO COVID-19 VIRUS: Primary | ICD-10-CM

## 2021-11-05 PROCEDURE — U0003 INFECTIOUS AGENT DETECTION BY NUCLEIC ACID (DNA OR RNA); SEVERE ACUTE RESPIRATORY SYNDROME CORONAVIRUS 2 (SARS-COV-2) (CORONAVIRUS DISEASE [COVID-19]), AMPLIFIED PROBE TECHNIQUE, MAKING USE OF HIGH THROUGHPUT TECHNOLOGIES AS DESCRIBED BY CMS-2020-01-R: HCPCS | Performed by: FAMILY MEDICINE

## 2021-11-05 PROCEDURE — U0005 INFEC AGEN DETEC AMPLI PROBE: HCPCS | Performed by: FAMILY MEDICINE

## 2021-11-06 ENCOUNTER — TELEPHONE (OUTPATIENT)
Dept: FAMILY MEDICINE CLINIC | Facility: CLINIC | Age: 51
End: 2021-11-06

## 2021-12-13 ENCOUNTER — TELEPHONE (OUTPATIENT)
Dept: GASTROENTEROLOGY | Facility: AMBULARY SURGERY CENTER | Age: 51
End: 2021-12-13

## 2021-12-16 ENCOUNTER — TELEPHONE (OUTPATIENT)
Dept: GASTROENTEROLOGY | Facility: AMBULARY SURGERY CENTER | Age: 51
End: 2021-12-16

## 2021-12-17 ENCOUNTER — HOSPITAL ENCOUNTER (OUTPATIENT)
Dept: GASTROENTEROLOGY | Facility: AMBULARY SURGERY CENTER | Age: 51
Setting detail: OUTPATIENT SURGERY
Discharge: HOME | End: 2021-12-17
Attending: INTERNAL MEDICINE | Admitting: INTERNAL MEDICINE
Payer: COMMERCIAL

## 2021-12-17 ENCOUNTER — ANESTHESIA EVENT (OUTPATIENT)
Dept: GASTROENTEROLOGY | Facility: AMBULARY SURGERY CENTER | Age: 51
End: 2021-12-17

## 2021-12-17 ENCOUNTER — TELEPHONE (OUTPATIENT)
Dept: GASTROENTEROLOGY | Facility: AMBULARY SURGERY CENTER | Age: 51
End: 2021-12-17

## 2021-12-17 ENCOUNTER — ANESTHESIA (OUTPATIENT)
Dept: GASTROENTEROLOGY | Facility: AMBULARY SURGERY CENTER | Age: 51
End: 2021-12-17

## 2021-12-17 VITALS
HEART RATE: 70 BPM | OXYGEN SATURATION: 97 % | BODY MASS INDEX: 31.1 KG/M2 | TEMPERATURE: 97.6 F | SYSTOLIC BLOOD PRESSURE: 119 MMHG | WEIGHT: 210 LBS | HEIGHT: 69 IN | RESPIRATION RATE: 15 BRPM | DIASTOLIC BLOOD PRESSURE: 62 MMHG

## 2021-12-17 DIAGNOSIS — Z80.0 FAMILY HISTORY OF COLON CANCER: ICD-10-CM

## 2021-12-17 DIAGNOSIS — D50.8 OTHER IRON DEFICIENCY ANEMIA: ICD-10-CM

## 2021-12-17 DIAGNOSIS — K44.9 HIATAL HERNIA: Primary | ICD-10-CM

## 2021-12-17 DIAGNOSIS — K22.70 BARRETT'S ESOPHAGUS WITHOUT DYSPLASIA: ICD-10-CM

## 2021-12-17 DIAGNOSIS — Z79.01 ANTICOAGULANT LONG-TERM USE: ICD-10-CM

## 2021-12-17 PROBLEM — R06.02 SOBOE (SHORTNESS OF BREATH ON EXERTION): Status: RESOLVED | Noted: 2019-02-18 | Resolved: 2021-12-17

## 2021-12-17 PROCEDURE — 43239 EGD BIOPSY SINGLE/MULTIPLE: CPT | Performed by: INTERNAL MEDICINE

## 2021-12-17 PROCEDURE — 88305 TISSUE EXAM BY PATHOLOGIST: CPT | Performed by: PATHOLOGY

## 2021-12-17 PROCEDURE — G0121 COLON CA SCRN NOT HI RSK IND: HCPCS | Performed by: INTERNAL MEDICINE

## 2021-12-17 RX ORDER — SODIUM CHLORIDE, SODIUM LACTATE, POTASSIUM CHLORIDE, CALCIUM CHLORIDE 600; 310; 30; 20 MG/100ML; MG/100ML; MG/100ML; MG/100ML
125 INJECTION, SOLUTION INTRAVENOUS CONTINUOUS
Status: DISCONTINUED | OUTPATIENT
Start: 2021-12-17 | End: 2021-12-21 | Stop reason: HOSPADM

## 2021-12-17 RX ORDER — LIDOCAINE HYDROCHLORIDE 10 MG/ML
0.5 INJECTION, SOLUTION EPIDURAL; INFILTRATION; INTRACAUDAL; PERINEURAL ONCE AS NEEDED
Status: DISCONTINUED | OUTPATIENT
Start: 2021-12-17 | End: 2021-12-21 | Stop reason: HOSPADM

## 2021-12-17 RX ORDER — PROPOFOL 10 MG/ML
INJECTION, EMULSION INTRAVENOUS AS NEEDED
Status: DISCONTINUED | OUTPATIENT
Start: 2021-12-17 | End: 2021-12-17

## 2021-12-17 RX ORDER — LIDOCAINE HYDROCHLORIDE 10 MG/ML
INJECTION, SOLUTION EPIDURAL; INFILTRATION; INTRACAUDAL; PERINEURAL AS NEEDED
Status: DISCONTINUED | OUTPATIENT
Start: 2021-12-17 | End: 2021-12-17

## 2021-12-17 RX ORDER — PROPOFOL 10 MG/ML
INJECTION, EMULSION INTRAVENOUS CONTINUOUS PRN
Status: DISCONTINUED | OUTPATIENT
Start: 2021-12-17 | End: 2021-12-17

## 2021-12-17 RX ORDER — ESOMEPRAZOLE MAGNESIUM 40 MG/1
40 CAPSULE, DELAYED RELEASE ORAL DAILY
Qty: 90 CAPSULE | Refills: 3 | Status: SHIPPED | OUTPATIENT
Start: 2021-12-17

## 2021-12-17 RX ADMIN — Medication 40 MG: at 11:37

## 2021-12-17 RX ADMIN — SODIUM CHLORIDE, SODIUM LACTATE, POTASSIUM CHLORIDE, AND CALCIUM CHLORIDE 125 ML/HR: .6; .31; .03; .02 INJECTION, SOLUTION INTRAVENOUS at 10:47

## 2021-12-17 RX ADMIN — PROPOFOL 100 MCG/KG/MIN: 10 INJECTION, EMULSION INTRAVENOUS at 11:25

## 2021-12-17 RX ADMIN — LIDOCAINE HYDROCHLORIDE 80 MG: 10 INJECTION, SOLUTION EPIDURAL; INFILTRATION; INTRACAUDAL at 11:20

## 2021-12-17 RX ADMIN — PROPOFOL 50 MG: 10 INJECTION, EMULSION INTRAVENOUS at 11:23

## 2021-12-17 RX ADMIN — PROPOFOL 120 MG: 10 INJECTION, EMULSION INTRAVENOUS at 11:20

## 2021-12-17 RX ADMIN — PROPOFOL 30 MG: 10 INJECTION, EMULSION INTRAVENOUS at 11:21

## 2021-12-22 ENCOUNTER — TELEPHONE (OUTPATIENT)
Dept: GASTROENTEROLOGY | Facility: CLINIC | Age: 51
End: 2021-12-22

## 2021-12-22 ENCOUNTER — TELEPHONE (OUTPATIENT)
Dept: HEMATOLOGY ONCOLOGY | Facility: CLINIC | Age: 51
End: 2021-12-22

## 2021-12-22 DIAGNOSIS — D50.9 IRON DEFICIENCY ANEMIA, UNSPECIFIED IRON DEFICIENCY ANEMIA TYPE: Primary | ICD-10-CM

## 2021-12-22 DIAGNOSIS — K44.9 HIATAL HERNIA: Primary | ICD-10-CM

## 2022-01-17 ENCOUNTER — HOSPITAL ENCOUNTER (OUTPATIENT)
Dept: RADIOLOGY | Facility: HOSPITAL | Age: 52
Discharge: HOME | End: 2022-01-17
Payer: COMMERCIAL

## 2022-01-17 DIAGNOSIS — K44.9 HIATAL HERNIA: ICD-10-CM

## 2022-01-17 PROCEDURE — 74220 X-RAY XM ESOPHAGUS 1CNTRST: CPT

## 2022-01-18 ENCOUNTER — OFFICE VISIT (OUTPATIENT)
Dept: CARDIAC SURGERY | Facility: CLINIC | Age: 52
End: 2022-01-18
Payer: COMMERCIAL

## 2022-01-18 VITALS
WEIGHT: 221.12 LBS | SYSTOLIC BLOOD PRESSURE: 144 MMHG | OXYGEN SATURATION: 97 % | HEIGHT: 69 IN | HEART RATE: 66 BPM | DIASTOLIC BLOOD PRESSURE: 98 MMHG | TEMPERATURE: 97.8 F | BODY MASS INDEX: 32.75 KG/M2 | RESPIRATION RATE: 16 BRPM

## 2022-01-18 DIAGNOSIS — K44.9 HIATAL HERNIA: ICD-10-CM

## 2022-01-18 PROCEDURE — 1036F TOBACCO NON-USER: CPT | Performed by: PHYSICIAN ASSISTANT

## 2022-01-18 PROCEDURE — 3008F BODY MASS INDEX DOCD: CPT | Performed by: PHYSICIAN ASSISTANT

## 2022-01-18 PROCEDURE — 99204 OFFICE O/P NEW MOD 45 MIN: CPT | Performed by: PHYSICIAN ASSISTANT

## 2022-01-18 NOTE — PROGRESS NOTES
Thoracic Consult  Assessment/Plan:    Hiatal hernia  Mr Carolyn Garcia has a moderate sized hiatal hernia discovered on routine EGD and confirmed by recent barium swallow  This process was fully described  He is not symptomatic from this, without any heartburn, dysphagia, or regurgitation  He has apparently been diagnosed with Nj's esophagus though we cannot find the results of this  Recent EGD and biopsies were benign  Typically, hiatal hernias are associated with reflux disease, and they can enlarge over time  This may become symptomatic in the future  Surgery was briefly discussed including hernia repair and fundoplication  Patient would like to think about this more, and decide if he would like to proceed with surgery  If he does decide to proceed, he will need esophageal manometry  All of his and his wife's questions were addressed  Diagnoses and all orders for this visit:    Hiatal hernia  -     Ambulatory referral to Thoracic Surgery          Thoracic History   Diagnosis: Hiatal hernia   Procedures/Surgeries:    Pathology:    Adjuvant Therapy:       Subjective:    Patient ID: America Guzman is a 45 y o  male  GERD-HRQL score- 0  HPI   Mr Carolyn Garcia is a 45year old man referred to our office by Dr Fátima Escalera for evaluation of a hiatal hernia  EGD 12/17/21 revealed a 5-6cm hiatal hernia, and small columnar mucosal extesnsion at GEJ with linear broad erosion  Biopsy was consistent with moderate chronic inflammation with reactive changes, no Nj's esophagus  Barium swallow 1/17/22 confirms the presence of a moderate hiatal hernia, with normal esophageal motility and no mucosal abnormalities  There was no reflux observed  He has a past medical history of Factor V Leiden, DVT/PE in 2018, HTN  He has never had abdominal surgery, and he does take Xarelto daily  He is a lifetime nonsmoker  He was diagnosed with anemia and is on iron supplementation and Vitamin C      On discussion, he has shortness of breath on exertion for his whole life, and even moreso since the pulmonary embolism  He denies any coughing, fevers, chills, or weight loss  He denies any dysphagia, heartburn, regurgitation or abdominal pain  He did have heartburn after starting vitamin C, heartburn has resolved  Apparently, he has been diagnosed with Nj's esophagus in the past and that is why he was started on Nexium  He takes Nexium once daily  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  His brother had esophageal cancer and father had colon cancer  The following portions of the patient's history were reviewed and updated as appropriate: allergies, current medications, past family history, past medical history, past social history, past surgical history and problem list     Past Medical History:   Diagnosis Date    Anemia     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 07/12/2006    DVT (deep venous thrombosis) ([facility] ) 2018    Dysphagia     Last Assessed: 8/12/2014     Factor 5 Leiden mutation, heterozygous ([facility] )     GERD (gastroesophageal reflux disease)     Globus sensation     Last Assessed: 6/4/2014     H/O neoplasm of uncertain behavior of skin 06/06/2006    Hyperlipidemia     Hypertension 02/14/2006    Lateral epicondylitis of right elbow     Last Assessed: 10/23/2015     Pes planus     last assessed: 10/23/2013     Plantar fascial fibromatosis     Last Assessed: 10/23/2013     Pulmonary embolism ([facility] )     Right patellofemoral syndrome     Last Assessed: 4/15/2016     Sebaceous cyst 11/03/2007      Past Surgical History:   Procedure Laterality Date    COLONOSCOPY      ESOPHAGOGASTRODUODENOSCOPY N/A 10/7/2016    Procedure: ESOPHAGOGASTRODUODENOSCOPY (EGD); Surgeon: Deny Winn MD;  Location: [facility] GI LAB; Service:     NASAL SEPTUM SURGERY  1995    [place] ESOPHAGOGASTRODUODENOSCOPY TRANSORAL DIAGNOSTIC N/A 12/6/2018    Procedure: ESOPHAGOGASTRODUODENOSCOPY (EGD);   Surgeon: Deny Winn MD;  Location: [facility]; Service: Gastroenterology      Family History   Problem Relation Age of Onset    Heart disease Mother         valve 76s    Hypertension Mother     Cancer Father         colon    Colon cancer Father     Hypertension Father     Coronary artery disease Father         CABG    Heart disease Brother         MI exp age 39   [employer] Colon cancer Family     Cancer Brother         esophageal CA exp age 40      Social History     Socioeconomic History    Marital status: /Civil Union     Spouse name: Not on file    Number of children: Not on file    Years of education: Not on file    Highest education level: Not on file   Occupational History    Not on file   Tobacco Use    Smoking status: Never Smoker    Smokeless tobacco: Former User   Vaping Use    Vaping Use: Never used   Substance and Sexual Activity    Alcohol use: Yes     Comment: socially    Drug use: No    Sexual activity: Not on file   Other Topics Concern    Not on file   Social History Narrative    Not on file     Social Determinants of Health     Financial Resource Strain: Not on file   Food Insecurity: Not on file   Transportation Needs: Not on file   Physical Activity: Not on file   Stress: Not on file   Social Connections: Not on file   Intimate Partner Violence: Not on file   Housing Stability: Not on file      Current Outpatient Medications   Medication Instructions    esomeprazole (NEXIUM) 40 mg, Oral, Daily    ferrous sulfate 324 mg, Oral, 3 times daily before meals    Xarelto 20 mg, Oral, Daily     No Known Allergies    Review of Systems   Constitutional: Negative  HENT: Negative  Eyes: Negative  Respiratory: Positive for shortness of breath  Negative for cough, chest tightness and wheezing  Cardiovascular: Negative  Gastrointestinal: Negative  Musculoskeletal: Negative  Skin: Negative  Neurological: Negative  Hematological: Negative  Psychiatric/Behavioral: Negative            Objective: Physical Exam  Constitutional:       General: He is not in acute distress  Appearance: Normal appearance  HENT:      Head: Normocephalic and atraumatic  Eyes:      General: No scleral icterus  Conjunctiva/sclera: Conjunctivae normal    Cardiovascular:      Rate and Rhythm: Normal rate and regular rhythm  Pulmonary:      Effort: Pulmonary effort is normal  No respiratory distress  Breath sounds: Normal breath sounds  Abdominal:      General: There is no distension  Palpations: Abdomen is soft  Musculoskeletal:      Cervical back: Neck supple  Lymphadenopathy:      Cervical: No cervical adenopathy  Skin:     General: Skin is warm and dry  Neurological:      General: No focal deficit present  Mental Status: He is alert and oriented to person, place, and time  Psychiatric:         Mood and Affect: Mood normal      Pulse 66   Temp 97 8 °F (36 6 °C) (Temporal)   Resp 16   Ht 5' 9" (1 753 m)   Wt 100 kg (221 lb 1 9 oz)   SpO2 97%   BMI 32 65 kg/m²       FL barium swallow ROUTINE    Result Date: 1/17/2022  Narrative BARIUM SWALLOW-ESOPHAGRAM INDICATION:   K44 9: Diaphragmatic hernia without obstruction or gangrene  COMPARISON:  CT 3/23/2021 IMAGES:  19 FLUOROSCOPY TIME:   1 2 MIN  TECHNIQUE: The patient was given effervescent granules and barium by mouth and images of the esophagus were obtained  FINDINGS: The esophagus is normal in caliber  Esophageal motility is normal and emptying of contrast from the esophagus is prompt  No mucosal lesion, ulceration or evidence of fold thickening is seen  Gastroesophageal reflux was not observed  Moderate sized sliding hiatal hernia measuring approximately 7 x 9 cm  Impression Moderate hiatal hernia   Workstation performed: [id number]

## 2022-01-18 NOTE — ASSESSMENT & PLAN NOTE
Mr Khari Galvez has a moderate sized hiatal hernia discovered on routine EGD and confirmed by recent barium swallow  This process was fully described  He is not symptomatic from this, without any heartburn, dysphagia, or regurgitation  He has apparently been diagnosed with Nj's esophagus though we cannot find the results of this  Recent EGD and biopsies were benign  Typically, hiatal hernias are associated with reflux disease, and they can enlarge over time  This may become symptomatic in the future  Surgery was briefly discussed including hernia repair and fundoplication  Patient would like to think about this more, and decide if he would like to proceed with surgery  If he does decide to proceed, he will need esophageal manometry  All of his and his wife's questions were addressed

## 2022-02-21 LAB
BASOPHILS # BLD AUTO: 0 X10E3/UL (ref 0–0.2)
BASOPHILS NFR BLD AUTO: 1 %
EOSINOPHIL # BLD AUTO: 0.3 X10E3/UL (ref 0–0.4)
EOSINOPHIL NFR BLD AUTO: 5 %
ERYTHROCYTE [DISTWIDTH] IN BLOOD BY AUTOMATED COUNT: 12.7 % (ref 11.6–15.4)
HCT VFR BLD AUTO: 42.2 % (ref 37.5–51)
HGB BLD-MCNC: 14.4 G/DL (ref 13–17.7)
IMM GRANULOCYTES # BLD: 0 X10E3/UL (ref 0–0.1)
IMM GRANULOCYTES NFR BLD: 0 %
LYMPHOCYTES # BLD AUTO: 1.9 X10E3/UL (ref 0.7–3.1)
LYMPHOCYTES NFR BLD AUTO: 27 %
MCH RBC QN AUTO: 30.3 PG (ref 26.6–33)
MCHC RBC AUTO-ENTMCNC: 34.1 G/DL (ref 31.5–35.7)
MCV RBC AUTO: 89 FL (ref 79–97)
MONOCYTES # BLD AUTO: 0.8 X10E3/UL (ref 0.1–0.9)
MONOCYTES NFR BLD AUTO: 12 %
NEUTROPHILS # BLD AUTO: 3.8 X10E3/UL (ref 1.4–7)
NEUTROPHILS NFR BLD AUTO: 55 %
PLATELET # BLD AUTO: 216 X10E3/UL (ref 150–450)
RBC # BLD AUTO: 4.76 X10E6/UL (ref 4.14–5.8)
WBC # BLD AUTO: 6.9 X10E3/UL (ref 3.4–10.8)

## 2022-02-25 DIAGNOSIS — I26.99 OTHER PULMONARY EMBOLISM WITHOUT ACUTE COR PULMONALE, UNSPECIFIED CHRONICITY (HCC): ICD-10-CM

## 2022-02-25 RX ORDER — RIVAROXABAN 20 MG/1
TABLET, FILM COATED ORAL
Qty: 90 TABLET | Refills: 1 | Status: SHIPPED | OUTPATIENT
Start: 2022-02-25 | End: 2022-08-08

## 2022-04-18 DIAGNOSIS — D50.8 OTHER IRON DEFICIENCY ANEMIA: ICD-10-CM

## 2022-04-18 RX ORDER — FERROUS SULFATE TAB EC 324 MG (65 MG FE EQUIVALENT) 324 (65 FE) MG
324 TABLET DELAYED RESPONSE ORAL
Qty: 90 TABLET | Refills: 5 | Status: CANCELLED | OUTPATIENT
Start: 2022-04-18

## 2022-04-19 NOTE — TELEPHONE ENCOUNTER
Apt made in May for 6 month f/u     Please send in iron pills as he will be going on vacation tomorrow      Thank you

## 2022-04-20 NOTE — TELEPHONE ENCOUNTER
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

## 2022-04-29 ENCOUNTER — TELEMEDICINE (OUTPATIENT)
Dept: FAMILY MEDICINE CLINIC | Facility: CLINIC | Age: 52
End: 2022-04-29
Payer: COMMERCIAL

## 2022-04-29 ENCOUNTER — TELEPHONE (OUTPATIENT)
Dept: FAMILY MEDICINE CLINIC | Facility: CLINIC | Age: 52
End: 2022-04-29

## 2022-04-29 DIAGNOSIS — U07.1 COVID-19 VIRUS INFECTION: Primary | ICD-10-CM

## 2022-04-29 LAB — SARS-COV-2 AG UPPER RESP QL IA: ABNORMAL

## 2022-04-29 PROCEDURE — 99213 OFFICE O/P EST LOW 20 MIN: CPT | Performed by: NURSE PRACTITIONER

## 2022-04-29 NOTE — LETTER
April 29, 2022     Patient: Chiquita Winters  YOB: 1970  Date of Visit: 4/29/2022      To Whom it May Concern:    Margo Sepulveda is under my professional care for Covid 19 infection  Based on symptom onset and current guidelines, he is asked to isolate through 5/2/22  He may return to work after that date  If you have any questions or concerns, please don't hesitate to call           Sincerely,          MIRNA Olivarez        CC: No Recipients

## 2022-04-29 NOTE — TELEPHONE ENCOUNTER
----- Message from Jemima Winslow sent at 4/29/2022 11:33 AM EDT -----  Regarding: FW: Covid test  Can someone please result this in pt's chart? Thanks!   ----- Message -----  From: Kenisha Taylor  Sent: 4/29/2022  11:14 AM EDT  To: MIRNA Romo  Subject: FW: Covid test                                     ----- Message -----  From: Niles Kwan  Sent: 4/29/2022  10:41 AM EDT  To: [facility]  Subject: Covid test                                       This message is being sent by Helena Tatum on behalf of Niles Kwan  Good morning,     Here is the test result  Please let me know if you need anything else       Thank you,    Shalom Culver

## 2022-05-04 ENCOUNTER — TELEPHONE (OUTPATIENT)
Dept: FAMILY MEDICINE CLINIC | Facility: CLINIC | Age: 52
End: 2022-05-04

## 2022-05-04 NOTE — TELEPHONE ENCOUNTER
Pt spouse calling asking for labs to be put in chart for pt up coming appt in July  Please call when orders are in chart

## 2022-05-05 DIAGNOSIS — Z13.6 SCREENING FOR CARDIOVASCULAR, RESPIRATORY, AND GENITOURINARY DISEASES: Primary | ICD-10-CM

## 2022-05-05 DIAGNOSIS — R73.03 PREDIABETES: ICD-10-CM

## 2022-05-05 DIAGNOSIS — Z13.1 SCREENING FOR DIABETES MELLITUS (DM): ICD-10-CM

## 2022-05-05 DIAGNOSIS — Z13.89 SCREENING FOR CARDIOVASCULAR, RESPIRATORY, AND GENITOURINARY DISEASES: Primary | ICD-10-CM

## 2022-05-05 DIAGNOSIS — Z13.83 SCREENING FOR CARDIOVASCULAR, RESPIRATORY, AND GENITOURINARY DISEASES: Primary | ICD-10-CM

## 2022-05-06 NOTE — TELEPHONE ENCOUNTER
Patient informed although the request was never for the PSA it was only for blood work because patient is coming in on July 13th to go over     [address], [place]

## 2022-05-06 NOTE — TELEPHONE ENCOUNTER
I put orders in chart but he should be informed that a PSA may not be covered until 9/24/22 (a year from his last one)  If he wants the PSA order placed anyway, I will add it

## 2022-06-18 LAB
ALBUMIN SERPL-MCNC: 4.5 G/DL (ref 3.8–4.9)
ALBUMIN/GLOB SERPL: 1.7 {RATIO} (ref 1.2–2.2)
ALP SERPL-CCNC: 76 IU/L (ref 44–121)
ALT SERPL-CCNC: 32 IU/L (ref 0–44)
AST SERPL-CCNC: 25 IU/L (ref 0–40)
BILIRUB SERPL-MCNC: 0.7 MG/DL (ref 0–1.2)
BUN SERPL-MCNC: 20 MG/DL (ref 6–24)
BUN/CREAT SERPL: 19 (ref 9–20)
CALCIUM SERPL-MCNC: 9.3 MG/DL (ref 8.7–10.2)
CHLORIDE SERPL-SCNC: 101 MMOL/L (ref 96–106)
CHOLEST SERPL-MCNC: 210 MG/DL (ref 100–199)
CO2 SERPL-SCNC: 25 MMOL/L (ref 20–29)
CREAT SERPL-MCNC: 1.04 MG/DL (ref 0.76–1.27)
EGFR: 86 ML/MIN/1.73
GLOBULIN SER-MCNC: 2.6 G/DL (ref 1.5–4.5)
GLUCOSE SERPL-MCNC: 115 MG/DL (ref 65–99)
HBA1C MFR BLD: 6 % (ref 4.8–5.6)
HDLC SERPL-MCNC: 62 MG/DL
LDLC SERPL CALC-MCNC: 127 MG/DL (ref 0–99)
MICRODELETION SYND BLD/T FISH: NORMAL
POTASSIUM SERPL-SCNC: 4.4 MMOL/L (ref 3.5–5.2)
PROT SERPL-MCNC: 7.1 G/DL (ref 6–8.5)
SODIUM SERPL-SCNC: 138 MMOL/L (ref 134–144)
TRIGL SERPL-MCNC: 117 MG/DL (ref 0–149)

## 2022-06-26 LAB
BASOPHILS # BLD AUTO: 0.1 X10E3/UL (ref 0–0.2)
BASOPHILS NFR BLD AUTO: 1 %
EOSINOPHIL # BLD AUTO: 0.5 X10E3/UL (ref 0–0.4)
EOSINOPHIL NFR BLD AUTO: 7 %
ERYTHROCYTE [DISTWIDTH] IN BLOOD BY AUTOMATED COUNT: 12 % (ref 11.6–15.4)
HCT VFR BLD AUTO: 44.6 % (ref 37.5–51)
HGB BLD-MCNC: 14.5 G/DL (ref 13–17.7)
IMM GRANULOCYTES # BLD: 0 X10E3/UL (ref 0–0.1)
IMM GRANULOCYTES NFR BLD: 0 %
IRON SATN MFR SERPL: 15 % (ref 15–55)
IRON SERPL-MCNC: 63 UG/DL (ref 38–169)
LYMPHOCYTES # BLD AUTO: 2 X10E3/UL (ref 0.7–3.1)
LYMPHOCYTES NFR BLD AUTO: 31 %
MCH RBC QN AUTO: 29.4 PG (ref 26.6–33)
MCHC RBC AUTO-ENTMCNC: 32.5 G/DL (ref 31.5–35.7)
MCV RBC AUTO: 91 FL (ref 79–97)
MONOCYTES # BLD AUTO: 0.7 X10E3/UL (ref 0.1–0.9)
MONOCYTES NFR BLD AUTO: 11 %
NEUTROPHILS # BLD AUTO: 3.2 X10E3/UL (ref 1.4–7)
NEUTROPHILS NFR BLD AUTO: 50 %
PLATELET # BLD AUTO: 253 X10E3/UL (ref 150–450)
RBC # BLD AUTO: 4.93 X10E6/UL (ref 4.14–5.8)
TIBC SERPL-MCNC: 423 UG/DL (ref 250–450)
UIBC SERPL-MCNC: 360 UG/DL (ref 111–343)
WBC # BLD AUTO: 6.5 X10E3/UL (ref 3.4–10.8)

## 2022-07-13 ENCOUNTER — OFFICE VISIT (OUTPATIENT)
Dept: FAMILY MEDICINE CLINIC | Facility: CLINIC | Age: 52
End: 2022-07-13
Payer: COMMERCIAL

## 2022-07-13 VITALS
RESPIRATION RATE: 18 BRPM | SYSTOLIC BLOOD PRESSURE: 134 MMHG | HEART RATE: 96 BPM | WEIGHT: 222 LBS | OXYGEN SATURATION: 99 % | DIASTOLIC BLOOD PRESSURE: 86 MMHG | BODY MASS INDEX: 32.88 KG/M2 | TEMPERATURE: 97.9 F | HEIGHT: 69 IN

## 2022-07-13 DIAGNOSIS — E61.1 IRON DEFICIENCY: ICD-10-CM

## 2022-07-13 DIAGNOSIS — Z13.6 SCREENING FOR CARDIOVASCULAR, RESPIRATORY, AND GENITOURINARY DISEASES: ICD-10-CM

## 2022-07-13 DIAGNOSIS — Z13.1 SCREENING FOR DIABETES MELLITUS (DM): ICD-10-CM

## 2022-07-13 DIAGNOSIS — D68.51 FACTOR 5 LEIDEN MUTATION, HETEROZYGOUS (HCC): ICD-10-CM

## 2022-07-13 DIAGNOSIS — D68.59 HYPERCOAGULABLE STATE (HCC): ICD-10-CM

## 2022-07-13 DIAGNOSIS — D68.51 ACTIVATED PROTEIN C RESISTANCE (HCC): ICD-10-CM

## 2022-07-13 DIAGNOSIS — M79.605 LEFT LEG PAIN: Primary | ICD-10-CM

## 2022-07-13 DIAGNOSIS — Z13.89 SCREENING FOR CARDIOVASCULAR, RESPIRATORY, AND GENITOURINARY DISEASES: ICD-10-CM

## 2022-07-13 DIAGNOSIS — Z86.718 HISTORY OF DVT (DEEP VEIN THROMBOSIS): ICD-10-CM

## 2022-07-13 DIAGNOSIS — Z79.899 LONG-TERM USE OF HIGH-RISK MEDICATION: ICD-10-CM

## 2022-07-13 DIAGNOSIS — Z13.83 SCREENING FOR CARDIOVASCULAR, RESPIRATORY, AND GENITOURINARY DISEASES: ICD-10-CM

## 2022-07-13 DIAGNOSIS — R73.03 PREDIABETES: ICD-10-CM

## 2022-07-13 PROBLEM — M79.89 LEFT LEG SWELLING: Status: ACTIVE | Noted: 2022-07-13

## 2022-07-13 PROBLEM — M79.89 LEFT LEG SWELLING: Status: RESOLVED | Noted: 2022-07-13 | Resolved: 2022-07-13

## 2022-07-13 PROBLEM — D64.9 ANEMIA: Status: RESOLVED | Noted: 2021-09-28 | Resolved: 2022-07-13

## 2022-07-13 PROCEDURE — 99214 OFFICE O/P EST MOD 30 MIN: CPT | Performed by: FAMILY MEDICINE

## 2022-07-13 NOTE — PROGRESS NOTES
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    F/u gi for PPI  Recent data suggesting increased risk of ischemic CVA and chronic kidney damage with PPI use was advised  Prediabetes  Diet advised  Recheck a1c, fbs     Diagnoses and all orders for this visit:    Left leg pain  -     VAS lower limb venous duplex study, unilateral/limited; Future    Factor 5 Leiden mutation, heterozygous ([facility] [address] )    History of DVT (deep vein thrombosis)    Hypercoagulable state ([facility] )    Long-term use of high-risk medication  -     Vitamin D 25 hydroxy; Future    Screening for cardiovascular, respiratory, and genitourinary diseases    Screening for diabetes mellitus (DM)  -     Comprehensive metabolic panel; Future    Prediabetes  -     Hemoglobin A1C; Future    Iron deficiency  -     Iron Saturation %; Future    Activated protein C resistance (HCC)        Return if symptoms worsen or fail to improve  Subjective:      Patient ID: Jordy Hammonds is a 47 y o  male  Chief Complaint   Patient presents with    Follow-up     6 month mz cma       HPI  Off iron for 3m  Eating vegetables    Left leg pain for 2 weeks  Not more swollen  Sudden onset  Foot also hurts  No immobility history    On xarelto currently, started by hematologist    nexium high dose  Helps  Low acid diet  No blood in stool    Hx of DVT b/l    The following portions of the patient's history were reviewed and updated as appropriate: allergies, current medications, past family history, past medical history, past social history, past surgical history and problem list     Review of Systems   Respiratory: Negative for shortness of breath  Cardiovascular: Positive for leg swelling  Current Outpatient Medications   Medication Sig Dispense Refill    esomeprazole (NexIUM) 40 MG capsule Take 1 capsule (40 mg total) by mouth daily 90 capsule 3    Xarelto 20 MG tablet TAKE 1 TABLET BY MOUTH DAILY 90 tablet 1     No current facility-administered medications for this visit  Objective:    /86   Pulse 96   Temp 97 9 °F (36 6 °C)   Resp 18   Ht 5' 9" (1 753 m)   Wt 101 kg (222 lb)   SpO2 99%   BMI 32 78 kg/m²        Physical Exam  Vitals and nursing note reviewed  Constitutional:       General: He is not in acute distress  Appearance: He is well-developed  He is obese  He is not ill-appearing  HENT:      Head: Normocephalic  Right Ear: Tympanic membrane normal       Left Ear: Tympanic membrane normal    Eyes:      General: No scleral icterus  Conjunctiva/sclera: Conjunctivae normal    Cardiovascular:      Rate and Rhythm: Normal rate and regular rhythm  Heart sounds: No murmur heard  Pulmonary:      Effort: Pulmonary effort is normal  No respiratory distress  Breath sounds: No wheezing  Abdominal:      General: There is no distension  Palpations: Abdomen is soft  Tenderness: There is no abdominal tenderness  Musculoskeletal:         General: No deformity  Cervical back: Neck supple  Right lower leg: No edema  Left lower leg: Edema present  Skin:     General: Skin is warm and dry  Coloration: Skin is not jaundiced or pale  Findings: No lesion  Neurological:      Mental Status: He is alert  Motor: No weakness  Gait: Gait normal    Psychiatric:         Mood and Affect: Mood normal          Behavior: Behavior normal          Thought Content:  Thought content normal                 Kathy Jha DO

## 2022-07-19 ENCOUNTER — HOSPITAL ENCOUNTER (OUTPATIENT)
Dept: RADIOLOGY | Facility: HOSPITAL | Age: 52
Discharge: HOME | End: 2022-07-19
Payer: COMMERCIAL

## 2022-07-19 DIAGNOSIS — M79.605 LEFT LEG PAIN: ICD-10-CM

## 2022-07-19 PROCEDURE — 93971 EXTREMITY STUDY: CPT | Performed by: SURGERY

## 2022-07-19 PROCEDURE — 93971 EXTREMITY STUDY: CPT

## 2022-08-01 ENCOUNTER — OFFICE VISIT (OUTPATIENT)
Dept: PODIATRY | Facility: CLINIC | Age: 52
End: 2022-08-01
Payer: COMMERCIAL

## 2022-08-01 VITALS
DIASTOLIC BLOOD PRESSURE: 86 MMHG | BODY MASS INDEX: 32.88 KG/M2 | HEIGHT: 69 IN | SYSTOLIC BLOOD PRESSURE: 134 MMHG | RESPIRATION RATE: 17 BRPM | WEIGHT: 222 LBS

## 2022-08-01 DIAGNOSIS — M21.962 ACQUIRED DEFORMITY OF LEFT FOOT: ICD-10-CM

## 2022-08-01 DIAGNOSIS — M21.42 ACQUIRED FLAT FOOT, LEFT: ICD-10-CM

## 2022-08-01 DIAGNOSIS — M21.41 ACQUIRED FLAT FOOT, RIGHT: ICD-10-CM

## 2022-08-01 DIAGNOSIS — M72.2 PLANTAR FASCIITIS: Primary | ICD-10-CM

## 2022-08-01 DIAGNOSIS — M21.961 ACQUIRED DEFORMITY OF RIGHT FOOT: ICD-10-CM

## 2022-08-01 PROCEDURE — L3000 FT INSERT UCB BERKELEY SHELL: HCPCS | Performed by: PODIATRIST

## 2022-08-01 PROCEDURE — 99212 OFFICE O/P EST SF 10 MIN: CPT | Performed by: PODIATRIST

## 2022-08-01 NOTE — PROGRESS NOTES
Assessment/Plan:  Pain upon ambulation  Acquired deformity foot  Acquired pes planus  Plantar fasciitis  Plan  Foot exam performed  Patient educated on condition  Patient will start a stretching program   He declines injection therapy at this point  He will take Aleve p r n   In order to help with pronation syndrome, patient's feet have been casted for custom molded foot orthotics  Diagnoses and all orders for this visit:    Plantar fasciitis    Acquired flat foot, right    Acquired flat foot, left    Acquired deformity of right foot    Acquired deformity of left foot          Subjective:  Patient has return of pain in his left heel  Patient has history of pain upon ambulation  He has pain upon rising  No history of trauma  He has history of plantar fasciitis    No Known Allergies      Current Outpatient Medications:     esomeprazole (NexIUM) 40 MG capsule, Take 1 capsule (40 mg total) by mouth daily, Disp: 90 capsule, Rfl: 3    Xarelto 20 MG tablet, TAKE 1 TABLET BY MOUTH DAILY, Disp: 90 tablet, Rfl: 1    Patient Active Problem List   Diagnosis    Allergic rhinitis    Hiatal hernia    GERD without esophagitis    Prediabetes    Screening for cardiovascular, respiratory, and genitourinary diseases    Screening for diabetes mellitus (DM)    Prostate cancer screening    Obesity (BMI 30-39  9)    [place] cardiac risk <10% in next 10 years   [address] maintenance    Hyperlipidemia    History of pulmonary embolus (PE)    History of DVT (deep vein thrombosis)    Factor 5 Leiden mutation, heterozygous ([address] )    Hypercoagulable state ([address] )    Activated protein C resistance ([facility] [address] )    Family history of colon cancer    Anticoagulant long-term use    Spermatocele    Acquired bladder diverticulum    Calcium oxalate kidney stones    Colon cancer screening    Left leg pain    Iron deficiency    Long-term use of high-risk medication          Patient ID: Cassie Ryan is a 47 y o  male     HPI    The following portions of the patient's history were reviewed and updated as appropriate:     family history includes Cancer in his brother and father; Colon cancer in his family and father; Coronary artery disease in his father; Heart disease in his brother and mother; Hypertension in his father and mother  reports that he has never smoked  He quit smokeless tobacco use about 27 years ago  He reports current alcohol use  He reports that he does not use drugs  Vitals:    08/01/22 1611   BP: 134/86   Resp: 17       Review of Systems      Objective:  Patient's shoes and socks removed  Foot ExamPhysical Exam      Foot Exam     General  General Appearance: appears stated age and healthy   Orientation: alert and oriented to person, place, and time   Affect: appropriate   Gait: unimpaired         Right Foot/Ankle      Inspection and Palpation  Ecchymosis: none  Swelling: dorsum and metatarsals   Arch: pes planus  Hammertoes: fifth toe  Hallux valgus: no  Hallux limitus: yes     Neurovascular  Dorsalis pedis: 3+  Posterior tibial: 3+  Achilles reflex: 2+     Muscle Strength  Ankle dorsiflexion: 4+        Left Foot/Ankle       Inspection and Palpation  Ecchymosis: none  Swelling: dorsum and metatarsals   Arch: pes planus  Hammertoes: fifth toe  Hallux valgus: no  Hallux limitus: yes     Neurovascular  Dorsalis pedis: 3+  Posterior tibial: 3+  Superficial peroneal nerve sensation: diminished  Deep peroneal nerve sensation: diminished  Achilles reflex: 1+     Muscle Strength  Ankle dorsiflexion: 4           Physical Exam   Constitutional: He is oriented to person, place, and time  He appears well-developed and well-nourished  Cardiovascular: Normal rate and regular rhythm  Pulses:       Dorsalis pedis pulses are 3+ on the right side, and 3+ on the left side  Posterior tibial pulses are 3+ on the right side, and 3+ on the left side  Musculoskeletal: He exhibits deformity     Patient is pronated in stance and gait  Bilateral pes planus  Early hallux limitus noted bilateral   Neurological: He is alert and oriented to person, place, and time  Reflex Scores:       Achilles reflexes are 2+ on the right side and 1+ on the left side  Skin: Skin is warm  Capillary refill takes less than 2 seconds  Psychiatric: He has a normal mood and affect  His behavior is normal  Judgment and thought content normal    Vitals reviewed

## 2022-08-06 DIAGNOSIS — I26.99 OTHER PULMONARY EMBOLISM WITHOUT ACUTE COR PULMONALE, UNSPECIFIED CHRONICITY (HCC): ICD-10-CM

## 2022-08-08 RX ORDER — RIVAROXABAN 20 MG/1
TABLET, FILM COATED ORAL
Qty: 90 TABLET | Refills: 1 | Status: SHIPPED | OUTPATIENT
Start: 2022-08-08

## 2022-08-24 ENCOUNTER — OFFICE VISIT (OUTPATIENT)
Dept: OBGYN CLINIC | Facility: CLINIC | Age: 52
End: 2022-08-24
Payer: COMMERCIAL

## 2022-08-24 ENCOUNTER — APPOINTMENT (OUTPATIENT)
Dept: RADIOLOGY | Facility: CLINIC | Age: 52
End: 2022-08-24
Payer: COMMERCIAL

## 2022-08-24 VITALS
WEIGHT: 223 LBS | BODY MASS INDEX: 31.92 KG/M2 | TEMPERATURE: 98.2 F | HEIGHT: 70 IN | HEART RATE: 78 BPM | RESPIRATION RATE: 19 BRPM | DIASTOLIC BLOOD PRESSURE: 80 MMHG | SYSTOLIC BLOOD PRESSURE: 132 MMHG

## 2022-08-24 DIAGNOSIS — M25.562 CHRONIC PAIN OF LEFT KNEE: ICD-10-CM

## 2022-08-24 DIAGNOSIS — M25.562 LEFT KNEE PAIN, UNSPECIFIED CHRONICITY: ICD-10-CM

## 2022-08-24 DIAGNOSIS — M17.12 PRIMARY LOCALIZED OSTEOARTHRITIS OF LEFT KNEE: Primary | ICD-10-CM

## 2022-08-24 DIAGNOSIS — Z01.89 ENCOUNTER FOR LOWER EXTREMITY COMPARISON IMAGING STUDY: ICD-10-CM

## 2022-08-24 DIAGNOSIS — M71.22 BAKER CYST, LEFT: ICD-10-CM

## 2022-08-24 DIAGNOSIS — Z86.718 HISTORY OF DVT (DEEP VEIN THROMBOSIS): ICD-10-CM

## 2022-08-24 DIAGNOSIS — G89.29 CHRONIC PAIN OF LEFT KNEE: ICD-10-CM

## 2022-08-24 PROCEDURE — 73560 X-RAY EXAM OF KNEE 1 OR 2: CPT

## 2022-08-24 PROCEDURE — 73562 X-RAY EXAM OF KNEE 3: CPT

## 2022-08-24 PROCEDURE — 99203 OFFICE O/P NEW LOW 30 MIN: CPT | Performed by: ORTHOPAEDIC SURGERY

## 2022-08-24 NOTE — PROGRESS NOTES
Assessment/Plan:  1  Primary localized osteoarthritis of left knee  Ambulatory Referral to Physical Therapy   2  Chronic pain of left knee  XR knee 3 vw left non injury    Ambulatory Referral to Physical Therapy   3  History of DVT (deep vein thrombosis)     4  Baker cyst, left       Scribe Attestation    I,:  Zabrina Valverde am acting as a scribe while in the presence of the attending physician :       I,:  Linnea Garcia, DO personally performed the services described in this documentation    as scribed in my presence :         Cele Bo is a pleasant 76-year-old male presents today for initial evaluation of his left knee pain  After reviewing his imaging and performing a thorough history and physical exam I explained that his symptoms are consistent with a Baker cyst secondary to his localized osteoarthritis in his left knee  I recommended initiating physical therapy to optimize his patellofemoral mechanics and function  I provided him with a referral today  I also encouraged him to use a knee sleeve when his Baker cyst returns if it is symptomatic for him  He will avoid NSAID medication due to his blood thinner  If his symptoms persist in the future, we can consider corticosteroid injection therapy  All of his questions and concerns were addressed today  We will see him back as needed  Subjective:  Initial evaluation for left knee pain    Patient ID: Henrry Rochachester is a 47 y o  male who presents for initial evaluation his left knee pain  At today's visit, he complains of pain about the posterior aspect of his knee over the last 2 months  This pain can radiate to his heel at times  He also complains of fullness in the posterior aspect his leg  He was evaluated with an ultrasound recently which demonstrated a Baker cyst   He does also have an underlying chronic DVT and is on Xarelto  He does report that his symptoms have mostly resolved today  He denies any new injury trauma      Review of Systems Constitutional: Positive for activity change  Negative for chills, fever and unexpected weight change  HENT: Negative for hearing loss, nosebleeds and sore throat  Eyes: Negative for pain, redness and visual disturbance  Respiratory: Negative for cough, shortness of breath and wheezing  Cardiovascular: Negative for chest pain, palpitations and leg swelling  Gastrointestinal: Negative for abdominal pain, nausea and vomiting  Endocrine: Negative for polyphagia and polyuria  Genitourinary: Negative for dysuria and hematuria  Musculoskeletal: Positive for arthralgias and myalgias  Negative for joint swelling  See HPI   Skin: Negative for rash and wound  Neurological: Negative for dizziness, numbness and headaches  Psychiatric/Behavioral: Negative for decreased concentration and suicidal ideas  The patient is not nervous/anxious  Past Medical History:   Diagnosis Date    Anemia     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 07/12/2006    DVT [facility]deep venous thrombosis) ([facility] ) 2018    Dysphagia     Last Assessed: 8/12/2014     [facility]     GERD (gastroesophageal reflux disease)     Globus sensation     Last Assessed: 6/4/2014     H/O neoplasm of uncertain behavior of skin 06/06/2006    Hyperlipidemia     Hypertension 02/14/2006    Lateral epicondylitis of right elbow     Last Assessed: 10/23/2015     Pes planus     last assessed: 10/23/2013     Plantar fascial fibromatosis     Last Assessed: 10/23/2013     Pulmonary embolism ([facility] )     Right patellofemoral syndrome     Last Assessed: 4/15/2016     Sebaceous cyst 11/03/2007       Past Surgical History:   Procedure Laterality Date    COLONOSCOPY      ESOPHAGOGASTRODUODENOSCOPY N/A 10/7/2016    Procedure: ESOPHAGOGASTRODUODENOSCOPY (EGD); Surgeon: Kevin Alcaraz MD;  Location: [facility] GI [facility];   Service:     NASAL SEPTUM SURGERY  1995    TN ESOPHAGOGASTRODUODENOSCOPY TRANSORAL DIAGNOSTIC N/A 12/6/2018    Procedure: ESOPHAGOGASTRODUODENOSCOPY (EGD); Surgeon: Twila Yousif MD;  Location: [facility] GI [facility]; Service: Gastroenterology       Family History   Problem Relation Age of Onset    Heart disease Mother         valve 76s    Hypertension Mother     Cancer Father         colon    Colon cancer Father     Hypertension Father     Coronary artery disease Father         CABG    Heart disease Brother         MI exp age 39   Marty Raymundo Colon cancer Family     Cancer Brother         esophageal CA exp age 40       Social History     Occupational History    Not on file   Tobacco Use    Smoking status: Never Smoker    Smokeless tobacco: Former User     Quit date: 2/18/1995   Vaping Use    Vaping Use: Never used   Substance and Sexual Activity    Alcohol use: Yes     Comment: socially    Drug use: No    Sexual activity: Not on file         Current Outpatient Medications:     esomeprazole (NexIUM) 40 MG capsule, Take 1 capsule (40 mg total) by mouth daily, Disp: 90 capsule, Rfl: 3    Xarelto 20 MG tablet, TAKE 1 TABLET BY MOUTH DAILY, Disp: 90 tablet, Rfl: 1    No Known Allergies    Objective:  Vitals:    08/24/22 1509   BP: 132/80   Pulse: 78   Resp: 19   Temp: 98 2 °F (36 8 °C)       Body mass index is 32 kg/m²  Left Knee Exam     Tenderness   The patient is experiencing no tenderness  Range of Motion   Left knee extension: 0  Left knee flexion: 135  Tests   Varus: negative Valgus: negative  Drawer:  Anterior - negative     Posterior - negative    Other   Erythema: absent  Scars: absent  Sensation: normal  Pulse: present  Swelling: none  Effusion: no effusion present    Comments:  Stable at 0, 30 and 90 degrees  Neurovascularly in tact distally  No warmth or erythema  Parapatellar crepitance noted  Patellofemoral grind: positive          Observations   Left Knee   Negative for effusion  Physical Exam  Vitals and nursing note reviewed     Constitutional:       Appearance: He is well-developed  HENT:      Head: Normocephalic and atraumatic  Eyes:      General: No scleral icterus  Extraocular Movements: Extraocular movements intact  Conjunctiva/sclera: Conjunctivae normal    Cardiovascular:      Rate and Rhythm: Normal rate  Pulmonary:      Effort: Pulmonary effort is normal  No respiratory distress  Musculoskeletal:      Cervical back: Normal range of motion and neck supple  Left knee: No effusion  Comments: As noted in HPI   Skin:     General: Skin is warm and dry  Neurological:      Mental Status: He is alert and oriented to person, place, and time  Psychiatric:         Behavior: Behavior normal          I have personally reviewed pertinent films in PACS  X-ray of the left knee obtained on 08/24/2022 reviewed demonstrating mild degenerative change with patellofemoral narrowing, sclerosis and osteophytosis  There is no acute fracture, dislocation, lytic or blastic lesion

## 2022-09-12 ENCOUNTER — OFFICE VISIT (OUTPATIENT)
Dept: FAMILY MEDICINE CLINIC | Facility: CLINIC | Age: 52
End: 2022-09-12
Payer: COMMERCIAL

## 2022-09-12 VITALS
OXYGEN SATURATION: 98 % | WEIGHT: 217 LBS | TEMPERATURE: 97.3 F | RESPIRATION RATE: 18 BRPM | SYSTOLIC BLOOD PRESSURE: 110 MMHG | HEART RATE: 80 BPM | BODY MASS INDEX: 31.07 KG/M2 | HEIGHT: 70 IN | DIASTOLIC BLOOD PRESSURE: 76 MMHG

## 2022-09-12 DIAGNOSIS — J20.9 ACUTE BRONCHITIS, UNSPECIFIED ORGANISM: Primary | ICD-10-CM

## 2022-09-12 PROCEDURE — 99213 OFFICE O/P EST LOW 20 MIN: CPT | Performed by: NURSE PRACTITIONER

## 2022-09-12 RX ORDER — AZITHROMYCIN 250 MG/1
TABLET, FILM COATED ORAL
Qty: 6 TABLET | Refills: 0 | Status: SHIPPED | OUTPATIENT
Start: 2022-09-12 | End: 2022-09-17

## 2022-09-12 RX ORDER — METHYLPREDNISOLONE 4 MG/1
TABLET ORAL
Qty: 21 TABLET | Refills: 0 | Status: SHIPPED | OUTPATIENT
Start: 2022-09-12

## 2022-09-12 NOTE — PROGRESS NOTES
Assessment/Plan:    1  Acute bronchitis, unspecified organism  -     azithromycin (ZITHROMAX) 250 mg tablet; Take 500mg on day 1, 250mg on days 2-5  -     methylPREDNISolone 4 MG tablet therapy pack; Use as directed on package          Patient Instructions: Take medication with food  It is important that you take the entire course of antibiotics prescribed  May also take a probiotic of your choice to maintain healthy GI maranda  Can take some probiotic and yogurt with the medication  Take prednisone with food in morning and do not take any NSAID's while taking prednisone  Supportive care discussed and advised  Advised to RTO for any worsening and no improvement  Follow up for no improvement and worsening of conditions  Patient advised and educated when to see immediate medical care  Return if symptoms worsen or fail to improve  No future appointments  Subjective:      Patient ID: Michaela Amezcua is a 47 y o  male  Chief Complaint   Patient presents with    Cough     SOB started 2 weeks ago  [id number]         Vitals:  /76   Pulse 80   Temp (!) 97 3 °F (36 3 °C)   Resp 18   Ht 5' 10" (1 778 m)   Wt 98 4 kg (217 lb)   SpO2 98%   BMI 31 14 kg/m²     HPI  Patient stated that started with cough about 2 weeks and gets chest tightness at times too  Denies fever, chills, sob and chest pain  Stated that now having productive phelgm with colored like greyish  Tried OTC but no relief  The following portions of the patient's history were reviewed and updated as appropriate: allergies, current medications, past family history, past medical history, past social history, past surgical history and problem list       Review of Systems   Constitutional: Negative for chills, diaphoresis, fatigue, fever and unexpected weight change     HENT: Negative for congestion, dental problem, drooling, ear discharge, ear pain, facial swelling, hearing loss, mouth sores, nosebleeds, postnasal drip, rhinorrhea, sinus pressure, sinus pain, sneezing, sore throat, tinnitus, trouble swallowing and voice change  Respiratory: Positive for cough and chest tightness  Negative for shortness of breath and wheezing  Cardiovascular: Negative  Gastrointestinal: Negative for abdominal pain, constipation, diarrhea, nausea and vomiting  Musculoskeletal: Negative  Skin: Negative  Neurological: Negative for dizziness, light-headedness and headaches  Objective:    Social History     Tobacco Use   Smoking Status Never Smoker   Smokeless Tobacco Former User    Quit date: 2/18/1995       Allergies: No Known Allergies      Current Outpatient Medications   Medication Sig Dispense Refill    azithromycin (ZITHROMAX) 250 mg tablet Take 500mg on day 1, 250mg on days 2-5 6 tablet 0    esomeprazole (NexIUM) 40 MG capsule Take 1 capsule (40 mg total) by mouth daily 90 capsule 3    methylPREDNISolone 4 MG tablet therapy pack Use as directed on package 21 tablet 0    Xarelto 20 MG tablet TAKE 1 TABLET BY MOUTH DAILY 90 tablet 1     No current facility-administered medications for this visit  Physical Exam  Vitals reviewed  Constitutional:       Appearance: Normal appearance  He is well-developed  HENT:      Head: Normocephalic  Right Ear: Tympanic membrane, ear canal and external ear normal       Left Ear: Tympanic membrane, ear canal and external ear normal       Nose: Nose normal       Right Sinus: No maxillary sinus tenderness or frontal sinus tenderness  Left Sinus: No maxillary sinus tenderness or frontal sinus tenderness  Mouth/Throat:      Mouth: No oral lesions  Pharynx: No oropharyngeal exudate or posterior oropharyngeal erythema  Cardiovascular:      Rate and Rhythm: Normal rate and regular rhythm  Heart sounds: Normal heart sounds  Pulmonary:      Effort: Pulmonary effort is normal       Breath sounds: Wheezing present     Musculoskeletal:         General: Normal range of motion  Cervical back: Neck supple  Lymphadenopathy:      Cervical:      Right cervical: No superficial or posterior cervical adenopathy  Left cervical: No superficial or posterior cervical adenopathy  Skin:     General: Skin is warm and dry  Neurological:      Mental Status: He is alert and oriented to person, place, and time  Psychiatric:         Behavior: Behavior normal          Thought Content:  Thought content normal          Judgment: Judgment normal                      MIRNA Forte

## 2022-09-12 NOTE — PATIENT INSTRUCTIONS
Take medication with food  It is important that you take the entire course of antibiotics prescribed  May also take a probiotic of your choice to maintain healthy GI maranda  Can take some probiotic and yogurt with the medication  Take prednisone with food in morning and do not take any NSAID's while taking prednisone  Supportive care discussed and advised  Advised to RTO for any worsening and no improvement  Follow up for no improvement and worsening of conditions  Patient advised and educated when to see immediate medical care  Prednisone (By mouth)   Prednisone (PRED-ni-sone)  Treats many diseases and conditions, especially problems related to inflammation  This medicine is a corticosteroid  Brand Name(s): Jossue, predniSONE Intensol   There may be other brand names for this medicine  When This Medicine Should Not Be Used: This medicine is not right for everyone  Do not use if you had an allergic reaction to prednisone or if you are pregnant  How to Use This Medicine:   Liquid, Tablet, Delayed Release Tablet  Take your medicine as directed  Your dose may need to be changed several times to find what works best for you  It is best to take this medicine with food or milk  Swallow the delayed-release tablet whole  Do not crush, break, or chew it  Measure the oral liquid medicine with a marked measuring spoon, oral syringe, or medicine cup  Missed dose: Take a dose as soon as you remember  If it is almost time for your next dose, wait until then and take a regular dose  Do not take extra medicine to make up for a missed dose  Store the medicine in a closed container at room temperature, away from heat, moisture, and direct light  Do not freeze the oral liquid  Drugs and Foods to Avoid:   Ask your doctor or pharmacist before using any other medicine, including over-the-counter medicines, vitamins, and herbal products    Tell your doctor if you use any of the following:  Aminoglutethimide, amphotericin B, carbamazepine, cholestyramine, cyclosporine, digoxin, isoniazid, ketoconazole, phenobarbital, phenytoin, or rifampin  Blood thinner, such as warfarin  NSAID pain or arthritis medicine, such as aspirin, diclofenac, ibuprofen, naproxen, celecoxib  Diuretic (water pill)  Diabetes medicine  Macrolide antibiotic, such as azithromycin, clarithromycin, erythromycin  Estrogen, including birth control pills or hormone replacement therapy  This medicine may interfere with vaccines  Ask your doctor before you get a flu shot or any other vaccines  Warnings While Using This Medicine: It is not safe to take this medicine during pregnancy  It could harm an unborn baby  Tell your doctor right away if you become pregnant  Tell your doctor if you are breastfeeding or if you have kidney problems, heart failure, high blood pressure, a recent heart attack, diabetes, glaucoma, osteoporosis, or thyroid problems  Tell your doctor about any infection you have  Also tell your doctor if you have had mental or emotional problems (such as depression) or stomach or bowel problems (such as an ulcer or diverticulitis)  This medicine may cause the following problems:  Mood or behavior changes  Higher blood pressure, retaining water, changes in salt or potassium levels in your body  Cataracts or glaucoma (with long-term use)  Weak bones or osteoporosis (with long-term use)  Slow growth in children (with long-term use)  Muscle problems (with high doses, especially if you have myasthenia gravis or similar nerve and muscle problems)  Do not stop using this medicine suddenly  Your doctor will need to slowly decrease your dose before you stop it completely  This medicine could cause you to get infections more easily  Tell your doctor right away if you are exposed to chicken pox, measles, or other serious infection  Tell your doctor if you had a serious infection in the past, such as tuberculosis or herpes    Tell your doctor about any extra stress or anxiety in your life  Your dose might need to be changed for a short time  Tell any doctor or dentist who treats you that you are using this medicine  This medicine may affect certain medical test results  Keep all medicine out of the reach of children  Never share your medicine with anyone  Possible Side Effects While Using This Medicine:   Call your doctor right away if you notice any of these side effects: Allergic reaction: Itching or hives, swelling in your face or hands, swelling or tingling in your mouth or throat, chest tightness, trouble breathing  Dark freckles, skin color changes, coldness, weakness, tiredness, nausea, vomiting, weight loss  Depression, unusual thoughts, feelings, or behaviors, trouble sleeping  Fever, chills, cough, sore throat, and body aches  Muscle pain or weakness  Rapid weight gain, swelling in your hands, ankles, or feet  Severe stomach pain, nausea, vomiting, or red or black stools  Skin changes or growths  Trouble seeing, eye pain, headache  If you notice these less serious side effects, talk with your doctor: Increased appetite  Round, puffy face  Weight gain around your neck, upper back, breast, face, or waist  If you notice other side effects that you think are caused by this medicine, tell your doctor  Call your doctor for medical advice about side effects  You may report side effects to FDA at 4-878-FDA-7863    © Copyright [employer] 2022 Information is for End User's use only and may not be sold, redistributed or otherwise used for commercial purposes  The above information is an  only  It is not intended as medical advice for individual conditions or treatments  Talk to your doctor, nurse or pharmacist before following any medical regimen to see if it is safe and effective for you  Azithromycin (By mouth)   Azithromycin ([id number]-sie-MYE-sin)  Treats infections  This medicine is a macrolide antibiotic     Brand Name(s): Zithromax, Zithromax Tri-Ned, Zithromax Z-Ned, Zmax   There may be other brand names for this medicine  When This Medicine Should Not Be Used: This medicine is not right for everyone  Do not use it if you had an allergic reaction to azithromycin, erythromycin, or similar medicines, or if you have a history of liver problems caused by azithromycin  How to Use This Medicine:   Capsule, Liquid, Packet, Powder, Tablet  Your doctor will tell you how much medicine to use  Do not use more than directed  Take all of the medicine in your prescription to clear up your infection, even if you feel better after the first few doses  Multiple dose (Zithromax® oral liquid or tablets): You may take this medicine with or without food  Shake the bottle well before you measure the medicine  Measure the oral liquid medicine with a marked measuring spoon, oral syringe, or medicine cup  Single dose (Zmax® extended-release oral liquid or powder):   Liquid:   Take this medicine on an empty stomach at least 1 hour before you eat, or 2 hours after you eat  Call your doctor right away if you vomit within 1 hour after you take the medicine  You must take the liquid within 12 hours after the pharmacist gives it to you  Shake the bottle well before you measure the medicine  Measure your dose with a marked measuring spoon, cup, or syringe  Powder:   Open 1 packet and pour all of the medicine into a glass with about 2 ounces (¼ cup) of water  Mix well and drink the medicine right away  Pour another 2 ounces of water into the same glass, and drink the remaining medicine  Read and follow the patient instructions that come with this medicine  Talk to your doctor or pharmacist if you have any questions  Missed dose: If you are taking multiple doses, take the dose as soon as you remember  If it is almost time for your next dose, wait until then to take a regular dose  Do not use extra medicine to make up for a missed dose    Store the medicine in a closed container at room temperature, away from heat, moisture, and direct light  Extended-release oral liquid: Do not refrigerate or freeze  Oral liquid for 1 dose only: Store at room temperature  Do not store in the refrigerator or allow the medicine to freeze  Oral liquid for multiple doses: Store at room temperature or in the refrigerator  Use it within 10 days of filling the prescription  Drugs and Foods to Avoid:   Ask your doctor or pharmacist before using any other medicine, including over-the-counter medicines, vitamins, and herbal products  Some medicines can affect how azithromycin works  Tell your doctor if you are also using any of the following:  Colchicine, cyclosporine, digoxin, nelfinavir, phenytoin  Blood thinner (including warfarin)  Ergot medicine  Medicine for a heart rhythm problem (including amiodarone, dofetilide, procainamide, quinidine, sotalol)  Zithromax® for multiple doses: Do not take an antacid that contains magnesium or aluminum at the same time you take Zithromax®  An antacid will affect how the medicine works  Antacids will not affect Zmax® for single dose  Warnings While Using This Medicine:   Tell your doctor if you are pregnant or breastfeeding, or if you have kidney disease, liver disease, heart disease, heart rhythm problems, heart failure, or myasthenia gravis  Tell your doctor if anyone in your family has heart rhythm problems  This medicine may cause the following problems:   Serious skin reactions, including Perry-Juan syndrome, acute generalized exanthematous pustulosis, toxic epidermal necrolysis, and drug reaction with eosinophilia and systemic symptoms (DRESS)  Liver problems  Infantile hypertrophic pyloric stenosis  Heart rhythm problems, including QT prolongation  Increased risk of serious heart or blood vessel problems  This medicine can cause diarrhea  Call your doctor if the diarrhea becomes severe, does not stop, or is bloody   Do not take any medicine to stop diarrhea until you have talked to your doctor  Diarrhea can occur 2 months or more after you stop taking this medicine  It may occur 2 months or more after you stop using this medicine  Call your doctor if your symptoms do not improve or if they get worse  Your doctor will do lab tests at regular visits to check on the effects of this medicine  Keep all appointments  Keep all medicine out of the reach of children  Never share your medicine with anyone  Possible Side Effects While Using This Medicine:   Call your doctor right away if you notice any of these side effects: Allergic reaction: Itching or hives, swelling in your face or hands, swelling or tingling in your mouth or throat, chest tightness, trouble breathing  Blistering, peeling, red skin rash  Dark urine, pale stools, nausea, vomiting, loss of appetite, stomach pain, yellow skin or eyes  Fainting, dizziness, lightheadedness  Fast, pounding, or uneven heartbeat, blurred vision, chest pain, trouble breathing, tiredness or weakness  Feeling irritable or vomits after feeding (in babies)  Severe diarrhea that may contain blood, stomach cramps, fever  If you notice these less serious side effects, talk with your doctor:   Mild diarrhea, nausea, vomiting, stomach pain  If you notice other side effects that you think are caused by this medicine, tell your doctor  Call your doctor for medical advice about side effects  You may report side effects to FDA at 8-337-FDA-3778    © Copyright [employer] 2022 Information is for End User's use only and may not be sold, redistributed or otherwise used for commercial purposes  The above information is an  only  It is not intended as medical advice for individual conditions or treatments  Talk to your doctor, nurse or pharmacist before following any medical regimen to see if it is safe and effective for you

## 2022-09-20 ENCOUNTER — OFFICE VISIT (OUTPATIENT)
Dept: OBGYN CLINIC | Facility: CLINIC | Age: 52
End: 2022-09-20
Payer: COMMERCIAL

## 2022-09-20 ENCOUNTER — APPOINTMENT (OUTPATIENT)
Dept: RADIOLOGY | Facility: CLINIC | Age: 52
End: 2022-09-20
Payer: COMMERCIAL

## 2022-09-20 VITALS
SYSTOLIC BLOOD PRESSURE: 124 MMHG | BODY MASS INDEX: 32.14 KG/M2 | WEIGHT: 217 LBS | DIASTOLIC BLOOD PRESSURE: 84 MMHG | HEART RATE: 77 BPM | HEIGHT: 69 IN

## 2022-09-20 DIAGNOSIS — M25.511 ACUTE PAIN OF RIGHT SHOULDER: ICD-10-CM

## 2022-09-20 DIAGNOSIS — R29.898 SHOULDER WEAKNESS: ICD-10-CM

## 2022-09-20 DIAGNOSIS — M25.511 ACUTE PAIN OF RIGHT SHOULDER: Primary | ICD-10-CM

## 2022-09-20 PROCEDURE — 99213 OFFICE O/P EST LOW 20 MIN: CPT | Performed by: PHYSICIAN ASSISTANT

## 2022-09-20 PROCEDURE — 73030 X-RAY EXAM OF SHOULDER: CPT

## 2022-09-20 NOTE — PROGRESS NOTES
Assessment/Plan:  1  Acute pain of right shoulder  XR shoulder 2+ vw right   2  right shoulder weakness       Shruthi Paul had longstanding mild rotator cuff tendinitis symptoms and then experienced an acute ripping trauma last week while bowling and is left with significant rotator cuff weakness on clinical exam today  I am concerned about a full-thickness rotator cuff tear and would recommend an MRI to evaluate  He will be started in physical therapy to try and improve his range of motion and maintain his strength while we get the MRI  He will follow up with Dr Yosi Mason to discuss the MRI results (Dr Yosi Mason did his wife's RTC and would like to follow up with him)    I would not recommend any oral anti-inflammatories due to concurrent Xarelto use  Subjective:   Patient ID: Lisa Soler is a 47 y o  male with right shoulder pain  He reports he has had some degree of shoulder discomfort for a long time however recently he was bowling last week when he felt a popping ripping sensation in the shoulder and has been unable to lift the arm overhead since that time  He has pain at night when trying to sleep  No new paresthesias are reported  He states that he has longstanding numbness in his ring finger on the right side  He has not had any treatment for the right shoulder  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  He is right-hand dominant  Patient has a contributing medical history of DVT and pulmonary emboli and takes Xarelto chronically  He is not diabetic  Review of Systems   Constitutional: Negative for chills and fever  HENT: Negative for congestion and rhinorrhea  Eyes: Negative for discharge and redness  Respiratory: Negative for cough and shortness of breath  Cardiovascular: Negative for chest pain and leg swelling  Gastrointestinal: Negative for abdominal distention and nausea  Neurological: Negative for dizziness and facial asymmetry  Psychiatric/Behavioral: Negative for confusion and hallucinations  All other systems reviewed and are negative  Past Medical History:   Diagnosis Date    Anemia     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 07/12/2006    DVT (deep venous thrombosis) ([facility] ) 2018    Dysphagia     Last Assessed: 8/12/2014     Factor 5 Leiden mutation, heterozygous ([facility] )     GERD (gastroesophageal reflux disease)     Globus sensation     Last Assessed: 6/4/2014     H/O neoplasm of uncertain behavior of skin 06/06/2006    Hyperlipidemia     Hypertension 02/14/2006    Lateral epicondylitis of right elbow     Last Assessed: 10/23/2015     Pes planus     last assessed: 10/23/2013     Plantar fascial fibromatosis     Last Assessed: 10/23/2013     Pulmonary embolism ([facility] )     Right patellofemoral syndrome     Last Assessed: 4/15/2016     Sebaceous cyst 11/03/2007       Past Surgical History:   Procedure Laterality Date    COLONOSCOPY      ESOPHAGOGASTRODUODENOSCOPY N/A 10/7/2016    Procedure: ESOPHAGOGASTRODUODENOSCOPY (EGD); Surgeon: Malissa Wynne MD;  Location: [facility] GI [facility]; Service:     NASAL SEPTUM SURGERY  1995    VT ESOPHAGOGASTRODUODENOSCOPY TRANSORAL DIAGNOSTIC N/A 12/6/2018    Procedure: ESOPHAGOGASTRODUODENOSCOPY (EGD); Surgeon: Malissa Wynne MD;  Location: [facility] GI LAB;   Service: Gastroenterology       Family History   Problem Relation Age of Onset    Heart disease Mother         valve 76s    Hypertension Mother     Cancer Father         colon    Colon cancer Father     Hypertension Father     Coronary artery disease Father         CABG    Heart disease Brother         MI exp age 39    Colon cancer Family     Cancer Brother         esophageal CA exp age 37    Cancer Brother        Social History     Occupational History    Not on file   Tobacco Use    Smoking status: Never Smoker    Smokeless tobacco: Never Used   Vaping Use    Vaping Use: Never used   Substance and Sexual Activity    Alcohol use: Yes     Alcohol/week: 4 0 standard drinks     Types: 4 Cans of beer per week     Comment: socially    Drug use: No    Sexual activity: Yes     Partners: Female         Current Outpatient Medications:     esomeprazole (NexIUM) 40 MG capsule, Take 1 capsule (40 mg total) by mouth daily, Disp: 90 capsule, Rfl: 3    Xarelto 20 MG tablet, TAKE 1 TABLET BY MOUTH DAILY, Disp: 90 tablet, Rfl: 1    methylPREDNISolone 4 MG tablet therapy pack, Use as directed on package (Patient not taking: Reported on 9/20/2022), Disp: 21 tablet, Rfl: 0    No Known Allergies    Objective:  Vitals:    09/20/22 1827   BP: 124/84   Pulse: 77       Ortho Exam    Physical Exam  Constitutional:       General: He is not in acute distress  Appearance: Normal appearance  HENT:      Head: Normocephalic and atraumatic  Nose: Nose normal    Cardiovascular:      Rate and Rhythm: Normal rate  Pulses: Normal pulses  Pulmonary:      Effort: Pulmonary effort is normal  No respiratory distress  Breath sounds: No wheezing  Skin:     General: Skin is warm and dry  Coloration: Skin is not jaundiced  Findings: No erythema or rash  Neurological:      General: No focal deficit present  Mental Status: He is alert and oriented to person, place, and time  Psychiatric:         Mood and Affect: Mood normal          Behavior: Behavior normal          Thought Content: Thought content normal          Judgment: Judgment normal      Right shoulder has no skin breakdown lesions or signs of infection  Active forward flexion is to 90° with end motion pain  Passively he goes to 170° with discomfort  He has a positive drop-arm test   He has a positive impingement sign  Internal rotation and external rotation range of motion is within normal limits without significant pain  Internal rotation strength is 5/5  External rotation strength is decreased to 4/5    He is neurovascularly intact to light touch in the right upper extremity  He has no AC joint tenderness  I have personally reviewed pertinent films in PACS and my interpretation is three views of the right shoulder show no acute fractures or dislocations  Mild AC joint degenerative changes are seen  Type 2 acromion is noted  No significant glenohumeral joint degenerative changes are seen  This was read from an orthopedic standpoint will await official radiologist interpretation

## 2022-09-29 ENCOUNTER — EVALUATION (OUTPATIENT)
Dept: PHYSICAL THERAPY | Facility: CLINIC | Age: 52
End: 2022-09-29
Payer: COMMERCIAL

## 2022-09-29 DIAGNOSIS — M25.511 ACUTE PAIN OF RIGHT SHOULDER: Primary | ICD-10-CM

## 2022-09-29 DIAGNOSIS — R29.898 SHOULDER WEAKNESS: ICD-10-CM

## 2022-09-29 PROCEDURE — 97161 PT EVAL LOW COMPLEX 20 MIN: CPT | Performed by: PHYSICAL THERAPIST

## 2022-09-29 NOTE — PROGRESS NOTES
PT Evaluation     Today's date: 2022  Patient name: Kathryn Hernandez  : 1970  MRN: [id number]  Referring provider: BRYANT Connolly*  Dx:   Encounter Diagnosis     ICD-10-CM    1  Acute pain of right shoulder  M25 [id number] Ambulatory Referral to Physical Therapy   2  right shoulder weakness  [id number] Ambulatory Referral to Physical Therapy                  Assessment  Assessment details: Kathryn Hernandez is a 47 y o  male who presents with pain, decreased strength and decreased ROM  Due to these impairments, patient has difficulty performing ADL's, work-related activities, lifting/carrying, reaching  Patient's clinical presentation is consistent with their referring diagnosis of Acute pain of right shoulder  (primary encounter diagnosis)  Plan: Ambulatory Referral to Physical Therapy    right shoulder weakness  Plan: Ambulatory Referral to Physical Therapy    Patient has been educated in home exercise program and plan of care   Patient would benefit from skilled physical therapy services to address their aforementioned functional limitations and progress towards prior level of function and independence with home exercise program      Impairments: abnormal or restricted ROM, activity intolerance, impaired physical strength, lacks appropriate home exercise program, pain with function, poor posture  and poor body mechanics  Understanding of Dx/Px/POC: good   Prognosis: good  Prognosis details: Pt demo possible cervical spine contribution    Goals  Short Term Goals to be accomplished in 3 weeks:  STG1: Pt will be I with HEP  STG2: Pt will be I with posture management   STG3: Pt will demo 50% improvement in shoulder AROM to improve self care and household ADLs   STG4: Pt will demo 1/2 MMT strength in shoulder  STG5: Pt will report pain < 3/10 in shoulder     Long Term Goals to be accomplished in 6 weeks:   LTG1: Pt will demo full shoulder AROM to return to household duties  Trav Nicholas: Pt will return to work/household ADLs pain free as per PLOF  LTG3: Pt will demo shoulder strength [address] details:  HEP development, stretching, strengthening, A/AA/PROM, joint mobilizations, posture education, STM/MI as needed to reduce muscle tension, muscle reeducation, PLOC discussed and agreed upon with patient  Patient would benefit from: PT eval and skilled physical therapy  Planned modality interventions: cryotherapy and thermotherapy: hydrocollator packs  Planned therapy interventions: manual therapy, neuromuscular re-education, self care, therapeutic exercise, therapeutic activities and home exercise program  Frequency: 2x week (2-3x/week)  Duration in weeks: 6  Treatment plan discussed with: patient        Subjective Evaluation    History of Present Illness  Mechanism of injury: Pt reports his R shoulder has been bothering him for several years intermittently  He has been finding he is limited with activities because of it  He was bowling recently, felt a pop, but was able to continue to bowl (he bowls weekly in a league ) He reports his shoulder has been improving since last week though  When it was at it's worst he was using his L arm to raise the R arm  He is now able to start his car and reach overhead which he was not able to  He has had an episode of R 4th finger feels like it's asleep  Pt reports variability with the shoulder as to what makes it worse vs better  He does report some neck pain as well, lately noticeable on the L of his neck  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, needs arms to pull up ladder     Quality of life: good    Pain  Current pain rating: 3  At best pain ratin  At worst pain ratin          Objective     Postural Observations  Seated posture: poor  Standing posture: fair        Cervical/Thoracic Screen   Cervical range of motion within normal limits    Neurological Testing     Sensation     Shoulder   Left Shoulder   Intact: light touch    Right Shoulder   Intact: light touch    Reflexes   Left   Biceps (C5/C6): trace (1+)  Brachioradialis (C6): trace (1+)    Right   Biceps (C5/C6): trace (1+)  Brachioradialis (C6): trace (1+)    Additional Neurological Details  R UE reflexes pt notices tingling in forearm    Active Range of Motion   Left Shoulder   Normal active range of motion    Right Shoulder   Flexion: 170 degrees   Extension: 50 degrees   Abduction: 165 degrees   External rotation 0°: 50 degrees   Internal rotation BTB: L3   Horizontal adduction: 50 degrees     Additional Active Range of Motion Details  Painful arc (+) flexion and abduction    Passive Range of Motion     Right Shoulder   Normal passive range of motion    Strength/Myotome Testing     Left Shoulder   Normal muscle strength    Right Shoulder     Planes of Motion   Flexion: 4   Extension: 4   Abduction: 4   External rotation at 0°: 3+   Internal rotation at 0°: 4   Horizontal adduction: 4     Isolated Muscles   Biceps: 4+   Triceps: 4+               Eval/ Re-eval Auth #/ Referral # Total visits Start date  Expiration date Total active units  Total manual units  PT only or  PT+OT?   9/29                                                         Date:           Total authorized units:  Active:            Manual:           Total remaining units:  Active:               Manual:                Date:           Total authorized units: Active:            Manual:           Total remaining units:  Active:               Manual:                      Precautions: Standard  On Blood thinners (+)         Visit  1             9/29                                                   Neuro Re-Ed                                                                                                        Ther Ex             Rep shldr ext 10x in supination            RTC iso Flex/ abd/ext 10x                                                                                          Ther Activity Modalities

## 2022-10-04 ENCOUNTER — OFFICE VISIT (OUTPATIENT)
Dept: PHYSICAL THERAPY | Facility: CLINIC | Age: 52
End: 2022-10-04
Payer: COMMERCIAL

## 2022-10-04 DIAGNOSIS — M25.511 ACUTE PAIN OF RIGHT SHOULDER: Primary | ICD-10-CM

## 2022-10-04 DIAGNOSIS — R29.898 SHOULDER WEAKNESS: ICD-10-CM

## 2022-10-04 PROCEDURE — 97140 MANUAL THERAPY 1/> REGIONS: CPT | Performed by: PHYSICAL THERAPIST

## 2022-10-04 PROCEDURE — 97110 THERAPEUTIC EXERCISES: CPT | Performed by: PHYSICAL THERAPIST

## 2022-10-04 NOTE — PROGRESS NOTES
Daily Note     Today's date: 10/4/2022  Patient name: Anthony Griffith  : 1970  MRN: [id number]  Referring provider: BRYANT Acosta*  Dx:   Encounter Diagnosis     ICD-10-CM    1  Acute pain of right shoulder  M25 511    2  right shoulder weakness  R29 898                   Subjective: Pt reports he has had more soreness lately 3/10 in the shoulder  He wonders if he may have overdone his HEP  He does admit 1/10 soreness in shoulder with resisted ABd      Objective: See treatment diary below  Tx update  HEP update  Resisted RTC iso flexion provoking pain  Assessment: Tolerated treatment well  Patient over good response to est POC, demo major inc in [facility] mobility and demo reactivity of distal R RTC tendon, as well as slight neural sensitivity  Pt does however good good overall prognosis at this time  Pt demo good technique and good comprehension of HEP  Plan: Continue per plan of care  Return to prev HEP as per response to updated HEP     Eval/ Re-eval Auth #/ Referral # Total visits Start date  Expiration date Total active units  Total manual units  PT only or  PT+OT?                                                            Date:           Total authorized units:  Active:            Manual:           Total remaining units:  Active:               Manual:                Date:           Total authorized units: Active:            Manual:           Total remaining units:  Active:               Manual:                      Precautions: Standard  On Blood thinners (+)         Visit  1 2            9/29 10/4           [facility] mob  Inf/post 11'           Joint oscillation, distraction  4'                        Neuro Re-Ed                                                                                                        Ther Ex             Rep shldr ext 10x in supination            RTC iso Flex/ abd/ext 10x Abd in elbow ext, in IR 10x           Self inf mob  90 deg abd 10x Ther Activity                                                                              Modalities

## 2022-10-06 ENCOUNTER — OFFICE VISIT (OUTPATIENT)
Dept: PHYSICAL THERAPY | Facility: CLINIC | Age: 52
End: 2022-10-06
Payer: COMMERCIAL

## 2022-10-06 DIAGNOSIS — R29.898 SHOULDER WEAKNESS: ICD-10-CM

## 2022-10-06 DIAGNOSIS — M25.511 ACUTE PAIN OF RIGHT SHOULDER: Primary | ICD-10-CM

## 2022-10-06 PROCEDURE — 97110 THERAPEUTIC EXERCISES: CPT | Performed by: PHYSICAL THERAPIST

## 2022-10-06 PROCEDURE — 97140 MANUAL THERAPY 1/> REGIONS: CPT | Performed by: PHYSICAL THERAPIST

## 2022-10-06 NOTE — PROGRESS NOTES
Daily Note     Today's date: 10/6/2022  Patient name: Reynaldo Mae  : 1970  MRN: [id number]  Referring provider: BRYANT Quinonez*  Dx:   Encounter Diagnosis     ICD-10-CM    1  Acute pain of right shoulder  M25 511    2  right shoulder weakness  R29 898                   Subjective: Pt reports overall his shoulder remains unchanged since Tuesday's visit  He reports that during today's session he is having discomfort which is not changing and likely related from challenging the tissue  He reports trapezius discomfort when in his truck  Objective: See treatment diary below  Inc time spent assessing response of cervical spine in upper trapezius  Progress from cervical retraction, retraction extension, retraction extension with self mob PA with lateral component to L  Repeated horizontal adduction reintroduced      Assessment: Tolerated treatment fair  Patient is not reporting change in symptoms during today's session  Pt overall joint mobility is drastically improved since start of last session, has been able to maintain well between sessions  R shoulder mechanics have imporved however painful arc perissts  Plan: Continue per plan of care  Eval/ Re-eval Auth #/ Referral # Total visits Start date  Expiration date Total active units  Total manual units  PT only or  PT+OT?                                                            Date:           Total authorized units:  Active:            Manual:           Total remaining units:  Active:               Manual:                Date:           Total authorized units: Active:            Manual:           Total remaining units:  Active:               Manual:                      Precautions: Standard  On Blood thinners (+)         Visit  1 2 3           9/29 10/4 10/6          [facility] mob  Inf/post 11' 12'          Joint oscillation, distraction  4'                        Neuro Re-Ed Ther Ex             Rep shldr ext 10x in supination  Rev          RTC iso Flex/ abd/ext 10x Abd in elbow ext, in IR 10x 10x          Self inf mob  90 deg abd 10x Rev          Rep H add   With OP 5x                                                              Ther Activity                                                                              Modalities

## 2022-10-07 ENCOUNTER — TELEPHONE (OUTPATIENT)
Dept: RADIOLOGY | Facility: HOSPITAL | Age: 52
End: 2022-10-07

## 2022-10-10 ENCOUNTER — TELEPHONE (OUTPATIENT)
Dept: OBGYN CLINIC | Facility: CLINIC | Age: 52
End: 2022-10-10

## 2022-10-10 NOTE — TELEPHONE ENCOUNTER
MRI of RIGHT Shoulder was denied due to:    Based on Musculoskeletal Imaging Guidelines Section(s): MS 19 Shoulder and 1 0 General Guidelines, we cannot approve this request  Your healthcare provider told us that you have shoulder pain  The request cannot be approved because: It must be needed to help your doctor decide if a repair is needed following a recent (acute) shoulder injury that caused a tear (complete or partial rotator cuff tear)  Imaging requires six weeks of provider directed treatment to be completed  Supported treatments include (but are not limited to) drugs for swelling or pain, an in office workout (physical therapy), and/or oral or injected steroids  This must have been completed in the past three months without improved symptoms  Contact (via office visit, phone, email, or messaging) must occur after the treatment is completed  This has not been met because: You have not completed six weeks of provider directed treatment  There was no contact with your provider after completing treatment  Your practitioner can also discuss this decision with the medical director who reviewed this request by calling [phone]      Would you like to complete a Peer to Peer?

## 2022-10-11 ENCOUNTER — OFFICE VISIT (OUTPATIENT)
Dept: PHYSICAL THERAPY | Facility: CLINIC | Age: 52
End: 2022-10-11
Payer: COMMERCIAL

## 2022-10-11 DIAGNOSIS — M25.511 ACUTE PAIN OF RIGHT SHOULDER: Primary | ICD-10-CM

## 2022-10-11 DIAGNOSIS — R29.898 SHOULDER WEAKNESS: ICD-10-CM

## 2022-10-11 PROCEDURE — 97110 THERAPEUTIC EXERCISES: CPT | Performed by: PHYSICAL THERAPIST

## 2022-10-11 PROCEDURE — 97140 MANUAL THERAPY 1/> REGIONS: CPT | Performed by: PHYSICAL THERAPIST

## 2022-10-11 NOTE — PROGRESS NOTES
Daily Note     Today's date: 10/11/2022  Patient name: Gilmar Rocha  : 1970  MRN: [id number]  Referring provider: Ralph Snellen, PA*  Dx:   Encounter Diagnosis     ICD-10-CM    1  Acute pain of right shoulder  M25 511    2  right shoulder weakness  R29 898                   Subjective: Pt reports he is noticing ongoing clicking but inc use over weekend did not respond in inc pain or deficit  Pt denies painful arc at end of today's session, pt pleasantly surprised  Objective: See treatment diary below  HEP updated to add rep shldr abd with resistance  Emphasize work in painful arc  Assessment: Tolerated treatment well  Patient demo [facility] joint mobility WNL during today's session and overall clean excursion for overhewad reaching and pain free return to neutral  Progressed therex resistance to challenge dysfunctional ,musculotendinous junction, with success  Pt demo good comprehension of HEP  Plan: Continue per plan of care  Eval/ Re-eval Auth #/ Referral # Total visits Start date  Expiration date Total active units  Total manual units  PT only or  PT+OT?    [id number] 65                                                   Precautions: Standard  On Blood thinners (+)         Visit  1 2 3 4           10/4 10/6 10/11         [facility] mob  Inf/post 11' 12' 12'         Joint oscillation, distraction  4'  4'                      Neuro Re-Ed                                                                                                        Ther Ex             Rep shldr ext 10x in supination  Rev Rev         RTC iso Flex/ abd/ext 10x Abd in elbow ext, in IR 10x 10x In painful arc 2x10         Self inf mob  90 deg abd 10x Rev rev         Rep H add   With OP 5x manual         Abd    Red TB 2x10                                                Ther Activity                                                                              Modalities

## 2022-10-11 NOTE — TELEPHONE ENCOUNTER
Spoke with patient relayed Mani Solano's message and denial letter from insurance  Pt is currently in Physical therapy x 2 weeks  He will complete 6 weeks of therapy and then see Dr Afshin Archer    Pt scheduled with Dr Afshin Archer on 11/11

## 2022-10-12 PROBLEM — Z12.11 COLON CANCER SCREENING: Status: RESOLVED | Noted: 2021-04-09 | Resolved: 2022-10-12

## 2022-10-13 ENCOUNTER — HOSPITAL ENCOUNTER (OUTPATIENT)
Dept: RADIOLOGY | Facility: HOSPITAL | Age: 52
Discharge: HOME | End: 2022-10-13

## 2022-10-13 ENCOUNTER — OFFICE VISIT (OUTPATIENT)
Dept: PHYSICAL THERAPY | Facility: CLINIC | Age: 52
End: 2022-10-13
Payer: COMMERCIAL

## 2022-10-13 DIAGNOSIS — R29.898 SHOULDER WEAKNESS: ICD-10-CM

## 2022-10-13 DIAGNOSIS — M25.511 ACUTE PAIN OF RIGHT SHOULDER: Primary | ICD-10-CM

## 2022-10-13 PROCEDURE — 97110 THERAPEUTIC EXERCISES: CPT | Performed by: PHYSICAL THERAPIST

## 2022-10-13 PROCEDURE — 97140 MANUAL THERAPY 1/> REGIONS: CPT | Performed by: PHYSICAL THERAPIST

## 2022-10-13 NOTE — PROGRESS NOTES
Daily Note     Today's date: 10/13/2022  Patient name: Flakito Sweet  : 1970  MRN: [id number]  Referring provider: BRYANT Meeks*  Dx:   Encounter Diagnosis     ICD-10-CM    1  Acute pain of right shoulder  M25 511    2  right shoulder weakness  R29 898                   Subjective: Pt reports his shoulder is sore today "like a 2/10 " Pt admits there remains an ongoing "click" in shoulder  Pt does feel repeated extension reduces intensity of "click" with abduction  Objective: See treatment diary below  Therex performed in circuits today  HEP updated, adding more emphasis on repeated extension during daily HEP      Assessment: Tolerated treatment well  Patient demo major improvement in joint mobility however crepitus perissts and while pain has improved he admits ongoing deficits  Plan: Continue per plan of care  Eval/ Re-eval Auth #/ Referral # Total visits Start date  Expiration date Total active units  Total manual units  PT only or  PT+OT?    [id number] 93                                                   Precautions: Standard  On Blood thinners (+)         Visit  1 2 3 4 5    9/29 10/4 10/6 10/11 10/13   [address] mob  Inf/post 11' 12' 12' 12'   Joint oscillation, distraction  4'  4' 3'   MWM     Abd 6x   Neuro Re-Ed                                                                Ther Ex        Rep shldr ext 10x in supination  Rev Rev 2x10 with SPC   RTC iso Flex/ abd/ext 10x Abd in elbow ext, in IR 10x 10x In painful arc 2x10 2x10   Self inf mob  90 deg abd 10x Rev rev Rev   Rep H add   With OP 5x manual    Abd    Red TB 2x10 2x10   Ecc Abd     1lb 2x10   SL shldr flex     To 120 deg 12x           Ther Activity                                                Modalities

## 2022-10-18 ENCOUNTER — OFFICE VISIT (OUTPATIENT)
Dept: PHYSICAL THERAPY | Facility: CLINIC | Age: 52
End: 2022-10-18
Payer: COMMERCIAL

## 2022-10-18 DIAGNOSIS — M25.511 ACUTE PAIN OF RIGHT SHOULDER: Primary | ICD-10-CM

## 2022-10-18 DIAGNOSIS — R29.898 SHOULDER WEAKNESS: ICD-10-CM

## 2022-10-18 PROCEDURE — 97110 THERAPEUTIC EXERCISES: CPT | Performed by: PHYSICAL THERAPIST

## 2022-10-18 NOTE — PROGRESS NOTES
Daily Note     Today's date: 10/18/2022  Patient name: Rogelio Kay  : 1970  MRN: [id number]  Referring provider: BRYANT Hall*  Dx:   Encounter Diagnosis     ICD-10-CM    1  Acute pain of right shoulder  M25 511    2  right shoulder weakness  R29 898                   Subjective: Pt reports that he is 75% reduced in "click" intensity and pain, 3-4/10 with painful arc, less pain  Pt reports feelinf confident in HEP  Pt will be away next week  Objective: See treatment diary below  HEP reviewed and updated (rep shldr extension in IR), printed and issued  Assessment: Tolerated treatment well  Patient demo overall good porgress with a reduction in painful arc and while he demo major improvements mechanically, overall sensitivity peristss but to lesser severity  Pt R shldr IR HBB within 1/2 inch of L UE shldr IR HBB  Plan: Continue per plan of care  Eval/ Re-eval Auth #/ Referral # Total visits Start date  Expiration date Total active units  Total manual units  PT only or  PT+OT?    [id number] 64                                                   Precautions: Standard  On Blood thinners (+)         Visit  2 3 4 5 6    10/4 10/6 10/11 10/13 10/18   [facility] mob Inf/post 11' 12' 12' 12' 5'   Joint oscillation, distraction 4'  4' 3'    MWM    Abd 6x    Neuro Re-Ed                                                                Ther Ex        Rep shldr ext  Rev Rev 2x10 with SPC In IR 3x10 lunge   RTC iso Abd in elbow ext, in IR 10x 10x In painful arc 2x10 2x10 2x10   Self inf mob 90 deg abd 10x Rev rev Rev Rev   Rep H add  With OP 5x manual  Self 5x   Abd   Red TB 2x10 2x10 2x10   Ecc Abd    1lb 2x10 2x10   SL shldr flex    To 120 deg 12x hold           Ther Activity                                                Modalities

## 2022-10-20 ENCOUNTER — OFFICE VISIT (OUTPATIENT)
Dept: PHYSICAL THERAPY | Facility: CLINIC | Age: 52
End: 2022-10-20
Payer: COMMERCIAL

## 2022-10-20 DIAGNOSIS — M25.511 ACUTE PAIN OF RIGHT SHOULDER: Primary | ICD-10-CM

## 2022-10-20 DIAGNOSIS — R29.898 SHOULDER WEAKNESS: ICD-10-CM

## 2022-10-20 PROCEDURE — 97110 THERAPEUTIC EXERCISES: CPT | Performed by: PHYSICAL THERAPIST

## 2022-10-20 NOTE — PROGRESS NOTES
Daily Note     Today's date: 10/20/2022  Patient name: Freya Brush  : 1970  MRN: [id number]  Referring provider: BRYANT Sarabia*  Dx:   Encounter Diagnosis     ICD-10-CM    1  Acute pain of right shoulder  M25 511    2  right shoulder weakness  R29 898                   Subjective: Pt reports soreness today after reaching into backseat of truck to get his computer  "Lucas Loreto been doing good all week " Pt is prepping for 2 week vacation  Objective: See treatment diary below  Ongoing painful arc (full AROM) which improves with repeated extension, does not abolish  R shoulder MMT WNL t/o, R ER 4+/5 painful  Assessment: Tolerated treatment well  Patient demo symptom irritability today after awkward reaching episode however no change in joint mobility and overall progress at thisj time  Pt cont to demo excelent progression and comparatively excellent strength and ROM compared to evaluation  Pt demo good prognosis for further reoslution baarring any specifically jarring episodes while on vacation  Plan: Continue per plan of care  1 week f/u     Eval/ Re-eval Auth #/ Referral # Total visits Start date  Expiration date Total active units  Total manual units  PT only or  PT+OT?    [id number] 12                                                   Precautions: Standard  On Blood thinners (+)         Visit  3 4 5 6 7    10/6 10/11 10/13 10/18 10/20   [facility] mob 12' 12' 12' 5' 5'   Joint oscillation, distraction  4' 3'     MWM   Abd 6x     Neuro Re-Ed                                                                Ther Ex        Rep shldr ext Rev Rev 2x10 with SPC In IR 3x10 lunge 4x10   RTC iso 10x In painful arc 2x10 2x10 2x10 Rev   Self inf mob Rev rev Rev Rev rev   Rep H add With OP 5x manual  Self 5x Rev   Abd  Red TB 2x10 2x10 2x10 2x10   Ecc Abd   1lb 2x10 2x10 Rev   SL shldr flex   To 120 deg 12x hold Rev   Shldr ext     Resisted Red TB 2x10   Ther Activity Modalities

## 2022-11-01 ENCOUNTER — OFFICE VISIT (OUTPATIENT)
Dept: PHYSICAL THERAPY | Facility: CLINIC | Age: 52
End: 2022-11-01

## 2022-11-01 DIAGNOSIS — R29.898 SHOULDER WEAKNESS: ICD-10-CM

## 2022-11-01 DIAGNOSIS — M25.511 ACUTE PAIN OF RIGHT SHOULDER: Primary | ICD-10-CM

## 2022-11-01 NOTE — PROGRESS NOTES
Daily Note     Today's date: 2022  Patient name: Elton Moon  : 1970  MRN: [id number]  Referring provider: BRYANT Francis*  Dx:   Encounter Diagnosis     ICD-10-CM    1  Acute pain of right shoulder  M25 511    2  right shoulder weakness  R29 898                   Subjective: R shoulder pain 2/10 but had been irritated especially with sleeping arrangement while he was on vacation  Objective: See treatment diary below      Assessment: Tolerated treatment well  Patient cont to have "click" with eccentric portion of abduction in < 90 deg range  He also demo reduction with repeated motions and most noteably reduced with scap retraction in jesenia elying today  No change in  [address] joint mobility since last seen in clinic  Plan: Pt sees MD next week, one more PT appt this week  Eval/ Re-eval Auth #/ Referral # Total visits Start date  Expiration date Total active units  Total manual units  PT only or  PT+OT?    [id number]                                                   Precautions: Standard  On Blood thinners (+)         Visit  4 5 6 7 8    10/11 10/13 10/18 10/20 11/1/22   [facility] mob 12' 12' 5' 5'    Joint oscillation, distraction 4' 3'      MWM  Abd 6x      Neuro Re-Ed                                                                Ther Ex        Rep shldr ext Rev 2x10 with SPC In IR 3x10 lunge 4x10 4x10   RTC iso In painful arc 2x10 2x10 2x10 Rev 5s x 10   Self inf mob rev Rev Rev rev rev   Rep H add manual  Self 5x Rev 10x   Abd Red TB 2x10 2x10 2x10 2x10 2x10   Ecc Abd  1lb 2x10 2x10 Rev 3lbs 12x2   SL shldr flex  To 120 deg 12x hold Rev    Shldr ext    Resisted Red TB 2x10 2x10   Scap ret     "Toward back L pocker" R scap only in L SL 2x12   Ther Activity                                                Modalities

## 2022-11-03 ENCOUNTER — OFFICE VISIT (OUTPATIENT)
Dept: PHYSICAL THERAPY | Facility: CLINIC | Age: 52
End: 2022-11-03

## 2022-11-03 DIAGNOSIS — R29.898 SHOULDER WEAKNESS: ICD-10-CM

## 2022-11-03 DIAGNOSIS — M25.511 ACUTE PAIN OF RIGHT SHOULDER: Primary | ICD-10-CM

## 2022-11-03 NOTE — PROGRESS NOTES
Daily Note     Today's date: 11/3/2022  Patient name: Buddy Simental  : 1970  MRN: [id number]  Referring provider: BRYANT Salazar*  Dx:   Encounter Diagnosis     ICD-10-CM    1  Acute pain of right shoulder  M25 511    2  right shoulder weakness  R29 898                   Subjective: Pt reports he is overall not having much pian but clicking persists, however only when testing Abduction full AROM, not during functional mob of his active day/work  Pt really only having pain with resisted abd isometric  Objective: See treatment diary below      Assessment: Tolerated treatment well  Patient overall is not having symtoms that limit him but do however notice a click when specifically testing AROM  Pt demo good technique with therex and HEP, good prognosis for furethe rprogression and prveention of set back  Plan: Pt will comply wiht HEP until MD appt, f/u after MD 22     Eval/ Re-eval Auth #/ Referral # Total visits Start date  Expiration date Total active units  Total manual units  PT only or  PT+OT?    [id number] 12                                                   Precautions: Standard  On Blood thinners (+)         Visit  5 6 7 8 9    10/13 10/18 10/20 11/1/22 11/3/22   [facility] mob 12' 5' 5'  10'   Joint oscillation, distraction 3'       MWM Abd 6x       Neuro Re-Ed                                                                Ther Ex        Rep shldr ext 2x10 with SPC In IR 3x10 lunge 4x10 4x10 4x10   RTC iso 2x10 2x10 Rev 5s x 10 hold   Self inf mob Rev Rev rev rev rev   Rep H add  Self 5x Rev 10x 10x   Abd 2x10 2x10 2x10 2x10 Red TB 2x10   Ecc Abd 1lb 2x10 2x10 Rev 3lbs 12x2 3lbs 2x12   SL shldr flex To 120 deg 12x hold Rev     Shldr ext   Resisted Red TB 2x10 2x10 2x10   Scap ret    "Toward back L pocker" R scap only in L SL 2x12 2x12   Ther Activity                                                Modalities

## 2022-11-11 ENCOUNTER — OFFICE VISIT (OUTPATIENT)
Dept: OBGYN CLINIC | Facility: CLINIC | Age: 52
End: 2022-11-11

## 2022-11-11 VITALS
SYSTOLIC BLOOD PRESSURE: 141 MMHG | HEART RATE: 84 BPM | BODY MASS INDEX: 33.09 KG/M2 | DIASTOLIC BLOOD PRESSURE: 94 MMHG | WEIGHT: 223.4 LBS | HEIGHT: 69 IN

## 2022-11-11 DIAGNOSIS — M25.511 ACUTE PAIN OF RIGHT SHOULDER: ICD-10-CM

## 2022-11-11 DIAGNOSIS — R29.898 SHOULDER WEAKNESS: ICD-10-CM

## 2022-11-11 DIAGNOSIS — M24.811 INTERNAL DERANGEMENT OF RIGHT SHOULDER: Primary | ICD-10-CM

## 2022-11-11 NOTE — PROGRESS NOTES
Assessment/Plan:  1  Internal derangement of right shoulder  MRI shoulder right wo contrast   2  Acute pain of right shoulder  MRI shoulder right wo contrast   3  right shoulder weakness  MRI shoulder right wo contrast     Scribe Attestation    I,:  Cely Chadwick am acting as a scribe while in the presence of the attending physician :       I,:  Stan Noel MD personally performed the services described in this documentation    as scribed in my presence  :             47 y o  male with right shoulder injury  Upon review of the right shoulder x-ray from 9/20/2022, a thorough history and my examination, Cheri Luna is presenting with signs and symptoms consistent with right shoulder rotator cuff tear  Diagnosis and treatment options were discussed at length with the patient and his wife  An MRI was ordered for further evaluation  Based on exam I do think Cheri Luna has a right shoulder rotator cuff tear, the MRI is necessary to evaluate the severity of the tear and if he will need a right shoulder arthroscope  He will follow up after MRI to discuss results  Subjective:   Debra Coughlin is a 47 y o  male who presents who presents for initial evaluation for right shoulder pain  Patient was previously seen by Collin Byers PA-C 9/20/2022 after a right shoulder injury while bowling  At the time of the injury he states he felt a popping ripping sensation and was immediately unable to lift his arm  Since then he has had pain, limited range of motion and weakness  He denies numbness or distal paresthesias  At the time of his visit with Collin Byers 3 view x-rays were taken of the right shoulder, no acute osseous abnormalities  An MRI was ordered and unfortunately denies  Because of this Cheri Luna completed 6 weeks of physical therapy and presents to my office for evaluation  Today he states his pain has improved along with his range of motion  However his strength has not changed   He does note he is able to sleep thru the night with out the pain waking him up  He is also able to lift his arm to almost full flexion and abduction on his own  He denies any new injury or trauma to the right shoulder  Review of Systems   Constitutional: Negative for chills and fever  HENT: Negative for ear pain and sore throat  Eyes: Negative for pain and visual disturbance  Respiratory: Negative for cough and shortness of breath  Cardiovascular: Negative for chest pain and palpitations  Gastrointestinal: Negative for abdominal pain and vomiting  Genitourinary: Negative for dysuria and hematuria  Musculoskeletal: Negative for arthralgias and back pain  Skin: Negative for color change and rash  Neurological: Negative for seizures and syncope  All other systems reviewed and are negative  Past Medical History:   Diagnosis Date   • Anemia    • 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 07/12/2006   • DVT (deep venous thrombosis) ([facility] [address] ) 2018   • Dysphagia     Last Assessed: 8/12/2014    • Factor [facility] Leiden mutation, [facility]    • GERD (gastroesophageal reflux disease)    • Globus sensation     Last Assessed: 6/4/2014    • H/O neoplasm of uncertain behavior of skin 06/06/2006   • Hyperlipidemia    • Hypertension 02/14/2006   • Lateral epicondylitis of right elbow     Last Assessed: 10/23/2015    • Pes planus     last assessed: 10/23/2013    • Plantar fascial fibromatosis     Last Assessed: 10/23/2013    • Pulmonary embolism ([facility] )    • Right patellofemoral syndrome     Last Assessed: 4/15/2016    • Sebaceous cyst 11/03/2007       Past Surgical History:   Procedure Laterality Date   • COLONOSCOPY     • ESOPHAGOGASTRODUODENOSCOPY N/A 10/7/2016    Procedure: ESOPHAGOGASTRODUODENOSCOPY (EGD); Surgeon: Jose G Diggs MD;  Location: [facility] GI LAB; Service:    • NASAL SEPTUM SURGERY  1995   • HI ESOPHAGOGASTRODUODENOSCOPY TRANSORAL DIAGNOSTIC N/A 12/6/2018    Procedure: ESOPHAGOGASTRODUODENOSCOPY (EGD);   Surgeon: Jose G Diggs MD; Location: [facility] GI LAB; Service: Gastroenterology       Family History   Problem Relation Age of Onset   • Heart disease Mother         valve 76s   • Hypertension Mother    • Cancer Father         colon   • Colon cancer Father    • Hypertension Father    • Coronary artery disease Father         CABG   • Heart disease Brother         MI exp age 39   • Colon cancer Family    • Cancer Brother         esophageal CA exp age 40   • Cancer Brother        Social History     Occupational History   • Not on file   Tobacco Use   • Smoking status: Never Smoker   • Smokeless tobacco: Never Used   Vaping Use   • Vaping Use: Never used   Substance and Sexual Activity   • Alcohol use: Yes     Alcohol/week: 4 0 standard drinks     Types: 4 Cans of beer per week     Comment: socially   • Drug use: No   • Sexual activity: Yes     Partners: Female         Current Outpatient Medications:   •  esomeprazole (NexIUM) 40 MG capsule, Take 1 capsule (40 mg total) by mouth daily, Disp: 90 capsule, Rfl: 3  •  Xarelto 20 MG tablet, TAKE 1 TABLET BY MOUTH DAILY, Disp: 90 tablet, Rfl: 1  •  methylPREDNISolone 4 MG tablet therapy pack, Use as directed on package (Patient not taking: No sig reported), Disp: 21 tablet, Rfl: 0    No Known Allergies    Objective:  Vitals:    11/11/22 0757   BP: 141/94   Pulse: 84       Right Shoulder Exam     Range of Motion   Active abduction: 160     Muscle Strength   Abduction: 4/5   Internal rotation: 5/5   External rotation: 4/5     Tests   Impingement: positive    Other   Erythema: absent  Sensation: normal  Pulse: present    Comments:  4/5 Empty Can              Physical Exam  Vitals and nursing note reviewed  HENT:      Head: Normocephalic  Eyes:      Extraocular Movements: Extraocular movements intact  Cardiovascular:      Rate and Rhythm: Normal rate  Pulses: Normal pulses  Pulmonary:      Effort: Pulmonary effort is normal    Musculoskeletal:         General: Normal range of motion  Cervical back: Normal range of motion  Skin:     General: Skin is warm and dry  Neurological:      General: No focal deficit present  Mental Status: He is alert  Psychiatric:         Behavior: Behavior normal        Right shoulder x-ray from September 20 is viewed demonstrating no signs of acute fracture or other obvious bony abnormality  This document was created using speech voice recognition software  Grammatical errors, random word insertions, pronoun errors, and incomplete sentences are an occasional consequence of this system due to software limitations, ambient noise, and hardware issues  Any formal questions or concerns about content, text, or information contained within the body of this dictation should be directly addressed to the provider for clarification

## 2022-11-22 ENCOUNTER — TELEPHONE (OUTPATIENT)
Dept: RHEUMATOLOGY | Facility: CLINIC | Age: 52
End: 2022-11-22

## 2022-11-22 NOTE — TELEPHONE ENCOUNTER
Patients wife Breann Farooq called to let us know that the insurance company contacted them to let them know they need more information for the MRI to be approved   Arnav Winter is scheduled to have his MRI on 12/12 at 7 30 am

## 2022-11-22 NOTE — TELEPHONE ENCOUNTER
Spoke with patient, explained that we are in the process of training staff on how to do the precerts and some information was not sent with the Initial request  I apologized and explained that I've sent the additional information for Reconsideration and that I would keep him updated with the [address] [place] [address] Street  He appreciated the call back

## 2022-12-12 ENCOUNTER — HOSPITAL ENCOUNTER (OUTPATIENT)
Dept: RADIOLOGY | Facility: HOSPITAL | Age: 52
Discharge: HOME | End: 2022-12-12
Attending: ORTHOPAEDIC SURGERY

## 2022-12-12 DIAGNOSIS — M24.811 INTERNAL DERANGEMENT OF RIGHT SHOULDER: ICD-10-CM

## 2022-12-12 DIAGNOSIS — M25.511 ACUTE PAIN OF RIGHT SHOULDER: ICD-10-CM

## 2022-12-12 DIAGNOSIS — R29.898 SHOULDER WEAKNESS: ICD-10-CM

## 2022-12-14 ENCOUNTER — OFFICE VISIT (OUTPATIENT)
Dept: OBGYN CLINIC | Facility: CLINIC | Age: 52
End: 2022-12-14

## 2022-12-14 VITALS
BODY MASS INDEX: 32.97 KG/M2 | SYSTOLIC BLOOD PRESSURE: 151 MMHG | DIASTOLIC BLOOD PRESSURE: 81 MMHG | WEIGHT: 222.6 LBS | HEIGHT: 69 IN | HEART RATE: 76 BPM

## 2022-12-14 DIAGNOSIS — Z11.59 SPECIAL SCREENING EXAMINATION FOR VIRAL DISEASE: ICD-10-CM

## 2022-12-14 DIAGNOSIS — M75.101 TEAR OF RIGHT ROTATOR CUFF, UNSPECIFIED TEAR EXTENT, UNSPECIFIED WHETHER TRAUMATIC: Primary | ICD-10-CM

## 2022-12-14 DIAGNOSIS — Z01.812 ENCOUNTER FOR PRE-OPERATIVE LABORATORY TESTING: ICD-10-CM

## 2022-12-14 NOTE — PROGRESS NOTES
Assessment/Plan:  1  Tear of right rotator cuff, unspecified tear extent, unspecified whether traumatic  Ambulatory Referral to Physical Therapy    Sling    Case request operating room: Shoulder Arthroscopy with Rotator Cuff Repair    Ambulatory referral to Family Practice    Basic metabolic panel    CBC and differential    APTT    Protime-INR    EKG 12 lead    PAT Covid Screening    Case request operating room: Shoulder Arthroscopy with Rotator Cuff Repair      2  Encounter for pre-operative laboratory testing  Ambulatory referral to [facility]    Basic metabolic panel    CBC and differential    APTT    Protime-INR    EKG 12 lead    PAT Covid Screening      3  Special screening examination for viral disease          Scribe Attestation    I,:  SALIMA Hunt am acting as a scribe while in the presence of the attending physician :       I,:  Arnol Ferrara MD personally performed the services described in this documentation    as scribed in my presence :             The MRI was reviewed with Emily Colin and his wife in the office today which demonstrates a massive right rotator cuff tear  He does have a complete tear to the supraspinatus with 2 cm tendon retraction and a partial-thickness subscapularis tear  The patient has tried and failed non operative treatment in the form of activity modification and formal therapy  Surgical intervention was discussed at length in the form of a right shoulder arthroscopy with rotator cuff repair  Risks and benefits were discussed  Risks of the surgery are inclusive of but not limited to bleeding, infection, nerve injury, blood clot, worsening of symptoms, not achieving the anticipated results, persistent stiffness, weakness, retear and the need for additional surgery  The patient verbally stated they understood those risks and would like to proceed with the surgery  Surgical consent was signed in the office today   The patient was fitted and provided with a postoperative sling he was advised to bring the day of surgery  He is aware he will be in a sling 6 weeks and will not be able to drive for 6 weeks  A referral was provided to formal therapy which he will begin 2 weeks postoperatively  He will require pre operative blood work, EKG, and PCP clearance  He will also require COVID testing prior to surgery  He will hold Xarelto 2 days prior to surgery  We did discuss likely return to work 3 months postoperatively however, 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 if his job can accommodate this  I will see him the day of surgery  Subjective:   America Guzman is a 47 y o  male who presents to the office today for follow up evaluation of his right shoulder  The patient did sustain an injury while bowling where he felt a pop and ribbing sensation and was unable to lift his arm  He has tried formal therapy without relief  He notes continued pain and weakness about the shoulder  A MRI was ordered at his last visit to evaluate for a rotator cuff tear  The patient presents to the office today to review the MRI  Review of Systems   Constitutional: Negative for chills and fever  HENT: Negative for drooling and sneezing  Eyes: Negative for redness  Respiratory: Negative for cough and wheezing  Gastrointestinal: Negative for nausea and vomiting  Musculoskeletal: Positive for arthralgias  Negative for joint swelling and myalgias  Neurological: Negative for weakness and numbness  Psychiatric/Behavioral: Negative for behavioral problems  The patient is not nervous/anxious            Past Medical History:   Diagnosis Date   • Anemia    • 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 07/12/2006   • DVT (deep venous thrombosis) ([facility] ) 2018   • Dysphagia     Last Assessed: 8/12/2014    • Factor 5 Leiden mutation, heterozygous ([facility] )    • GERD (gastroesophageal reflux disease)    • Globus sensation     Last Assessed: 6/4/2014    • H/O neoplasm of uncertain behavior of skin 06/06/2006   • Hyperlipidemia    • Hypertension 02/14/2006   • Lateral epicondylitis of right elbow     Last Assessed: 10/23/2015    • Pes planus     last assessed: 10/23/2013    • Plantar fascial fibromatosis     Last Assessed: 10/23/2013    • Pulmonary embolism ([facility] )    • Right patellofemoral syndrome     Last Assessed: 4/15/2016    • Sebaceous cyst 11/03/2007       Past Surgical History:   Procedure Laterality Date   • COLONOSCOPY     • ESOPHAGOGASTRODUODENOSCOPY N/A 10/7/2016    Procedure: ESOPHAGOGASTRODUODENOSCOPY (EGD); Surgeon: Linda Malone MD;  Location: [facility] GI LAB; Service:    • NASAL SEPTUM SURGERY  1995   • CT ESOPHAGOGASTRODUODENOSCOPY TRANSORAL DIAGNOSTIC N/A 12/6/2018    Procedure: ESOPHAGOGASTRODUODENOSCOPY (EGD); Surgeon: Linda Malone MD;  Location: [facility] GI LAB; Service: Gastroenterology       Family History   Problem Relation Age of Onset   • Heart disease Mother         valve 76s   • Hypertension Mother    • Cancer Father         colon   • Colon cancer Father    • Hypertension Father    • Coronary artery disease Father         CABG   • Heart disease Brother         MI exp age 39   • Colon cancer Family    • Cancer Brother         esophageal CA exp age 40   • Cancer Brother        Social History     Occupational History   • Not on file   Tobacco Use   • Smoking status: Never   • Smokeless tobacco: Never   Vaping Use   • Vaping Use: Never used   Substance and Sexual Activity   • Alcohol use:  Yes     Alcohol/week: 4 0 standard drinks     Types: 4 Cans of beer per week     Comment: socially   • Drug use: No   • Sexual activity: Yes     Partners: Female         Current Outpatient Medications:   •  esomeprazole (NexIUM) 40 MG capsule, Take 1 capsule (40 mg total) by mouth daily, Disp: 90 capsule, Rfl: 3  •  Xarelto 20 MG tablet, TAKE 1 TABLET BY MOUTH DAILY, Disp: 90 tablet, Rfl: 1  •  methylPREDNISolone 4 MG tablet therapy pack, Use as directed on package (Patient not taking: Reported on 9/20/2022), Disp: 21 tablet, Rfl: 0    No Known Allergies    Objective:  Vitals:    12/14/22 1558   BP: 151/81   Pulse: 76       Right Shoulder Exam     Range of Motion   Active abduction: 150   External rotation: 50   Internal rotation 0 degrees: L2     Muscle Strength   Right shoulder normal muscle strength: 4-/5 abduction  Internal rotation: 5/5   External rotation: 4/5     Other   Erythema: absent  Sensation: normal  Pulse: present            Physical Exam  Constitutional:       Appearance: He is well-developed  HENT:      Head: Normocephalic and atraumatic  Eyes:      General:         Right eye: No discharge  Left eye: No discharge  Conjunctiva/sclera: Conjunctivae normal    Cardiovascular:      Rate and Rhythm: Normal rate  Heart sounds: Normal heart sounds  Pulmonary:      Effort: Pulmonary effort is normal  No respiratory distress  Breath sounds: Normal breath sounds  Musculoskeletal:      Cervical back: Normal range of motion and neck supple  Comments: As noted in HPI   Skin:     General: Skin is warm and dry  Neurological:      Mental Status: He is alert and oriented to person, place, and time  Psychiatric:         Behavior: Behavior normal          Thought Content: Thought content normal          Judgment: Judgment normal          I have personally reviewed pertinent films in PACS and my interpretation is as follows:MRI right shoulder performed on 12/12/22 demonstrate complete supraspinatus tear with torn tendon edge retracted proximally 2 cm  Partial-thickness subscapularis tear  This document was created using speech voice recognition software  Grammatical errors, random word insertions, pronoun errors, and incomplete sentences are an occasional consequence of this system due to software limitations, ambient noise, and hardware issues     Any formal questions or concerns about content, text, or information contained within the body of this dictation should be directly addressed to the provider for clarification

## 2022-12-19 ENCOUNTER — TELEPHONE (OUTPATIENT)
Dept: OBGYN CLINIC | Facility: HOSPITAL | Age: 52
End: 2022-12-19

## 2022-12-19 NOTE — TELEPHONE ENCOUNTER
Caller: Wife    Doctor: Dr Abdifatah Bernabe    Reason for call: Wife calling in asking if a work note can be produced with a timeframe as to how long patient will be out of work following his upcoming surgery on 1/26/22? He does drive for a living  Wife asking if they can call her when note is done so she can come by and pick the note up      Call back#: [phone]

## 2022-12-29 LAB
25(OH)D3+25(OH)D2 SERPL-MCNC: 40 NG/ML (ref 30–100)
ALBUMIN SERPL-MCNC: 4.6 G/DL (ref 3.8–4.9)
ALBUMIN/GLOB SERPL: 1.8 {RATIO} (ref 1.2–2.2)
ALP SERPL-CCNC: 76 IU/L (ref 44–121)
ALT SERPL-CCNC: 34 IU/L (ref 0–44)
APTT PPP: 28 SEC (ref 24–33)
AST SERPL-CCNC: 30 IU/L (ref 0–40)
BASOPHILS # BLD AUTO: 0 X10E3/UL (ref 0–0.2)
BASOPHILS NFR BLD AUTO: 1 %
BILIRUB SERPL-MCNC: 0.6 MG/DL (ref 0–1.2)
BUN SERPL-MCNC: 20 MG/DL (ref 6–24)
BUN SERPL-MCNC: 20 MG/DL (ref 6–24)
BUN/CREAT SERPL: 19 (ref 9–20)
BUN/CREAT SERPL: 19 (ref 9–20)
CALCIUM SERPL-MCNC: 10 MG/DL (ref 8.7–10.2)
CALCIUM SERPL-MCNC: 10 MG/DL (ref 8.7–10.2)
CHLORIDE SERPL-SCNC: 100 MMOL/L (ref 96–106)
CHLORIDE SERPL-SCNC: 102 MMOL/L (ref 96–106)
CO2 SERPL-SCNC: 26 MMOL/L (ref 20–29)
CO2 SERPL-SCNC: 27 MMOL/L (ref 20–29)
CREAT SERPL-MCNC: 1.06 MG/DL (ref 0.76–1.27)
CREAT SERPL-MCNC: 1.06 MG/DL (ref 0.76–1.27)
EGFR: 84 ML/MIN/1.73
EGFR: 84 ML/MIN/1.73
EOSINOPHIL # BLD AUTO: 0.3 X10E3/UL (ref 0–0.4)
EOSINOPHIL NFR BLD AUTO: 4 %
ERYTHROCYTE [DISTWIDTH] IN BLOOD BY AUTOMATED COUNT: 12.4 % (ref 11.6–15.4)
GLOBULIN SER-MCNC: 2.6 G/DL (ref 1.5–4.5)
GLUCOSE SERPL-MCNC: 116 MG/DL (ref 70–99)
GLUCOSE SERPL-MCNC: 119 MG/DL (ref 70–99)
HBA1C MFR BLD: 5.8 % (ref 4.8–5.6)
HCT VFR BLD AUTO: 44.4 % (ref 37.5–51)
HGB BLD-MCNC: 14.9 G/DL (ref 13–17.7)
IMM GRANULOCYTES # BLD: 0 X10E3/UL (ref 0–0.1)
IMM GRANULOCYTES NFR BLD: 0 %
INR PPP: 1.2 (ref 0.9–1.2)
IRON SATN MFR SERPL: 49 % (ref 15–55)
IRON SERPL-MCNC: 168 UG/DL (ref 38–169)
LYMPHOCYTES # BLD AUTO: 2.2 X10E3/UL (ref 0.7–3.1)
LYMPHOCYTES NFR BLD AUTO: 33 %
MCH RBC QN AUTO: 30.6 PG (ref 26.6–33)
MCHC RBC AUTO-ENTMCNC: 33.6 G/DL (ref 31.5–35.7)
MCV RBC AUTO: 91 FL (ref 79–97)
MONOCYTES # BLD AUTO: 0.7 X10E3/UL (ref 0.1–0.9)
MONOCYTES NFR BLD AUTO: 10 %
NEUTROPHILS # BLD AUTO: 3.5 X10E3/UL (ref 1.4–7)
NEUTROPHILS NFR BLD AUTO: 52 %
PLATELET # BLD AUTO: 243 X10E3/UL (ref 150–450)
POTASSIUM SERPL-SCNC: 5 MMOL/L (ref 3.5–5.2)
POTASSIUM SERPL-SCNC: 5.1 MMOL/L (ref 3.5–5.2)
PROT SERPL-MCNC: 7.2 G/DL (ref 6–8.5)
PROTHROMBIN TIME: 12.1 SEC (ref 9.1–12)
RBC # BLD AUTO: 4.87 X10E6/UL (ref 4.14–5.8)
SODIUM SERPL-SCNC: 140 MMOL/L (ref 134–144)
SODIUM SERPL-SCNC: 142 MMOL/L (ref 134–144)
TIBC SERPL-MCNC: 344 UG/DL (ref 250–450)
UIBC SERPL-MCNC: 176 UG/DL (ref 111–343)
WBC # BLD AUTO: 6.6 X10E3/UL (ref 3.4–10.8)

## 2023-01-03 DIAGNOSIS — I26.99 OTHER PULMONARY EMBOLISM WITHOUT ACUTE COR PULMONALE, UNSPECIFIED CHRONICITY (HCC): ICD-10-CM

## 2023-01-03 RX ORDER — RIVAROXABAN 20 MG/1
20 TABLET, FILM COATED ORAL DAILY
Qty: 90 TABLET | Refills: 1 | Status: SHIPPED | OUTPATIENT
Start: 2023-01-03

## 2023-01-09 ENCOUNTER — RA CDI HCC (OUTPATIENT)
Dept: OTHER | Facility: HOSPITAL | Age: 53
End: 2023-01-09

## 2023-01-09 NOTE — PROGRESS NOTES
[facility]  coding opportunities       Chart reviewed, no opportunity found: CHART REVIEWED, NO OPPORTUNITY FOUND        Patients Insurance        Commercial Insurance: Rojas Jones

## 2023-01-12 ENCOUNTER — TELEPHONE (OUTPATIENT)
Dept: GASTROENTEROLOGY | Facility: CLINIC | Age: 53
End: 2023-01-12

## 2023-01-12 DIAGNOSIS — K21.9 GERD WITHOUT ESOPHAGITIS: Primary | ICD-10-CM

## 2023-01-12 DIAGNOSIS — K22.70 BARRETT'S ESOPHAGUS WITHOUT DYSPLASIA: ICD-10-CM

## 2023-01-12 RX ORDER — ESOMEPRAZOLE MAGNESIUM 40 MG/1
40 CAPSULE, DELAYED RELEASE ORAL DAILY
Qty: 30 CAPSULE | Refills: 0 | OUTPATIENT
Start: 2023-01-12 | End: 2023-02-11

## 2023-01-12 RX ORDER — ESOMEPRAZOLE MAGNESIUM 40 MG/1
40 CAPSULE, DELAYED RELEASE ORAL
Qty: 90 CAPSULE | Refills: 1 | Status: SHIPPED | OUTPATIENT
Start: 2023-01-12 | End: 2023-03-14 | Stop reason: SDUPTHER

## 2023-01-12 NOTE — TELEPHONE ENCOUNTER
GI Physician: Dr Mabel Andrade for Medication: Esomeprazole    Dose: 40 mg    Quantity: 90 days    Pharmacy and Location: [address] [place] [address]    Pt scheduled 1/31/23

## 2023-01-14 PROBLEM — Z86.39 HISTORY OF IRON DEFICIENCY: Status: ACTIVE | Noted: 2022-07-13

## 2023-01-14 PROBLEM — Z01.818 PREOPERATIVE CLEARANCE: Status: ACTIVE | Noted: 2023-01-14

## 2023-01-16 ENCOUNTER — OFFICE VISIT (OUTPATIENT)
Dept: FAMILY MEDICINE CLINIC | Facility: CLINIC | Age: 53
End: 2023-01-16

## 2023-01-16 VITALS
HEART RATE: 58 BPM | OXYGEN SATURATION: 98 % | DIASTOLIC BLOOD PRESSURE: 78 MMHG | SYSTOLIC BLOOD PRESSURE: 126 MMHG | HEIGHT: 69 IN | BODY MASS INDEX: 31.4 KG/M2 | TEMPERATURE: 98 F | WEIGHT: 212 LBS | RESPIRATION RATE: 16 BRPM

## 2023-01-16 DIAGNOSIS — Z01.818 PREOP TESTING: ICD-10-CM

## 2023-01-16 DIAGNOSIS — Z86.39 HISTORY OF IRON DEFICIENCY: ICD-10-CM

## 2023-01-16 DIAGNOSIS — K21.9 GERD WITHOUT ESOPHAGITIS: ICD-10-CM

## 2023-01-16 DIAGNOSIS — D68.51 FACTOR 5 LEIDEN MUTATION, HETEROZYGOUS (HCC): ICD-10-CM

## 2023-01-16 DIAGNOSIS — Z01.818 PREOPERATIVE CLEARANCE: Primary | ICD-10-CM

## 2023-01-16 NOTE — PROGRESS NOTES
Assessment/Plan:    No problem-specific Assessment & Plan notes found for this encounter  Medically cleared for surgery  EKG ok with sinus bradycardia and no symptoms  Hold Xarelto at least 24hr preop, restart post op when bleeding controlled    Gerd/ppi per gi    No sign of iron deficiency anemia recurrence    Your last dose of Xarelto before shoulder surgery should be on the night on 1/24/23  Diagnoses and all orders for this visit:    Preoperative clearance    Preop testing  -     POCT ECG    Factor 5 Leiden mutation, heterozygous ([address] )    GERD without esophagitis    History of iron deficiency      Return if symptoms worsen or fail to improve  Subjective:      Patient ID: Annie Fonseca is a 47 y o  male  Chief Complaint   Patient presents with   • Pre-op Exam     Having rt shoulder surgery on 01/26/23 with Dr Stepan Osman MA         HPI  Not WC related  Not sure of original injury  Worse with bowling    Right side    Planned procedure:  Right RTC surgery  Surgeon:  Dr Stepan Davenport  Date:  1/26/23  Anesthesia type:  unstated    Prior major surgeries:  Deviated septum repair  Problems with anesthesia in past:  n    Can walk 4 blocks or 2 flights of stairs:  n  History of excessive bleeding:  n  History of excessive clotting:  Y, has FVL and on xarelto  Personal history of DVT or Pe:  DVT and PE history    Taking NSAIDS:  none  Takings vitamins D or E or A or fish oil supplements:  Vit D hold    Usual am medications:  None in am  Advised to hold xarelto at least 24hrs before surgery and restart after bleeding risk ends postop    Walks intensely w/o problem such as cp or sob or syncope    The following portions of the patient's history were reviewed and updated as appropriate: allergies, current medications, past family history, past medical history, past social history, past surgical history and problem list     Review of Systems   Constitutional: Negative for chills and fever  Musculoskeletal: Positive for arthralgias  Current Outpatient Medications   Medication Sig Dispense Refill   • esomeprazole (NexIUM) 40 MG capsule Take 1 capsule (40 mg total) by mouth daily before breakfast 90 capsule 1   • Xarelto 20 MG tablet Take 1 tablet (20 mg total) by mouth daily 90 tablet 1     No current facility-administered medications for this visit  Objective:    /78   Pulse 58   Temp 98 °F (36 7 °C)   Resp 16   Ht 5' 9" (1 753 m)   Wt 96 2 kg (212 lb)   SpO2 98%   BMI 31 31 kg/m²        Physical Exam  Vitals and nursing note reviewed  Constitutional:       General: He is not in acute distress  Appearance: He is well-developed  He is not ill-appearing  HENT:      Head: Normocephalic  Right Ear: Tympanic membrane normal       Left Ear: Tympanic membrane normal    Eyes:      General: No scleral icterus  Conjunctiva/sclera: Conjunctivae normal    Cardiovascular:      Rate and Rhythm: Normal rate and regular rhythm  Heart sounds: No murmur heard  Pulmonary:      Effort: Pulmonary effort is normal  No respiratory distress  Breath sounds: No wheezing  Abdominal:      General: There is no distension  Palpations: Abdomen is soft  Musculoskeletal:         General: Tenderness present  No deformity  Cervical back: Neck supple  Right lower leg: No edema  Left lower leg: No edema  Comments: Right empty can test positive   Skin:     General: Skin is warm and dry  Coloration: Skin is not jaundiced or pale  Neurological:      Mental Status: He is alert  Motor: No weakness  Gait: Gait normal    Psychiatric:         Mood and Affect: Mood normal          Behavior: Behavior normal          Thought Content:  Thought content normal                 Dahlia Flores,

## 2023-01-17 NOTE — PRE-PROCEDURE INSTRUCTIONS
My Surgical Experience    The following information was developed to assist you to prepare for your operation  What do I need to do before coming to the hospital?  • Arrange for a responsible person to drive you to and from the hospital   • Arrange care for your children at home  Children are not allowed in the recovery areas of the hospital  • Plan to wear clothing that is easy to put on and take off  If you are having shoulder surgery, wear a shirt that buttons or zippers in the front  Bathing  o Shower the evening before and the morning of your surgery with an antibacterial soap  Please refer to the Pre Op Showering Instructions for Surgery Patients Sheet   o Remove nail polish and all body piercing jewelry  o Do not shave any body part for at least 24 hours before surgery-this includes face, arms, legs and upper body  Food  o Nothing to eat or drink after midnight the night before your surgery  This includes candy and chewing gum  o Exception: If your surgery is after 12:00pm (noon), you may have clear liquids such as 7-Up®, ginger ale, apple or cranberry juice, Jell-O®, water, or clear broth until 8:00 am  o Do not drink milk or juice with pulp on the morning before surgery  o Do not drink alcohol 24 hours before surgery  Medicine  o Follow instructions you received from your surgeon about which medicines you may take on the day of surgery  o If instructed to take medicine on the morning of surgery, take pills with just a small sip of water  Call your prescribing doctor for specific infroamtion on what to do if you take insulin    What should I bring to the hospital?    Bring:  • Crutches or a walker, if you have them, for foot or knee surgery  • A list of the daily medicines, vitamins, minerals, herbals and nutritional supplements you take   Include the dosages of medicines and the time you take them each day  • Glasses, dentures or hearing aids  • Minimal clothing; you will be wearing hospital sleepwear  • Photo ID; required to verify your identity  • If you have a Living Will or Power of , bring a copy of the documents  • If you have an ostomy, bring an extra pouch and any supplies you use    Do not bring  • Medicines or inhalers  • Money, valuables or jewelry    What other information should I know about the day of surgery? • Notify your surgeons if you develop a cold, sore throat, cough, fever, rash or any other illness  • Report to the Ambulatory Surgical/Same Day Surgery Unit  • You will be instructed to stop at Registration only if you have not been pre-registered  • Inform your  fi they do not stay that they will be asked by the staff to leave a phone number where they can be reached  • Be available to be reached before surgery  In the event the operating room schedule changes, you may be asked to come in earlier or later than expected    *It is important to tell your doctor and others involved in your health care if you are taking or have been taking any non-prescription drugs, vitamins, minerals, herbals or other nutritional supplements  Any of these may interact with some food or medicines and cause a reaction      Pre-Surgery Instructions:   Medication Instructions   • esomeprazole (NexIUM) 40 MG capsule Hold day of surgery  • Xarelto 20 MG tablet Instructions provided by MD    Bring sling and wear a large, loose top

## 2023-01-20 DIAGNOSIS — M75.101 TEAR OF RIGHT ROTATOR CUFF, UNSPECIFIED TEAR EXTENT, UNSPECIFIED WHETHER TRAUMATIC: ICD-10-CM

## 2023-01-20 DIAGNOSIS — Z01.812 ENCOUNTER FOR PRE-OPERATIVE LABORATORY TESTING: ICD-10-CM

## 2023-01-21 LAB — SARS-COV-2 RNA RESP QL NAA+PROBE: NEGATIVE

## 2023-01-25 ENCOUNTER — ANESTHESIA EVENT (OUTPATIENT)
Dept: PERIOP | Facility: HOSPITAL | Age: 53
End: 2023-01-25

## 2023-01-26 ENCOUNTER — HOSPITAL ENCOUNTER (OUTPATIENT)
Facility: HOSPITAL | Age: 53
Setting detail: OUTPATIENT SURGERY
Discharge: HOME | End: 2023-01-26
Attending: ORTHOPAEDIC SURGERY | Admitting: ORTHOPAEDIC SURGERY

## 2023-01-26 ENCOUNTER — ANESTHESIA (OUTPATIENT)
Dept: PERIOP | Facility: HOSPITAL | Age: 53
End: 2023-01-26

## 2023-01-26 VITALS
OXYGEN SATURATION: 95 % | BODY MASS INDEX: 31.1 KG/M2 | TEMPERATURE: 97.7 F | HEIGHT: 69 IN | HEART RATE: 74 BPM | DIASTOLIC BLOOD PRESSURE: 79 MMHG | WEIGHT: 210 LBS | RESPIRATION RATE: 18 BRPM | SYSTOLIC BLOOD PRESSURE: 143 MMHG

## 2023-01-26 DIAGNOSIS — Z98.890 S/P RIGHT ROTATOR CUFF REPAIR: Primary | ICD-10-CM

## 2023-01-26 DEVICE — 4.75MM BC KNOTLESS SWIVELOCK
Type: IMPLANTABLE DEVICE | Site: SHOULDER | Status: FUNCTIONAL
Brand: ARTHREX®

## 2023-01-26 DEVICE — SPEEDBRG IMP SYS W/BIO-COMP SWVLK
Type: IMPLANTABLE DEVICE | Site: SHOULDER | Status: FUNCTIONAL
Brand: ARTHREX®

## 2023-01-26 RX ORDER — ONDANSETRON 2 MG/ML
4 INJECTION INTRAMUSCULAR; INTRAVENOUS ONCE AS NEEDED
Status: DISCONTINUED | OUTPATIENT
Start: 2023-01-26 | End: 2023-01-26 | Stop reason: HOSPADM

## 2023-01-26 RX ORDER — ONDANSETRON 2 MG/ML
INJECTION INTRAMUSCULAR; INTRAVENOUS AS NEEDED
Status: DISCONTINUED | OUTPATIENT
Start: 2023-01-26 | End: 2023-01-26

## 2023-01-26 RX ORDER — LIDOCAINE HYDROCHLORIDE 10 MG/ML
INJECTION, SOLUTION EPIDURAL; INFILTRATION; INTRACAUDAL; PERINEURAL
Status: COMPLETED | OUTPATIENT
Start: 2023-01-26 | End: 2023-01-26

## 2023-01-26 RX ORDER — FENTANYL CITRATE/PF 50 MCG/ML
50 SYRINGE (ML) INJECTION
Status: DISCONTINUED | OUTPATIENT
Start: 2023-01-26 | End: 2023-01-26 | Stop reason: HOSPADM

## 2023-01-26 RX ORDER — SODIUM CHLORIDE, SODIUM LACTATE, POTASSIUM CHLORIDE, CALCIUM CHLORIDE 600; 310; 30; 20 MG/100ML; MG/100ML; MG/100ML; MG/100ML
100 INJECTION, SOLUTION INTRAVENOUS CONTINUOUS
Status: DISCONTINUED | OUTPATIENT
Start: 2023-01-26 | End: 2023-01-27 | Stop reason: HOSPADM

## 2023-01-26 RX ORDER — EPHEDRINE SULFATE 50 MG/ML
INJECTION INTRAVENOUS AS NEEDED
Status: DISCONTINUED | OUTPATIENT
Start: 2023-01-26 | End: 2023-01-26

## 2023-01-26 RX ORDER — FENTANYL CITRATE 50 UG/ML
INJECTION, SOLUTION INTRAMUSCULAR; INTRAVENOUS AS NEEDED
Status: DISCONTINUED | OUTPATIENT
Start: 2023-01-26 | End: 2023-01-26

## 2023-01-26 RX ORDER — OXYCODONE HYDROCHLORIDE 5 MG/1
5 TABLET ORAL EVERY 4 HOURS PRN
Qty: 15 TABLET | Refills: 0 | Status: SHIPPED | OUTPATIENT
Start: 2023-01-26

## 2023-01-26 RX ORDER — PROPOFOL 10 MG/ML
INJECTION, EMULSION INTRAVENOUS AS NEEDED
Status: DISCONTINUED | OUTPATIENT
Start: 2023-01-26 | End: 2023-01-26

## 2023-01-26 RX ORDER — MIDAZOLAM HYDROCHLORIDE 2 MG/2ML
INJECTION, SOLUTION INTRAMUSCULAR; INTRAVENOUS
Status: COMPLETED | OUTPATIENT
Start: 2023-01-26 | End: 2023-01-26

## 2023-01-26 RX ORDER — BUPIVACAINE HYDROCHLORIDE 5 MG/ML
INJECTION, SOLUTION PERINEURAL
Status: COMPLETED | OUTPATIENT
Start: 2023-01-26 | End: 2023-01-26

## 2023-01-26 RX ORDER — CEFAZOLIN SODIUM 2 G/50ML
2000 SOLUTION INTRAVENOUS ONCE
Status: COMPLETED | OUTPATIENT
Start: 2023-01-26 | End: 2023-01-26

## 2023-01-26 RX ORDER — LIDOCAINE HYDROCHLORIDE 10 MG/ML
INJECTION, SOLUTION EPIDURAL; INFILTRATION; INTRACAUDAL; PERINEURAL AS NEEDED
Status: DISCONTINUED | OUTPATIENT
Start: 2023-01-26 | End: 2023-01-26

## 2023-01-26 RX ORDER — SODIUM CHLORIDE, SODIUM LACTATE, POTASSIUM CHLORIDE, CALCIUM CHLORIDE 600; 310; 30; 20 MG/100ML; MG/100ML; MG/100ML; MG/100ML
125 INJECTION, SOLUTION INTRAVENOUS CONTINUOUS
Status: DISCONTINUED | OUTPATIENT
Start: 2023-01-26 | End: 2023-01-27 | Stop reason: HOSPADM

## 2023-01-26 RX ORDER — DEXAMETHASONE SODIUM PHOSPHATE 10 MG/ML
INJECTION, SOLUTION INTRAMUSCULAR; INTRAVENOUS AS NEEDED
Status: DISCONTINUED | OUTPATIENT
Start: 2023-01-26 | End: 2023-01-26

## 2023-01-26 RX ADMIN — FENTANYL CITRATE 50 MCG: 50 INJECTION, SOLUTION INTRAMUSCULAR; INTRAVENOUS at 13:37

## 2023-01-26 RX ADMIN — EPHEDRINE SULFATE 10 MG: 50 INJECTION, SOLUTION INTRAVENOUS at 14:04

## 2023-01-26 RX ADMIN — EPHEDRINE SULFATE 10 MG: 50 INJECTION, SOLUTION INTRAVENOUS at 13:46

## 2023-01-26 RX ADMIN — MIDAZOLAM 2 MG: 1 INJECTION INTRAMUSCULAR; INTRAVENOUS at 13:10

## 2023-01-26 RX ADMIN — LIDOCAINE HYDROCHLORIDE 50 MG: 10 INJECTION, SOLUTION EPIDURAL; INFILTRATION; INTRACAUDAL; PERINEURAL at 13:31

## 2023-01-26 RX ADMIN — FENTANYL CITRATE 25 MCG: 50 INJECTION, SOLUTION INTRAMUSCULAR; INTRAVENOUS at 14:24

## 2023-01-26 RX ADMIN — DEXAMETHASONE SODIUM PHOSPHATE 4 MG: 10 INJECTION, SOLUTION INTRAMUSCULAR; INTRAVENOUS at 13:37

## 2023-01-26 RX ADMIN — SODIUM CHLORIDE, SODIUM LACTATE, POTASSIUM CHLORIDE, AND CALCIUM CHLORIDE 125 ML/HR: .6; .31; .03; .02 INJECTION, SOLUTION INTRAVENOUS at 11:52

## 2023-01-26 RX ADMIN — PROPOFOL 200 MG: 10 INJECTION, EMULSION INTRAVENOUS at 13:31

## 2023-01-26 RX ADMIN — LIDOCAINE HYDROCHLORIDE 1 ML: 10 INJECTION, SOLUTION EPIDURAL; INFILTRATION; INTRACAUDAL; PERINEURAL at 13:12

## 2023-01-26 RX ADMIN — EPHEDRINE SULFATE 10 MG: 50 INJECTION, SOLUTION INTRAVENOUS at 13:36

## 2023-01-26 RX ADMIN — BUPIVACAINE HYDROCHLORIDE 15 ML: 5 INJECTION, SOLUTION PERINEURAL at 13:15

## 2023-01-26 RX ADMIN — ONDANSETRON 4 MG: 2 INJECTION INTRAMUSCULAR; INTRAVENOUS at 13:37

## 2023-01-26 RX ADMIN — CEFAZOLIN SODIUM 2000 MG: 2 SOLUTION INTRAVENOUS at 13:28

## 2023-01-26 RX ADMIN — FENTANYL CITRATE 50 MCG: 50 INJECTION, SOLUTION INTRAMUSCULAR; INTRAVENOUS at 13:42

## 2023-01-26 RX ADMIN — BUPIVACAINE 10 ML: 13.3 INJECTION, SUSPENSION, LIPOSOMAL INFILTRATION at 13:15

## 2023-01-26 NOTE — DISCHARGE INSTR - AVS FIRST PAGE
Instruction Sheet Following RTC repair     Sling:   Wear your sling at all times after your surgery (this includes sleeping), except for when you are doing pendulum exercises, showering, or physical therapy  Additionally, you should not carry anything heavier than a pencil in your hand  Dressing:   Remove all cotton and yellow gauze 48 hours after your surgery  You do not need to put a new dressing on your wound; place Band-Aids on your wound  Showering: You may shower 48 hours after surgery  Please use CAUTION!! Be careful not to slip and fall  The effects of anesthesia and/or medication may make you drowsy or light-headed  Do not soak in a bathtub, hot tub, or pool until the doctor tells you it is O K , to do so  Once you are done showering pat the wound dry and apply a Band-Aid  While in the shower you must keep the arm across the front of the body as if it were still in the sling  Sleeping:   You will most likely have difficulty sleeping in the first few weeks after surgery  Most people find it more comfortable to sleep in a reclining position  You can either sleep in a recliner chair or create this position with pillows  Ice:   You can ice the shoulder to reduce swelling and discomfort  Do not ice the shoulder more than 20 minutes at a time  Let the shoulder warm up before reapplication  Avoid getting you wound wet  If you have a Cryocuff you may keep this on continuously  Follow-up visit:   You need to see the doctor about one week following surgery for your first post-op visit  At that time your sutures (stitches) will be removed  You will be given a prescription to begin physical therapy if you were not already given one  Common concerns:   Bruising and/or swelling of the shoulder, arm, or hand are common after surgery  To relieve this discomfort it is best to ice the shoulder  Please call if:   Any oozing or redness of the wound, fevers (>101 3°F), or chills       2  Any difficulty breathing or heaviness in the chest      REMEMBER - these are only guidelines for what to expect  If you have any questions or concerns, please do not hesitate to call the office  [phone]

## 2023-01-26 NOTE — H&P
Assessment/Plan:  The patient would like to proceed with right shoulder arthroscopy with rotator cuff repair  We discussed the procedure and risks at length, including but not limited to, infection, bleeding, wound issues, nerve injury, blood clot, stiffness, failure of procedure, and need for additional surgery  We will see the patient back at the time of surgery  Subjective:   En Elias is a 47 y o  male with right shoulder rotator cuff tear who notes ongoing pain and weakness about the shoulder  Review of Systems   Constitutional: Negative  Negative for chills and fever  HENT: Negative  Negative for ear pain and sore throat  Eyes: Negative  Negative for pain and redness  Respiratory: Negative  Negative for shortness of breath and wheezing  Cardiovascular: Negative for chest pain and palpitations  Gastrointestinal: Negative  Negative for abdominal pain and blood in stool  Endocrine: Negative  Negative for polydipsia and polyuria  Genitourinary: Negative  Negative for difficulty urinating and dysuria  Musculoskeletal:        As noted in HPI   Skin: Negative  Negative for pallor and rash  Neurological: Negative  Negative for dizziness and numbness  Hematological: Negative  Negative for adenopathy  Does not bruise/bleed easily  Psychiatric/Behavioral: Negative  Negative for confusion and suicidal ideas           Past Medical History:   Diagnosis Date   • Anemia    • 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 07/12/2006   • DVT (deep venous thrombosis) ([facility] ) 2018   • Dysphagia     Last Assessed: 8/12/2014    • Factor 5 Leiden mutation, heterozygous ([facility] )    • GERD (gastroesophageal reflux disease)    • Globus sensation     Last Assessed: 6/4/2014    • H/O neoplasm of uncertain behavior of skin 06/06/2006   • Hyperlipidemia    • Hypertension 02/14/2006   • Lateral epicondylitis of right elbow     Last Assessed: 10/23/2015    • Pes planus     last assessed: 10/23/2013    • Plantar fascial fibromatosis     Last Assessed: 10/23/2013    • Pulmonary embolism ([address] )    • Right patellofemoral syndrome     Last Assessed: 4/15/2016    • Sebaceous cyst 11/03/2007       Past Surgical History:   Procedure Laterality Date   • COLONOSCOPY     • ESOPHAGOGASTRODUODENOSCOPY N/A 10/7/2016    Procedure: ESOPHAGOGASTRODUODENOSCOPY (EGD); Surgeon: Wilman Blackwell MD;  Location: [facility] GI LAB; Service:    • NASAL SEPTUM SURGERY  1995   • [place] ESOPHAGOGASTRODUODENOSCOPY TRANSORAL DIAGNOSTIC N/A 12/6/2018    Procedure: ESOPHAGOGASTRODUODENOSCOPY (EGD); Surgeon: Wilman Blackwell MD;  Location: [facility] GI LAB; Service: Gastroenterology       Family History   Problem Relation Age of Onset   • Heart disease Mother         valve 76s   • Hypertension Mother    • Cancer Father         colon   • Colon cancer Father    • Hypertension Father    • Coronary artery disease Father         CABG   • Heart disease Brother         MI exp age 39   • Colon cancer Family    • Cancer Brother         esophageal CA exp age 40   • Cancer Brother        Social History     Occupational History   • Not on file   Tobacco Use   • Smoking status: Never   • Smokeless tobacco: Never   Vaping Use   • Vaping Use: Never used   Substance and Sexual Activity   • Alcohol use:  Yes     Alcohol/week: 4 0 standard drinks     Types: 4 Cans of beer per week     Comment: socially   • Drug use: No   • Sexual activity: Yes     Partners: Female         Current Facility-Administered Medications:   •  bupivacaine liposomal (EXPAREL) 1 3 % injection 10 mL, 10 mL, Infiltration, Once, Yumiko Ponce MD  •  ceFAZolin (ANCEF) IVPB (premix in dextrose) 2,000 mg 50 mL, 2,000 mg, Intravenous, Once, Cody Cho PA-C  •  lactated ringers infusion, 125 mL/hr, Intravenous, Continuous, Yumiko Ponce MD, Last Rate: 125 mL/hr at 01/26/23 1152, 125 mL/hr at 01/26/23 1152    No Known Allergies    Objective:  Vitals:    01/26/23 1137   BP: 144/91   Pulse: 76   Resp: 18   Temp: 97 8 °F (36 6 °C)   SpO2: 97%       Ortho Exam    Physical Exam  Constitutional:       General: He is not in acute distress  Appearance: He is well-developed  HENT:      Head: Normocephalic and atraumatic  Eyes:      General: No scleral icterus  Conjunctiva/sclera: Conjunctivae normal    Neck:      Vascular: No JVD  Cardiovascular:      Rate and Rhythm: Normal rate  Pulmonary:      Effort: Pulmonary effort is normal  No respiratory distress  Skin:     General: Skin is warm  Neurological:      Mental Status: He is alert and oriented to person, place, and time  Coordination: Coordination normal          Right shoulder: Positive empty can test       This document was created using speech voice recognition software  Grammatical errors, random word insertions, pronoun errors, and incomplete sentences are an occasional consequence of this system due to software limitations, ambient noise, and hardware issues  Any formal questions or concerns about content, text, or information contained within the body of this dictation should be directly addressed to the provider for clarification

## 2023-01-26 NOTE — ANESTHESIA POSTPROCEDURE EVALUATION
Post-Op Assessment Note    CV Status:  Stable  Pain Score: 0    Pain management: adequate     Mental Status:  Awake   Hydration Status:  Stable   PONV Controlled:  None   Airway Patency:  Patent      Post Op Vitals Reviewed: Yes      Staff: CRNA         No notable events documented      BP   147/64   Temp      Pulse  70   Resp   14   SpO2   99

## 2023-01-26 NOTE — ANESTHESIA PROCEDURE NOTES
Peripheral Block    Patient location during procedure: holding area  Start time: 1/26/2023 1:18 PM  Reason for block: at surgeon's request and post-op pain management  Staffing  Performed: Anesthesiologist   Preanesthetic Checklist  Completed: patient identified, IV checked, site marked, risks and benefits discussed, surgical consent, monitors and equipment checked, pre-op evaluation and timeout performed  Peripheral Block  Patient position: supine  Prep: ChloraPrep  Patient monitoring: continuous pulse ox and frequent blood pressure checks  Block type: interscalene  Laterality: right  Procedures: ultrasound guided, Ultrasound guidance required for the procedure to increase accuracy and safety of medication placement and decrease risk of complications  and nerve stimulator  Ultrasound permanent image savedbupivacaine (MARCAINE) 0 5 % - Perineural   15 mL - 1/26/2023 1:15:00 PM  midazolam (VERSED) 2 mg/2 mL - Intravenous   2 mg - 1/26/2023 1:10:00 PM  lidocaine (PF) (XYLOCAINE-MPF) 1 % - Infiltration   1 mL - 1/26/2023 1:12:00 PM  Needle  Needle type: [employer]   Needle gauge: 20 G  Needle length: 4 in  Needle localization: ultrasound guidance and nerve stimulator  Needle insertion depth: 4 cm  Test dose: negative  Assessment  Injection assessment: incremental injection, local visualized surrounding nerve on ultrasound, no paresthesia on injection, negative aspiration for heme and negative aspiration for CSF  Paresthesia pain: none  Heart rate change: no  Slow fractionated injection: yes  Post-procedure:  sterile dressing applied and site cleaned  patient tolerated the procedure well with no immediate complications  Additional Notes  0 5% Bupivacaine 15 ml was mixed with Exparel 10 ml for the block

## 2023-01-26 NOTE — OP NOTE
OPERATIVE REPORT  PATIENT NAME: Sergio Palomo    :  1970  MRN: [id number]  Pt Location: [facility] OR ROOM 03    SURGERY DATE: 2023    Surgeon(s) and Role:     * Shantelle Kirby MD - Primary     * Bárbara Hines PA-C - Assisting necessary for the procedure for assistance with improve visualization due to the minimally invasive arthroscopic techniques utilized for the operation for assistance with utilization of the camera and shaver and bur and assistance with placement of the anchors and suture management and repair techniques  For Ivana GRIMALDO and [facility] second Assistant    Preop Diagnosis:  Tear of right rotator cuff, unspecified tear extent, unspecified whether traumatic [M75 101]    Post-Op Diagnosis Codes:     * Tear of right rotator cuff, unspecified tear extent, unspecified whether traumatic [M75 101] of both supraspinatus and subscapularis and subacromial impingement and degenerative tearing along the anterior and superior labrum    Procedure(s):  Right shoulder arthroscopy with rotator cuff repair of the subscapularis utilizing speed fix technique Arthrex with a fiber tape suture and a 4 75 mm swivel lock anchor and supraspinatus tendon repair utilizing speed bridge technique plus a speed fix utilizing 4 anchors for the speed bridge and one anchor for the speed fix and 9 sutures for the supraspinatus tendon repair and subacromial decompression and limited debridement    Specimen(s):  * No specimens in log *    Estimated Blood Loss:   Minimal    Drains:  * No LDAs found *    Anesthesia Type:   General w/ Regional    Operative Indications:  Tear of right rotator cuff, unspecified tear extent, unspecified whether traumatic Dixon Bee is a 50-year-old male who has been suffering with right shoulder pain and weakness  He was found on MRI to have a rotator cuff tear  He wished to undergo a right shoulder arthroscopy with rotator cuff repair    He understood the risks and benefits of that procedure  The risks are inclusive of but not limited to infection, stiffness, nerve or blood vessel injury causing numbness pain and weakness, worsening of symptoms, failure to achieve anticipated results, failure to regain full strength and ability, blood clots, recurrence of tear, persistence of symptoms, and need for further surgery  Operative Findings:  Right shoulder exam dressy demonstrated forward flexion and abduction each to 150 degrees with external rotation to 80 degrees and internal rotation to 70 degrees  Intra-articular findings demonstrated good appearance of articular cartilage throughout the glenohumeral joint with no loose bodies in axillary pouch  Subscapularis tendon had an upper third tear with some retraction requiring repair as was found on the MRI as well  The long head of biceps tendon was however intact  Superior and anterior labrum had mild degenerative fraying that was debrided with a shaver to a stable rim of tissue  Supraspinatus tendon had an obvious full-thickness tear with significant retraction requiring a speed bridge technique for repair as well as a speed fix for the posterior aspect  We had excellent repair of both tendons  There was also a type III subacromial spur upon which performed an acromioplasty for subacromial decompression due to impingement  Complications:   None    Procedure and Technique:  Jerson Villanueva was taken to the operating room and placed supine on the OR table  He was given preoperative IV antibiotics as well as preoperative regional anesthesia by attending anesthesiologist   General anesthesia was administered and he was brought comfortably safely into the beachchair position with all parts well-padded and the head neutral and the head josé  Right shoulder was taken to examine anesthesia as described above  Right upper extremity was then prepped and draped in usual sterile fashion  Surgical timeout was taken    We created the posterior portal and 11 blade  Diagnostic thrust was begun and anterior portal was made in the rotator interval with 11 blade  We demonstrated good appearance of articular cartilage throughout the glenohumeral joint with no loose bodies in the axillary pouch  The long head of the biceps tendon was intact  Superior and anterior labrum had mild degenerative tearing that was debrided with a shaver to a stable rim of tissue  Long head of biceps was nicely stable on the superior labrum  Subscapularis tendon had an upper third tear with some mild retraction  This would require surgical fixation  We also demonstrated a massive supraspinatus tendon tear with significant retraction which would also require repair with a speed bridge  We did create a lateral portal with an 11 blade  We began by debriding the greater tuberosity down to bleeding bed of bone with the use of a bur to enhance healing  We also were able to then place a fiber tape in a horizontal mattress figuration into the tear of the upper third of the subscapularis tendon and then secured that down to the superior aspect of the lesser tuberosity with the use of a 4 75 mm swivel lock anchor  We had excellent fixation and good tensioning to the subscapularis tendon  We then went to the subacromial space where we demonstrated a type III subacromial spur  This was causing impingement  We did perform an acromioplasty on this for a subacromial compression with use of a bur  We then began the repair technique of the supraspinatus tendon  We placed 2 Medial Row 4 75 mm swivel lock anchors and passed with a fiber tape and FiberWire sutures from each anchor and horizontal mattress configuration into the supraspinatus tendon tear  We did take 1 fiber tape suture from each medial row anchor and secured them into an anterior lateral row anchor  This gave good initial fixation    We then passed the FiberWire sutures and then anchor through the anterior leading edge of the tendon tear and tied that down with arthroscopic knot-tying techniques  We then took the remaining 2 fiber tape sutures from the medial row and secured them to the posterior lateral anchor on the greater tuberosity  We did take the FiberWire suture and then anchor and placed into the more posterior aspect of the tendon tear and tied that down with arthroscopic knot-tying techniques  We then tied the remaining 2 FiberWire sutures in the medial row anchors securing them nicely with arthroscopic knot-tying techniques  We then viewed the lateral portal and demonstrated that there was still instability of the more posterior aspect of the tendon tear  Thus, we placed a fiber tape suture in horizontal mattress configuration and secured that down to the more posterior aspect of the greater tuberosity with the use of a 4 75 mm swivel lock anchor from Arthrex for a speed fix posteriorly  We are quite pleased with our overall result and good security of both tendon repairs  We thus removed the arthroscopic equipment and closed the portals with 4-0 nylon suture  Dry, sterile dressings were applied with a sling  He tolerated procedure well and transferred to the recovery room in stable condition  He will follow-up in 1 week for suture removal   He will be on a combined supraspinatus and subscapularis tendon repair rehabilitation protocol     I was present for the entire procedure and A qualified resident physician was not available    Patient Disposition:  PACU         SIGNATURE: Sy Mcneal MD  DATE: January 26, 2023  TIME: 2:31 PM

## 2023-01-26 NOTE — ANESTHESIA PREPROCEDURE EVALUATION
Procedure:  Shoulder Arthroscopy with Rotator Cuff Repair (Right: Shoulder)    Relevant Problems   ANESTHESIA (within normal limits)      CARDIO   (+) Hyperlipidemia      GI/HEPATIC   (+) GERD without esophagitis   (+) Hiatal hernia      /RENAL   (+) Calcium oxalate kidney stones      HEMATOLOGY   (+) Hypercoagulable state (HCC)      NEURO/PSYCH   (+) History of DVT (deep vein thrombosis)   (+) History of iron deficiency   (+) History of pulmonary embolus (PE)      PULMONARY (within normal limits)        Physical Exam    Airway    Mallampati score: II  TM Distance: >3 FB  Neck ROM: full     Dental   No notable dental hx     Cardiovascular  Cardiovascular exam normal    Pulmonary  Pulmonary exam normal     Other Findings        Anesthesia Plan  ASA Score- 2     Anesthesia Type- regional with ASA Monitors  Additional Monitors:   Airway Plan: LMA  Plan Factors-Exercise tolerance (METS): >4 METS  Chart reviewed  EKG reviewed  Existing labs reviewed  Patient summary reviewed  Patient is not a current smoker  Induction- intravenous  Postoperative Plan- Plan for postoperative opioid use  Planned trial extubation    Informed Consent- Anesthetic plan and risks discussed with patient  I personally reviewed this patient with the CRNA  Discussed and agreed on the Anesthesia Plan with the CRNA  Radha March

## 2023-02-01 ENCOUNTER — OFFICE VISIT (OUTPATIENT)
Dept: OBGYN CLINIC | Facility: CLINIC | Age: 53
End: 2023-02-01

## 2023-02-01 VITALS
TEMPERATURE: 98.2 F | SYSTOLIC BLOOD PRESSURE: 126 MMHG | HEART RATE: 88 BPM | WEIGHT: 215 LBS | BODY MASS INDEX: 31.75 KG/M2 | DIASTOLIC BLOOD PRESSURE: 78 MMHG

## 2023-02-01 DIAGNOSIS — Z98.890 S/P RIGHT ROTATOR CUFF REPAIR: Primary | ICD-10-CM

## 2023-02-01 NOTE — PROGRESS NOTES
Assessment/Plan:  1  S/P right rotator cuff repair          Patient has no signs of infection today  He will remain in the sling for the first 6 weeks postoperatively  His forearm discomfort may be due to some dependent swelling, though he does not have significant swelling on examination today     This should improve over time  He will start physical therapy in 1 more week on the subscapularis and supraspinatus tendon repair protocols  He can continue to ice and take over-the-counter medications as needed for discomfort  He will follow-up in 6 weeks for repeat evaluation  Subjective:   Berenice Atwood is a 47 y o  male who presents today for follow-up of his right shoulder, now 6 days status post arthroscopic rotator cuff repair and subacromial decompression  He notes his pain has been relatively well controlled  He has been compliant with wearing his sling  He notes good sensation of the right upper extremity  He did experience a popping sensation about the shoulder shortly after surgery, however he was in his sling when this occurred  He also complains of some pain into his forearm         Review of Systems      Past Medical History:   Diagnosis Date   • Anemia    • 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 07/12/2006   • DVT (deep venous thrombosis) ([facility] [address] ) 2018   • Dysphagia     Last Assessed: 8/12/2014    • Factor 5 Leiden mutation, heterozygous ([facility] [address] )    • GERD (gastroesophageal reflux disease)    • Globus sensation     Last Assessed: 6/4/2014    • H/O neoplasm of uncertain behavior of skin 06/06/2006   • Hyperlipidemia    • Hypertension 02/14/2006   • Lateral epicondylitis of right elbow     Last Assessed: 10/23/2015    • Pes planus     last assessed: 10/23/2013    • Plantar fascial fibromatosis     Last Assessed: 10/23/2013    • Pulmonary embolism ([facility] [address] )    • Right patellofemoral syndrome     Last Assessed: 4/15/2016    • Sebaceous cyst 11/03/2007       Past Surgical History:   Procedure Laterality Date   • COLONOSCOPY     • ESOPHAGOGASTRODUODENOSCOPY N/A 10/7/2016    Procedure: ESOPHAGOGASTRODUODENOSCOPY (EGD); Surgeon: Tricia Chow MD;  Location: [facility] GI LAB; Service:    • NASAL SEPTUM SURGERY  1995   • NM ESOPHAGOGASTRODUODENOSCOPY TRANSORAL DIAGNOSTIC N/A 12/6/2018    Procedure: ESOPHAGOGASTRODUODENOSCOPY (EGD); Surgeon: Tricia Chow MD;  Location: [facility] GI LAB; Service: Gastroenterology   • NM SURGICAL ARTHROSCOPY SHOULDER W/ROTATOR CUFF RPR Right 1/26/2023    Procedure: Shoulder Arthroscopy with Rotator Cuff Repair, Subacromial Decompression, and Limited Debridement;  Surgeon: Pham Singh MD;  Location: [facility]IN OR;  Service: Orthopedics       Family History   Problem Relation Age of Onset   • Heart disease Mother         valve 76s   • Hypertension Mother    • Cancer Father         colon   • Colon cancer Father    • Hypertension Father    • Coronary artery disease Father         CABG   • Heart disease Brother         MI exp age 39   • Colon cancer Family    • Cancer Brother         esophageal CA exp age 40   • Cancer Brother        Social History     Occupational History   • Not on file   Tobacco Use   • Smoking status: Never   • Smokeless tobacco: Never   Vaping Use   • Vaping Use: Never used   Substance and Sexual Activity   • Alcohol use:  Yes     Alcohol/week: 4 0 standard drinks     Types: 4 Cans of beer per week     Comment: socially   • Drug use: No   • Sexual activity: Yes     Partners: Female         Current Outpatient Medications:   •  esomeprazole (NexIUM) 40 MG capsule, Take 1 capsule (40 mg total) by mouth daily before breakfast (Patient taking differently: Take 40 mg by mouth daily as needed), Disp: 90 capsule, Rfl: 1  •  oxyCODONE (ROXICODONE) 5 immediate release tablet, Take 1 tablet (5 mg total) by mouth every 4 (four) hours as needed for moderate pain for up to 15 doses Max Daily Amount: 30 mg, Disp: 15 tablet, Rfl: 0  •  Xarelto 20 MG tablet, Take 1 tablet (20 mg total) by mouth daily, Disp: 90 tablet, Rfl: 1    No Known Allergies    Objective:  Vitals:    02/01/23 1133   BP: 126/78   Pulse: 88   Temp: 98 2 °F (36 8 °C)       Ortho Exam    Physical Exam    Right shoulder: Sutures removed  Incision CDI  No erythema  Shoulder ROM and strength testing deferred  Patient moves all fingers freely  2+ radial pulse  No significant swelling of the upper arm  This document was created using speech voice recognition software  Grammatical errors, random word insertions, pronoun errors, and incomplete sentences are an occasional consequence of this system due to software limitations, ambient noise, and hardware issues  Any formal questions or concerns about content, text, or information contained within the body of this dictation should be directly addressed to the provider for clarification

## 2023-02-09 ENCOUNTER — EVALUATION (OUTPATIENT)
Dept: PHYSICAL THERAPY | Facility: CLINIC | Age: 53
End: 2023-02-09

## 2023-02-09 DIAGNOSIS — M75.101 TEAR OF RIGHT ROTATOR CUFF, UNSPECIFIED TEAR EXTENT, UNSPECIFIED WHETHER TRAUMATIC: ICD-10-CM

## 2023-02-09 DIAGNOSIS — M25.511 ACUTE PAIN OF RIGHT SHOULDER: Primary | ICD-10-CM

## 2023-02-09 NOTE — PROGRESS NOTES
PT Evaluation     Today's date: 2023  Patient name: Aide Mora  : 1970  MRN: [id number]  Referring provider: Audrey Juarez*  Dx:   Encounter Diagnosis     ICD-10-CM    1  Acute pain of right shoulder  M25 511       2  Tear of right rotator cuff, unspecified tear extent, unspecified whether traumatic  M75 101 Ambulatory Referral to Physical Therapy                     Assessment  Assessment details: Aide Mora is a 47 y o  male who presents with pain, decreased strength and decreased ROM  Due to these impairments, patient has difficulty performing ADL's, work-related activities, lifting/carrying, reaching  Patient's clinical presentation is consistent with their referring diagnosis of Acute pain of right shoulder  (primary encounter diagnosis), Tear of right rotator cuff, unspecified tear extent, unspecified whether traumatic  Plan: Ambulatory Referral to Physical Therapy  Patient has been educated in post-op contraindications / precautions, home exercise program and plan of care   Patient would benefit from skilled physical therapy services to address their aforementioned functional limitations and progress towards prior level of function and independence with home exercise program      Impairments: abnormal or restricted ROM, activity intolerance, impaired physical strength, lacks appropriate home exercise program, pain with function, poor posture  and poor body mechanics  Understanding of Dx/Px/POC: good   Prognosis: good  Prognosis details: Fear avoidance    Goals  Short Term Goals to be accomplished in 6 weeks:  STG1: Pt will be I with HEP  STG2: Pt will be I with posture management   STG3: Pt will demo 50% improvement in shoulder AROM to improve self care and household ADLs   STG4: Pt will demo 1/2 MMT strength in shoulder  STG5: Pt will report pain < 3/10 in shoulder     Long Term Goals to be accomplished in 12 weeks:   LTG1: Pt will demo full shoulder AROM to return to household duties  Lemon Aj: Pt will return to work ADLs pain free as per PLOF  LTG3: Pt will demo shoulder strength [address] details:  HEP development, stretching, strengthening, A/AA/PROM, joint mobilizations, posture education, STM/MI as needed to reduce muscle tension, muscle reeducation, PLOC discussed and agreed upon with patient  Patient would benefit from: PT eval and skilled physical therapy  Planned modality interventions: cryotherapy, thermotherapy: hydrocollator packs and TENS  Planned therapy interventions: manual therapy, neuromuscular re-education, self care, therapeutic exercise, therapeutic activities and home exercise program  Frequency: 2x week (2-3x/week)  Duration in weeks: 6  Treatment plan discussed with: patient        Subjective Evaluation    History of Present Illness  Mechanism of injury: Pt presents 2 weeks post op RTC repair, he is not having much pain reportedly, sleeping relatively well but restless  Pt is taking Tylenol for pain every 4 hours  He had been in therapy before for his shoulder, was able to conclude with 2/10 pain at worst but completing all work duties and demo mostly full AROM  Imaging however revealed what was reported as a major tear      Pt goals include: returning to work, ADLs such as household duties and yard work, and community responsibilitiues like errands  Quality of life: good    Pain  At worst pain ratin          Objective     Postural Observations  Seated posture: poor  Standing posture: poor    Additional Postural Observation Details  FHP    Active Range of Motion     Additional Active Range of Motion Details  NT per protocol    R elbow lacking 5 deg ext, flexion 95 deg    Passive Range of Motion   Left Shoulder   Normal passive range of motion    Right Shoulder   Flexion: 35 degrees   Abduction: 20 degrees     Additional Passive Range of Motion Details  ER lacking 25 deg from neutral        Strength/Myotome Testing     Left Shoulder   Normal muscle strength    Additional Strength Details   strength equal  Wrist strength WNL  R elbow 3/5 MMT                Eval/ Re-eval Auth #/ Referral # Total visits Start date  Expiration date Total active units  Total manual units  PT only or  PT+OT?   2/9 Req                                                        Date:           Total authorized units:  Active:            Manual:           Total remaining units:  Active:               Manual:                Date:           Total authorized units: Active:            Manual:           Total remaining units:  Active:               Manual:                      Precautions: Standard  PROTOCOL         Visit 1        2/9/23                               Neuro Re-Ed                                                                Ther Ex        Pendulums Edu 30s x 3       Shrugs 2x5       Scap Ret 2x5       Elbow AROM 10x       Wrist AROM 10x        Edu                       Ther Activity                                                Modalities

## 2023-02-14 ENCOUNTER — OFFICE VISIT (OUTPATIENT)
Dept: PHYSICAL THERAPY | Facility: CLINIC | Age: 53
End: 2023-02-14

## 2023-02-14 DIAGNOSIS — M25.511 ACUTE PAIN OF RIGHT SHOULDER: Primary | ICD-10-CM

## 2023-02-14 DIAGNOSIS — M75.101 TEAR OF RIGHT ROTATOR CUFF, UNSPECIFIED TEAR EXTENT, UNSPECIFIED WHETHER TRAUMATIC: ICD-10-CM

## 2023-02-14 NOTE — PROGRESS NOTES
Daily Note     Today's date: 2023  Patient name: Sergio Palomo  : 1970  MRN: [id number]  Referring provider: Romayne Rama*  Dx:   Encounter Diagnosis     ICD-10-CM    1  Acute pain of right shoulder  M25 511       2  Tear of right rotator cuff, unspecified tear extent, unspecified whether traumatic  M75  101                      Subjective: Pt reports he has been having pian today, took tylenol 3 x, but yesterday was generally pretty comfortable  Pain woke him last night 2:00am  Pt reports his R biceps feels like "when it falls asleep "      Objective: See treatment diary below  Progressed elbow AROM to 3 way  Pendulum position to 80 deg flexion  PROM flexion to 25 deg, abd to 45 deg and ER to approx 10 deg lacking from neutral         Assessment: Tolerated treatment well  Patient demo improved muscular recruitment with scap musculature  Good tolerance progressing elbow AROM and positioning for pendulums  Plan: Continue per plan of care  Progress treament per protocol  Eval/ Re-eval Auth #/ Referral # Total visits Start date  Expiration date Total active units  Total manual units  PT only or  PT+OT?     [id number]   12                                                 Precautions: Standard  PROTOCOL         Visit 1 2       23      PROM  20'                      Neuro Re-Ed        CS AROM  Edu                                                      Ther Ex        Pendulums Edu 30s x 3 4 x 30s      Shrugs 2x5 2x10      Scap Ret 2x5 2x10       Elbow AROM 10x 3 way 2x10 ea      Wrist AROM 10x 20x ea       Edu 25lbs 30x                      Ther Activity                                                Modalities

## 2023-02-16 ENCOUNTER — OFFICE VISIT (OUTPATIENT)
Dept: PHYSICAL THERAPY | Facility: CLINIC | Age: 53
End: 2023-02-16

## 2023-02-16 DIAGNOSIS — M25.511 ACUTE PAIN OF RIGHT SHOULDER: Primary | ICD-10-CM

## 2023-02-16 DIAGNOSIS — M75.101 TEAR OF RIGHT ROTATOR CUFF, UNSPECIFIED TEAR EXTENT, UNSPECIFIED WHETHER TRAUMATIC: ICD-10-CM

## 2023-02-16 NOTE — PROGRESS NOTES
Daily Note     Today's date: 2023  Patient name: Annie Fonseca  : 1970  MRN: [id number]  Referring provider: Carlo Essex*  Dx:   Encounter Diagnosis     ICD-10-CM    1  Acute pain of right shoulder  M25 511       2  Tear of right rotator cuff, unspecified tear extent, unspecified whether traumatic  M75  101                      Subjective: Pt reports he is overall doing well, struggling with the elbow AROM with the "back of hand to shoulder "      Objective: See treatment diary below  Progressed therex per protocol, HEP updated  Assessment: Tolerated treatment well  Patient demo overall good progression through protocol and good prognosis for further recovery  Plan: Continue per plan of care  Eval/ Re-eval Auth #/ Referral # Total visits Start date  Expiration date Total active units  Total manual units  PT only or  PT+OT?     [id number]   12                                                 Precautions: Standard  PROTOCOL         Visit 1 2 3      23     PROM  20' 20' flexion to 110 deg                     Neuro Re-Ed        CS AROM  Edu Rev                     Ther Ex        Pendulums Edu 30s x 3 4 x 30s 4x     Shrugs 2x5 2x10 30x     Scap Ret 2x5 2x10  30x     Elbow AROM 10x 3 way 2x10 ea 30xea     Wrist AROM 10x 20x ea 30x      Edu 25lbs 30x 30x     Table slides   2x10     Supine ER   Wand 2x10     RTC Isos 5 way   10x ea                             Ther Activity                                                Modalities

## 2023-02-21 ENCOUNTER — OFFICE VISIT (OUTPATIENT)
Dept: PHYSICAL THERAPY | Facility: CLINIC | Age: 53
End: 2023-02-21

## 2023-02-21 DIAGNOSIS — M25.511 ACUTE PAIN OF RIGHT SHOULDER: Primary | ICD-10-CM

## 2023-02-21 DIAGNOSIS — M75.101 TEAR OF RIGHT ROTATOR CUFF, UNSPECIFIED TEAR EXTENT, UNSPECIFIED WHETHER TRAUMATIC: ICD-10-CM

## 2023-02-21 NOTE — PROGRESS NOTES
Daily Note     Today's date: 2023  Patient name: Anthony Griffith  : 1970  MRN: [id number]  Referring provider: Perfecto Crowe*  Dx:   Encounter Diagnosis     ICD-10-CM    1  Acute pain of right shoulder  M25 511       2  Tear of right rotator cuff, unspecified tear extent, unspecified whether traumatic  M75  101                      Subjective: Pt reports overall he is doing well, he did have a lot of pain in his shoulder last night, this woke him up  He feels better when he does his pendulums  Objective: See treatment diary below      Assessment: Tolerated treatment well  Patient is showing excellent ability to discern inability to relax shoulder when attempting PROM which limits progression  Pt demo great ability to self progress with respect to protocol/      Plan: Continue per plan of care  Eval/ Re-eval Auth #/ Referral # Total visits Start date  Expiration date Total active units  Total manual units  PT only or  PT+OT?     [id number]   12                                                 Precautions: Standard  PROTOCOL         Visit 1 2 3 4     23    PROM  20' 20' flexion to 110 deg                     Neuro Re-Ed        CS AROM  Edu Rev                     Ther Ex        Pendulums Edu 30s x 3 4 x 30s 4x 5x    Shrugs 2x5 2x10 30x HEP    Scap Ret 2x5 2x10  30x HEP    Elbow AROM 10x 3 way 2x10 ea 30xea 30x ea    Wrist AROM 10x 20x ea 30x 30x ea     Edu 25lbs 30x 30x 3rd setting 30x    Table slides   2x10 2x10    Supine ER   Wand 2x10 2x10    RTC Isos 5 way   10x ea 10x ea    AAROM    Supine hands to 90 deg 10x                    Ther Activity                                                Modalities

## 2023-02-23 ENCOUNTER — OFFICE VISIT (OUTPATIENT)
Dept: PHYSICAL THERAPY | Facility: CLINIC | Age: 53
End: 2023-02-23

## 2023-02-23 DIAGNOSIS — M75.101 TEAR OF RIGHT ROTATOR CUFF, UNSPECIFIED TEAR EXTENT, UNSPECIFIED WHETHER TRAUMATIC: ICD-10-CM

## 2023-02-23 DIAGNOSIS — M25.511 ACUTE PAIN OF RIGHT SHOULDER: Primary | ICD-10-CM

## 2023-02-23 NOTE — PROGRESS NOTES
Daily Note     Today's date: 2023  Patient name: Amairani Farnsworth  : 1970  MRN: [id number]  Referring provider: Jeanne Donis*  Dx:   Encounter Diagnosis     ICD-10-CM    1  Acute pain of right shoulder  M25 511       2  Tear of right rotator cuff, unspecified tear extent, unspecified whether traumatic  M75  101                      Subjective: Pt overall doing very well, feeling confident in progression and feels challenge with general therex  Objective: See treatment diary below  Added pulleys today per protocol      Assessment: Tolerated treatment well  Patient demo great progress and PROM progression toward 120 deg flexion, as per protocol  He demo great technique throughout therex, able to very consciously address muscle guarding successfully  Plan: Continue per plan of care  Progress treament per protocol  Eval/ Re-eval Auth #/ Referral # Total visits Start date  Expiration date Total active units  Total manual units  PT only or  PT+OT?     [id number]   12                                                 Precautions: Standard  PROTOCOL         Visit 1 2 3 4 5    23   PROM  20' 20' flexion to 110 deg  20'                   Neuro Re-Ed        CS AROM  Edu Rev  Rev                   Ther Ex        Pendulums Edu 30s x 3 4 x 30s 4x 5x 5x   Shrugs 2x5 2x10 30x HEP 30x   Scap Ret 2x5 2x10  30x HEP 30x   Elbow AROM 10x 3 way 2x10 ea 30xea 30x ea 30x   Wrist AROM 10x 20x ea 30x 30x ea 30x    Edu 25lbs 30x 30x 3rd setting 30x DC   Table slides   2x10 2x10 Ball 2x10   Supine ER   Wand 2x10 2x10 2x10   RTC Isos 5 way   10x ea 10x ea 2x10 ea   AAROM    Supine hands to 90 deg 10x 2x10   Pulleys     4' flexion           Ther Activity                                                Modalities

## 2023-02-28 ENCOUNTER — OFFICE VISIT (OUTPATIENT)
Dept: PHYSICAL THERAPY | Facility: CLINIC | Age: 53
End: 2023-02-28

## 2023-02-28 DIAGNOSIS — M25.511 ACUTE PAIN OF RIGHT SHOULDER: Primary | ICD-10-CM

## 2023-02-28 DIAGNOSIS — M75.101 TEAR OF RIGHT ROTATOR CUFF, UNSPECIFIED TEAR EXTENT, UNSPECIFIED WHETHER TRAUMATIC: ICD-10-CM

## 2023-02-28 NOTE — PROGRESS NOTES
Daily Note     Today's date: 2023  Patient name: Chacho Taylor  : 1970  MRN: [id number]  Referring provider: Yue Hinton  Dx:   Encounter Diagnosis     ICD-10-CM    1  Acute pain of right shoulder  M25 511       2  Tear of right rotator cuff, unspecified tear extent, unspecified whether traumatic  M75  101                      Subjective: Pt reports he had a challenging night pain wise for no known reason, is quite sore today  Aside from this had been doing really well  Objective: See treatment diary below  Progressed per protocol, sling for at risk situations only  Assessment: Tolerated treatment well  Patient cont to progress through protocol well and without issues or adverse affects thus far  Plan: Continue per plan of care  Progress treament per protocol  Eval/ Re-eval Auth #/ Referral # Total visits Start date  Expiration date Total active units  Total manual units  PT only or  PT+OT?     [id number]   12                                                 Precautions: Standard  PROTOCOL         Visit 2 3 4 5 6    23   PROM 20' 20' flexion to 110 deg  20' 15' Flexion to 120, abd 90                   Neuro Re-Ed        CS AROM Edu Rev  Rev Rev                   Ther Ex        Pendulums 4 x 30s 4x 5x 5x 3x   Shrugs 2x10 30x HEP 30x 30x   Scap Ret 2x10  30x HEP 30x 30x   Elbow AROM 3 way 2x10 ea 30xea 30x ea 30x 30x ea   Wrist AROM 20x ea 30x 30x ea 30x HEP    25lbs 30x 30x 3rd setting 30x DC    Table slides  2x10 2x10 Ball 2x10 2x10   Supine ER  Wand 2x10 2x10 2x10 2x10   RTC Isos 5 way  10x ea 10x ea 2x10 ea 2x10 ea   AAROM   Supine hands to 90 deg 10x 2x10 2x10   Pulleys    4' flexion 4' flexion standing and seated   Post Capsule str     Attempted   Bent over row     2x10           Ther Activity                                                Modalities

## 2023-03-02 ENCOUNTER — OFFICE VISIT (OUTPATIENT)
Dept: PHYSICAL THERAPY | Facility: CLINIC | Age: 53
End: 2023-03-02

## 2023-03-02 DIAGNOSIS — M75.101 TEAR OF RIGHT ROTATOR CUFF, UNSPECIFIED TEAR EXTENT, UNSPECIFIED WHETHER TRAUMATIC: Primary | ICD-10-CM

## 2023-03-02 DIAGNOSIS — M25.511 ACUTE PAIN OF RIGHT SHOULDER: ICD-10-CM

## 2023-03-02 NOTE — PROGRESS NOTES
Daily Note     Today's date: 3/2/2023  Patient name: Yenifer Concepcion  : 1970  MRN: [id number]  Referring provider: Bella Diehl*  Dx:   Encounter Diagnosis     ICD-10-CM    1  Tear of right rotator cuff, unspecified tear extent, unspecified whether traumatic  M75 101       2  Acute pain of right shoulder  M25 511                      Subjective: Pt reports that he does feel a little stiff today  Objective: See treatment diary below      Assessment: Tolerated treatment well  Patient demo good form for therex  Is gradually progressing through protocol  Plan: Continue per plan of care  Eval/ Re-eval Auth #/ Referral # Total visits Start date  Expiration date Total active units  Total manual units  PT only or  PT+OT?     [id number]   12                                                 Precautions: Standard  PROTOCOL  Visit 2 3 4 5 6 7    23   PROM 20' 20' flexion to 110 deg  20' 15' Flexion to 120, abd 90 15' flexion to 120 , abd 90                      Neuro Re-Ed         CS AROM Edu Rev  Rev Rev                      Ther Ex         Pendulums 4 x 30s 4x 5x 5x 3x 3x   Shrugs 2x10 30x HEP 30x 30x 30x   Scap Ret 2x10  30x HEP 30x 30x 30x   Elbow AROM 3 way 2x10 ea 30xea 30x ea 30x 30x ea 30x ea  Wrist AROM 20x ea 30x 30x ea 30x HEP     25lbs 30x 30x 3rd setting 30x DC     Table slides  2x10 2x10 Ball 2x10 2x10 2 x 10    Supine ER  Wand 2x10 2x10 2x10 2x10 2x 10    RTC Isos 5 way  10x ea 10x ea 2x10 ea 2x10 ea 2x 10 ea      AAROM   Supine hands to 90 deg 10x 2x10 2x10 2 x 10    Pulleys    4' flexion 4' flexion standing and seated 4' flexion standing and seated    Post Capsule str     Attempted attempted   Bent over row     2x10 2 x 10             Ther Activity                                                      Modalities

## 2023-03-07 ENCOUNTER — EVALUATION (OUTPATIENT)
Dept: PHYSICAL THERAPY | Facility: CLINIC | Age: 53
End: 2023-03-07

## 2023-03-07 DIAGNOSIS — M25.511 ACUTE PAIN OF RIGHT SHOULDER: Primary | ICD-10-CM

## 2023-03-07 DIAGNOSIS — M75.101 TEAR OF RIGHT ROTATOR CUFF, UNSPECIFIED TEAR EXTENT, UNSPECIFIED WHETHER TRAUMATIC: ICD-10-CM

## 2023-03-07 NOTE — PROGRESS NOTES
PT Re-Evaluation     Today's date: 3/7/2023  Patient name: Ricardo Lizarraga  : 1970  MRN: [id number]  Referring provider: Maryellen Rodriguez*  Dx:   Encounter Diagnosis     ICD-10-CM    1  Acute pain of right shoulder  M25 511       2  Tear of right rotator cuff, unspecified tear extent, unspecified whether traumatic  M75  101                      Assessment  Assessment details: Ricardo Lizarraga is a 47 y o  male who has been regularly participating in skilled PT since time of IE and at this time remains well below his PLOF however is indeed making good progress with respect to PROM and overall activity tolerance  Pt will begin to transition into more active and strength driven tasks in upcoming healing phases as outlined in 22 week post op protocol  At this time pt status is inline with healing phase and protocol, he demo good prognosis to transition into next phase of protocol and toward his est LTGs  Pt's recent pain exacerbation does not appear to have set him back  Pt has been a pleasure to work with     Impairments: abnormal or restricted ROM, activity intolerance, impaired physical strength, lacks appropriate home exercise program, pain with function, poor posture  and poor body mechanics  Understanding of Dx/Px/POC: good   Prognosis: good    Goals  Short Term Goals to be accomplished in 6 weeks:-ongoing  STG1: Pt will be I with HEP  STG2: Pt will be I with posture management   STG3: Pt will demo 50% improvement in shoulder AROM to improve self care and household ADLs   STG4: Pt will demo 1/2 MMT strength in shoulder  STG5: Pt will report pain < 3/10 in shoulder     Long Term Goals to be accomplished in 12 weeks: -ongoing  LTG1: Pt will demo full shoulder AROM to return to household duties  Arletta Ivory: Pt will return to work ADLs pain free as per PLOF  LTG3: Pt will demo shoulder strength [address] details:  HEP development, stretching, strengthening, A/AA/PROM, joint mobilizations, posture education, STM/MI as needed to reduce muscle tension, muscle reeducation, PLOC discussed and agreed upon with patient  Patient would benefit from: PT eval and skilled physical therapy  Planned modality interventions: cryotherapy, thermotherapy: hydrocollator packs and TENS  Planned therapy interventions: manual therapy, neuromuscular re-education, self care, therapeutic exercise, therapeutic activities and home exercise program  Frequency: 2x week (2-3x/week)  Duration in weeks: 6  Treatment plan discussed with: patient        Subjective Evaluation    History of Present Illness  Mechanism of injury: Pt reports excruciating pain since last week which was not problematic prior to last week  Overall has been progressing well aside from isolated "pop" felt last week, does believe this is improving  He is transitioning out of sling  Pt unable to dress himself, perform work duties or light household activities, limited with self care ongoin     Quality of life: good    Pain  At worst pain ratin          Objective     Postural Observations  Seated posture: poor  Standing posture: poor    Additional Postural Observation Details  FHP    Active Range of Motion   Left Shoulder   Normal active range of motion    Additional Active Range of Motion Details  NT per protocol    R elbow AROM WFL    Passive Range of Motion   Left Shoulder   Normal passive range of motion    Right Shoulder   Flexion: 120 degrees   Abduction: 90 degrees   External rotation 0°: 15 degrees   Internal rotation 0°: 40 degrees     Additional Passive Range of Motion Details          Strength/Myotome Testing     Left Shoulder   Normal muscle strength    Additional Strength Details   strength equal  Wrist strength WNL  R elbow 4/5 MMT  R shoulder MMT NT per protocol                Eval/ Re-eval Auth #/ Referral # Total visits Start date  Expiration date Total active units  Total manual units  PT only or  PT+OT?     [id number]   12  Precautions: Standard  PROTOCOL  Visit 4 5 6 7 8    2/21/23 2/23/23 2/28/23 2/28/23 3/7   PROM  20' 15' Flexion to 120, abd 90 15' flexion to 120 , abd 90  15'                   Neuro Re-Ed        CS AROM  Rev Rev                     Ther Ex        Pendulums 5x 5x 3x 3x 2x   Shrugs HEP 30x 30x 30x    Scap Ret HEP 30x 30x 30x    Elbow AROM 30x ea 30x 30x ea 30x ea  Wrist AROM 30x ea 30x HEP      3rd setting 30x DC      Table slides 2x10 Ball 2x10 2x10 2 x 10  2x10   Supine ER 2x10 2x10 2x10 2x 10     RTC Isos 5 way 10x ea 2x10 ea 2x10 ea 2x 10 ea       AAROM Supine hands to 90 deg 10x 2x10 2x10 2 x 10  2x10   Pulleys  4' flexion 4' flexion standing and seated 4' flexion standing and seated  Flexion/Abd 4' ea   Post Capsule str   Attempted attempted HOLD   Bent over row   2x10 2 x 10  2x10           Ther Activity                                                Modalities

## 2023-03-09 ENCOUNTER — OFFICE VISIT (OUTPATIENT)
Dept: PHYSICAL THERAPY | Facility: CLINIC | Age: 53
End: 2023-03-09

## 2023-03-09 DIAGNOSIS — M25.511 ACUTE PAIN OF RIGHT SHOULDER: Primary | ICD-10-CM

## 2023-03-09 DIAGNOSIS — M75.101 TEAR OF RIGHT ROTATOR CUFF, UNSPECIFIED TEAR EXTENT, UNSPECIFIED WHETHER TRAUMATIC: ICD-10-CM

## 2023-03-10 NOTE — PROGRESS NOTES
Daily Note     Today's date: 3/9/2023  Patient name: Chacho Taylor  : 1970  MRN: [id number]  Referring provider: Yue Hinton  Dx:   Encounter Diagnosis     ICD-10-CM    1  Acute pain of right shoulder  M25 511       2  Tear of right rotator cuff, unspecified tear extent, unspecified whether traumatic  M75  101                      Subjective: Pt acknowledges his pain and tenderness remain present however improving  He is not wearing sling in his home, only out in public and when sleeping  Objective: See treatment diary below  Removed abd pillow from sling  HEP progressed  AROM initiated today per protocol  Assessment: Tolerated treatment well  Patient demo great progression and while mild compensatory methods may be noted with initiation of active motion due to weakness, this is not inconsistent with phase of healing  He has discomfort with AROM however muñoz snot persist upon release of active task  Pt demo great comprehension of overall progression  Plan: Continue per plan of care  Progress treament per protocol  Eval/ Re-eval Auth #/ Referral # Total visits Start date  Expiration date Total active units  Total manual units  PT only or  PT+OT?     [id number]   12                                                 Precautions: Standard  PROTOCOL  Visit 5 6 7 8 9    2/23/23 2/28/23 2/28/23 3/7 3/9/23   PROM 20' 15' Flexion to 120, abd 90 15' flexion to 120 , abd 90  15' 10'                   Neuro Re-Ed        CS AROM Rev Rev                      Ther Ex        Pendulums 5x 3x 3x 2x 2x   Shrugs 30x 30x 30x     Scap Ret 30x 30x 30x     Elbow AROM 30x 30x ea 30x ea  Wrist AROM 30x HEP       DC       Table slides Ball 2x10 2x10 2 x 10  2x10    Supine ER 2x10 2x10 2x 10      RTC Isos 5 way 2x10 ea 2x10 ea 2x 10 ea        AAROM 2x10 2x10 2 x 10  2x10    Pulleys 4' flexion 4' flexion standing and seated 4' flexion standing and seated  Flexion/Abd 4' ea 4' ea Post Capsule str  Attempted attempted HOLD    Bent over row  2x10 2 x 10  2x10 2x10   AROM     Scaption, Abd 10x ea   SL ER     AROM 2x10   Supine flexion     Hands clasped 2x10                   Ther Activity                                                Modalities

## 2023-03-14 ENCOUNTER — OFFICE VISIT (OUTPATIENT)
Dept: PHYSICAL THERAPY | Facility: CLINIC | Age: 53
End: 2023-03-14

## 2023-03-14 ENCOUNTER — OFFICE VISIT (OUTPATIENT)
Dept: GASTROENTEROLOGY | Facility: CLINIC | Age: 53
End: 2023-03-14

## 2023-03-14 VITALS
WEIGHT: 217.3 LBS | RESPIRATION RATE: 18 BRPM | DIASTOLIC BLOOD PRESSURE: 70 MMHG | HEART RATE: 78 BPM | OXYGEN SATURATION: 100 % | BODY MASS INDEX: 32.19 KG/M2 | HEIGHT: 69 IN | SYSTOLIC BLOOD PRESSURE: 120 MMHG

## 2023-03-14 DIAGNOSIS — K21.9 GERD WITHOUT ESOPHAGITIS: ICD-10-CM

## 2023-03-14 DIAGNOSIS — M25.511 ACUTE PAIN OF RIGHT SHOULDER: Primary | ICD-10-CM

## 2023-03-14 DIAGNOSIS — M75.101 TEAR OF RIGHT ROTATOR CUFF, UNSPECIFIED TEAR EXTENT, UNSPECIFIED WHETHER TRAUMATIC: ICD-10-CM

## 2023-03-14 DIAGNOSIS — K22.70 BARRETT'S ESOPHAGUS WITHOUT DYSPLASIA: Primary | ICD-10-CM

## 2023-03-14 RX ORDER — ESOMEPRAZOLE MAGNESIUM 40 MG/1
40 CAPSULE, DELAYED RELEASE ORAL
Qty: 90 CAPSULE | Refills: 3 | Status: SHIPPED | OUTPATIENT
Start: 2023-03-14

## 2023-03-14 NOTE — PROGRESS NOTES
Follow-up Note -  [facility]  Zaid Wild 1970 male         Reason: Follow-up    HPI:  Mr Felton Rodriguez came in for regular follow-up  He has history of small Nj's esophagus but the previous EGD with biopsies were negative for Nj's  He was noted to have a large hiatal hernia measuring up to 5-6 cm and was recommended for hernia repair at that time  Patient takes Nexium regularly  Patient has regular bowel movements and denies any blood or mucus in the stool  Appetite is good and denies any recent weight loss  Denies any abdominal pain, nausea, or vomiting  Has no heartburn or acid reflux  Denies any difficulty swallowing  Last EGD and colonoscopies were in December 2021    Chaperon: Ms Garrison Meter: Review of Systems   Constitutional: Negative for activity change, appetite change, chills, diaphoresis, fatigue, fever and unexpected weight change  HENT: Negative for ear discharge, ear pain, facial swelling, hearing loss, nosebleeds, sore throat, tinnitus and voice change  Eyes: Negative for pain, discharge, redness, itching and visual disturbance  Respiratory: Negative for apnea, cough, chest tightness, shortness of breath and wheezing  Cardiovascular: Negative for chest pain and palpitations  Gastrointestinal:        As noted in HPI   Endocrine: Negative for cold intolerance, heat intolerance and polyuria  Genitourinary: Negative for difficulty urinating, dysuria, flank pain, hematuria and urgency  Musculoskeletal: Positive for arthralgias  Negative for back pain, gait problem, joint swelling and myalgias  Skin: Negative for rash and wound  Neurological: Negative for dizziness, tremors, seizures, speech difficulty, light-headedness, numbness and headaches  Hematological: Negative for adenopathy  Does not bruise/bleed easily  Psychiatric/Behavioral: Negative for agitation, behavioral problems and confusion   The patient is not nervous/anxious  Past Medical History:   Diagnosis Date   • Anemia    • 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 07/12/2006   • DVT (deep venous thrombosis) ([facility] [address] ) 2018   • Dysphagia     Last Assessed: 8/12/2014    • Factor 5 Leiden mutation, heterozygous ([facility] [address] )    • GERD (gastroesophageal reflux disease)    • Globus sensation     Last Assessed: 6/4/2014    • H/O neoplasm of uncertain behavior of skin 06/06/2006   • Hyperlipidemia    • Hypertension 02/14/2006   • Lateral epicondylitis of right elbow     Last Assessed: 10/23/2015    • Pes planus     last assessed: 10/23/2013    • Plantar fascial fibromatosis     Last Assessed: 10/23/2013    • Pulmonary embolism ([facility] [address] )    • Right patellofemoral syndrome     Last Assessed: 4/15/2016    • Sebaceous cyst 11/03/2007      Past Surgical History:   Procedure Laterality Date   • COLONOSCOPY     • ESOPHAGOGASTRODUODENOSCOPY N/A 10/7/2016    Procedure: ESOPHAGOGASTRODUODENOSCOPY (EGD); Surgeon: Chato Bennett MD;  Location: [facility] GI [facility]; Service:    • NASAL SEPTUM SURGERY  1995   • NM ESOPHAGOGASTRODUODENOSCOPY TRANSORAL DIAGNOSTIC N/A 12/6/2018    Procedure: ESOPHAGOGASTRODUODENOSCOPY (EGD); Surgeon: Chato Bennett MD;  Location: [facility]; Service: Gastroenterology   • NM SURGICAL ARTHROSCOPY SHOULDER W/ROTATOR CUFF RPR Right 1/26/2023    Procedure: Shoulder Arthroscopy with Rotator Cuff Repair, Subacromial Decompression, and Limited Debridement;  Surgeon: Shantelle Kirby MD;  Location: [facility];  Service: Orthopedics     Social History     Socioeconomic History   • Marital status: /Civil Union     Spouse name: Not on file   • Number of children: Not on file   • Years of education: Not on file   • Highest education level: Not on file   Occupational History   • Not on file   Tobacco Use   • Smoking status: Never   • Smokeless tobacco: Never   Vaping Use   • Vaping Use: Never used   Substance and Sexual Activity   • Alcohol use:  Yes Alcohol/week: 4 0 standard drinks     Types: 4 Cans of beer per week     Comment: socially   • Drug use: No   • Sexual activity: Yes     Partners: Female   Other Topics Concern   • Not on file   Social History Narrative   • Not on file     Social Determinants of Health     Financial Resource Strain: Not on file   Food Insecurity: Not on file   Transportation Needs: Not on file   Physical Activity: Not on file   Stress: Not on file   Social Connections: Not on file   Intimate Partner Violence: Not on file   Housing Stability: Not on file     Family History   Problem Relation Age of Onset   • Heart disease Mother         valve 76s   • Hypertension Mother    • Cancer Father         colon   • Colon cancer Father    • Hypertension Father    • Coronary artery disease Father         CABG   • Heart disease Brother         MI exp age 39   • Colon cancer Family    • Cancer Brother         esophageal CA exp age 40   • Cancer Brother      Patient has no known allergies  Current Outpatient Medications   Medication Sig Dispense Refill   • esomeprazole (NexIUM) 40 MG capsule Take 1 capsule (40 mg total) by mouth daily before breakfast 90 capsule 3   • oxyCODONE (ROXICODONE) 5 immediate release tablet Take 1 tablet (5 mg total) by mouth every 4 (four) hours as needed for moderate pain for up to 15 doses Max Daily Amount: 30 mg 15 tablet 0   • Xarelto 20 MG tablet Take 1 tablet (20 mg total) by mouth daily 90 tablet 1     No current facility-administered medications for this visit  Blood pressure 120/70, pulse 78, resp  rate 18, height 5' 9" (1 753 m), weight 98 6 kg (217 lb 4 8 oz), SpO2 100 %  PHYSICAL EXAM: Physical Exam  Constitutional:       Appearance: He is well-developed  HENT:      Head: Normocephalic and atraumatic  Eyes:      General: No scleral icterus  Right eye: No discharge  Left eye: No discharge        Conjunctiva/sclera: Conjunctivae normal       Pupils: Pupils are equal, round, and reactive to light  Neck:      Thyroid: No thyromegaly  Vascular: No JVD  Trachea: No tracheal deviation  Cardiovascular:      Rate and Rhythm: Normal rate and regular rhythm  Heart sounds: Normal heart sounds  No murmur heard  No friction rub  No gallop  Pulmonary:      Effort: Pulmonary effort is normal  No respiratory distress  Breath sounds: Normal breath sounds  No wheezing or rales  Chest:      Chest wall: No tenderness  Abdominal:      General: Bowel sounds are normal  There is no distension  Palpations: Abdomen is soft  There is no mass  Tenderness: There is no abdominal tenderness  There is no guarding or rebound  Hernia: No hernia is present  Musculoskeletal:      Cervical back: Neck supple  Lymphadenopathy:      Cervical: No cervical adenopathy  Skin:     General: Skin is warm and dry  Findings: No erythema or rash  Neurological:      Mental Status: He is alert and oriented to person, place, and time  Psychiatric:         Behavior: Behavior normal          Thought Content: Thought content normal           Lab Results   Component Value Date    WBC 6 6 12/28/2022    HGB 14 9 12/28/2022    HCT 44 4 12/28/2022    MCV 91 12/28/2022     12/28/2022     Lab Results   Component Value Date    CALCIUM 9 3 03/23/2021    K 5 0 12/28/2022    CO2 26 12/28/2022     12/28/2022    BUN 20 12/28/2022    CREATININE 1 06 12/28/2022     Lab Results   Component Value Date    ALT 34 12/28/2022    AST 30 12/28/2022    ALKPHOS 74 03/23/2021     Lab Results   Component Value Date    INR 1 2 12/28/2022    INR 1 15 08/27/2018    INR 1 16 08/15/2018    PROTIME 12 1 (H) 12/28/2022    PROTIME 12 0 (H) 08/27/2018    PROTIME 12 1 (H) 08/15/2018       No results found  ASSESSMENT & PLAN:    Nj's esophagus without dysplasia  Patient with history of small Nj's but the previous biopsies were negative    He has been doing well on Nexium     -Patient was explained about the lifestyle and dietary modifications  Advised to avoid fatty foods, chocolates, caffeine, alcohol and any other triggering foods  Avoid eating for at least 3 hours before going to bed     -Continue Nexium regularly    Discussed about the long-term side effects     -Next surveillance EGD in December 2023    -Advised patient to call for any GI symptoms

## 2023-03-14 NOTE — PROGRESS NOTES
Daily Note     Today's date: 3/14/2023  Patient name: Nate Sheets  : 1970  MRN: [id number]  Referring provider: Luly Naqvi*  Dx:   Encounter Diagnosis     ICD-10-CM    1  Acute pain of right shoulder  M25 511       2  Tear of right rotator cuff, unspecified tear extent, unspecified whether traumatic  M75  101                      Subjective: Pt reports that his R upper trap remains very tight and sore, he is continuing to have pain and clicking in the anterior portion of his shoulder, he indicates  This anterior area is very tender  He has been walking a lot with out his sling, wondering if this could be an aggravating factor  "I wonder if I'm over doing it "       Objective: See treatment diary below  Pt encouraged to wear brace for long walks for remainder of week, encouraged to self pace  Discomfort provoked during AROM abates in a moment's time  Assessment: Tolerated treatment well  Patient demo ongoing compensatory methods with AROM elevation greatest in abd, far less in scaption  Plan: Continue per plan of care  Progress treament per protocol  Eval/ Re-eval Auth #/ Referral # Total visits Start date  Expiration date Total active units  Total manual units  PT only or  PT+OT?     [id number]   12                                                 Precautions: Standard  PROTOCOL  Visit 6 7 8 9 10    2/28/23 2/28/23 3/7 3/9/23 3/14/23   PROM 15' Flexion to 120, abd 90 15' flexion to 120 , abd 90  15' 10' 5'                   Neuro Re-Ed        CS AROM Rev                       Ther Ex        Pendulums 3x 3x 2x 2x 2x   Shrugs 30x 30x      Scap Ret 30x 30x      Elbow AROM 30x ea 30x ea  Wrist AROM HEP               Table slides 2x10 2 x 10  2x10  2x10 ball   Supine ER 2x10 2x 10       RTC Isos 5 way 2x10 ea 2x 10 ea         AAROM 2x10 2 x 10  2x10     Pulleys 4' flexion standing and seated 4' flexion standing and seated  Flexion/Abd 4' ea 4' ea 4'   Post Capsule str Attempted attempted HOLD     Bent over row 2x10 2 x 10  2x10 2x10 2x10   AROM    Scaption, Abd 10x ea 10x ea   SL ER    AROM 2x10 2x10   Supine flexion    Hands clasped 2x10 2x10                   Ther Activity                                                Modalities

## 2023-03-15 ENCOUNTER — OFFICE VISIT (OUTPATIENT)
Dept: OBGYN CLINIC | Facility: CLINIC | Age: 53
End: 2023-03-15

## 2023-03-15 DIAGNOSIS — Z98.890 S/P RIGHT ROTATOR CUFF REPAIR: Primary | ICD-10-CM

## 2023-03-15 NOTE — PROGRESS NOTES
Assessment/Plan:  1  S/P right rotator cuff repair  Ambulatory Referral to Physical Therapy        Scribe Attestation    I,:  Sabra Tubbs am acting as a scribe while in the presence of the attending physician :       I,:  Riaz Pickett MD personally performed the services described in this documentation    as scribed in my presence :         Hugo Caraballo upon exam today appears to be doing well now 7 weeks status post right rotator cuff repair  He does not present with external signs of infection such as erythema, redness, tenderness to touch, or discharge  His incisions appear to be well healed  Regarding the painful pop, is possible this is related to scar tissue  I did explain he did have a massive rotator cuff tear that required approximately 6 anchors  It is likely symptoms are related to his extensive repair  Overall I think he is progressing quite well  He will continue with physical therapy per rotator cuff repair protocol  Over the next 6 weeks he will start active range of motion and gentle strengthening  It is reasonable to attend physical therapy only to days a week as long as he is consistent with his physician directed home exercise program every day  He may discontinue the sling at this time  He is able to drive  He also may sleep without restrictions  He may continue to utilize ice and oral analgesics for his symptoms as needed  I will see him back in 6 weeks for repeat clinical evaluation  Subjective:   Guillermo Cali is a 47 y o  male who presents for follow up evaluation of the right shoulder  He is approximately 7 weeks status post right shoulder arthroscopy rotator cuff repair, subacromial decompression, and limited debridement  He was compliant with wearing his sling consistently for 6 weeks  He has been attending physical therapy which he reports was going well  Unfortunately, Lacy Oseguera complains of significant pain globally about the shoulder   He complains of sensitivity in his anterior shoulder  He states his shoulder experienced a painful pop while at physical therapy about 2 weeks ago  He was only doing light range of motion exercises at the time  He feels pain at night that inhibits his ability to sleep  Review of Systems   Constitutional: Negative for chills, fever and unexpected weight change  HENT: Negative for nosebleeds and sore throat  Eyes: Negative for pain, redness and visual disturbance  Respiratory: Negative for cough, shortness of breath and wheezing  Cardiovascular: Negative for chest pain, palpitations and leg swelling  Gastrointestinal: Negative for nausea and vomiting  Endocrine: Negative for polydipsia and polyuria  Genitourinary: Negative for dysuria and hematuria  Musculoskeletal: Positive for arthralgias and myalgias  Negative for joint swelling and neck pain  See HPI   Skin: Negative for rash and wound  Neurological: Negative for dizziness, numbness and headaches  Psychiatric/Behavioral: Negative for decreased concentration  The patient is not nervous/anxious            Past Medical History:   Diagnosis Date   • Anemia    • 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 07/12/2006   • DVT (deep venous thrombosis) ([facility] [address] ) 2018   • Dysphagia     Last Assessed: 8/12/2014    • Factor [facility] Leiden mutation, [facility]    • GERD (gastroesophageal reflux disease)    • Globus sensation     Last Assessed: 6/4/2014    • H/O neoplasm of uncertain behavior of skin 06/06/2006   • Hyperlipidemia    • Hypertension 02/14/2006   • Lateral epicondylitis of right elbow     Last Assessed: 10/23/2015    • Pes planus     last assessed: 10/23/2013    • Plantar fascial fibromatosis     Last Assessed: 10/23/2013    • Pulmonary embolism ([facility] [address] )    • Right patellofemoral syndrome     Last Assessed: 4/15/2016    • Sebaceous cyst 11/03/2007       Past Surgical History:   Procedure Laterality Date   • COLONOSCOPY     • ESOPHAGOGASTRODUODENOSCOPY N/A 10/7/2016 Procedure: ESOPHAGOGASTRODUODENOSCOPY (EGD); Surgeon: Pantera Woodruff MD;  Location: [facility] GI LAB; Service:    • NASAL SEPTUM SURGERY  1995   • ID ESOPHAGOGASTRODUODENOSCOPY TRANSORAL DIAGNOSTIC N/A 12/6/2018    Procedure: ESOPHAGOGASTRODUODENOSCOPY (EGD); Surgeon: Pantera Woodruff MD;  Location: [facility] GI LAB; Service: Gastroenterology   • ID SURGICAL ARTHROSCOPY SHOULDER W/ROTATOR CUFF RPR Right 1/26/2023    Procedure: Shoulder Arthroscopy with Rotator Cuff Repair, Subacromial Decompression, and Limited Debridement;  Surgeon: Sam Fletcher MD;  Location: [facility] OR;  Service: Orthopedics       Family History   Problem Relation Age of Onset   • Heart disease Mother         valve 76s   • Hypertension Mother    • Cancer Father         colon   • Colon cancer Father    • Hypertension Father    • Coronary artery disease Father         CABG   • Heart disease Brother         MI exp age 39   • Colon cancer Family    • Cancer Brother         esophageal CA exp age 40   • Cancer Brother        Social History     Occupational History   • Not on file   Tobacco Use   • Smoking status: Never   • Smokeless tobacco: Never   Vaping Use   • Vaping Use: Never used   Substance and Sexual Activity   • Alcohol use: Yes     Alcohol/week: 4 0 standard drinks     Types: 4 Cans of beer per week     Comment: socially   • Drug use: No   • Sexual activity: Yes     Partners: Female         Current Outpatient Medications:   •  esomeprazole (NexIUM) 40 MG capsule, Take 1 capsule (40 mg total) by mouth daily before breakfast, Disp: 90 capsule, Rfl: 3  •  oxyCODONE (ROXICODONE) 5 immediate release tablet, Take 1 tablet (5 mg total) by mouth every 4 (four) hours as needed for moderate pain for up to 15 doses Max Daily Amount: 30 mg, Disp: 15 tablet, Rfl: 0  •  Xarelto 20 MG tablet, Take 1 tablet (20 mg total) by mouth daily, Disp: 90 tablet, Rfl: 1    No Known Allergies    Objective: There were no vitals filed for this visit      Right Shoulder Exam     Tests   Drop arm: positive    Other   Erythema: absent  Scars: present (well healed surgical scars)  Sensation: normal    Comments:  No external signs of infection, no redness, discharge, bruising, or heat              Physical Exam  HENT:      Head: Normocephalic and atraumatic  Eyes:      General:         Right eye: No discharge  Left eye: No discharge  Conjunctiva/sclera: Conjunctivae normal    Cardiovascular:      Rate and Rhythm: Normal rate  Pulmonary:      Effort: Pulmonary effort is normal  No respiratory distress  Musculoskeletal:         General: Tenderness present  No swelling  Cervical back: Normal range of motion and neck supple  Comments: See Ortho exam   Skin:     General: Skin is warm and dry  Neurological:      Mental Status: He is alert and oriented to person, place, and time  I have personally reviewed pertinent films in PACS and my interpretation is as follows: No imaging reviewed today  This document was created using speech voice recognition software  Grammatical errors, random word insertions, pronoun errors, and incomplete sentences are an occasional consequence of this system due to software limitations, ambient noise, and hardware issues  Any formal questions or concerns about content, text, or information contained within the body of this dictation should be directly addressed to the provider for clarification

## 2023-03-15 NOTE — LETTER
March 15, 2023     Patient: Kodak Stout  YOB: 1970  Date of Visit: 3/15/2023      To Whom it May Concern:    Idalia Chen is under my professional care  Angela Crandall was seen in my office on 3/15/2023  Angelaluis eduardo Crandall may return to work on 4/10/2023  If you have any questions or concerns, please don't hesitate to call           Sincerely,          Bessy Lopez MD        CC: No Recipients

## 2023-03-15 NOTE — LETTER
March 15, 2023     Patient: Yenifer Concepcion  YOB: 1970  Date of Visit: 3/15/2023      To Whom it May Concern:    Lesley Leal is under my professional care  Yolanda Velasquez was seen in my office on 3/15/2023  Yolanda Velasquez may return to work on 4/1/23  If you have any questions or concerns, please don't hesitate to call           Sincerely,          Merrily Nissen, MD        CC: No Recipients

## 2023-03-16 ENCOUNTER — OFFICE VISIT (OUTPATIENT)
Dept: PHYSICAL THERAPY | Facility: CLINIC | Age: 53
End: 2023-03-16

## 2023-03-16 DIAGNOSIS — M75.101 TEAR OF RIGHT ROTATOR CUFF, UNSPECIFIED TEAR EXTENT, UNSPECIFIED WHETHER TRAUMATIC: ICD-10-CM

## 2023-03-16 DIAGNOSIS — M25.511 ACUTE PAIN OF RIGHT SHOULDER: Primary | ICD-10-CM

## 2023-03-16 NOTE — PROGRESS NOTES
Daily Note     Today's date: 3/16/2023  Patient name: Colleen Stock  : 1970  MRN: [id number]  Referring provider: Bill Posadas  Dx:   Encounter Diagnosis     ICD-10-CM    1  Acute pain of right shoulder  M25 511       2  Tear of right rotator cuff, unspecified tear extent, unspecified whether traumatic  M75  101                      Subjective: Pt reports he saw his MD recently, pt will be released back to work 4/10/23  He continues ot have discomfort overall in shoulder but reducing as of reduction in activity last few days  Objective: See treatment diary below      Assessment: Tolerated treatment well  Patient is still having exquisite pain with AROM abduction, pain with scaption elevation but far less than abd  PROM progressing very well  Plan: Continue per plan of care  Continue with reduction of activity for the rest of the week, until next visit next week  Eval/ Re-eval Auth #/ Referral # Total visits Start date  Expiration date Total active units  Total manual units  PT only or  PT+OT?     [id number]   12                                                 Precautions: Standard  PROTOCOL  Visit 7 8 9 10 11    2/28/23 3/7 3/9/23 3/14/23 3/16/23   PROM 15' flexion to 120 , abd 90  15' 10' 5' 5'                   Neuro Re-Ed        CS AROM                        Ther Ex        Pendulums 3x 2x 2x 2x 2 rounds   Shrugs 30x    HEP   Scap Ret 30x    HEP   Elbow AROM 30x ea  DC   Wrist AROM     DC        HEP   Table slides 2 x 10  2x10  2x10 ball 2x10 ball   Supine ER 2x 10     HEP   RTC Isos 5 way 2x 10 ea       DC   AAROM 2 x 10  2x10      Pulleys 4' flexion standing and seated  Flexion/Abd 4' ea 4' ea 4' 3' ea   Post Capsule str attempted HOLD   HOLD   Bent over row 2 x 10  2x10 2x10 2x10 2x10   AROM   Scaption, Abd 10x ea 10x ea 6x ea   SL ER   AROM 2x10 2x10 2x10   Supine flexion   Hands clasped 2x10 2x10 Wand 2x10   Serratus punch     2x10 Supine Ther Activity                                                Modalities

## 2023-03-16 NOTE — ASSESSMENT & PLAN NOTE
Patient with history of small Nj's but the previous biopsies were negative  He has been doing well on Nexium     -Patient was explained about the lifestyle and dietary modifications  Advised to avoid fatty foods, chocolates, caffeine, alcohol and any other triggering foods  Avoid eating for at least 3 hours before going to bed     -Continue Nexium regularly    Discussed about the long-term side effects     -Next surveillance EGD in December 2023    -Advised patient to call for any GI symptoms

## 2023-03-21 ENCOUNTER — OFFICE VISIT (OUTPATIENT)
Dept: PHYSICAL THERAPY | Facility: CLINIC | Age: 53
End: 2023-03-21

## 2023-03-21 DIAGNOSIS — M25.511 ACUTE PAIN OF RIGHT SHOULDER: Primary | ICD-10-CM

## 2023-03-21 DIAGNOSIS — M75.101 TEAR OF RIGHT ROTATOR CUFF, UNSPECIFIED TEAR EXTENT, UNSPECIFIED WHETHER TRAUMATIC: ICD-10-CM

## 2023-03-21 NOTE — PROGRESS NOTES
Daily Note - Progress Note    Today's date: 3/21/2023  Patient name: Gladys Bernabe  : 1970  MRN: [id number]  Referring provider: Dexter Brown*  Dx:   Encounter Diagnosis     ICD-10-CM    1  Acute pain of right shoulder  M25 511       2  Tear of right rotator cuff, unspecified tear extent, unspecified whether traumatic  M75  101                      Subjective: Pt reports his pain has been severe  Icing and heating regularly, sleep disturbed due to pain, had to take Tylenol today  Pain in same anterior shoulder locaiton  Pt reports when elevating his R UE he finds himself adjusting course to accommodate crepitus, away from flexion plane and more into scaption plane, this deviation feels more comfortable, he reports  He was able to reach to start his car ignition today successfully  Pt is walking long distances while wearing his sling  Pt remains unable to complete self care tasks indep or wihtout modifcation  Not able to RTW at this itme  Objective: See treatment diary below         Goals  Short Term Goals to be accomplished in 6 weeks:-ongoing  STG1: Pt will be I with HEP  STG2: Pt will be I with posture management   STG3: Pt will demo 50% improvement in shoulder AROM to improve self care and household ADLs   STG4: Pt will demo 1/2 MMT strength in shoulder  STG5: Pt will report pain < 3/10 in shoulder     Long Term Goals to be accomplished in 12 weeks: -ongoing  LTG1: Pt will demo full shoulder AROM to return to household duties  [employer] Gavel: Pt will return to work ADLs pain free as per PLOF  LTG3: Pt will demo shoulder strength WNL     Passive Range of Motion   Left Shoulder   Normal passive range of motion    Right Shoulder   Flexion: 120 degrees   Abduction: 90 degrees   External rotation 0°: 15 degrees   Internal rotation 0°: 40 degrees     Strength/Myotome Testing     Left Shoulder   Normal muscle strength    Additional Strength Details   strength equal  Wrist strength WNL  R elbow 4/5 MMT  R shoulder MMT NT per protocol      Assessment: Tolerated treatment well  Rebel Deutsch is a 47 y o  male who has been regularly participating in skilled PT since time of IE and at this time remains well below his PLOF however is indeed making good progress with respect to PROM and overall activity tolerance  Pt will begin to transition into more active and strength driven tasks in upcoming healing phases as outlined in 22 week post op protocol  At this time pt status is inline with healing phase and protocol, he demo good prognosis to transition into next phase of protocol and toward his est LTGs  Pt's recent pain exacerbation does not appear to have set him back  Pt has been a pleasure to work with  Plan: Continue per plan of care  Progress treament per protocol  Eval/ Re-eval Auth #/ Referral # Total visits Start date  Expiration date Total active units  Total manual units  PT only or  PT+OT?   2/9  [id number]   12 2/9 5/9      3/21/23                                          Precautions: Standard  PROTOCOL         Visit 8 9 10 11 12    3/7 3/9/23 3/14/23 3/16/23 3/21/23   PROM 15' 10' 5' 5' 10'                   Neuro Re-Ed        CS AROM                        Ther Ex        Pendulums 2x 2x 2x 2 rounds 2x   Shrugs    HEP    Scap Ret    HEP    Elbow AROM    DC    Wrist AROM    DC        HEP    Table slides 2x10  2x10 ball 2x10 ball Blue Ball on table 2x10    Supine ER    HEP    RTC Isos 5 way    DC    AAROM 2x10       Pulleys Flexion/Abd 4' ea 4' ea 4' 3' ea 3' ea   Post Capsule str HOLD   HOLD    Bent over row 2x10 2x10 2x10 2x10 20x   AROM  Scaption, Abd 10x ea 10x ea 6x ea Elevation x10, Punch out x5, Punch out and hold 5s x 5, Punch out and lower x10ea flex/scap    SL ER  AROM 2x10 2x10 2x10 2x10   Supine flexion  Hands clasped 2x10 2x10 Wand 2x10    Serratus punch    2x10 Supine 2x10   Wand ext/IR     10x ea   H add     Supine AROM x10   Prone Ext/ H Abd     x10 ea                   Ther Activity                                                Modalities

## 2023-03-23 ENCOUNTER — OFFICE VISIT (OUTPATIENT)
Dept: PHYSICAL THERAPY | Facility: CLINIC | Age: 53
End: 2023-03-23

## 2023-03-23 DIAGNOSIS — M25.511 ACUTE PAIN OF RIGHT SHOULDER: Primary | ICD-10-CM

## 2023-03-23 DIAGNOSIS — M75.101 TEAR OF RIGHT ROTATOR CUFF, UNSPECIFIED TEAR EXTENT, UNSPECIFIED WHETHER TRAUMATIC: ICD-10-CM

## 2023-03-23 NOTE — PROGRESS NOTES
Daily Note     Today's date: 3/23/2023  Patient name: Melony Skaggs  : 1970  MRN: [id number]  Referring provider: Natalia Saldaña*  Dx:   Encounter Diagnosis     ICD-10-CM    1  Acute pain of right shoulder  M25 511       2  Tear of right rotator cuff, unspecified tear extent, unspecified whether traumatic  M75  101                      Subjective: Pt reports the shoulder is doing "the same "      Objective: See treatment diary below      Assessment: Tolerated treatment well  Patient does demo good progress with PROM despite ongoing pain and compensation with elevation  Pt demo good prognosis through protocol  Plan: Continue per plan of care  Eval/ Re-eval Auth #/ Referral # Total visits Start date  Expiration date Total active units  Total manual units  PT only or  PT+OT?     [id number]   12 2/9 5/9      3/21/23 # [id number] approved  3/22/2023-2023  12 visits                                         Precautions: Standard  PROTOCOL         Visit 9 10 11 12 13 of 24    3/9/23 3/14/23 3/16/23 3/21/23 3/23/23   PROM 10' 5' 5' 10' 10'                   Neuro Re-Ed        CS AROM                        Ther Ex        Pendulums 2x 2x 2 rounds 2x 2x   Shrugs   HEP     Scap Ret   HEP     Elbow AROM   DC     Wrist AROM   DC        HEP     Table slides  2x10 ball 2x10 ball Blue Ball on table 2x10     Supine ER   HEP     RTC Isos 5 way   DC     AAROM        Pulleys 4' ea 4' 3' ea 3' ea 3' ea   Post Capsule str   HOLD     Bent over row 2x10 2x10 2x10 20x 2x10 2lbs   AROM Scaption, Abd 10x ea 10x ea 6x ea Elevation x10, Punch out x5, Punch out and hold 5s x 5, Punch out and lower x10ea flex/scap  [employer] via punch outs 1x10 ea    AROM x10 ea   SL ER AROM 2x10 2x10 2x10 2x10 1lb 2x10   Supine flexion Hands clasped 2x10 2x10 Wand 2x10  Wand 2x10   Serratus punch   2x10 Supine 2x10 Supine 2x10    L SL NV   Wand ext/IR    10x ea YTB x10 ea   H add    Supine AROM x10 2x10   Prone Ext/ H Abd x10 ea x10 ea 1lb   Biceps curl     3 way x10 ea   Tri ext     NV   SL Abd      NV                   Ther Activity                        Modalities             TENs and Ice 5'

## 2023-03-28 ENCOUNTER — OFFICE VISIT (OUTPATIENT)
Dept: PHYSICAL THERAPY | Facility: CLINIC | Age: 53
End: 2023-03-28

## 2023-03-28 DIAGNOSIS — M25.511 ACUTE PAIN OF RIGHT SHOULDER: Primary | ICD-10-CM

## 2023-03-28 DIAGNOSIS — M75.101 TEAR OF RIGHT ROTATOR CUFF, UNSPECIFIED TEAR EXTENT, UNSPECIFIED WHETHER TRAUMATIC: ICD-10-CM

## 2023-03-28 NOTE — PROGRESS NOTES
Daily Note     Today's date: 3/28/2023  Patient name: Chano Magana  : 1970  MRN: [id number]  Referring provider: Teagan Hernandez*  Dx:   Encounter Diagnosis     ICD-10-CM    1  Acute pain of right shoulder  M25 511       2  Tear of right rotator cuff, unspecified tear extent, unspecified whether traumatic  M75  101                      Subjective: Pt reports no significant changes in pain following progressions last visit  Objective: See treatment diary below      Assessment: Tolerated treatment well  Patient continues to demo good progress with PROM despite ongoing pain and compensation with elevation  Limitations in ER and IR PROM with muscle guarding  Min cueing for technique throughout session for proper biomechanics  Plan: Continue per plan of care  Eval/ Re-eval Auth #/ Referral # Total visits Start date  Expiration date Total active units  Total manual units  PT only or  PT+OT?     [id number]   12 2/9 5/9      3/21/23 # [id number] approved  3/22/2023-2023  12 visits                                         Precautions: Standard  PROTOCOL         Visit 9 10 11 12 13  14    3/9/23 3/14/23 3/16/23 3/21/23 3/23/23 3/28/23   PROM 10' 5' 5' 10' 10' 10'                     Neuro Re-Ed         CS AROM                           Ther Ex         Pendulums 2x 2x 2 rounds 2x 2x 2x   Shrugs   HEP      Scap Ret   HEP      Elbow AROM   DC      Wrist AROM   DC         HEP      Table slides  2x10 ball 2x10 ball Blue Ball on table 2x10      Supine ER   HEP      RTC Isos 5 way   DC      AAROM         Pulleys 4' ea 4' 3' ea 3' ea 3' ea 3' ea    Post Capsule str   HOLD      Bent over row 2x10 2x10 2x10 20x 2x10 2lbs 2x10 2lbs   AROM Scaption, Abd 10x ea 10x ea 6x ea Elevation x10, Punch out x5, Punch out and hold 5s x 5, Punch out and lower x10ea flex/scap  [employer] via punch outs 1x10 ea    AROM x10 ea [employer] via punch outs 1x10 ea    AROM x10 ea   SL ER AROM 2x10 2x10 2x10 2x10 1lb 2x10 2lb 2x10    Supine flexion Hands clasped 2x10 2x10 Wand 2x10  Wand 2x10 Wand 2x10   Serratus punch   2x10 Supine 2x10 Supine 2x10    L SL NV Supine 2x10   Wand ext/IR    10x ea YTB x10 ea YTB x10 ea   H add    Supine AROM x10 2x10 2x10   Prone Ext/ H Abd    x10 ea x10 ea 1lb x10 ea 1lb   Biceps curl     3 way x10 ea 3 way x10 ea   Tri ext     NV    SL Abd      NV                      Ther Activity                           Modalities              TENs and Ice 5' Ice 5'

## 2023-03-30 ENCOUNTER — OFFICE VISIT (OUTPATIENT)
Dept: PHYSICAL THERAPY | Facility: CLINIC | Age: 53
End: 2023-03-30

## 2023-03-30 DIAGNOSIS — M75.101 TEAR OF RIGHT ROTATOR CUFF, UNSPECIFIED TEAR EXTENT, UNSPECIFIED WHETHER TRAUMATIC: ICD-10-CM

## 2023-03-30 DIAGNOSIS — M25.511 ACUTE PAIN OF RIGHT SHOULDER: Primary | ICD-10-CM

## 2023-03-30 NOTE — PROGRESS NOTES
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  Objective: See treatment diary below    Assessment:  increased time spent educating patinet on scap mechanics and residual effects of tight muscles/structures on proper mechanics of scapulohumeral joint  patient demonstrated tightness and tenderness in pec minor, subscap and rhomboids/mid traps  patient demosntrated increased upper trpa compensation when attempting shoulder elevation in standing therefore peformed elevation in supine with 1 lb weight to initiate strengthening  patient was challenged with 1 lb wieght but was able to complete with good form  patient needed assistance/retraining with upward rotation of scap to aovid upper trap compensation  was able to perofrm with verbal and manual cuing  added scap protraction to facilitate serratus anterior upper fibers    The goal of the current treatment is to address patient's functional limitations as well as objective findings  Plan: continue as inicated by aislinn PT  ecommend rib assessment due to sensitivity of anterior rib on right and possible Ktape to for space correction           Eval/ Re-eval Auth #/ Referral # Total visits Start date  Expiration date Total active units  Total manual units  PT only or  PT+OT?     [id number]   12 2/9 5/9      3/21/23 # [id number] approved  3/22/2023-2023  12 visits Precautions: Standard  PROTOCOL  Visit 11 12 13 of 24 14 15/24 Completed 9 weeks today         3/16/23 3/21/23 3/23/23 3/28/23 3/30/23    PROM 5' 10' 10' 10' 10 min                   MI of pec minor, serratus, subscap and rhomboids: then instruct in self release with tennis ball    Neuro Re-Ed         CS AROM                           Ther Ex         Pendulums 2 rounds 2x 2x 2x     Shrugs HEP        Scap Ret HEP        Elbow AROM DC        Wrist AROM DC         HEP        Table slides 2x10 ball Blue Ball on table 2x10        Supine ER HEP        RTC Isos 5 way DC        AAROM     With cane 10 x     Pulleys 3' ea 3' ea 3' ea 3' ea  Flexion: 15 x   Retraction: 15 x     Post Capsule str HOLD        Bent over row 2x10 20x 2x10 2lbs 2x10 2lbs rev    AROM 6x ea Elevation x10, Punch out x5, Punch out and hold 5s x 5, Punch out and lower x10ea flex/scap  [employer] via punch outs 1x10 ea    AROM x10 ea [employer] via punch outs 1x10 ea    AROM x10 ea Supine elevation 0 lb: 10 x   1 lb 10 x    SL ER 2x10 2x10 1lb 2x10 2lb 2x10      Supine flexion Wand 2x10  Wand 2x10 Wand 2x10     Serratus punch 2x10 Supine 2x10 Supine 2x10    L SL NV Supine 2x10 Performed at 110 deg elevation with cane: 10 x 3    Wand ext/IR  10x ea YTB x10 ea YTB x10 ea Wand extension: 10 x 2    H add  Supine AROM x10 2x10 2x10     Prone Ext/ H Abd  x10 ea x10 ea 1lb x10 ea 1lb     Biceps curl   3 way x10 ea 3 way x10 ea     Tri ext   NV      SL Abd    NV      sidelying scap  resetting     5 sec x 10 PROM/ With iso hold                      sidelying upward trap rotation      PROM with active elevation, then MREs x 1015      Ther Activity                           Modalities            TENs and Ice 5' Ice 5'  Ice + tens 10 min

## 2023-04-04 ENCOUNTER — OFFICE VISIT (OUTPATIENT)
Dept: PHYSICAL THERAPY | Facility: CLINIC | Age: 53
End: 2023-04-04

## 2023-04-04 DIAGNOSIS — M75.101 TEAR OF RIGHT ROTATOR CUFF, UNSPECIFIED TEAR EXTENT, UNSPECIFIED WHETHER TRAUMATIC: ICD-10-CM

## 2023-04-04 DIAGNOSIS — M25.511 ACUTE PAIN OF RIGHT SHOULDER: Primary | ICD-10-CM

## 2023-04-06 ENCOUNTER — OFFICE VISIT (OUTPATIENT)
Dept: PHYSICAL THERAPY | Facility: CLINIC | Age: 53
End: 2023-04-06

## 2023-04-06 DIAGNOSIS — M75.101 TEAR OF RIGHT ROTATOR CUFF, UNSPECIFIED TEAR EXTENT, UNSPECIFIED WHETHER TRAUMATIC: ICD-10-CM

## 2023-04-06 DIAGNOSIS — M25.511 ACUTE PAIN OF RIGHT SHOULDER: Primary | ICD-10-CM

## 2023-04-06 NOTE — PROGRESS NOTES
Daily Note     Today's date: 2023  Patient name: Tom Subramanian  : 1970  MRN: [id number]  Referring provider: Joseluis River*  Dx:   Encounter Diagnosis     ICD-10-CM    1  Acute pain of right shoulder  M25 511       2  Tear of right rotator cuff, unspecified tear extent, unspecified whether traumatic  M75  101                      Subjective: No new complaints, pt generally reporting he ir progressing, pt returns to work next week  Objective: See treatment diary below  HEP updated, therex progressed  Assessment: Tolerated treatment well  Patient demo overall good progress through protocol but remains well below PLOF at this time, which is consistent with his post op status and only very early introduction to strengthening in an otherwise predominantly strengthening protocol for 22 weeks  Plan: Continue per plan of care  Eval/ Re-eval Auth #/ Referral # Total visits Start date  Expiration date Total active units  Total manual units  PT only or  PT+OT?     [id number]   12 2/9 5/9      3/21/23 # [id number] approved  3/22/2023-2023  12 visits                                         Precautions: Standard  PROTOCOL  HEP update 23 Access Code [email or web address]      Visit 13  14 15/24 Completed 9 weeks today     16/24 17/24    3/23/23 3/28/23 3/30/23 4/4/23 4/6/23   PROM 10' 10' 10 min  10' 10'              MI of pec minor, serratus, subscap and rhomboids: then instruct in self release with tennis ball     Neuro Re-Ed                Ther Ex        Pendulums 2x 2x      Table slides    Wall slides 2x10 (added to HEP) 2x10   AAROM   With cane 10 x      Pulleys 3' ea 3' ea  Flexion: 15 x   Retraction: 15 x  3' 3'   Post Capsule str        Bent over row 2x10 2lbs 2x10 2lbs rev Standing rows Green TB 2x10 2x10   AROM [employer] via punch outs 1x10 ea    AROM x10 ea [employer] via punch outs 1x10 ea    AROM x10 ea Supine elevation 0 lb: 10 x   1 lb 10 x  x10 ea   SL ER 1lb 2x10 2lb 2x10   Standing ER TBand Green 2x10 2x10   IR TBand     NV   Supine flexion Wand 2x10 Wand 2x10   4lbs wand 2x10   Serratus punch Supine 2x10    L SL NV Supine 2x10 Performed at 110 deg elevation with cane: 10 x 3     Wand ext/IR YTB x10 ea YTB x10 ea Wand extension: 10 x 2     H add 2x10 2x10      Prone Ext/ H Abd x10 ea 1lb x10 ea 1lb      Biceps curl 3 way x10 ea 3 way x10 ea  3lbs 3 way, 2x10 3lbs 2x10   Tri ext NV   Green TB 2x10 2x10   SL Abd  NV   10x (added to HEP) x10   sidelying scap  resetting   5 sec x 10 PROM/ With iso hold     IR SOS HBB     5x           sidelying upward trap rotation    PROM with active elevation, then MREs x 1015       Ther Activity                        Modalities         TENs and Ice 5' Ice 5'  Ice + tens 10 min  10' 5'

## 2023-04-07 NOTE — PROGRESS NOTES
COVID-19 Outpatient Progress Note    Assessment/Plan:    Problem List Items Addressed This Visit     None      Visit Diagnoses     COVID-19 virus infection    -  Primary         Disposition:     Patient has COVID-19 infection  Based off CDC guidelines, they were recommended to isolate for 5 days from the date of the positive test  If they remain asymptomatic, isolation may be ended followed by 5 days of wearing a mask when around othes to minimize risk of infecting others  If they have a fever, continue to stay home until fever resolves for at least 24 hours  Discussed symptom directed medication options with patient  Discussed vitamin D, vitamin C, and/or zinc supplementation with patient  I have spent 8 minutes directly with the patient  Offered MAB and Paxlovid, he does not want either d/t concern with EUA  Will treat symptomatically, pt to call with any changes or worsening symptoms  Pt to send documentation of positive test      Encounter provider Frankey Kill, CRNP    Provider located at [address] [address]    Recent Visits  No visits were found meeting these conditions  Showing recent visits within past 7 days and meeting all other requirements  Today's Visits  Date Type Provider Dept   04/29/22 Telemedicine Frankey Kill, [place] today's visits and meeting all other requirements  Future Appointments  No visits were found meeting these conditions  Showing future appointments within next 150 days and meeting all other requirements     This virtual check-in was done via telephone and he agrees to proceed  Patient agrees to participate in a virtual check in via telephone or video visit instead of presenting to the office to address urgent/immediate medical needs  Patient is aware this is a billable service      After connecting through Telephone, the patient was identified by name and date of birth  Isabel Barrera was informed that this was a telemedicine visit and that the exam was being conducted confidentially over secure lines  My office door was closed  No one else was in the room  Isabel Barrera acknowledged consent and understanding of privacy and security of the telemedicine visit  I informed the patient that I have reviewed his record in Epic and presented the opportunity for him to ask any questions regarding the visit today  The patient agreed to participate  It was my intent to perform this visit via video technology but the patient was not able to do a video connection so the visit was completed via audio telephone only  Verification of patient location:  Patient is located in the following state in which I hold an active license: [place]    Subjective:   Isabel Barrera is a 47 y o  male who has been screened for COVID-19  Symptom change since last report: unchanged  Patient's symptoms include nasal congestion and cough (mild)  Patient denies fever, chills, fatigue, malaise, rhinorrhea, sore throat, anosmia, loss of taste, shortness of breath, chest tightness, abdominal pain, nausea, vomiting, diarrhea, myalgias and headaches  - Date of symptom onset: 4/27/2022  - Date of positive COVID-19 test: 4/28/2022  Type of test: Home antigen  COVID-19 vaccination status: Not vaccinated    Christiano Lima has been staying home and has isolated themselves in his home  He is taking care to not share personal items and He is wearing a mask when he leaves his room  Pt was exposed to Covid a couple of days prior to symptom onset  He has congestion and mild cough, otherwise asymptomatic  Had Covid approx 1 year ago, not vaccinated  History of Factor V, on lifelong anticoagulation         Lab Results   Component Value Date    SARSCOV2 Negative 11/05/2021     Past Medical History:   Diagnosis Date    Anemia     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 07/12/2006    DVT [facility]deep venous thrombosis) [facility] [address] ) 2018    Dysphagia     Last Assessed: 8/12/2014     Factor 5 Leiden mutation, heterozygous ([facility] [address] )     GERD (gastroesophageal reflux disease)     Globus sensation     Last Assessed: 6/4/2014     H/O neoplasm of uncertain behavior of skin 06/06/2006    Hyperlipidemia     Hypertension 02/14/2006    Lateral epicondylitis of right elbow     Last Assessed: 10/23/2015     Pes planus     last assessed: 10/23/2013     Plantar fascial fibromatosis     Last Assessed: 10/23/2013     Pul[facility] embol[facility] ([facility] )     Right patellofemoral syndrome     Last Assessed: 4/15/2016     Sebaceous cyst 11/03/2007     Past Surgical History:   Procedure Laterality Date    COLONOSCOPY      ESOPHAGOGASTRODUODENOSCOPY N/A 10/7/2016    Procedure: ESOPHAGOGASTRODUODENOSCOPY (EGD); Surgeon: Siva Don MD;  Location: [facility]; Service:     NASAL SEPTUM SURGERY  1995    [place] ESOPHAGOGASTRODUODENOSCOPY TRANSORAL DIAGNOSTIC N/A 12/6/2018    Procedure: ESOPHAGOGASTRODUODENOSCOPY (EGD); Surgeon: Siva Don MD;  Location: [facility]; Service: Gastroenterology     Current Outpatient Medications   Medication Sig Dispense Refill    esomeprazole (NexIUM) 40 MG capsule Take 1 capsule (40 mg total) by mouth daily 90 capsule 3    ferrous sulfate 324 (65 Fe) mg Take 1 tablet (324 mg total) by mouth 3 (three) times a day before meals 90 tablet 5    Xarelto 20 MG tablet TAKE 1 TABLET BY MOUTH DAILY 90 tablet 1     No current facility-administered medications for this visit  No Known Allergies    Review of Systems   Constitutional: Negative for chills, fatigue and fever  HENT: Positive for congestion  Negative for rhinorrhea and sore throat  Respiratory: Positive for cough (mild)  Negative for chest tightness and shortness of breath  Gastrointestinal: Negative for abdominal pain, diarrhea, nausea and vomiting  Musculoskeletal: Negative for myalgias  Neurological: Negative for headaches  Objective:     There were no vitals filed for this visit  Physical Exam    VIRTUAL VISIT DISCLAIMER    Trish Wyatt verbally agrees to participate in [employer]  Pt is aware that [employer] could be limited without vital signs or the ability to perform a full hands-on physical Phani Toussaint understands he or the provider may request at any time to terminate the video visit and request the patient to seek care or treatment in person  15

## 2023-04-25 ENCOUNTER — APPOINTMENT (OUTPATIENT)
Dept: PHYSICAL THERAPY | Facility: CLINIC | Age: 53
End: 2023-04-25

## 2023-04-27 ENCOUNTER — APPOINTMENT (OUTPATIENT)
Dept: PHYSICAL THERAPY | Facility: CLINIC | Age: 53
End: 2023-04-27

## 2023-05-02 ENCOUNTER — OFFICE VISIT (OUTPATIENT)
Dept: PHYSICAL THERAPY | Facility: CLINIC | Age: 53
End: 2023-05-02

## 2023-05-02 DIAGNOSIS — M75.101 TEAR OF RIGHT ROTATOR CUFF, UNSPECIFIED TEAR EXTENT, UNSPECIFIED WHETHER TRAUMATIC: ICD-10-CM

## 2023-05-02 DIAGNOSIS — M25.511 ACUTE PAIN OF RIGHT SHOULDER: Primary | ICD-10-CM

## 2023-05-02 NOTE — PROGRESS NOTES
Daily Note     Today's date: 2023  Patient name: Carolyn Jameson  : 1970  MRN: [id number]  Referring provider: Dottie Quick*  Dx:   Encounter Diagnosis     ICD-10-CM    1  Acute pain of right shoulder  M25 511       2  Tear of right rotator cuff, unspecified tear extent, unspecified whether traumatic  M75  101                      Subjective: Reports a 3/10 pain level      Objective: See treatment diary below      Assessment: Tolerated treatment well  Patient would benefit from continued PT in order to build right rotator cuff muscles  Did well with the addition of 1# of weight to [facility] walk  No increase in pain levels with exercise  Continue per primary therapists plan  Plan: Continue per plan of care  Eval/ Re-eval Auth #/ Referral # Total visits Start date  Expiration date Total active units  Total manual units  PT only or  PT+OT?     [id number]   12 2/9 5/9      3/21/23 # [id number] approved  3/22/2023-2023  12 visits                                         Precautions: Standard  PROTOCOL       HEP update 23 Access Code [email or web address]      Visit  18 of 24 19 or 24 23 PN    PROM 10' 10' 10' 8 min  8'                      Neuro Re-Ed         Rhythmic stab   Sup 90 deg flex 4x 30s      Ther Ex         Pendulums         Table slides 2x10  2x10 Wall slides; 2 x 10       AAROM         Pulleys 3' 3' 3' 3 min   3 min   Post Capsule str   Manual  Manual Manual    Bent over row 2x10 2x10 Blue 2x10 shldr ext: CC: 7 5 lb 10 x 2 rounds    Rows: 7 5 lb 10 x 2  shldr ext: CC: 7 5 lb 10 x 2 rounds    Rows: 7 5 lb 10 x 2  shldr ext: CC: 7 5 lb 10 x 2 rounds    Rows: 7 5 lb 10 x 2   AROM x10 ea x10 x10  x10    SL ER 2x10 2x10 standing and SL 2x10 ea ER: Standin 5 lb 2 x 10   Sidelying: 3 lb 10 x 2     ER: Standin 5 lb 2 x 10   Sidelying: 3 lb 10 x 2 ER: Standin 5 lb 2 x 10   Sidelying: 3 lb 10 x 2   IR TBand NV Blue TB 2x10 2x10 IR: 2 5 lb 10 x 2 IR: 2 5 lb 10 x 2 IR: 2 5 lb 10 x 2   Supine flexion 4lbs wand 2x10 6lbs 2x10 2x10 7 lb 10 x 2  8lbs 2x10 8lbs 2x10   Serratus punch  4lbs 2x10 2x10 4 lb: 10 x 2     Biceps curl 3lbs 2x10 5lbs 2x10 6lbs 2x10 7 lb 2 x 10   2x10 2*10   Tri ext 2x10 3x10 3x10 CC: 7 5 lb 10 x 3 CC: 7 5 lb 10 x 3 CC: 7 5 lb 10 x 3   SL Abd x10 2lbs 2x10 2x10 2 lb 10 x 2 2x10 2*10   sidelying scap  resetting         IR SOS HBB 5x 10x 10x 10 x  x10 x10   Med ball carry      B UEs 12lbs 150' B UEs 12lbs 150'   Palloff press     Walking 150' 9lbs B UEs Walking 150' 9lbs B UEs   OH Carry     0lbs 150' 1 lbs 150'            sidelying upward trap rotation          Ther Activity                           Modalities          5' 5' 5' 5 min  5' 5'

## 2023-05-04 ENCOUNTER — OFFICE VISIT (OUTPATIENT)
Dept: PHYSICAL THERAPY | Facility: CLINIC | Age: 53
End: 2023-05-04

## 2023-05-04 DIAGNOSIS — M75.101 TEAR OF RIGHT ROTATOR CUFF, UNSPECIFIED TEAR EXTENT, UNSPECIFIED WHETHER TRAUMATIC: ICD-10-CM

## 2023-05-04 DIAGNOSIS — M25.511 ACUTE PAIN OF RIGHT SHOULDER: Primary | ICD-10-CM

## 2023-05-04 NOTE — PROGRESS NOTES
Daily Note         Today's date: 2023  Patient name: Ana Quiles  : 1970  MRN: [id number]  Referring provider: Bill Thompson*  Dx:   Encounter Diagnosis     ICD-10-CM    1  Acute pain of right shoulder  M25 511       2  Tear of right rotator cuff, unspecified tear extent, unspecified whether traumatic  M75 101           Subjective: Ana Quiles reports his shoulder has been sore  Pain level entering today 3/10  Objective: See treatment diary below    Assessment:  patient had excellent toleranc eot progresm today  advised patient to vocalize with PT if weights felt too heavy  he voclaized understadning and denied when questioned  patient still has tightness at end range flexion with PROM  patient with mild upper trap compensation with elevation actvities          Plan: progress as indicated by primary PT  Eval/ Re-eval Auth #/ Referral # Total visits Start date  Expiration date Total active units  Total manual units  PT only or  PT+OT?     [id number]   12 2/9 5/9      3/21/23 # [id number] approved  3/22/2023-2023  12 visits                                         Precautions: Standard  PROTOCOL       HEP update 23 Access Code [email or web address]      Visit  18 of 24 19 or 24 20 of 24 21 of 24 22 of 24 23 of 24    23 PN 23   PROM 10' 10' 10' 8 min  8'  Performed                        Neuro Re-Ed          Rhythmic stab   Sup 90 deg flex 4x 30s       Ther Ex          Pendulums          Table slides 2x10  2x10 Wall slides; 2 x 10     Wall slides; 2 x 10     AAROM          Pulleys 3' 3' 3' 3 min   3 min 3 min    Post Capsule str   Manual  Manual Manual     Bent over row 2x10 2x10 Blue 2x10 shldr ext: CC: 7 5 lb 10 x 2 rounds    Rows: 7 5 lb 10 x 2  shldr ext: CC: 7 5 lb 10 x 2 rounds    Rows: 7 5 lb 10 x 2  shldr ext: CC: 7 5 lb 10 x 2 rounds    Rows: 7 5 lb 10 x 2 Rows: 8 lb 10 x 2     extension: 8 lb 10 x 2   AROM x10 ea x10 x10  x10  per patient Scaption: 3 lb 10 x 2   SL ER 2x10 2x10 standing and SL 2x10 ea ER: Standin 5 lb 2 x 10   Sidelying: 3 lb 10 x 2     ER: Standin 5 lb 2 x 10   Sidelying: 3 lb 10 x 2 ER: Standin 5 lb 2 x 10   Sidelying: 3 lb 10 x 2 CC: ER: 2 5 lb: 10 x2    Sidelying: 3 lb 10 x 2       IR TBand NV Blue TB 2x10 2x10 IR: 2 5 lb 10 x 2 IR: 2 5 lb 10 x 2 IR: 2 5 lb 10 x 2 CC: 2 5 lb 10 x 2    Supine flexion 4lbs wand 2x10 6lbs 2x10 2x10 7 lb 10 x 2  8lbs 2x10 8lbs 2x10 8 lb: both hands 10 x 2    Serratus punch  4lbs 2x10 2x10 4 lb: 10 x 2   sidelying as per patient : 3 lb 10 x    Biceps curl 3lbs 2x10 5lbs 2x10 6lbs 2x10 7 lb 2 x 10   2x10 2*10 8  Lb 10 x 2   Tri ext 2x10 3x10 3x10 CC: 7 5 lb 10 x 3 CC: 7 5 lb 10 x 3 CC: 7 5 lb 10 x 3 CC: 7 5 lb:    SL Abd x10 2lbs 2x10 2x10 2 lb 10 x 2 2x10 2*10 --   sidelying scap  resetting          IR SOS HBB 5x 10x 10x 10 x  x10 x10 W/ pulleys: 10 x    Med ball carry      B UEs 12lbs 150' B UEs 12lbs 150' erin UE: 8  feet   Palloff press     Walking 150' 9lbs B UEs Walking 150' 9lbs B UEs Walking 150' 9lbs B UEs   OH Carry     0lbs 150' 1 lbs 150' 1 lb: 150 feet             sidelying upward trap rotation           Ther Activity                              Modalities           5' 5' 5' 5 min  5' 5'

## 2023-05-09 ENCOUNTER — OFFICE VISIT (OUTPATIENT)
Dept: PHYSICAL THERAPY | Facility: CLINIC | Age: 53
End: 2023-05-09

## 2023-05-09 DIAGNOSIS — M75.101 TEAR OF RIGHT ROTATOR CUFF, UNSPECIFIED TEAR EXTENT, UNSPECIFIED WHETHER TRAUMATIC: ICD-10-CM

## 2023-05-09 DIAGNOSIS — M25.511 ACUTE PAIN OF RIGHT SHOULDER: Primary | ICD-10-CM

## 2023-05-09 NOTE — PROGRESS NOTES
"Daily Note     Today's date: 2023  Patient name: Fide Healy  : 1970  MRN: [id number]  Referring provider: Jose Parker*  Dx:   Encounter Diagnosis     ICD-10-CM    1  Acute pain of right shoulder  M25 511       2  Tear of right rotator cuff, unspecified tear extent, unspecified whether traumatic  M75 101           Start Time: 1700  Stop Time: 1745  Total time in clinic (min): 45 minutes    Patient was co-treated by a physical therapy student, Anya Delaney, under my direct supervision  Subjective: Pt reports he still has the \"same old pains  \" He reports anterior R shoulder pain at the point of the biceps tendon  Objective: See treatment diary below      Assessment: Tolerated treatment fair  Discussed incorporating bicep and ER stretching of the R shoulder to increase motion and increase comfort  Discussed scapular motion and coordination and how that can contribute to the pain he is having  He did have pain relief with bicep and ER stretching  Tactile cue to depress and retract scapulae during flexion/abduction increased AROM with minor discomfort  Patient demonstrated fatigue post treatment and would benefit from continued PT to maximize function and promote return to PLOF  Plan: Continue per plan of care  Progress treatment as tolerated  Eval/ Re-eval Auth #/ Referral # Total visits Start date  Expiration date Total active units  Total manual units  PT only or  PT+OT?     [id number]    2/9 5/9      3/21/23 # [id number] approved  3/22/2023-2023  12 visits                                         Precautions: Standard  PROTOCOL       HEP update 23 Access Code [email or web address]      Visit  18 of 24 19 or 24 20 of 24 21 of 24 22 of 24 23 of 24 24 of 23 PN 23   PROM 10' 10' 10' 8 min  8'  Performed                           Neuro Re-Ed           Rhythmic stab   Sup 90 deg flex 4x 30s        Ther Ex         " "  Pendulums           Table slides 2x10  2x10 Wall slides; 2 x 10     Wall slides; 2 x 10      AAROM        Bicep stretching on table (R) 10\"x15    ER stretch with 3# 30\"x3   Pulleys 3' 3' 3' 3 min   3 min 3 min  3 min    Post Capsule str   Manual  Manual Manual      Bent over row 2x10 2x10 Blue 2x10 shldr ext: CC: 7 5 lb 10 x 2 rounds    Rows: 7 5 lb 10 x 2  shldr ext: CC: 7 5 lb 10 x 2 rounds    Rows: 7 5 lb 10 x 2  shldr ext: CC: 7 5 lb 10 x 2 rounds    Rows: 7 5 lb 10 x 2 Rows: 8 lb 10 x 2     extension: 8 lb 10 x 2 Rows: 8 lb 10 x 2     extension: 8 lb 10 x 2   AROM x10 ea x10 x10  x10  per patient Scaption: 3 lb 10 x 2 per patient Scaption: 3 lb 10 x 2   SL ER 2x10 2x10 standing and SL 2x10 ea ER: Standin 5 lb 2 x 10   Sidelying: 3 lb 10 x 2     ER: Standin 5 lb 2 x 10   Sidelying: 3 lb 10 x 2 ER: Standin 5 lb 2 x 10   Sidelying: 3 lb 10 x 2 CC: ER: 2 5 lb: 10 x2    Sidelying: 3 lb 10 x 2     Sidelying: 3 lb 10 x 2   IR TBand NV Blue TB 2x10 2x10 IR: 2 5 lb 10 x 2 IR: 2 5 lb 10 x 2 IR: 2 5 lb 10 x 2 CC: 2 5 lb 10 x 2  CC: 2 5 lb 10 x 2    Supine flexion 4lbs wand 2x10 6lbs 2x10 2x10 7 lb 10 x 2  8lbs 2x10 8lbs 2x10 8 lb: both hands 10 x 2  8 lb: both hands 10 x 2    Serratus punch  4lbs 2x10 2x10 4 lb: 10 x 2   sidelying as per patient : 3 lb 10 x  sidelying as per patient : 3 lb 10 x    Biceps curl 3lbs 2x10 5lbs 2x10 6lbs 2x10 7 lb 2 x 10   2x10 2*10 8  Lb 10 x 2 8# 10x2   Tri ext 2x10 3x10 3x10 CC: 7 5 lb 10 x 3 CC: 7 5 lb 10 x 3 CC: 7 5 lb 10 x 3 CC: 7 5 lb:     SL Abd x10 2lbs 2x10 2x10 2 lb 10 x 2 2x10 2*10 -- 2x10 1#   sidelying scap  resetting           IR SOS HBB 5x 10x 10x 10 x  x10 x10 W/ pulleys: 10 x     Med ball carry      B UEs 12lbs 150' B UEs 12lbs 150' erin UE: 8  feet    Palloff press     Walking 150' 9lbs B UEs Walking 150' 9lbs B UEs Walking 150' 9lbs B UEs    OH Carry     0lbs 150' 1 lbs 150' 1 lb: 150 feet 2# 150ft              sidelying upward trap rotation          " Ther Activity                                 Modalities            5' 5' 5' 5 min  5' 5'

## 2023-05-11 ENCOUNTER — OFFICE VISIT (OUTPATIENT)
Dept: PHYSICAL THERAPY | Facility: CLINIC | Age: 53
End: 2023-05-11

## 2023-05-11 DIAGNOSIS — M25.511 ACUTE PAIN OF RIGHT SHOULDER: Primary | ICD-10-CM

## 2023-05-11 DIAGNOSIS — M75.101 TEAR OF RIGHT ROTATOR CUFF, UNSPECIFIED TEAR EXTENT, UNSPECIFIED WHETHER TRAUMATIC: ICD-10-CM

## 2023-05-11 NOTE — PROGRESS NOTES
"Daily Note     Today's date: 2023  Patient name: Gabrielle Burgos  : 1970  MRN: [id number]  Referring provider: Stephani Soler  Dx:   Encounter Diagnosis     ICD-10-CM    1  Acute pain of right shoulder  M25 511       2  Tear of right rotator cuff, unspecified tear extent, unspecified whether traumatic  M75 101           Start Time: 1710  Stop Time: 1800  Total time in clinic (min): 50 minutes    Subjective: Pt reports that he felt like he has gained more motion since last visit  Reports pinpoint discomfort at scar distal to [facility] joint  Mostly achy/nagging, but can be sharp at times  Reports that sleeping has improved  Objective: See treatment diary below  SC and AC joint inferior hypomobility    Assessment: Tolerated treatment well  Patient reported reduced shoulder irritation following manual intervention  Improved shoulder AROM noted after joint mobs added today  Plan: Continue per plan of care  Progress treatment as tolerated  Eval/ Re-eval Auth #/ Referral # Total visits Start date  Expiration date Total active units  Total manual units  PT only or  PT+OT?     [id number]   12 2/9 5/9      3/21/23 # [id number] approved  3/22/2023-2023  12 visits                                         Precautions: Standard  PROTOCOL       HEP update 23 Access Code [email or web address]      Visit  25    23 PN 23   PROM 8'  Performed   performed   AC and SC mobs     FB   subscap and teres minor release     FB           Neuro Re-Ed                Ther Ex        Pendulums        Table slides   Wall slides; 2 x 10       AAROM    Bicep stretching on table (R) 10\"x15    ER stretch with 3# 30\"x3    Pulleys  3 min 3 min  3 min     Post Capsule str Manual Manual       Bent over row shldr ext: CC: 7 5 lb 10 x 2 rounds  Rows: 7 5 lb 10 x 2  shldr ext: CC: 7 5 lb 10 x 2 rounds  Rows: 7 5 lb 10 x 2 Rows: 8 lb 10 x 2     extension: 8 lb 10 x 2 " Rows: 8 lb 10 x 2     extension: 8 lb 10 x 2 Rows: 8 lb 10 x 2     extension: 8 lb 10 x 2   AROM x10  per patient Scaption: 3 lb 10 x 2 per patient Scaption: 3 lb 10 x 2 Scaption: 3 lb 10 x 2   SL ER ER: Standin 5 lb 2 x 10   Sidelying: 3 lb 10 x 2 ER: Standin 5 lb 2 x 10   Sidelying: 3 lb 10 x 2 CC: ER: 2 5 lb: 10 x2    Sidelying: 3 lb 10 x 2 Sidelying: 3 lb 10 x 2 Sidelyin*10, 3#   IR TBand IR: 2 5 lb 10 x 2 IR: 2 5 lb 10 x 2 CC: 2 5 lb 10 x 2  CC: 2 5 lb 10 x 2     Supine flexion 8lbs 2x10 8lbs 2x10 8 lb: both hands 10x2  8 lb: both hands 10 x 2  8 lb: both hands 10 x 2    Serratus punch   sidelying as per patient : 3 lb 10 x  sidelying as per patient : 3 lb 10 x  S/L 2*10, 3#   Biceps curl 2x10 2*10 8  Lb 10 x 2 8# 10x2 2*10, 8#   Tri ext CC: 7 5 lb 10 x 3 CC: 7 5 lb 10 x 3 CC: 7 5 lb:      SL Abd 2x10 2*10 -- 2x10 1# 2*10, 1#   sidelying scap  resetting        IR SOS HBB x10 x10 W/ pulleys: 10 x   W/pulleys: 10x   Med ball carry  B UEs 12lbs 150' B UEs 12lbs 150' erin UE: 8  feet     Palloff press Walking 150' 9lbs B UEs Walking 150' 9lbs B UEs Walking 150' 9lbs B UEs     OH Carry 0lbs 150' 1 lbs 150' 1 lb: 150 feet 2# 150ft            sidelying upward trap rotation         Ther Activity                        Modalities         5' 5'

## 2023-05-16 ENCOUNTER — OFFICE VISIT (OUTPATIENT)
Dept: PHYSICAL THERAPY | Facility: CLINIC | Age: 53
End: 2023-05-16

## 2023-05-16 DIAGNOSIS — M75.101 TEAR OF RIGHT ROTATOR CUFF, UNSPECIFIED TEAR EXTENT, UNSPECIFIED WHETHER TRAUMATIC: ICD-10-CM

## 2023-05-16 DIAGNOSIS — M25.511 ACUTE PAIN OF RIGHT SHOULDER: Primary | ICD-10-CM

## 2023-05-16 NOTE — PROGRESS NOTES
Daily Note - Progress Note    Today's date: 2023  Patient name: Frank Deleon  : 1970  MRN: [id number]  Referring provider: Nahun Dunne*  Dx:   Encounter Diagnosis     ICD-10-CM    1  Acute pain of right shoulder  M25 511       2  Tear of right rotator cuff, unspecified tear extent, unspecified whether traumatic  M75  101                      Subjective: Pt reports he is feeling well  He continues to have soreness in his R shoulder and tightness, he also notices weakness  He is working but is not completing full work duties at this time  Objective: See treatment diary below  R shoulder AROM: Flexion 140 deg, Abd 140 deg, ER 40, IR HBB min loss compared to L  R shldr strength 3+ to 4-/5 MMT throughout  R elbow strength 4- to 4/5 throughout  LTG partially met/ongoing      Assessment: Tolerated treatment well  Similar to last reporting period, patient demo excellent progression and demo great prognosis for further gains  Pt deficits do place him below his PLOF and at this time would benefit from ongoin skilled PT to [facility] return to PLOF and LTG accomplishment  He is working at very reduced responsibility and has yet to return to full work duty  Pt does still have to modify self care and ADLs  Plan: Continue per plan of care  Pt protocol has 10 weeks remaining  Eval/ Re-eval Auth #/ Referral # Total visits Start date  Expiration date Total active units  Total manual units  PT only or  PT+OT?     [id number]   12 2/9 5/9      3/21/23 # [id number] approved  3/22/2023-2023  12 visits           # [id number]  64 visits-2023-2023                               Precautions: Standard  PROTOCOL       HEP update 23 Access Code [email or web address]        Visit  25 26    23 PN 23   PROM 8'  Performed   performed    AC and SC mobs     FB    subscap and teres minor release     FB             Neuro Re-Ed "           Ther Ex         Pendulums         Table slides   Wall slides; 2 x 10        AAROM    Bicep stretching on table (R) 10\"x15    ER stretch with 3# 30\"x3     Pulleys  3 min 3 min  3 min      Post Capsule str Manual Manual        Bent over row shldr ext: CC: 7 5 lb 10 x 2 rounds  Rows: 7 5 lb 10 x 2  shldr ext: CC: 7 5 lb 10 x 2 rounds  Rows: 7 5 lb 10 x 2 Rows: 8 lb 10 x 2     extension: 8 lb 10 x 2 Rows: 8 lb 10 x 2     extension: 8 lb 10 x 2 Rows: 8 lb 10 x 2     extension: 8 lb 10 x 2 Rows: 8 lb 10 x 2     extension: 8 lb 10 x 2      AROM x10  per patient Scaption: 3 lb 10 x 2 per patient Scaption: 3 lb 10 x 2 Scaption: 3 lb 10 x 2 Scaption: 3 lb 10 x 2    3way raises 5x2 1lb   SL ER ER: Standin 5 lb 2 x 10   Sidelying: 3 lb 10 x 2 ER: Standin 5 lb 2 x 10   Sidelying: 3 lb 10 x 2 CC: ER: 2 5 lb: 10 x2    Sidelying: 3 lb 10 x 2 Sidelying: 3 lb 10 x 2 Sidelyin*10, 3# Sidelyin*10, 3#   IR TBand IR: 2 5 lb 10 x 2 IR: 2 5 lb 10 x 2 CC: 2 5 lb 10 x 2  CC: 2 5 lb 10 x 2      Supine flexion 8lbs 2x10 8lbs 2x10 8 lb: both hands 10x2  8 lb: both hands 10 x 2  8 lb: both hands 10 x 2  8 lb: both hands 10 x 2    Serratus punch   sidelying as per patient : 3 lb 10 x  sidelying as per patient : 3 lb 10 x  S/L 2*10, 3# S/L 2*10, 3#   Biceps curl 2x10 2*10 8  Lb 10 x 2 8# 10x2 2*10, 8# 2*10, 8#   Tri ext CC: 7 5 lb 10 x 3 CC: 7 5 lb 10 x 3 CC: 7 5 lb:       SL Abd 2x10 2*10 -- 2x10 1# 2*10, 1# 2*10, 1#   sidelying scap  resetting         IR SOS HBB x10 x10 W/ pulleys: 10 x   W/pulleys: 10x    Med ball carry  B UEs 12lbs 150' B UEs 12lbs 150' erin UE: 8  feet      Palloff press Walking 150' 9lbs B UEs Walking 150' 9lbs B UEs Walking 150' 9lbs B UEs      OH Carry 0lbs 150' 1 lbs 150' 1 lb: 150 feet 2# 150ft  3lbs             sidelying upward trap rotation          Ther Activity                           Modalities          5' 5'                     "

## 2023-05-18 ENCOUNTER — OFFICE VISIT (OUTPATIENT)
Dept: PHYSICAL THERAPY | Facility: CLINIC | Age: 53
End: 2023-05-18

## 2023-05-18 DIAGNOSIS — M25.511 ACUTE PAIN OF RIGHT SHOULDER: Primary | ICD-10-CM

## 2023-05-18 DIAGNOSIS — M75.101 TEAR OF RIGHT ROTATOR CUFF, UNSPECIFIED TEAR EXTENT, UNSPECIFIED WHETHER TRAUMATIC: ICD-10-CM

## 2023-05-18 NOTE — PROGRESS NOTES
"Daily Note     Today's date: 2023  Patient name: Cheryl Coulter  : 1970  MRN: [id number]  Referring provider: Luis Bennett*  Dx:   Encounter Diagnosis     ICD-10-CM    1  Acute pain of right shoulder  M25 511       2  Tear of right rotator cuff, unspecified tear extent, unspecified whether traumatic  M75  101                      Subjective: Pt reports \"a lot of popping and clanking and clicking today\" which he reports is noticeable with activity, like donning coat  He does feels while he is doing more activity he is noticing more aggravation at the anterior shoulder site  Objective: See treatment diary below      Assessment: Tolerated treatment well  Patient cont to demo steady progress wrt to protocol progression  Pt remains below his PLOF  Plan: Continue per plan of care  Eval/ Re-eval Auth #/ Referral # Total visits Start date  Expiration date Total active units  Total manual units  PT only or  PT+OT?     [id number]   12 2/9 5/9      3/21/23 # [id number] approved  3/22/2023-2023  12 visits           # [id number]  24 visits-2023-2023                               Precautions: Standard  PROTOCOL       HEP update 23 Access Code [email or web address]        Visit  25 26 /23   PROM Performed   performed     AC and SC mobs   FB     subscap and teres minor release   FB             Neuro Re-Ed        PNF     Uni lift 2 5lbs CC 6x ea   Ther Ex        Pendulums        Table slides Wall slides; 2 x 10         AAROM  Bicep stretching on table (R) 10\"x15    ER stretch with 3# 30\"x3      Pulleys 3 min  3 min       Post Capsule str        Bent over row Rows: 8 lb 10 x 2     extension: 8 lb 10 x 2 Rows: 8 lb 10 x 2     extension: 8 lb 10 x 2 Rows: 8 lb 10 x 2     extension: 8 lb 10 x 2 Rows: 8 lb 10 x 2     extension: 8 lb 10 x 2    Row CC 10lbs 2x8    Face pulls CC 12 5lbs 2x8    Shldr ext CC 11lbs 2x10   AROM per patient " Scaption: 3 lb 10 x 2 per patient Scaption: 3 lb 10 x 2 Scaption: 3 lb 10 x 2 Scaption: 3 lb 10 x 2    3way raises 5x2 1lb 3 way 1lb 10xea    OH press 2lbs 2x10   SL ER CC: ER: 2 5 lb: 10 x2    Sidelying: 3 lb 10 x 2 Sidelying: 3 lb 10 x 2 Sidelyin*10, 3# Sidelyin*10, 3# CC ER 2 5lbs 2x10       IR TBand CC: 2 5 lb 10 x 2  CC: 2 5 lb 10 x 2    CC IR 5 5lbs 2x1   Supine flexion 8 lb: both hands 10x2  8 lb: both hands 10 x 2  8 lb: both hands 10 x 2  8 lb: both hands 10 x 2     Serratus punch sidelying as per patient : 3 lb 10 x  sidelying as per patient : 3 lb 10 x  S/L 2*10, 3# S/L 2*10, 3#    Biceps curl 8  Lb 10 x 2 8# 10x2 2*10, 8# 2*10, 8# 2x6 9lbs   Tri ext CC: 7 5 lb:     10 5lbs 2x10   SL Abd -- 2x10 1# 2*10, 1# 2*10, 1#    sidelying scap  resetting        IR SOS HBB W/ pulleys: 10 x   W/pulleys: 10x     Med ball carry  erin UE: 8  feet    10 lbs alt hands 1 laps    10lbs OH press 1 lap    10lbs palloff press 1 lap   Palloff press Walking 150' 9lbs B UEs       OH Carry 1 lb: 150 feet 2# 150ft  3lbs     Wall walks     With Tband loop Green 5 x   sidelying upward trap rotation         Ther Activity                        Modalities

## 2023-05-23 ENCOUNTER — OFFICE VISIT (OUTPATIENT)
Dept: PHYSICAL THERAPY | Facility: CLINIC | Age: 53
End: 2023-05-23

## 2023-05-23 DIAGNOSIS — M25.511 ACUTE PAIN OF RIGHT SHOULDER: Primary | ICD-10-CM

## 2023-05-23 DIAGNOSIS — M75.101 TEAR OF RIGHT ROTATOR CUFF, UNSPECIFIED TEAR EXTENT, UNSPECIFIED WHETHER TRAUMATIC: ICD-10-CM

## 2023-05-23 NOTE — PROGRESS NOTES
"Daily Note     Today's date: 2023  Patient name: Jase Bacon  : 1970  MRN: [id number]  Referring provider: Jessie Gilbert*  Dx:   Encounter Diagnosis     ICD-10-CM    1  Acute pain of right shoulder  M25 511       2  Tear of right rotator cuff, unspecified tear extent, unspecified whether traumatic  M75  101                      Subjective: Pt reports anterior shoulder pain persists, \"same soreness\"      Objective: See treatment diary below  Therex progression  [employer] message sent to pt's PA to inquire about ongoing pain in same locaiton for numerous weeks  Assessment: Tolerated treatment well  Patient demo improvement in reactive type motions, this does demo dec reflexive behavior compared to non post op UE  Plan: Continue per plan of care  Progress treament per protocol  Eval/ Re-eval Auth #/ Referral # Total visits Start date  Expiration date Total active units  Total manual units  PT only or  PT+OT?     [id number]   12 2/9 5/9      3/21/23 # [id number] approved  3/22/2023-2023  12 visits           # [id number]  07 visits-2023-2023                               Precautions: Standard  PROTOCOL       HEP update 23 Access Code [email or web address]        Visit 23   PROM Performed   performed      AC and SC mobs   FB      subscap and teres minor release   FB               Neuro Re-Ed         PNF     Uni lift 2 5lbs CC 6x ea 6x ea   Reactionary tasks      Rapid reach taps 5 rounds 20-30s   Ther Ex         Pendulums         Table slides Wall slides; 2 x 10          AAROM  Bicep stretching on table (R) 10\"x15    ER stretch with 3# 30\"x3       Pulleys 3 min  3 min        Shoulder taps      3 rounds 30s ea   Bent over row Rows: 8 lb 10 x 2     extension: 8 lb 10 x 2 Rows: 8 lb 10 x 2     extension: 8 lb 10 x 2 Rows: 8 lb 10 x 2     extension: 8 lb 10 x 2 Rows: 8 lb 10 x 2     extension: 8 " lb 10 x 2    Row CC 10lbs 2x8    Face pulls CC 12 5lbs 2x8    Shldr ext CC 11lbs 2x10 Row CC 10lbs 2x8    Face pulls CC 12 5lbs 2x8    Shldr ext CC 11lbs 2x10   AROM per patient Scaption: 3 lb 10 x 2 per patient Scaption: 3 lb 10 x 2 Scaption: 3 lb 10 x 2 Scaption: 3 lb 10 x 2    3way raises 5x2 1lb 3 way 1lb 10xea    OH press 2lbs 2x10 3 way 1lb 10xea    OH press 3lbs 2x10   SL ER CC: ER: 2 5 lb: 10 x2    Sidelying: 3 lb 10 x 2 Sidelying: 3 lb 10 x 2 Sidelyin*10, 3# Sidelyin*10, 3# CC ER 2 5lbs 2x10     2x10   IR TBand CC: 2 5 lb 10 x 2  CC: 2 5 lb 10 x 2    CC IR 5 5lbs 2x1 2x10   Supine flexion 8 lb: both hands 10x2  8 lb: both hands 10 x 2  8 lb: both hands 10 x 2  8 lb: both hands 10 x 2      Serratus punch sidelying as per patient : 3 lb 10 x  sidelying as per patient : 3 lb 10 x  S/L 2*10, 3# S/L 2*10, 3#  CC 7 5lbs 2x8   Biceps curl 8  Lb 10 x 2 8# 10x2 2*10, 8# 2*10, 8# 2x6 9lbs 2x6   Tri ext CC: 7 5 lb:     10 5lbs 2x10 2x10   SL Abd -- 2x10 1# 2*10, 1# 2*10, 1#     Ball dribble on wall      3 rounds 30s   IR SOS HBB W/ pulleys: 10 x   W/pulleys: 10x   10x   Med ball carry  erin UE: 8  feet    10 lbs alt hands 1 laps    10lbs OH press 1 lap    10lbs palloff press 1 lap    Palloff press Walking 150' 9lbs B UEs        OH Carry 1 lb: 150 feet 2# 150ft  3lbs   3lbs   Wall walks     With Tband loop Green 5 x             Ther Activity                           Modalities

## 2023-05-25 ENCOUNTER — OFFICE VISIT (OUTPATIENT)
Dept: PHYSICAL THERAPY | Facility: CLINIC | Age: 53
End: 2023-05-25

## 2023-05-25 DIAGNOSIS — M25.511 ACUTE PAIN OF RIGHT SHOULDER: Primary | ICD-10-CM

## 2023-05-25 DIAGNOSIS — M75.101 TEAR OF RIGHT ROTATOR CUFF, UNSPECIFIED TEAR EXTENT, UNSPECIFIED WHETHER TRAUMATIC: ICD-10-CM

## 2023-05-25 NOTE — PROGRESS NOTES
Daily Note     Today's date: 2023  Patient name: Yunior Buchanan  : 1970  MRN: [id number]  Referring provider: Shelby Estrada*  Dx:   Encounter Diagnosis     ICD-10-CM    1  Acute pain of right shoulder  M25 511       2  Tear of right rotator cuff, unspecified tear extent, unspecified whether traumatic  M75  101                      Subjective: Pt reports he was very sore after last visit  Objective: See treatment diary below  Withheld PROM today to determine effects and carryover of self stretching  Assessment: Tolerated treatment well  Patient soreness likely associated with fast paced, reflexive nature of added exercises last session  Pt demo good I with therex at [facility] set up and self pacing  Plan: Continue per plan of care  Eval/ Re-eval Auth #/ Referral # Total visits Start date  Expiration date Total active units  Total manual units  PT only or  PT+OT?     [id number]   12 2/9 5/9      3/21/23 # [id number] approved  3/22/2023-2023  12 visits           # [id number]  83 visits-2023-2023                               Precautions: Standard  PROTOCOL       HEP update 23 Access Code [email or web address]        Visit 23   PROM performed       AC and SC mobs FB       subscap and teres minor release FB               Neuro Re-Ed        PNF   Uni lift 2 5lbs CC 6x ea 6x ea    Reactionary tasks    Rapid reach taps 5 rounds 20-30s    Ther Ex        Pendulums        Table slides        AAROM        Pulleys        Shoulder taps    3 rounds 30s ea    Bent over row Rows: 8 lb 10 x 2     extension: 8 lb 10 x 2 Rows: 8 lb 10 x 2     extension: 8 lb 10 x 2    Row CC 10lbs 2x8    Face pulls CC 12 5lbs 2x8    Shldr ext CC 11lbs 2x10 Row CC 10lbs 2x8    Face pulls CC 12 5lbs 2x8    Shldr ext CC 11lbs 2x10 Row CC 10lbs 2x8    Face pulls CC 12 5lbs 2x8    Shldr ext CC 11lbs 2x10   AROM Scaption: 3 lb 10 x 2 Scaption: 3 lb 10 x 2    3way raises 5x2 1lb 3 way 1lb 10xea    OH press 2lbs 2x10 3 way 1lb 10xea    OH press 3lbs 2x10    SL ER Sidelyin*10, 3# Sidelyin*10, 3# CC ER 2 5lbs 2x10     2x10 2x10   IR TBand   CC IR 5 5lbs 2x1 2x10 2x10   Supine flexion 8 lb: both hands 10 x 2  8 lb: both hands 10 x 2    9lbs 2x10   Serratus punch S/L 2*10, 3# S/L 2*10, 3#  CC 7 5lbs 2x8 2x8   Biceps curl 2*10, 8# 2*10, 8# 2x6 9lbs 2x6 9lbs 2x8   Tri ext   10 5lbs 2x10 2x10 2x10   SL Abd 2*10, 1# 2*10, 1#      Ball dribble on wall    3 rounds 30s    IR SOS HBB W/pulleys: 10x   10x 10x   Med ball carry    10 lbs alt hands 1 laps    10lbs OH press 1 lap    10lbs palloff press 1 lap     Palloff press        OH Carry  3lbs   3lbs    Wall walks   With Tband loop Green 5 x  5x   H Abd     2 5lb CC 2x8   Truck drivers     83 [id number] [id number]   L UBE     L4 4'   Peg wall     With 4lb AW on wrist 5x   Supine ER     At 90 deg abd 2x8 3lbs   Ther Activity                        Modalities

## 2023-05-30 ENCOUNTER — OFFICE VISIT (OUTPATIENT)
Dept: PHYSICAL THERAPY | Facility: CLINIC | Age: 53
End: 2023-05-30

## 2023-05-30 DIAGNOSIS — I26.99 OTHER PULMONARY EMBOLISM WITHOUT ACUTE COR PULMONALE, UNSPECIFIED CHRONICITY (HCC): ICD-10-CM

## 2023-05-30 DIAGNOSIS — M75.101 TEAR OF RIGHT ROTATOR CUFF, UNSPECIFIED TEAR EXTENT, UNSPECIFIED WHETHER TRAUMATIC: ICD-10-CM

## 2023-05-30 DIAGNOSIS — M25.511 ACUTE PAIN OF RIGHT SHOULDER: Primary | ICD-10-CM

## 2023-05-30 RX ORDER — RIVAROXABAN 20 MG/1
TABLET, FILM COATED ORAL
Qty: 90 TABLET | Refills: 0 | Status: SHIPPED | OUTPATIENT
Start: 2023-05-30

## 2023-05-30 NOTE — PROGRESS NOTES
Daily Note     Today's date: 2023  Patient name: Che Smith  : 1970  MRN: [id number]  Referring provider: Dorothy Mchugh*  Dx:   Encounter Diagnosis     ICD-10-CM    1  Acute pain of right shoulder  M25 511       2  Tear of right rotator cuff, unspecified tear extent, unspecified whether traumatic  M75  101                      Subjective: Pt was more sore after LV, but was using his arm a lot more over the weekend  Objective: See treatment diary below      Assessment: Pt fatigued with TE and reports some soreness, overall no major c/o pain  Pt will continue to benefit from skilled PT to increase strength       Plan: Continue per plan of care  Eval/ Re-eval Auth #/ Referral # Total visits Start date  Expiration date Total active units  Total manual units  PT only or  PT+OT?     [id number]   12 2/9 5/9      3/21/23 # [id number] approved  3/22/2023-2023  12 visits           # [id number]  96 visits-2023-2023                               Precautions: Standard  PROTOCOL       HEP update 23 Access Code [email or web address]        Visit 26  28/36 /36 36    23   PROM        AC and SC mobs        subscap and teres minor release                Neuro Re-Ed        PNF  Uni lift 2 5lbs CC 6x ea 6x ea     Reactionary tasks   Rapid reach taps 5 rounds 20-30s     Ther Ex        Pendulums        Table slides        AAROM        Pulleys        Shoulder taps   3 rounds 30s ea     Bent over row Rows: 8 lb 10 x 2     extension: 8 lb 10 x 2    Row CC 10lbs 2x8    Face pulls CC 12 5lbs 2x8    Shldr ext CC 11lbs 2x10 Row CC 10lbs 2x8    Face pulls CC 12 5lbs 2x8    Shldr ext CC 11lbs 2x10 Row CC 10lbs 2x8    Face pulls CC 12 5lbs 2x8    Shldr ext CC 11lbs 2x10 Row CC 10lbs 2x8    Face pulls CC 12 5lbs 2x8    Shldr ext CC 11lbs 2x10   AROM Scaption: 3 lb 10 x 2    3way raises 5x2 1lb 3 way 1lb 10xea    OH press 2lbs 2x10 3 way 1lb 10xea    OH press 3lbs 2x10     SL ER Sidelyin*10, 3# CC ER 2 5lbs 2x10     2x10 2x10 2x10   IR TBand  CC IR 5 5lbs 2x1 2x10 2x10 2x10   Supine flexion 8 lb: both hands 10 x 2    9lbs 2x10 9# 2x10   Serratus punch S/L 2*10, 3#  CC 7 5lbs 2x8 2x8 2x8   Biceps curl 2*10, 8# 2x6 9lbs 2x6 9lbs 2x8 9# 2x10   Tri ext  10 5lbs 2x10 2x10 2x10 2x10   SL Abd 2*10, 1#       Ball dribble on wall   3 rounds 30s     IR SOS HBB   10x 10x 10x   Med ball carry   10 lbs alt hands 1 laps    10lbs OH press 1 lap    10lbs palloff press 1 lap      Palloff press        OH Carry 3lbs   3lbs     Wall walks  With Tband loop Green 5 x  5x 5x   H Abd    2 5lb CC 2x8 2 5lb CC 2x8   Truck drivers    93 [id number] 2D29 12 5lbs 2x10   L UBE    L4 4'    Peg wall    With 4lb AW on wrist 5x With 4lb AW on wrist 5x   Supine ER    At 90 deg abd 2x8 3lbs 2# 2x10   Ther Activity                        Modalities

## 2023-06-01 ENCOUNTER — OFFICE VISIT (OUTPATIENT)
Dept: PHYSICAL THERAPY | Facility: CLINIC | Age: 53
End: 2023-06-01

## 2023-06-01 DIAGNOSIS — M75.101 TEAR OF RIGHT ROTATOR CUFF, UNSPECIFIED TEAR EXTENT, UNSPECIFIED WHETHER TRAUMATIC: ICD-10-CM

## 2023-06-01 DIAGNOSIS — M25.511 ACUTE PAIN OF RIGHT SHOULDER: Primary | ICD-10-CM

## 2023-06-01 NOTE — PROGRESS NOTES
Daily Note     Today's date: 2023  Patient name: Jase Bacon  : 1970  MRN: [id number]  Referring provider: Jessie Gilbert*  Dx:   Encounter Diagnosis     ICD-10-CM    1  Acute pain of right shoulder  M25 511       2  Tear of right rotator cuff, unspecified tear extent, unspecified whether traumatic  M75  101                      Subjective: Pt reports he feels he is able to self stretch well  He sees surgeon PA next Monday to confirm further POC  Pain is a constant 3/10 but does feel horizontal adduction is improved  Objective: See treatment diary below      Assessment: Tolerated treatment well  Patient demo fatigued with therex  Pt is pretty independent  He will follow up with primary physical therapy after appointment with MD         Plan: Continue per plan of care  Will f/u with PT the following week  Eval/ Re-eval Auth #/ Referral # Total visits Start date  Expiration date Total active units  Total manual units  PT only or  PT+OT?     [id number]   12 2/9 5/9      3/21/23 # [id number] approved  3/22/2023-2023  12 visits           # [id number]  84 visits-2023-2023                               Precautions: Standard  PROTOCOL       HEP update 23 Access Code [email or web address]        Visit 23   PROM        AC and SC mobs        subscap and teres minor release                Neuro Re-Ed        PNF Uni lift 2 5lbs CC 6x ea 6x ea      Reactionary tasks  Rapid reach taps 5 rounds 20-30s      Ther Ex        Pendulums        Table slides        AAROM        Pulleys        Shoulder taps  3 rounds 30s ea      Bent over row Row CC 10lbs 2x8    Face pulls CC 12 5lbs 2x8    Shldr ext CC 11lbs 2x10 Row CC 10lbs 2x8    Face pulls CC 12 5lbs 2x8    Shldr ext CC 11lbs 2x10 Row CC 10lbs 2x8    Face pulls CC 12 5lbs 2x8    Shldr ext CC 11lbs 2x10 Row CC 10lbs 2x8    Face pulls CC 12 5lbs 2x8    Shldr ext CC 11lbs 2x10 Row CC 10lbs 2x8    Face pulls CC 12 5lbs 2x8    Shldr ext CC 11lbs 2x10   AROM 3 way 1lb 10xea    OH press 2lbs 2x10 3 way 1lb 10xea    OH press 3lbs 2x10      SL ER CC ER 2 5lbs 2x10     2x10 2x10 2x10 2x10   IR TBand CC IR 5 5lbs 2x1 2x10 2x10 2x10 2x10   Supine flexion   9lbs 2x10 9# 2x10 9# 2 x 10    Serratus punch  CC 7 5lbs 2x8 2x8 2x8    Biceps curl 2x6 9lbs 2x6 9lbs 2x8 9# 2x10 9# 2 x 13   Tri ext 10 5lbs 2x10 2x10 2x10 2x10 2 x 13    SL Abd        Ball dribble on wall  3 rounds 30s      IR SOS HBB  10x 10x 10x 10x    Med ball carry  10 lbs alt hands 1 laps    10lbs OH press 1 lap    10lbs palloff press 1 lap       Palloff press        OH Carry  3lbs      Wall walks With Tband loop Green 5 x  5x 5x 5x    H Abd   2 5lb CC 2x8 2 5lb CC 2x8 2x10   Truck drivers   24 5CRO 0P45 12 5lbs 2x10 12 5 lbs 2 x 10    L UBE   L4 4'     Peg wall   With 4lb AW on wrist 5x With 4lb AW on wrist 5x W/ 4 lb AW on wrist 5x    Supine ER   At 90 deg abd 2x8 3lbs 2# 2x10 2# 2 x 10    Ther Activity                        Modalities

## 2023-06-05 ENCOUNTER — OFFICE VISIT (OUTPATIENT)
Dept: OBGYN CLINIC | Facility: CLINIC | Age: 53
End: 2023-06-05
Payer: COMMERCIAL

## 2023-06-05 VITALS
SYSTOLIC BLOOD PRESSURE: 130 MMHG | DIASTOLIC BLOOD PRESSURE: 88 MMHG | HEIGHT: 69 IN | BODY MASS INDEX: 32.14 KG/M2 | WEIGHT: 217 LBS | HEART RATE: 60 BPM

## 2023-06-05 DIAGNOSIS — Z98.890 S/P RIGHT ROTATOR CUFF REPAIR: Primary | ICD-10-CM

## 2023-06-05 DIAGNOSIS — M75.21 BICEPS TENDONITIS ON RIGHT: ICD-10-CM

## 2023-06-05 PROCEDURE — 99213 OFFICE O/P EST LOW 20 MIN: CPT | Performed by: PHYSICIAN ASSISTANT

## 2023-06-05 NOTE — PROGRESS NOTES
Assessment/Plan:  1  S/P right rotator cuff repair  MRI shoulder right wo contrast      2  Biceps tendonitis on right          Injection in her knee that helped unfortunately patient continues to struggle now 4-1/2 months status post arthroscopic rotator cuff repair  His pain does seem to be localized to the biceps tendon at this point  I do feel repeat MRI is warranted to rule out a partial tear of this tendon, especially given his history of a popping sensation about the anterior shoulder back in February  If just tendinitis is shown, we did discuss possible referral to Dr Margarita Aviles for an ultrasound-guided steroid injection into the biceps tendon sheath  If a tear is found, I discussed referral to Dr Shanice Bowman  He will follow-up after his MRI to discuss the results and how to proceed  Subjective:   Conner Hall is a 46 y o  male who presents today for follow-up of his right shoulder, now 4 and half month status post rotator cuff repair  He notes ongoing pain about the anterior shoulder  He did feel a pop about the anterior shoulder back in February that physical therapy  He feels he has more pain now than he did preoperatively  He notes limitations in range of motion and weakness of the shoulder still as well  He notes good sensation of the right upper extremity  Review of Systems   Constitutional: Negative  Negative for chills and fever  HENT: Negative  Negative for ear pain and sore throat  Eyes: Negative  Negative for pain and redness  Respiratory: Negative  Negative for shortness of breath and wheezing  Cardiovascular: Negative for chest pain and palpitations  Gastrointestinal: Negative  Negative for abdominal pain and blood in stool  Endocrine: Negative  Negative for polydipsia and polyuria  Genitourinary: Negative  Negative for difficulty urinating and dysuria  Musculoskeletal:        As noted in HPI   Skin: Negative  Negative for pallor and rash     Neurological: Negative  Negative for dizziness and numbness  Hematological: Negative  Negative for adenopathy  Does not bruise/bleed easily  Psychiatric/Behavioral: Negative  Negative for confusion and suicidal ideas  Past Medical History:   Diagnosis Date   • Anemia    • 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 07/12/2006   • DVT (deep venous thrombosis) ([facility] ) 2018   • Dysphagia     Last Assessed: 8/12/2014    • Factor 5 Leiden mutation, heterozygous ([facility] )    • GERD (gastroesophageal reflux disease)    • Globus sensation     Last Assessed: 6/4/2014    • H/O neoplasm of uncertain behavior of skin 06/06/2006   • Hyperlipidemia    • Hypertension 02/14/2006   • Lateral epicondylitis of right elbow     Last Assessed: 10/23/2015    • Pes planus     last assessed: 10/23/2013    • Plantar fascial fibromatosis     Last Assessed: 10/23/2013    • Pulmonary embolism ([facility] )    • Right patellofemoral syndrome     Last Assessed: 4/15/2016    • Sebaceous cyst 11/03/2007       Past Surgical History:   Procedure Laterality Date   • COLONOSCOPY     • ESOPHAGOGASTRODUODENOSCOPY N/A 10/7/2016    Procedure: ESOPHAGOGASTRODUODENOSCOPY (EGD); Surgeon: Marilia Jerome MD;  Location: [facility] GI LAB; Service:    • NASAL SEPTUM SURGERY  1995   • ME ESOPHAGOGASTRODUODENOSCOPY TRANSORAL DIAGNOSTIC N/A 12/6/2018    Procedure: ESOPHAGOGASTRODUODENOSCOPY (EGD); Surgeon: Marilia Jerome MD;  Location: [facility] GI LAB;   Service: Gastroenterology   • ME SURGICAL ARTHROSCOPY SHOULDER W/ROTATOR CUFF RPR Right 1/26/2023    Procedure: Shoulder Arthroscopy with Rotator Cuff Repair, Subacromial Decompression, and Limited Debridement;  Surgeon: Chato Mary MD;  Location: [facility] OR;  Service: Orthopedics       Family History   Problem Relation Age of Onset   • Heart disease Mother         valve 76s   • Hypertension Mother    • Cancer Father         colon   • Colon cancer Father    • Hypertension Father    • Coronary artery disease Father CABG   • Heart disease Brother         MI exp age 39   • Colon cancer Family    • Cancer Brother         esophageal CA exp age 40   • Cancer Brother        Social History     Occupational History   • Not on file   Tobacco Use   • Smoking status: Never   • Smokeless tobacco: Never   Vaping Use   • Vaping Use: Never used   Substance and Sexual Activity   • Alcohol use: Yes     Alcohol/week: 4 0 standard drinks of alcohol     Types: 4 Cans of beer per week     Comment: socially   • Drug use: No   • Sexual activity: Yes     Partners: Female         Current Outpatient Medications:   •  Xarelto 20 MG tablet, Take 1 tablet by mouth daily  , Disp: 90 tablet, Rfl: 0  •  esomeprazole (NexIUM) 40 MG capsule, Take 1 capsule (40 mg total) by mouth daily before breakfast, Disp: 90 capsule, Rfl: 3  •  oxyCODONE (ROXICODONE) 5 immediate release tablet, Take 1 tablet (5 mg total) by mouth every 4 (four) hours as needed for moderate pain for up to 15 doses Max Daily Amount: 30 mg, Disp: 15 tablet, Rfl: 0    No Known Allergies    Objective:  Vitals:    06/05/23 1553   BP: 130/88   Pulse: 60            Right Shoulder Exam     Tenderness   The patient is experiencing tenderness in the biceps tendon  Range of Motion   External rotation: 40   Forward flexion: 140   Internal rotation 0 degrees: L5     Muscle Strength   Abduction: 4/5   Internal rotation: 5/5   External rotation: 5/5   Biceps: 4/5     Tests   Drop arm: negative    Other   Erythema: absent  Scars: present  Sensation: normal  Pulse: present    Comments:  Positive speeds test             Physical Exam  Constitutional:       General: He is not in acute distress  Appearance: He is well-developed  HENT:      Head: Normocephalic and atraumatic  Eyes:      General: No scleral icterus  Conjunctiva/sclera: Conjunctivae normal    Neck:      Vascular: No JVD  Cardiovascular:      Rate and Rhythm: Normal rate     Pulmonary:      Effort: Pulmonary effort is normal  No respiratory distress  Skin:     General: Skin is warm  Neurological:      Mental Status: He is alert and oriented to person, place, and time  Coordination: Coordination normal        :        This document was created using speech voice recognition software  Grammatical errors, random word insertions, pronoun errors, and incomplete sentences are an occasional consequence of this system due to software limitations, ambient noise, and hardware issues  Any formal questions or concerns about content, text, or information contained within the body of this dictation should be directly addressed to the provider for clarification

## 2023-06-09 ENCOUNTER — TELEPHONE (OUTPATIENT)
Dept: OBGYN CLINIC | Facility: CLINIC | Age: 53
End: 2023-06-09

## 2023-06-09 DIAGNOSIS — Z98.890 S/P RIGHT ROTATOR CUFF REPAIR: Primary | ICD-10-CM

## 2023-06-09 NOTE — TELEPHONE ENCOUNTER
Caller: Patient    Doctor: Deidra Squires PA-c    Reason for call: Returning call  Understood instructions

## 2023-06-09 NOTE — TELEPHONE ENCOUNTER
L/m advising patient that insurance company is requiring an xray of his shoulder in order to approve his upcoming MRI  I did advise patient that Betty Raya has placed that order for an xray and that he can stop at any [facility], or orthopedic office to get that completed

## 2023-06-10 ENCOUNTER — APPOINTMENT (OUTPATIENT)
Dept: RADIOLOGY | Facility: CLINIC | Age: 53
End: 2023-06-10
Payer: COMMERCIAL

## 2023-06-10 DIAGNOSIS — Z98.890 S/P RIGHT ROTATOR CUFF REPAIR: ICD-10-CM

## 2023-06-10 PROCEDURE — 73030 X-RAY EXAM OF SHOULDER: CPT

## 2023-06-13 ENCOUNTER — OFFICE VISIT (OUTPATIENT)
Dept: PHYSICAL THERAPY | Facility: CLINIC | Age: 53
End: 2023-06-13
Payer: COMMERCIAL

## 2023-06-13 DIAGNOSIS — M75.101 TEAR OF RIGHT ROTATOR CUFF, UNSPECIFIED TEAR EXTENT, UNSPECIFIED WHETHER TRAUMATIC: ICD-10-CM

## 2023-06-13 DIAGNOSIS — M25.511 ACUTE PAIN OF RIGHT SHOULDER: Primary | ICD-10-CM

## 2023-06-13 PROCEDURE — 97112 NEUROMUSCULAR REEDUCATION: CPT

## 2023-06-13 PROCEDURE — 97110 THERAPEUTIC EXERCISES: CPT

## 2023-06-13 NOTE — PROGRESS NOTES
Daily Note     Today's date: 2023  Patient name: Elba Her  : 1970  MRN: [id number]  Referring provider: Devan Severino*  Dx:   Encounter Diagnosis     ICD-10-CM    1  Acute pain of right shoulder  M25 511       2  Tear of right rotator cuff, unspecified tear extent, unspecified whether traumatic  M75 101           Start Time: 1700  Stop Time: 1745  Total time in clinic (min): 45 minutes    Subjective: Going for MRI on Sat for right shoulder  Still experiencing twinges      Objective: See treatment diary below      Assessment: Tolerated treatment well  Patient exhibited good technique with therapeutic exercises and was able to complete all exercises with no increase in pain  Continue to progress per primary PT plan  Plan: Continue per plan of care  Eval/ Re-eval Auth #/ Referral # Total visits Start date  Expiration date Total active units  Total manual units  PT only or  PT+OT?     [id number]   12 2/9 5/9      3/21/23 # [id number] approved  3/22/2023-2023  12 visits           # [id number]  35 visits-2023-2023                               Precautions: Standard  PROTOCOL       HEP update 23 Access Code [email or web address]        Visit / 32/36    23   PROM      Performed KW   AC and SC mobs         subscap and teres minor release                  Neuro Re-Ed         PNF Uni lift 2 5lbs CC 6x ea 6x ea       Reactionary tasks  Rapid reach taps 5 rounds 20-30s       Ther Ex         Pendulums         Table slides         AAROM         Pulleys         Shoulder taps  3 rounds 30s ea       Bent over row Row CC 10lbs 2x8    Face pulls CC 12 5lbs 2x8    Shldr ext CC 11lbs 2x10 Row CC 10lbs 2x8    Face pulls CC 12 5lbs 2x8    Shldr ext CC 11lbs 2x10 Row CC 10lbs 2x8    Face pulls CC 12 5lbs 2x8    Shldr ext CC 11lbs 2x10 Row CC 10lbs 2x8    Face pulls CC 12 5lbs 2x8    Shldr ext CC 11lbs 2x10 Row CC 10lbs 2x8    Face pulls CC 12 5lbs 2x8    Shldr ext CC 11lbs 2x10 Row CC 10lbs 2x8    Face pulls CC 12 5lbs 2x8    Shldr ext CC 11lbs 2x10   AROM 3 way 1lb 10xea    OH press 2lbs 2x10 3 way 1lb 10xea    OH press 3lbs 2x10       SL ER CC ER 2 5lbs 2x10     2x10 2x10 2x10 2x10 2*10   IR TBand CC IR 5 5lbs 2x1 2x10 2x10 2x10 2x10 2*10   Supine flexion   9lbs 2x10 9# 2x10 9# 2 x 10  9# 2*10   Serratus punch  CC 7 5lbs 2x8 2x8 2x8     Biceps curl 2x6 9lbs 2x6 9lbs 2x8 9# 2x10 9# 2 x 13 9# 2*10   Tri ext 10 5lbs 2x10 2x10 2x10 2x10 2 x 13  2*13   SL Abd         Ball dribble on wall  3 rounds 30s       IR SOS HBB  10x 10x 10x 10x  10x   Med ball carry  10 lbs alt hands 1 laps    10lbs OH press 1 lap    10lbs palloff press 1 lap        Palloff press         OH Carry  3lbs       Wall walks With Tband loop Green 5 x  5x 5x 5x  5x   H Abd   2 5lb CC 2x8 2 5lb CC 2x8 2x10 2*10   Truck drivers   99 [id number] 12 5lbs 2x10 12 5 lbs 2 x 10  12 5 lbs 2 x 10   L UBE   L4 4'      Peg wall   With 4lb AW on wrist 5x With 4lb AW on wrist 5x W/ 4 lb AW on wrist 5x  W/ 4 lb AW on wrist 5x    Supine ER   At 90 deg abd 2x8 3lbs 2# 2x10 2# 2 x 10  2# 2*10   Ther Activity       14# 2*10         Supine overhead flexion with 8# 10*2         scap push up 8# 10x   Modalities

## 2023-06-15 ENCOUNTER — OFFICE VISIT (OUTPATIENT)
Dept: PHYSICAL THERAPY | Facility: CLINIC | Age: 53
End: 2023-06-15
Payer: COMMERCIAL

## 2023-06-15 DIAGNOSIS — M25.511 ACUTE PAIN OF RIGHT SHOULDER: Primary | ICD-10-CM

## 2023-06-15 DIAGNOSIS — M75.101 TEAR OF RIGHT ROTATOR CUFF, UNSPECIFIED TEAR EXTENT, UNSPECIFIED WHETHER TRAUMATIC: ICD-10-CM

## 2023-06-15 PROCEDURE — 97110 THERAPEUTIC EXERCISES: CPT | Performed by: PHYSICAL THERAPIST

## 2023-06-15 NOTE — PROGRESS NOTES
Daily Note         Today's date: 6/15/2023  Patient name: Elba Her  : 1970  MRN: [id number]  Referring provider: Devan Severino*  Dx:   Encounter Diagnosis     ICD-10-CM    1  Acute pain of right shoulder  M25 511       2  Tear of right rotator cuff, unspecified tear extent, unspecified whether traumatic  M75 101           Subjective: Elba Her reports shoulder has jade soreness since last session possibly due to the stretching  Objective: See treatment diary below    Assessment:  PLOC discussed with primary PT  Discussed with patient and primary PT for patient to hold HEP over weekend to see if soreness reduces  Patient agreeable to plan and will resume pending report at next session  Patient needed only minimal cuing for proper form with exercises  Plan: continue as indicated by [facility]  Eval/ Re-eval Auth #/ Referral # Total visits Start date  Expiration date Total active units  Total manual units  PT only or  PT+OT?     [id number]   12 2/9 5/9      3/21/23 # [id number] approved  3/22/2023-2023  12 visits           # [id number]  39 visits-2023-2023                               Precautions: Standard  PROTOCOL       HEP update 23 Access Code [email or web address]        Visit 29 30/36 3136 32/36 33/36    5/25/23 5/30/23 6/1/23 6/13/23 6/15/23   PROM    Performed KW Hold per PLOC   AC and SC mobs        subscap and teres minor release                Neuro Re-Ed        PNF        Reactionary tasks        Ther Ex        Pendulums        Table slides        AAROM        Pulleys        Shoulder taps     Chest press: unilateral 12 5 lb 10 x 2   Bent over row Row CC 10lbs 2x8    Face pulls CC 12 5lbs 2x8    Shldr ext CC 11lbs 2x10 Row CC 10lbs 2x8    Face pulls CC 12 5lbs 2x8    Shldr ext CC 11lbs 2x10 Row CC 10lbs 2x8    Face pulls CC 12 5lbs 2x8    Shldr ext CC 11lbs 2x10 Row CC 10lbs 2x8    Face pulls CC 12 5lbs 2x8    Shldr ext CC 11lbs 2x10 Row: 12 5 lb 20x    Face pulls CC 12 5lbs 2x8    Shldr ext CC 11lbs 2x10       AROM        SL ER 2x10 2x10 2x10 2*10 CC: standing    IR TBand 2x10 2x10 2x10 2*10 CC: standing 4 0 lb 10 x 2   Supine flexion 9lbs 2x10 9# 2x10 9# 2 x 10  9# 2*10 See below   Serratus punch 2x8 2x8      Biceps curl 9lbs 2x8 9# 2x10 9# 2 x 13 9# 2*10 8 lb 10 x 2    Tri ext 2x10 2x10 2 x 13  2*13 CC: 7 5 lb: 10 x 2   SL Abd        Ball dribble on wall        IR SOS HBB 10x 10x 10x  10x 10 x    Med ball carry         Palloff press        OH Carry        Wall walks 5x 5x 5x  5x Green looped band: 6 x 2   H Abd 2 5lb CC 2x8 2 5lb CC 2x8 2x10 2*10 2 5 lb 10 x 2    Truck drivers 32 [id number] 12 5lbs 2x10 12 5 lbs 2 x 10  12 5 lbs 2 x 10 --   L UBE L4 4'       Peg wall With 4lb AW on wrist 5x With 4lb AW on wrist 5x W/ 4 lb AW on wrist 5x  W/ 4 lb AW on wrist 5x  4 lb 10x    Supine ER At 90 deg abd 2x8 3lbs 2# 2x10 2# 2 x 10  2# 2*10 3 lb: 2 x 10     Ther Activity     14# 2*10 14 lb 10 x 2        Supine overhead flexion with 8# 10*2 8 lb: 10 x 2        scap push up 8# 10x  scap protractions: 8 lb: 10 x  2   Modalities     PNF lift: 2 5 lb 10 x 2

## 2023-06-17 ENCOUNTER — HOSPITAL ENCOUNTER (OUTPATIENT)
Dept: MRI IMAGING | Facility: HOSPITAL | Age: 53
Discharge: HOME | End: 2023-06-17
Payer: COMMERCIAL

## 2023-06-17 DIAGNOSIS — Z98.890 S/P RIGHT ROTATOR CUFF REPAIR: ICD-10-CM

## 2023-06-17 PROCEDURE — 73221 MRI JOINT UPR EXTREM W/O DYE: CPT

## 2023-06-17 PROCEDURE — G1004 CDSM NDSC: HCPCS

## 2023-06-20 ENCOUNTER — OFFICE VISIT (OUTPATIENT)
Dept: PHYSICAL THERAPY | Facility: CLINIC | Age: 53
End: 2023-06-20
Payer: COMMERCIAL

## 2023-06-20 DIAGNOSIS — M75.101 TEAR OF RIGHT ROTATOR CUFF, UNSPECIFIED TEAR EXTENT, UNSPECIFIED WHETHER TRAUMATIC: ICD-10-CM

## 2023-06-20 DIAGNOSIS — M25.511 ACUTE PAIN OF RIGHT SHOULDER: Primary | ICD-10-CM

## 2023-06-20 PROCEDURE — 97110 THERAPEUTIC EXERCISES: CPT | Performed by: PHYSICAL THERAPIST

## 2023-06-20 NOTE — PROGRESS NOTES
"Daily Note - Progress Note    Today's date: 2023  Patient name: Jocelyn Jefferson  : 1970  MRN: [id number]  Referring provider: Nahomy Reyes*  Dx:   Encounter Diagnosis     ICD-10-CM    1  Acute pain of right shoulder  M25 511       2  Tear of right rotator cuff, unspecified tear extent, unspecified whether traumatic  M75  101                      Subjective: Pt reports 30-40% reduction of discomfort since last session and HEP hiatus  Pt's pain today \"still a 3/10 but not as bad  \" Pt continue to experience pain throughout his day and with ADLs and work duties  Pt still feels <50% strength in R UE as compared to L UE  Objective: See treatment diary below  Today's therex lessened to 50% capacity of reps and resistance  Pt had an MRI this past weekend  L shoulder AROM WNL, R shoulder flexion 150 deg, Abd 155 deg, ER 35 deg, IR HBB to L1, H Add finger tips to upper contralateral trap  Pain with ALL AROM  R shldr strength 4- to 4/5 MMT throughout, pain with all resisted activities  R Elbow strength and biceps strength 4/5MMT  Crepitus throughout activity  Assessment: Tolerated treatment well  Patient at this time has followed his protocol appropriately however is still below his PLOF  He at this time time is limited by pain and weakness  His ROM and strength have indeed progressed since last reporting period as well as function and functional activity tolerance however pain persists  He has undergone MRI this past week which results remain pending to determine structurally which pain generator is most active at this time  Pt would benefit from ongoing skilled PT to maximize pain reduction, strength and ROM for high level physicality of his job  Plan: Continue per plan of care  Progress treament per protocol        Eval/ Re-eval Auth #/ Referral # Total visits Start date  Expiration date Total active units  Total manual units  PT only or  PT+OT?     [id number]   12     " 3/21/23 # [id number] approved  3/22/2023-6/22/2023  12 visits           # [id number]  78 ASCEDE-7/56/9402-4/49/0992         6/20/23 Req                     Precautions: Standard  PROTOCOL       HEP update 4/6/23 Access Code [email or web address]        Visit 30/36 31/36 32/36 33/36 34/36    5/30/23 6/1/23 6/13/23 6/15/23 6/20/23   PROM   Performed KW Hold per PLOC    AC and SC mobs        subscap and teres minor release                Neuro Re-Ed        PNF        Reactionary tasks        Ther Ex        Pendulums        Table slides        AAROM        Pulleys        Shoulder taps    Chest press: unilateral 12 5 lb 10 x 2 5lbs x10   Bent over row Row CC 10lbs 2x8    Face pulls CC 12 5lbs 2x8    Shldr ext CC 11lbs 2x10 Row CC 10lbs 2x8    Face pulls CC 12 5lbs 2x8    Shldr ext CC 11lbs 2x10 Row CC 10lbs 2x8    Face pulls CC 12 5lbs 2x8    Shldr ext CC 11lbs 2x10 Row: 12 5 lb 20x    Face pulls CC 12 5lbs 2x8    Shldr ext CC 11lbs 2x10     10x 5lbs          5lbs 10x   AROM        SL ER 2x10 2x10 2*10 CC: standing  x10 2 5lbs   IR TBand 2x10 2x10 2*10 CC: standing 4 0 lb 10 x 2 2lbs x10   Supine flexion 9# 2x10 9# 2 x 10  9# 2*10 See below    Serratus punch 2x8       Biceps curl 9# 2x10 9# 2 x 13 9# 2*10 8 lb 10 x 2     Tri ext 2x10 2 x 13  2*13 CC: 7 5 lb: 10 x 2 5lbs x10   SL Abd        Ball dribble on wall        IR SOS HBB 10x 10x  10x 10 x  5x    Med ball carry         Palloff press        OH Carry        Wall walks 5x 5x  5x Green looped band: 6 x 2    H Abd 2 5lb CC 2x8 2x10 2*10 2 5 lb 10 x 2     Truck drivers 52 [id number] 12 5 lbs 2 x 10  12 5 lbs 2 x 10 -- 7lbs x10   UBE     4' L3   Peg wall With 4lb AW on wrist 5x W/ 4 lb AW on wrist 5x  W/ 4 lb AW on wrist 5x  4 lb 10x     Supine ER 2# 2x10 2# 2 x 10  2# 2*10 3 lb: 2 x 10   Wall str 5x 10s   Ther Activity    14# 2*10 14 lb 10 x 2  See above      Supine overhead flexion with 8# 10*2 8 lb: 10 x 2        scap push up 8# 10x  scap protractions: 8 lb: 10 x  2 Modalities    PNF lift: 2 5 lb 10 x 2         Ice and TENs 5'

## 2023-06-22 ENCOUNTER — OFFICE VISIT (OUTPATIENT)
Dept: PHYSICAL THERAPY | Facility: CLINIC | Age: 53
End: 2023-06-22
Payer: COMMERCIAL

## 2023-06-22 DIAGNOSIS — M25.511 ACUTE PAIN OF RIGHT SHOULDER: Primary | ICD-10-CM

## 2023-06-22 DIAGNOSIS — M75.101 TEAR OF RIGHT ROTATOR CUFF, UNSPECIFIED TEAR EXTENT, UNSPECIFIED WHETHER TRAUMATIC: ICD-10-CM

## 2023-06-22 PROCEDURE — 97110 THERAPEUTIC EXERCISES: CPT | Performed by: PHYSICAL THERAPIST

## 2023-06-22 NOTE — PROGRESS NOTES
Daily Note     Today's date: 2023  Patient name: Edgar Hightower  : 1970  MRN: [id number]  Referring provider: Tobin Melton*  Dx:   Encounter Diagnosis     ICD-10-CM    1  Acute pain of right shoulder  M25 511       2  Tear of right rotator cuff, unspecified tear extent, unspecified whether traumatic  M75  101                      Subjective: Pt reports having soreness related to activity but his symptoms are no worse as a result  Objective: See treatment diary below  Modifying therex to reduce activity and progression at this time due to inflamed nature of shoulder tissue  Assessment: Tolerated treatment well  Patient recent MRI results indicate intact tissue at surgical site however tendinosis (+)  At this time pt would likely benefit from brief hiatus in regular exercise  Plan: Continue per plan of care  Will f/u with pt in 2 weeks for hiatus and rest of inflammed tendon to maximize tolerance ot therex progression  Eval/ Re-eval Auth #/ Referral # Total visits Start date  Expiration date Total active units  Total manual units  PT only or  PT+OT?     [id number]   12 2/9 5/9      3/21/23 # [id number] approved  3/22/2023-2023  12 visits           # [id number]  90 DDUXUO--23 Auth # [id number] pending-12 visits-2023-2023                     Precautions: Standard  PROTOCOL       HEP update 23 Access Code [email or web address]        Visit 32/36 33/36 34/36 35/48    6/13/23 6/15/23 6/20/23 6/22/23   PROM Performed KW Hold per PLOC  KW   AC and SC mobs       subscap and teres minor release              Neuro Re-Ed       PNF       Reactionary tasks       Ther Ex       Pendulums       Table slides       AAROM       Pulleys       Shoulder taps  Chest press: unilateral 12 5 lb 10 x 2 5lbs x10    Bent over row Row CC 10lbs 2x8    Face pulls CC 12 5lbs 2x8    Shldr ext CC 11lbs 2x10 Row: 12 5 lb 20x    Face pulls CC 12 5lbs 2x8    Shldr ext CC 11lbs 2x10     10x 5lbs          5lbs 10x 10x 5lbs          10x 5lbs   AROM       SL ER 2*10 CC: standing  x10 2 5lbs    IR TBand 2*10 CC: standing 4 0 lb 10 x 2 2lbs x10 x10   Supine flexion 9# 2*10 See below     Serratus punch    10x   Biceps curl 9# 2*10 8 lb 10 x 2   3lbs 2x10   Tri ext 2*13 CC: 7 5 lb: 10 x 2 5lbs x10 5lbs x10   SL Abd       Ball dribble on wall       IR SOS HBB 10x 10 x  5x     Med ball carry        Palloff press       OH Carry       Wall walks 5x Green looped band: 6 x 2     H Abd 2*10 2 5 lb 10 x 2      Truck drivers 01 3 lbs 2 x 10 -- 7lbs x10    UBE   4' L3    Peg wall W/ 4 lb AW on wrist 5x  4 lb 10x      Supine ER 2# 2*10 3 lb: 2 x 10   Wall str 5x 10s    Ther Activity  14# 2*10 14 lb 10 x 2  See above     Supine overhead flexion with 8# 10*2 8 lb: 10 x 2       scap push up 8# 10x  scap protractions: 8 lb: 10 x  2     Modalities  PNF lift: 2 5 lb 10 x 2        Ice and TENs 5' 5'

## 2023-06-27 ENCOUNTER — APPOINTMENT (OUTPATIENT)
Dept: PHYSICAL THERAPY | Facility: CLINIC | Age: 53
End: 2023-06-27
Payer: COMMERCIAL

## 2023-06-29 ENCOUNTER — APPOINTMENT (OUTPATIENT)
Dept: PHYSICAL THERAPY | Facility: CLINIC | Age: 53
End: 2023-06-29
Payer: COMMERCIAL

## 2023-07-06 ENCOUNTER — OFFICE VISIT (OUTPATIENT)
Dept: PHYSICAL THERAPY | Facility: CLINIC | Age: 53
End: 2023-07-06
Payer: COMMERCIAL

## 2023-07-06 DIAGNOSIS — M25.511 ACUTE PAIN OF RIGHT SHOULDER: Primary | ICD-10-CM

## 2023-07-06 DIAGNOSIS — M75.101 TEAR OF RIGHT ROTATOR CUFF, UNSPECIFIED TEAR EXTENT, UNSPECIFIED WHETHER TRAUMATIC: ICD-10-CM

## 2023-07-06 PROCEDURE — 97110 THERAPEUTIC EXERCISES: CPT

## 2023-07-06 PROCEDURE — 97140 MANUAL THERAPY 1/> REGIONS: CPT

## 2023-07-06 NOTE — PROGRESS NOTES
Daily Note     Today's date: 2023  Patient name: Sheryle Belts  : 1970  MRN: [id number]  Referring provider: Lisa Manning*  Dx:   Encounter Diagnosis     ICD-10-CM    1. Acute pain of right shoulder  M25.511       2. Tear of right rotator cuff, unspecified tear extent, unspecified whether traumatic  M75. 101                      Subjective: Pt reports soreness with active movement. Objective: See treatment diary below      Assessment: Pt did well with all TE as listed, reports no increased pain during or post tx. Plan: Continue per plan of care. Eval/ Re-eval Auth #/ Referral # Total visits Start date  Expiration date Total active units  Total manual units  PT only or  PT+OT?     [id number]   12 2/9 5/9      3/21/23 # [id number] approved  3/22/2023-2023  12 visits           # [id number]  80 AOGFRH--3/28/2679         6/20/23 Auth # [id number] pending-12 visits-2023-2023                     Precautions: Standard. PROTOCOL.      HEP update 23 Access Code [email or web address]        Visit 32/36 33/36 34/36 35/48 36/48    6/13/23 6/15/23 6/20/23 6/22/23 7/6/23   PROM Performed KW Hold per PLOC  KW HA   AC and SC mobs        subscap and teres minor release                Neuro Re-Ed        PNF        Reactionary tasks        Ther Ex        Pendulums        Table slides        AAROM        Pulleys        Shoulder taps  Chest press: unilateral 12.5 lb 10 x 2 5lbs x10     Bent over row Row CC 10lbs 2x8    Face pulls CC 12.5lbs 2x8    Shldr ext CC 11lbs 2x10 Row: 12.5 lb 20x    Face pulls CC 12.5lbs 2x8    Shldr ext CC 11lbs 2x10     10x 5lbs          5lbs 10x 10x 5lbs          10x 5lbs 15x 5lbs          15x 5lbs   AROM        SL ER 2*10 CC: standing  x10 2.5lbs  x10 2#   IR TBand 2*10 CC: standing 4.0 lb 10 x 2 2lbs x10 x10 x15   Supine flexion 9# 2*10 See below      Serratus punch    10x 5 lbs x15   Biceps curl 9# 2*10 8 lb 10 x 2   3lbs 2x10 5 lbs 2x10   Tri ext 9*39 CC: 7.5 lb: 10 x 2 5lbs x10 5lbs x10 5lbs x10   SL Abd        Ball dribble on wall        IR SOS HBB 10x 10 x  5x      Med ball carry         Palloff press        OH Carry        Wall walks 5x Green looped band: 6 x 2      H Abd 2*10 2.5 lb 10 x 2       Truck drivers 45.3 lbs 2 x 10 -- 7lbs x10     UBE   4' L3     Peg wall W/ 4 lb AW on wrist 5x  4 lb 10x       Supine ER 2# 2*10 3 lb: 2 x 10   Wall str 5x 10s     Ther Activity  14# 2*10 14 lb 10 x 2  See above      Supine overhead flexion with 8# 10*2 8 lb: 10 x 2        scap push up 8# 10x  scap protractions: 8 lb: 10 x  2      Modalities  PNF lift: 2.5 lb 10 x 2         Ice and TENs 5' 5'

## 2023-07-11 ENCOUNTER — TELEPHONE (OUTPATIENT)
Dept: OBGYN CLINIC | Facility: HOSPITAL | Age: 53
End: 2023-07-11

## 2023-07-11 ENCOUNTER — OFFICE VISIT (OUTPATIENT)
Dept: PHYSICAL THERAPY | Facility: CLINIC | Age: 53
End: 2023-07-11
Payer: COMMERCIAL

## 2023-07-11 DIAGNOSIS — M75.101 TEAR OF RIGHT ROTATOR CUFF, UNSPECIFIED TEAR EXTENT, UNSPECIFIED WHETHER TRAUMATIC: ICD-10-CM

## 2023-07-11 DIAGNOSIS — M25.511 ACUTE PAIN OF RIGHT SHOULDER: Primary | ICD-10-CM

## 2023-07-11 PROCEDURE — 97110 THERAPEUTIC EXERCISES: CPT | Performed by: PHYSICAL THERAPIST

## 2023-07-11 NOTE — PROGRESS NOTES
Daily Note     Today's date: 2023  Patient name: Kerri Lopez  : 1970  MRN: [id number]  Referring provider: Royal Whittaker*  Dx:   Encounter Diagnosis     ICD-10-CM    1. Acute pain of right shoulder  M25.511       2. Tear of right rotator cuff, unspecified tear extent, unspecified whether traumatic  M75. 101                      Subjective: Pt has not been doing his HEP as instructed and at this time does admit a reduction in soreness however it has not abolished. He has pain when raising arm OH (3/10). He does have very mild resting discomfort in R anterior shoulder as he had been (1/10). Objective: See treatment diary below. Modified therex at lesser capacity, gentle PROM      Assessment: Tolerated treatment well. Patient at this time does cont to demo symptoms of tendinosis and inflammatory response. Overall end range deficits would benefit from inc stretching however may elicit an increase in pain. Plan: Continue per plan of care. Pt may benefit from further assessment from PA to determine need for injection. Eval/ Re-eval Auth #/ Referral # Total visits Start date  Expiration date Total active units  Total manual units  PT only or  PT+OT?     [id number]   12 2/9 5/9      3/21/23 # [id number] approved  3/22/2023-2023  12 visits           # [id number]  13 BEATRIZGRACE--         23 Auth # [id number] pending-12 visits-2023-2023                     Precautions: Standard. PROTOCOL.      HEP update 23 Access Code [email or web address]        Visit 32/36 33/36 34/36 35/48 36/48 37/48    6/13/23 6/15/23 6/20/23 6/22/23 7/6/23 7/11/23   PROM Performed KW Hold per PLOC  KW HA KW   AC and SC mobs         subscap and teres minor release                  Neuro Re-Ed         PNF         Reactionary tasks         Ther Ex         Pendulums         Table slides         AAROM         Pulleys         Shoulder taps  Chest press: unilateral 12.5 lb 10 x 2 5lbs x10 Bent over row Row CC 10lbs 2x8    Face pulls CC 12.5lbs 2x8    Shldr ext CC 11lbs 2x10 Row: 12.5 lb 20x    Face pulls CC 12.5lbs 2x8    Shldr ext CC 11lbs 2x10     10x 5lbs          5lbs 10x 10x 5lbs          10x 5lbs 15x 5lbs          15x 5lbs CC 10x 12lbs          CC 10x 12lbs   AROM         SL ER 2*10 CC: standing  x10 2.5lbs  x10 2# CC 2.5lbs 2x8   IR TBand 2*10 CC: standing 4.0 lb 10 x 2 2lbs x10 x10 x15 2x8 4lbs   Supine flexion 9# 2*10 See below    6lbs Bilat 2x8   Serratus punch    10x 5 lbs x15    Biceps curl 9# 2*10 8 lb 10 x 2   3lbs 2x10 5 lbs 2x10 CC 7.5lbs 2x10   Tri ext 2*13 CC: 7.5 lb: 10 x 2 5lbs x10 5lbs x10 5lbs x10 CC 7.5lbs 2x10   SL Abd         Ball dribble on wall         IR SOS HBB 10x 10 x  5x       Med ball carry       Med ball hand pass off 8lbs 30s x 3   Palloff press         OH Carry         Wall walks 5x Green looped band: 6 x 2       H Abd 2*10 2.5 lb 10 x 2        Truck drivers 54.2 lbs 2 x 10 -- 7lbs x10      UBE   4' L3      Peg wall W/ 4 lb AW on wrist 5x  4 lb 10x        Supine ER 2# 2*10 3 lb: 2 x 10   Wall str 5x 10s      Ther Activity  14# 2*10 14 lb 10 x 2  See above       Supine overhead flexion with 8# 10*2 8 lb: 10 x 2         scap push up 8# 10x  scap protractions: 8 lb: 10 x  2       Modalities  PNF lift: 2.5 lb 10 x 2          Ice and TENs 5' 5'

## 2023-07-11 NOTE — TELEPHONE ENCOUNTER
Caller: Wife     Doctor: Roberto Carlos Montiel    Reason for call: Patient coming off of having rotator cuff surgery with Dr. Aide White a few months ago. They see the MRI results, but have not heard about the results and how to move forward. Please advise.      Call back#: [phone]

## 2023-07-13 ENCOUNTER — OFFICE VISIT (OUTPATIENT)
Dept: PHYSICAL THERAPY | Facility: CLINIC | Age: 53
End: 2023-07-13
Payer: COMMERCIAL

## 2023-07-13 DIAGNOSIS — M25.511 ACUTE PAIN OF RIGHT SHOULDER: Primary | ICD-10-CM

## 2023-07-13 DIAGNOSIS — M75.101 TEAR OF RIGHT ROTATOR CUFF, UNSPECIFIED TEAR EXTENT, UNSPECIFIED WHETHER TRAUMATIC: ICD-10-CM

## 2023-07-13 PROCEDURE — 97110 THERAPEUTIC EXERCISES: CPT | Performed by: PHYSICAL THERAPIST

## 2023-07-13 NOTE — PROGRESS NOTES
Daily Note     Today's date: 2023  Patient name: Alejandra Edwards  : 1970  MRN: [id number]  Referring provider: Ayesha Escobar*  Dx:   Encounter Diagnosis     ICD-10-CM    1. Acute pain of right shoulder  M25.511       2. Tear of right rotator cuff, unspecified tear extent, unspecified whether traumatic  M75. 101                      Subjective: Pt reports no adverse affects after last session. Slight discomfort with OH activity today with foam roller wall slides. Objective: See treatment diary below      Assessment: Tolerated treatment well. Patient demo reflexive nature to reach and receive weighted objects during therex with L UE as opposed to dominant R UE. Plan: Continue per plan of care. Pt will f/u with Ortho. Progress stretching on table for H Add and IR. Eval/ Re-eval Auth #/ Referral # Total visits Start date  Expiration date Total active units  Total manual units  PT only or  PT+OT?     [id number]   12 2/9 5/9      3/21/23 # [id number] approved  3/22/2023-2023  12 visits           # [id number]  91 [id number]         23 Auth # [id number] pending-12 visits-2023-2023                     Precautions: Standard. PROTOCOL.      HEP update 23 Access Code [email or web address]        Visit 45/86 85/45 42/62 56/23 96/09    23   PROM  KW HA KW KW with inc OP at end range   AC and SC mobs        subscap and teres minor release                Neuro Re-Ed        PNF        Reactionary tasks        Ther Ex        Shoulder taps 5lbs x10       Bent over row 10x 5lbs          5lbs 10x 10x 5lbs          10x 5lbs 15x 5lbs          15x 5lbs CC 10x 12lbs          CC 10x 12lbs CC uni row 12.5lbs 2x12   AROM        SL ER x10 2.5lbs  x10 2# CC 2.5lbs 2x8 CC 2x10 4lbs   IR TBand 2lbs x10 x10 x15 2x8 4lbs CC 2x10 4lbs   Supine flexion    6lbs Bilat 2x8    Serratus punch  10x 5 lbs x15     Biceps curl  3lbs 2x10 5 lbs 2x10 CC 7.5lbs 2x10 8lbs 2x10   Tri ext 5lbs x10 5lbs x10 5lbs x10 CC 7.5lbs 2x10 Tri ext OH press 4lbs 2x8   SL Abd        Ball dribble on wall        IR SOS HBB 5x        Med ball carry     Med ball hand pass off 8lbs 30s x 3 5x 30s   Palloff press        OH Carry        Wall walks     Wall slides foam roller 2x12   H Abd        Truck drivers 7lbs M02       UBE 4' L3       Peg wall        Supine ER Wall str 5x 10s       Ther Activity See above                       Modalities         Ice and TENs 5' 5'

## 2023-07-18 ENCOUNTER — OFFICE VISIT (OUTPATIENT)
Dept: PHYSICAL THERAPY | Facility: CLINIC | Age: 53
End: 2023-07-18
Payer: COMMERCIAL

## 2023-07-18 DIAGNOSIS — M25.511 ACUTE PAIN OF RIGHT SHOULDER: Primary | ICD-10-CM

## 2023-07-18 DIAGNOSIS — M75.101 TEAR OF RIGHT ROTATOR CUFF, UNSPECIFIED TEAR EXTENT, UNSPECIFIED WHETHER TRAUMATIC: ICD-10-CM

## 2023-07-18 PROCEDURE — 97112 NEUROMUSCULAR REEDUCATION: CPT | Performed by: PHYSICAL THERAPIST

## 2023-07-18 PROCEDURE — 97110 THERAPEUTIC EXERCISES: CPT | Performed by: PHYSICAL THERAPIST

## 2023-07-18 NOTE — PROGRESS NOTES
Daily Note     Today's date: 2023  Patient name: Ramiro Gustafson  : 1970  MRN: [id number]  Referring provider: Oleksandr Leblanc*  Dx:   Encounter Diagnosis     ICD-10-CM    1. Acute pain of right shoulder  M25.511       2. Tear of right rotator cuff, unspecified tear extent, unspecified whether traumatic  M75. 101                      Subjective: Overall pt feels he is with less pain but pain does remain present overall. He feels his exercise hiatus was helpful. Objective: See treatment diary below. Introduced progressive stretching for HEP as well as reactionary based exercises aimed at progressing speed and responsiveness in R UE. HEP updated      Assessment: Tolerated treatment well. Patient demo good response to today's therex and good comprehension of est HEP and progressed HEP. Pt is sore post session. Plan: Continue per plan of care. Eval/ Re-eval Auth #/ Referral # Total visits Start date  Expiration date Total active units  Total manual units  PT only or  PT+OT?     [id number]   12 2/9 5/9      3/21/23 # [id number] approved  3/22/2023-2023  12 visits           # [id number]  07 OXNBTT--23 Auth # [id number] pending-12 visits-2023-2023                     Precautions: Standard. PROTOCOL.      HEP update 23 Access Code [email or web address]        Visit  43/64 89/51 20/70 95/53    23   PROM KW HA KW KW with inc OP at end range KW with inc OP at end range   AC and SC mobs        subscap and teres minor release                Neuro Re-Ed        PNF        Reactionary tasks     RSB 10'   Ther Ex        Shoulder taps        Bent over row 10x 5lbs          10x 5lbs 15x 5lbs          15x 5lbs CC 10x 12lbs          CC 10x 12lbs CC uni row 12.5lbs 2x12 CC Uni scap ret/ecc pro 12.5lbs 2x10 (super set)   AROM        SL ER  x10 2# CC 2.5lbs 2x8 CC 2x10 4lbs    IR TBand x10 x15 2x8 4lbs CC 2x10 4lbs    Supine flexion   6lbs Bilat 2x8     Serratus punch 10x 5 lbs x15      Biceps curl 3lbs 2x10 5 lbs 2x10 CC 7.5lbs 2x10 8lbs 2x10    Tri ext 5lbs x10 5lbs x10 CC 7.5lbs 2x10 Tri ext OH press 4lbs 2x8    SL Abd        Ball dribble on wall        IR SOS HBB     Sleep stretch 5x5s   Med ball carry    Med ball hand pass off 8lbs 30s x 3 5x 30s    Palloff press        OH Carry        Wall walks    Wall slides foam roller 2x12 2x12   H Abd        Truck drivers        H add self stretch on table     Roll 5x5s   Peg wall        Supine ER     ER at wall self str 5 x5s   Rotational thruster     3lbs 2x10 (super set                   Ther Activity                        Modalities         5'

## 2023-07-20 ENCOUNTER — OFFICE VISIT (OUTPATIENT)
Dept: PHYSICAL THERAPY | Facility: CLINIC | Age: 53
End: 2023-07-20
Payer: COMMERCIAL

## 2023-07-20 DIAGNOSIS — M75.101 TEAR OF RIGHT ROTATOR CUFF, UNSPECIFIED TEAR EXTENT, UNSPECIFIED WHETHER TRAUMATIC: ICD-10-CM

## 2023-07-20 DIAGNOSIS — M25.511 ACUTE PAIN OF RIGHT SHOULDER: Primary | ICD-10-CM

## 2023-07-20 PROCEDURE — 97140 MANUAL THERAPY 1/> REGIONS: CPT | Performed by: PHYSICAL THERAPIST

## 2023-07-20 PROCEDURE — 97110 THERAPEUTIC EXERCISES: CPT | Performed by: PHYSICAL THERAPIST

## 2023-07-20 NOTE — PROGRESS NOTES
Daily Note         Today's date: 2023  Patient name: Attila Miranda  : 1970  MRN: [id number]  Referring provider: Haritha Trevizo*  Dx:   Encounter Diagnosis     ICD-10-CM    1. Acute pain of right shoulder  M25.511       2. Tear of right rotator cuff, unspecified tear extent, unspecified whether traumatic  M75.101           Subjective: Attila Miranda reports pain level entering today is 2/10. States with pec stretch on wall he experiences a 5/10. Objective: See treatment diary below    Assessment:  noted was hypomobility tspine with PA glides and sidelying tspine rotaiton mobs. patient demosntrated mod LOM with tspine rotaiton on right with minimal to no change after rotational mobs. patient demosntrated mod LOM rotation to left , which improved to min LOM after tspine mobs. patient instructed in sidleying tspine rotaiton stretch to improve ROM. PLOC discussed with primary PT. Edison Feliciano Plan: progress as indicated by primary PT. Eval/ Re-eval Auth #/ Referral # Total visits Start date  Expiration date Total active units  Total manual units  PT only or  PT+OT?     [id number]   12 2/9 5/9      3/21/23 # [id number] approved  3/22/2023-2023  12 visits           # [id number]  92 [facility]--         23 Auth # [id number] pending-12 visits-2023-2023                     Precautions: Standard. PROTOCOL. HEP update 23 Access Code [email or web address]        Visit  80/74  31/63 38/04    23   PROM CAAL KW KW with inc OP at end range KW with inc OP at end range --   AC and SC mobs        subscap and teres minor release             PA mobs grade III/IV tspine.    sidleying tspine rotation mobs grade III   Neuro Re-Ed        PNF        Reactionary tasks    RSB 10'    Ther Ex     Pulley scap retractions: 20x    Shoulder taps        Bent over row 15x 5lbs          15x 5lbs CC 10x 12lbs          CC 10x 12lbs CC uni row 12.5lbs 2x12 CC Uni scap ret/ecc pro 12.5lbs 2x10 (super set) CC Uni scap ret/ecc pro 12.5lbs 2x10 (super set)   AROM        SL ER x10 2# CC 2.5lbs 2x8 CC 2x10 4lbs  CC 2x10 4lbs   IR TBand x15 2x8 4lbs CC 2x10 4lbs  CC: 7.5 lbs 2 x 10     Supine flexion  6lbs Bilat 2x8      Serratus punch 5 lbs x15       Biceps curl 5 lbs 2x10 CC 7.5lbs 2x10 8lbs 2x10     Tri ext 5lbs x10 CC 7.5lbs 2x10 Tri ext OH press 4lbs 2x8     SL Abd        Ball dribble on wall        IR SOS HBB    Sleep stretch 5x5s    Med ball carry   Med ball hand pass off 8lbs 30s x 3 5x 30s     Palloff press        OH Carry        Wall walks   Wall slides foam roller 2x12 2x12 12 x 2    H Abd        Truck drivers        H add self stretch on table    Roll 5x5s    Peg wall        Supine ER    ER at wall self str 5 x5s --    Rotational thruster    3lbs 2x10 (super set 3 lb 10 x 2    Unilateral pull down      CC: 12.5 lb 10 x 2   sidelying tspine rotation mobs      5 sec x 10  erin   Ther Activity                        Modalities

## 2023-07-25 ENCOUNTER — OFFICE VISIT (OUTPATIENT)
Dept: PHYSICAL THERAPY | Facility: CLINIC | Age: 53
End: 2023-07-25
Payer: COMMERCIAL

## 2023-07-25 DIAGNOSIS — M75.101 TEAR OF RIGHT ROTATOR CUFF, UNSPECIFIED TEAR EXTENT, UNSPECIFIED WHETHER TRAUMATIC: ICD-10-CM

## 2023-07-25 DIAGNOSIS — M25.511 ACUTE PAIN OF RIGHT SHOULDER: Primary | ICD-10-CM

## 2023-07-25 PROCEDURE — 97110 THERAPEUTIC EXERCISES: CPT | Performed by: PHYSICAL THERAPIST

## 2023-07-25 NOTE — PROGRESS NOTES
Daily Note     Today's date: 2023  Patient name: Ramiro Gustafson  : 1970  MRN: [id number]  Referring provider: Oleksandr Leblanc*  Dx:   Encounter Diagnosis     ICD-10-CM    1. Acute pain of right shoulder  M25.511       2. Tear of right rotator cuff, unspecified tear extent, unspecified whether traumatic  M75. 101                      Subjective: Pt reports that he feels good today his ongoing soreness persists but overall he is feeling better. He is enjoying working the arm tonight with weights. Objective: See treatment diary below      Assessment: Tolerated treatment well. Patient demo overall ongoing progression. His greatest pain generator at this time is external rotation in shoulder while at 90 deg abd active achievement, however able to tolerate passively without issue. Plan: Continue per plan of care. Eval/ Re-eval Auth #/ Referral # Total visits Start date  Expiration date Total active units  Total manual units  PT only or  PT+OT?     [id number]   12 2/9 5/9      3/21/23 # [id number] approved  3/22/2023-2023  12 visits           # [id number]  45 [id number]--         23 Auth # [id number] pending-12 visits-2023-2023                     Precautions: Standard. PROTOCOL. HEP update 23 Access Code [email or web address]        Visit 45/15 30/09 36/41 70/11 77/45    23   PROM KW KW with inc OP at end range KW with inc OP at end range --    AC and SC mobs        subscap and teres minor release            PA mobs grade III/IV tspine.    sidleying tspine rotation mobs grade III Performed   Neuro Re-Ed        PNF        Reactionary tasks   RSB 10'     Ther Ex    Pulley scap retractions: 20x  20x   Shoulder taps        Bent over row CC 10x 12lbs          CC 10x 12lbs CC uni row 12.5lbs 2x12 CC Uni scap ret/ecc pro 12.5lbs 2x10 (super set) CC Uni scap ret/ecc pro 12.5lbs 2x10 (super set) 2x10   AROM        SL ER CC 2.5lbs 2x8 CC 2x10 4lbs  CC 2x10 4lbs 2x10   IR TBand 2x8 4lbs CC 2x10 4lbs  CC: 7.5 lbs 2 x 10   2x10   Supine flexion 6lbs Bilat 2x8    2x8   Serratus punch        Biceps curl CC 7.5lbs 2x10 8lbs 2x10   2x10   Tri ext CC 7.5lbs 2x10 Tri ext OH press 4lbs 2x8   CC 2x10   SL Abd     2x8   Ball dribble on wall        IR SOS HBB   Sleep stretch 5x5s  5x   Med ball carry  Med ball hand pass off 8lbs 30s x 3 5x 30s      Palloff press        OH Carry        Wall walks  Wall slides foam roller 2x12 2x12 12 x 2     H Abd        Truck drivers     6A96   H add self stretch on table   Roll 5x5s  5x   Peg wall        Supine ER   ER at wall self str 5 x5s --  At 90 deg abd 5x   Rotational thruster   3lbs 2x10 (super set 3 lb 10 x 2     Unilateral pull down     CC: 12.5 lb 10 x 2 2x10   sidelying tspine rotation mobs     5 sec x 10  erin 5s x 10   Ther Activity                        Modalities

## 2023-08-01 ENCOUNTER — OFFICE VISIT (OUTPATIENT)
Dept: PHYSICAL THERAPY | Facility: CLINIC | Age: 53
End: 2023-08-01
Payer: COMMERCIAL

## 2023-08-01 DIAGNOSIS — M25.511 ACUTE PAIN OF RIGHT SHOULDER: Primary | ICD-10-CM

## 2023-08-01 DIAGNOSIS — M75.101 TEAR OF RIGHT ROTATOR CUFF, UNSPECIFIED TEAR EXTENT, UNSPECIFIED WHETHER TRAUMATIC: ICD-10-CM

## 2023-08-01 PROCEDURE — 97110 THERAPEUTIC EXERCISES: CPT | Performed by: PHYSICAL THERAPIST

## 2023-08-01 NOTE — PROGRESS NOTES
Daily Note - Progress Note    Today's date: 2023  Patient name: Brian Hernandez  : 1970  MRN: [id number]  Referring provider: Miki Dumont*  Dx:   Encounter Diagnosis     ICD-10-CM    1. Acute pain of right shoulder  M25.511       2. Tear of right rotator cuff, unspecified tear extent, unspecified whether traumatic  M75. 101                      Subjective: Pt reports he has ongoing soreness in shoulder but much better since exercise hiatus. He feels he is 85% better at this time. OH activities remain his primary limitation due to discomfort. Pt is sore after today's session but not in pain. Objective: See treatment diary below. LTG partially accomplished. R shldr AROM 15% loss of flexion and abd as well as IR HBB with end range pain. R shldr strength 70% of L throughout. Pain range 2-5/10. Assessment: Tolerated treatment well. Patient demo ongoing excellent progression and at this time while he still remains below his PLOF he is working through inflamed tendinopathy in L shoulder to maximize return to his PLOF and full strength to prevent further exacerbation and ongoing deficits. Pt cont to defer all primary and initiative activities with L hand and UE, as opposed to R, continues to guard. Plan: Continue per plan of care. Eval/ Re-eval Auth #/ Referral # Total visits Start date  Expiration date Total active units  Total manual units  PT only or  PT+OT?     [id number]   12 2/9 5/9      3/21/23 # [id number] approved  3/22/2023-2023  12 visits           # [id number]  29 QREXFL--         23 Auth # [id number] pending-12 visits-2023-2023                     Precautions: Standard. PROTOCOL.      HEP update 23 Access Code [email or web address]        Visit  42/48    23   PROM KW with inc OP at end range KW with inc OP at end range --     AC and SC mobs        subscap and teres minor release PA mobs grade III/IV tspine.    sidleying tspine rotation mobs grade III Performed    Neuro Re-Ed        PNF        Reactionary tasks  RSB 10'   RSB 10'   Ther Ex   Pulley scap retractions: 20x  20x    Shoulder taps     2x15   Bent over row CC uni row 12.5lbs 2x12 CC Uni scap ret/ecc pro 12.5lbs 2x10 (super set) CC Uni scap ret/ecc pro 12.5lbs 2x10 (super set) 2x10 2x10   AROM        SL ER CC 2x10 4lbs  CC 2x10 4lbs 2x10 2x10   IR TBand CC 2x10 4lbs  CC: 7.5 lbs 2 x 10   2x10 2x10   Supine flexion    2x8    Serratus punch     CC 2x10 12.5lbs   Biceps curl 8lbs 2x10   2x10 2x10   Tri ext Tri ext OH press 4lbs 2x8   CC 2x10 2x10   SL Abd    2x8    Ball dribble on wall        IR SOS HBB  Sleep stretch 5x5s  5x 5x   Med ball carry  5x 30s    18lb KB carry down [address] scap cues   Palloff press        OH Carry     OH press 9lbs 5x   Wall walks Wall slides foam roller 2x12 2x12 12 x 2   Wall slide and lift off with TB loop (light) 2x10   H Abd     2x10 2.5lbs   Truck drivers    9M47 8S25   H add self stretch on table  Roll 5x5s  5x    Peg wall        Supine ER  ER at wall self str 5 x5s --  At 90 deg abd 5x    Rotational thruster  3lbs 2x10 (super set 3 lb 10 x 2      Unilateral pull down    CC: 12.5 lb 10 x 2 2x10 2x10   sidelying tspine rotation mobs    5 sec x 10  erin 5s x 10    Ther Activity                        Modalities

## 2023-08-03 ENCOUNTER — OFFICE VISIT (OUTPATIENT)
Dept: PHYSICAL THERAPY | Facility: CLINIC | Age: 53
End: 2023-08-03
Payer: COMMERCIAL

## 2023-08-03 DIAGNOSIS — M75.101 TEAR OF RIGHT ROTATOR CUFF, UNSPECIFIED TEAR EXTENT, UNSPECIFIED WHETHER TRAUMATIC: ICD-10-CM

## 2023-08-03 DIAGNOSIS — M25.511 ACUTE PAIN OF RIGHT SHOULDER: Primary | ICD-10-CM

## 2023-08-03 PROCEDURE — 97110 THERAPEUTIC EXERCISES: CPT | Performed by: PHYSICAL THERAPIST

## 2023-08-03 NOTE — PROGRESS NOTES
Daily Note         Today's date: 8/3/2023  Patient name: Rolla Sandhoff  : 1970  MRN: [id number]  Referring provider: BRYANT Melo  Dx:   Encounter Diagnosis     ICD-10-CM    1. Acute pain of right shoulder  M25.511       2. Tear of right rotator cuff, unspecified tear extent, unspecified whether traumatic  M75.101           Subjective: Rolla Sandhoff reports pain level entering today is 1/10. States his shoulder is slowly improving        Objective: See treatment diary below    Assessment:  patient needs only minimal cuing for proper form with ther ex. no new exercises added due to patient transitioning to HEP per patient as per ploc. The goal of the current treatment is to address patient's functional limitations as well as objective findings. Plan: no future appointments scheudled at this time until patient has consult with ortho as per plan with priamry PT per patient/        Eval/ Re-eval Auth #/ Referral # Total visits Start date  Expiration date Total active units  Total manual units  PT only or  PT+OT?     [id number]   12 2/9 5/9      3/21/23 # [id number] approved  3/22/2023-2023  12 visits           # [id number]  12 visits-2023-2023 Auth # [id number] pending-12 visits-2023-2023                     Precautions: Standard. PROTOCOL. HEP update 23 Access Code [email or web address]        Visit / 42/48 43/48    7/18/23 7/20/23 7/25/23 8/1/23 8/3/23   PROM KW with inc OP at end range --      AC and SC mobs        subscap and teres minor release          PA mobs grade III/IV tspine.    sidleying tspine rotation mobs grade III Performed     Neuro Re-Ed        PNF        Reactionary tasks RSB 10'   RSB 10'    Ther Ex  Pulley scap retractions: 20x  20x     Shoulder taps    2x15 15 x 2   Bent over row CC Uni scap ret/ecc pro 12.5lbs 2x10 (super set) CC Uni scap ret/ecc pro 12.5lbs 2x10 (super set) 2x10 2x10 CC Uni scap ret/ecc pro 12.5lbs 2x10   AROM        SL ER  CC 2x10 4lbs 2x10 2x10 CC: 2.5 lb 10 x 2   IR TBand  CC: 7.5 lbs 2 x 10   2x10 2x10 CC: 4.0 lb 10 x 2   Supine flexion   2x8     Serratus punch    CC 2x10 12.5lbs CC: 12.5 lb 10 x 2   Biceps curl   2x10 2x10 8 lb 10 x 2   Tri ext   CC 2x10 2x10 CC: 9.0 lb 10 x 2   SL Abd   2x8     Ball dribble on wall        IR SOS HBB Sleep stretch 5x5s  5x 5x W/ pulleys: 10 secx 5   Med ball carry     18lb KB carry down [address] scap cues 18 lb KB: 50 feet x 2 x 2 rounds    Palloff press        OH Carry    OH press 9lbs 5x 9 lb: 5 x 2 with bicep curl   Wall walks 2x12 12 x 2   Wall slide and lift off with TB loop (light) 2x10 Blue loop: 10 x 2    H Abd    2x10 2.5lbs Hor add CC; 7.5 lb 10 x 2   Hor abd: Cc: 2.5 lb 10 x 2    Truck drivers   1O44 1W89 PY:97.1 lb 10 x    15.5 lb 10 x   H add self stretch on table Roll 5x5s  5x     Peg wall        Supine ER ER at wall self str 5 x5s --  At 90 deg abd 5x     Rotational thruster 3lbs 2x10 (super set 3 lb 10 x 2       Unilateral pull down   CC: 12.5 lb 10 x 2 2x10 2x10 CC: 9.0 lb 10x 2   sidelying tspine rotation mobs   5 sec x 10  erin 5s x 10     Ther Activity                        Modalities

## 2023-08-09 ENCOUNTER — OFFICE VISIT (OUTPATIENT)
Dept: OBGYN CLINIC | Facility: CLINIC | Age: 53
End: 2023-08-09
Payer: COMMERCIAL

## 2023-08-09 VITALS
WEIGHT: 217 LBS | DIASTOLIC BLOOD PRESSURE: 82 MMHG | BODY MASS INDEX: 32.14 KG/M2 | HEIGHT: 69 IN | HEART RATE: 72 BPM | SYSTOLIC BLOOD PRESSURE: 124 MMHG

## 2023-08-09 DIAGNOSIS — M75.21 BICEPS TENDONITIS ON RIGHT: ICD-10-CM

## 2023-08-09 DIAGNOSIS — Z98.890 S/P RIGHT ROTATOR CUFF REPAIR: Primary | ICD-10-CM

## 2023-08-09 PROCEDURE — 99213 OFFICE O/P EST LOW 20 MIN: CPT | Performed by: ORTHOPAEDIC SURGERY

## 2023-08-09 NOTE — PROGRESS NOTES
Orthopedic Sports Medicine New Patient Visit     Assesment:   46 y.o. male with right biceps tendinitis, 6.5 months s/p right rotator cuff repair    Plan:    Upon review of MRI, the rotator cuff appears intact and I believe it is appropriate to resume strength training in PT. we reviewed that 6 months after her rotator cuff repair many patients can still expect to see significant improvement in shoulder function over the next few months. Regarding the biceps tenodesis read on MRI, per Dr. Latoya Bailon op note and imaging, my review of MRI, it does not appear that a biceps tenodesis was performed. The biceps tendon appears to be intact in its anatomic location. As such, I believe biceps tendinitis is the main source of his pain based on exam today, which can also be addressed with additional exercises in PT. We discussed that there are more invasive options for treatment, including U/S-guided steroid injections into the biceps tendon sheath and surgical intervention to perform a biceps tenotomy or tenodesis upon failing non-operative treatments. The patient would like to prioritize PT at this time, which is reasonable. We will follow up with the patient in 6 weeks following further PT to reevaluate his symptoms and discuss further treatment options as necessary. In the meantime, the patient can use ice/heat and OTC medications as needed. Follow up:    6 weeks following PT      Chief Complaint   Patient presents with   • Right Shoulder - Pain       History of Present Illness: The patient is a 46 y.o., right hand dominant, male, presenting with right shoulder pain that he localizes anteriorly. The patient had a right rotator cuff repair about 6.5 months ago and has been consistently attending PT per Dr. Latoya Bailon protocol. This new pain started during an early session of PT when the patient heard a pop in the shoulder.  The patient was able to progress through his rotator cuff protocol until he was increasing weights, which caused increased, persistent pain. PT discontinued strengthening and has been focusing more on stretching and shoulder mobility. The pain occurs specifically with shoulder forward flexion and weight bearing elbow flexion. The patient denies new injury, muscle deformity, bruising, swelling, or numbness/tingling. Shoulder Surgical History:  Right rotator cuff repair and subacromial decompression performed by Dr. Mikaela Quintana on 1/26/23    Past Medical, Social and Family History:  Past Medical History:   Diagnosis Date   • Anemia    • 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 07/12/2006   • DVT (deep venous thrombosis) ([address]) 2018   • Dysphagia     Last Assessed: 8/12/2014    • Factor 5 Leiden mutation, heterozygous ([address])    • GERD (gastroesophageal reflux disease)    • Globus sensation     Last Assessed: 6/4/2014    • H/O neoplasm of uncertain behavior of skin 06/06/2006   • Hyperlipidemia    • Hypertension 02/14/2006   • Lateral epicondylitis of right elbow     Last Assessed: 10/23/2015    • Pes planus     last assessed: 10/23/2013    • Plantar fascial fibromatosis     Last Assessed: 10/23/2013    • Pulmonary embolism ([address])    • Right patellofemoral syndrome     Last Assessed: 4/15/2016    • Sebaceous cyst 11/03/2007     Past Surgical History:   Procedure Laterality Date   • COLONOSCOPY     • ESOPHAGOGASTRODUODENOSCOPY N/A 10/7/2016    Procedure: ESOPHAGOGASTRODUODENOSCOPY (EGD); Surgeon: Holli Norris MD;  Location: [facility]; Service:    • NASAL SEPTUM SURGERY  1995   • PA ESOPHAGOGASTRODUODENOSCOPY TRANSORAL DIAGNOSTIC N/A 12/6/2018    Procedure: ESOPHAGOGASTRODUODENOSCOPY (EGD); Surgeon: Holli Norris MD;  Location: [facility];   Service: Gastroenterology   • PA SURGICAL ARTHROSCOPY SHOULDER W/ROTATOR CUFF RPR Right 1/26/2023    Procedure: Shoulder Arthroscopy with Rotator Cuff Repair, Subacromial Decompression, and Limited Debridement;  Surgeon: Tawanna Woods MD;  Location: [address] [place] [address] MAIN OR;  Service: Orthopedics     No Known Allergies  Current Outpatient Medications on File Prior to Visit   Medication Sig Dispense Refill   • Xarelto 20 MG tablet Take 1 tablet by mouth daily. 90 tablet 0   • oxyCODONE (ROXICODONE) 5 immediate release tablet Take 1 tablet (5 mg total) by mouth every 4 (four) hours as needed for moderate pain for up to 15 doses Max Daily Amount: 30 mg 15 tablet 0   • [DISCONTINUED] esomeprazole (NexIUM) 40 MG capsule Take 1 capsule (40 mg total) by mouth daily before breakfast 90 capsule 3     No current facility-administered medications on file prior to visit. Social History     Socioeconomic History   • Marital status: /Civil Union     Spouse name: Not on file   • Number of children: Not on file   • Years of education: Not on file   • Highest education level: Not on file   Occupational History   • Not on file   Tobacco Use   • Smoking status: Never   • Smokeless tobacco: Never   Vaping Use   • Vaping Use: Never used   Substance and Sexual Activity   • Alcohol use: Yes     Alcohol/week: 4.0 standard drinks of alcohol     Types: 4 Cans of beer per week     Comment: socially   • Drug use: No   • Sexual activity: Yes     Partners: Female   Other Topics Concern   • Not on file   Social History Narrative   • Not on file     Social Determinants of Health     Financial Resource Strain: Not on file   Food Insecurity: Not on file   Transportation Needs: Not on file   Physical Activity: Not on file   Stress: Not on file   Social Connections: Not on file   Intimate Partner Violence: Not on file   Housing Stability: Not on file         I have reviewed the past medical, surgical, social and family history, medications and allergies as documented in the EMR. Review of systems: ROS is negative other than that noted in the HPI. Constitutional: Negative for fatigue and fever. HENT: Negative for sore throat. Respiratory: Negative for shortness of breath.     Cardiovascular: Negative for chest pain. Gastrointestinal: Negative for abdominal pain. Endocrine: Negative for cold intolerance and heat intolerance. Genitourinary: Negative for flank pain. Musculoskeletal: Negative for back pain. Skin: Negative for rash. Allergic/Immunologic: Negative for immunocompromised state. Neurological: Negative for dizziness. Psychiatric/Behavioral: Negative for agitation. Physical Exam:    Blood pressure 124/82, pulse 72, height 5' 9" (1.753 m), weight 98.4 kg (217 lb). General/Constitutional: NAD, well developed, well nourished  HENT: Normocephalic, atraumatic  CV: Intact distal pulses, regular rate  Resp: No respiratory distress or labored breathing  Abdomen: soft, nondistended, non tender   Lymphatic: No lymphadenopathy palpated  Neuro: Alert and Oriented x 3, no focal deficits  Psych: Normal mood, normal affect  Skin: Warm, dry, no rashes, no erythema      Shoulder Exam:      Inspection: No ecchymosis, edema, or deformity. No visualized wounds or skin lesions   Palpation: moderate bicipital groove tenderness. No AC joint tenderness  Active Motion:       FF: 160        ER: 50 right, 60 left       IR: T12  Strength: 5/5 empty can, 4+/5 ER,  5/5 IR   Sensory - SILT in the Radial / Ulnar / Median / Axillary nerve distributions  Motor - AIN / PIN / Radial / Ulnar / Median / Axillary motor nerves in tact  Palpable Radial pulse  Cap refill <2secs in all digits      4+/5 Belly Press    + Evans  + Speed       Imaging    I reviewed and interpreted MRI of the right shoulder which shows intact rotator cuff repair without recurrent tear. Supraspinatus, infraspinatus, and subscapularis insertional tendinosis. Intact long head of the biceps tendon. Mild GH and AC joint degenerative changes.

## 2023-08-10 ENCOUNTER — OFFICE VISIT (OUTPATIENT)
Dept: PHYSICAL THERAPY | Facility: CLINIC | Age: 53
End: 2023-08-10
Payer: COMMERCIAL

## 2023-08-10 DIAGNOSIS — M75.101 TEAR OF RIGHT ROTATOR CUFF, UNSPECIFIED TEAR EXTENT, UNSPECIFIED WHETHER TRAUMATIC: ICD-10-CM

## 2023-08-10 DIAGNOSIS — M25.511 ACUTE PAIN OF RIGHT SHOULDER: Primary | ICD-10-CM

## 2023-08-10 PROCEDURE — 97110 THERAPEUTIC EXERCISES: CPT | Performed by: PHYSICAL THERAPIST

## 2023-08-10 NOTE — PROGRESS NOTES
Daily Note     Today's date: 8/10/2023  Patient name: Lisbeth Mckeon  : 1970  MRN: [id number]  Referring provider: Claudean Market*  Dx:   Encounter Diagnosis     ICD-10-CM    1. Acute pain of right shoulder  M25.511       2. Tear of right rotator cuff, unspecified tear extent, unspecified whether traumatic  M75. 101                      Subjective: Pt recently saw his MD and was encouraged to progress strengthening an emphasis biceps strengthening as well. Objective: See treatment diary below. Edu pt to P/NW traffic light guide to monitoring symptoms with inc eccentrics at biceps. HEP updated. Assessment: Tolerated treatment well. Patient demo good overall technique and motivation throughout. Nil adverse effects from progression today or eccentrics. Plan: Continue per plan of care. Eval/ Re-eval Auth #/ Referral # Total visits Start date  Expiration date Total active units  Total manual units  PT only or  PT+OT?     [id number]   12 2/9 5/9      3/21/23 # [id number] approved  3/22/2023-2023  12 visits           # [id number]  74 [id number]-         23 Auth # [id number] pending-12 visits-2023-2023                     Precautions: Standard. PROTOCOL.      HEP update 23 Access Code [email or web address]        Visit 41/48 42/48 43/48 44/48    7/25/23 8/1/23 8/3/23 8/10/23   PROM       AC and SC mobs       subscap and teres minor release        Performed      Neuro Re-Ed       PNF       Reactionary tasks  RSB 10'     Ther Ex 20x      Shoulder taps  2x15 15 x 2    Bent over row 2x10 2x10 CC Uni scap ret/ecc pro 12.5lbs 2x10 Uni scap ret row 17.5lbs 1x12   Eccentric Biceps    Ecc lowering from Flexion 3lbs 1x10    Ecc lowering from flex Blue TB 1x12    SL ER 2x10 2x10 CC: 2.5 lb 10 x 2    IR TBand 2x10 2x10 CC: 4.0 lb 10 x 2    Supine flexion 2x8      Serratus punch  CC 2x10 12.5lbs CC: 12.5 lb 10 x 2    Biceps curl 2x10 2x10 8 lb 10 x 2 Eccentrics 6lbs 1x10    Eccentrics Black TB 1x10   Tri ext CC 2x10 2x10 CC: 9.0 lb 10 x 2    SL Abd 2x8      Ball dribble on wall       IR SOS HBB 5x 5x W/ pulleys: 10 secx 5    Med ball carry   18lb KB carry down [address] scap cues 18 lb KB: 50 feet x 2 x 2 rounds     Palloff press       OH Carry  OH press 9lbs 5x 9 lb: 5 x 2 with bicep curl    Wall walks  Wall slide and lift off with TB loop (light) 2x10 Blue loop: 10 x 2     H Abd  2x10 2.5lbs Hor add CC; 7.5 lb 10 x 2   Hor abd: Cc: 2.5 lb 10 x 2  H Add Black TB 2x10   Truck drivers 8X04 3G07 IH:79.1 lb 10 x    15.5 lb 10 x 17.5lbs 1x10   H add self stretch on table 5x      PNF    D2 Black TB flex/Ect 2x10   Supine ER At 90 deg abd 5x             Unilateral pull down  2x10 2x10 CC: 9.0 lb 10x 2    sidelying tspine rotation mobs  5s x 10      Ther Activity                     Modalities

## 2023-08-11 DIAGNOSIS — I26.99 OTHER PULMONARY EMBOLISM WITHOUT ACUTE COR PULMONALE, UNSPECIFIED CHRONICITY (HCC): ICD-10-CM

## 2023-08-11 RX ORDER — RIVAROXABAN 20 MG/1
TABLET, FILM COATED ORAL
Qty: 90 TABLET | Refills: 0 | Status: SHIPPED | OUTPATIENT
Start: 2023-08-11

## 2023-08-15 ENCOUNTER — OFFICE VISIT (OUTPATIENT)
Dept: PHYSICAL THERAPY | Facility: CLINIC | Age: 53
End: 2023-08-15
Payer: COMMERCIAL

## 2023-08-15 DIAGNOSIS — M25.511 ACUTE PAIN OF RIGHT SHOULDER: Primary | ICD-10-CM

## 2023-08-15 DIAGNOSIS — M75.101 TEAR OF RIGHT ROTATOR CUFF, UNSPECIFIED TEAR EXTENT, UNSPECIFIED WHETHER TRAUMATIC: ICD-10-CM

## 2023-08-15 PROCEDURE — 97110 THERAPEUTIC EXERCISES: CPT | Performed by: PHYSICAL THERAPIST

## 2023-08-15 NOTE — PROGRESS NOTES
Daily Note     Today's date: 8/15/2023  Patient name: Jami Brumfield  : 1970  MRN: [id number]  Referring provider: Gena Enrique*  Dx:   Encounter Diagnosis     ICD-10-CM    1. Acute pain of right shoulder  M25.511       2. Tear of right rotator cuff, unspecified tear extent, unspecified whether traumatic  M75. 101                      Subjective: Pt reports he did well after last session and post session today he reports he may have some soreness or fatigue but feels good. Admits good challenge today. Objective: See treatment diary below      Assessment: Tolerated treatment well. Patient demo great indep with therex, and with inc work load nil adverse effects thus far. Plan: Continue per plan of care. Eval/ Re-eval Auth #/ Referral # Total visits Start date  Expiration date Total active units  Total manual units  PT only or  PT+OT?     [id number]   12 2/9 5/9      3/21/23 # [id number] approved  3/22/2023-2023  12 visits           # [id number]  09 YDPBUJ--23 Auth # [id number] pending-12 visits-2023-2023                     Precautions: Standard. PROTOCOL.      HEP update 23 Access Code [email or web address]        Visit 41/48 42/48 43/48 44/48 45/48    7/25/23 8/1/23 8/3/23 8/10/23 8/15/23   PROM        AC and SC mobs        subscap and teres minor release         Performed       Neuro Re-Ed        PNF        Reactionary tasks  RSB 10'      Ther Ex 20x       Shoulder taps  2x15 15 x 2     Bent over row 2x10 2x10 CC Uni scap ret/ecc pro 12.5lbs 2x10 Uni scap ret row 17.5lbs 1x12 2x12   Eccentric Biceps    Ecc lowering from Flexion 3lbs 1x10    Ecc lowering from flex Blue TB 1x12  2x10        9lbs KB 2x12   SL ER 2x10 2x10 CC: 2.5 lb 10 x 2  2x12   IR TBand 2x10 2x10 CC: 4.0 lb 10 x 2  2x10   Supine flexion 2x8       Serratus punch  CC 2x10 12.5lbs CC: 12.5 lb 10 x 2  2x10   Biceps curl 2x10 2x10 8 lb 10 x 2 Eccentrics 6lbs 1x10    Eccentrics Black TB 1x10 2x15      2x15   Tri ext CC 2x10 2x10 CC: 9.0 lb 10 x 2     SL Abd 2x8       Ball dribble on wall        IR SOS HBB 5x 5x W/ pulleys: 10 secx 5  10x   Med ball carry   18lb KB carry down [address] scap cues 18 lb KB: 50 feet x 2 x 2 rounds   15x   Palloff press        OH Carry  OH press 9lbs 5x 9 lb: 5 x 2 with bicep curl  KB OH press down [place] and back   Wall walks  Wall slide and lift off with TB loop (light) 2x10 Blue loop: 10 x 2      H Abd  2x10 2.5lbs Hor add CC; 7.5 lb 10 x 2   Hor abd: Cc: 2.5 lb 10 x 2  H Add Black TB 2x10 TRX flys 2x12   Truck drivers 6N51 2A32 MD:10.2 lb 10 x    15.5 lb 10 x 17.5lbs 1x10 2x12   H add self stretch on table 5x       PNF    D2 Black TB flex/Ect 2x10 CC and TB 2x10 ea   Supine ER At 90 deg abd 5x               Unilateral pull down  2x10 2x10 CC: 9.0 lb 10x 2     sidelying tspine rotation mobs  5s x 10       OH Iso     2x15                                   Ther Activity                        Modalities

## 2023-08-17 ENCOUNTER — OFFICE VISIT (OUTPATIENT)
Dept: PHYSICAL THERAPY | Facility: CLINIC | Age: 53
End: 2023-08-17
Payer: COMMERCIAL

## 2023-08-17 DIAGNOSIS — M75.101 TEAR OF RIGHT ROTATOR CUFF, UNSPECIFIED TEAR EXTENT, UNSPECIFIED WHETHER TRAUMATIC: ICD-10-CM

## 2023-08-17 DIAGNOSIS — M25.511 ACUTE PAIN OF RIGHT SHOULDER: Primary | ICD-10-CM

## 2023-08-17 PROCEDURE — 97110 THERAPEUTIC EXERCISES: CPT | Performed by: PHYSICAL THERAPIST

## 2023-08-17 NOTE — PROGRESS NOTES
Daily Note     Today's date: 2023  Patient name: Anny Turner  : 1970  MRN: [id number]  Referring provider: Kori Pantoja*  Dx:   Encounter Diagnosis     ICD-10-CM    1. Acute pain of right shoulder  M25.511       2. Tear of right rotator cuff, unspecified tear extent, unspecified whether traumatic  M75. 101                      Subjective: Pt having R rib soreness from previous episode and a bit of shoulder soreness from inc workload in last therapy session. Post session confirms fatigue. Objective: See treatment diary below. Progress therex today       Assessment: Tolerated treatment well. Patient demo great motivation throughout therex and session. Pt cont to progress well with additional weights and sets. Plan: Continue per plan of care. Eval/ Re-eval Auth #/ Referral # Total visits Start date  Expiration date Total active units  Total manual units  PT only or  PT+OT?     [id number]   12 2/9 5/9      3/21/23 # [id number] approved  3/22/2023-2023  12 visits           [facility] [id number]  [facility] Auth # [id number] pending-12 visits-2023-2023                     Precautions: Standard. PROTOCOL.      HEP update 23 Access Code [email or web address]        Visit 41/81 85/05 61/93 57/37    8/3/23 8/10/23 8/15/23 8/17/23   PROM       AC and SC mobs       subscap and teres minor release              Neuro Re-Ed       PNF       Reactionary tasks       Ther Ex       Shoulder taps 15 x 2      Bent over row CC Uni scap ret/ecc pro 12.5lbs 2x10 Uni scap ret row 17.5lbs 1x12 2x12 2x12   Eccentric Biceps  Ecc lowering from Flexion 3lbs 1x10    Ecc lowering from flex Blue TB 1x12  2x10        9lbs KB 2x12 9lbs 8x        18lbs 10x   SL ER CC: 2.5 lb 10 x 2  2x12 2x12 at side    At 90 deg abd 2.5lbs 2x5   IR TBand CC: 4.0 lb 10 x 2  2x10 2x12 at side    At 90 deg abd 2x5lbs 2x5   Supine flexion       Serratus punch CC: 12.5 lb 10 x 2  2x10    Biceps curl 8 lb 10 x 2 Eccentrics 6lbs 1x10    Eccentrics Black TB 1x10 2x15      2x15    Tri ext CC: 9.0 lb 10 x 2   2x12   SL Abd       Ball dribble on wall    Lateral ball throw 12lbs 12x   IR SOS HBB W/ pulleys: 10 secx 5  10x    Med ball carry  18 lb KB: 50 feet x 2 x 2 rounds   15x    Palloff press       OH Carry 9 lb: 5 x 2 with bicep curl  KB OH press down [place] and back    Wall walks Blue loop: 10 x 2       H Abd Hor add CC; 7.5 lb 10 x 2   Hor abd: Cc: 2.5 lb 10 x 2  H Add Black TB 2x10 TRX flys 2x12 2x12   Truck drivers BU:16.8 lb 10 x    15.5 lb 10 x 17.5lbs 1x10 2x12 2x12   H add self stretch on table       PNF  D2 Black TB flex/Ect 2x10 CC and TB 2x10 ea 2x12   Supine ER              Unilateral pull down  CC: 9.0 lb 10x 2   2x12   sidelying tspine rotation mobs        OH Iso   2x15                                Ther Activity                     Modalities

## 2023-08-21 ENCOUNTER — TELEPHONE (OUTPATIENT)
Dept: FAMILY MEDICINE CLINIC | Facility: CLINIC | Age: 53
End: 2023-08-21

## 2023-08-21 DIAGNOSIS — Z98.890 S/P RIGHT ROTATOR CUFF REPAIR: ICD-10-CM

## 2023-08-21 DIAGNOSIS — I26.99 OTHER PULMONARY EMBOLISM WITHOUT ACUTE COR PULMONALE, UNSPECIFIED CHRONICITY (HCC): ICD-10-CM

## 2023-08-21 RX ORDER — RIVAROXABAN 20 MG/1
20 TABLET, FILM COATED ORAL DAILY
Qty: 90 TABLET | Refills: 1 | Status: SHIPPED | OUTPATIENT
Start: 2023-08-21

## 2023-08-21 NOTE — TELEPHONE ENCOUNTER
Pt states the Xarelto 20 mg was lost in transit from [facility]. Pharmacy needs a new script sent to them.

## 2023-08-22 ENCOUNTER — OFFICE VISIT (OUTPATIENT)
Dept: PHYSICAL THERAPY | Facility: CLINIC | Age: 53
End: 2023-08-22
Payer: COMMERCIAL

## 2023-08-22 DIAGNOSIS — M25.511 ACUTE PAIN OF RIGHT SHOULDER: Primary | ICD-10-CM

## 2023-08-22 DIAGNOSIS — M75.101 TEAR OF RIGHT ROTATOR CUFF, UNSPECIFIED TEAR EXTENT, UNSPECIFIED WHETHER TRAUMATIC: ICD-10-CM

## 2023-08-22 PROCEDURE — 97110 THERAPEUTIC EXERCISES: CPT | Performed by: PHYSICAL THERAPIST

## 2023-08-22 NOTE — PROGRESS NOTES
Daily Note - Progress Note    Today's date: 2023  Patient name: Reyes Jade  : 1970  MRN: [id number]  Referring provider: Shobha Banks*  Dx:   Encounter Diagnosis     ICD-10-CM    1. Acute pain of right shoulder  M25.511       2. Tear of right rotator cuff, unspecified tear extent, unspecified whether traumatic  M75. 101                      Subjective: Pt reports he remains near 85% of his PLOF with pain that persists but not as bad intensity wise. Overall he is progressing while at work      Objective: See treatment diary below. R shldr AROM 90%, strength 70-80% in R UE compared to L. LTG partially accomplished. Assessment: Tolerated treatment well. Patient is fatigues post session but does not demo decline in technique through therex. Overall pt is demo steady progress but does remain below PLOF and demo dec reactionary response in R UE as guarding does persist in R shldr at this time. Pt cont to have soreness however is strengthening at a much higher level than previously. Plan: Continue per plan of care. Eval/ Re-eval Auth #/ Referral # Total visits Start date  Expiration date Total active units  Total manual units  PT only or  PT+OT?     [id number]   12 2/9 5/9      3/21/23 # [id number] approved  3/22/2023-2023  12 visits           # [id number]  03 HJTNEW--         23 Auth # [id number] pending-12 visits-2023-2023                     Precautions: Standard. PROTOCOL.      HEP update 23 Access Code [email or web address]        Visit  64/24 41/03 94/43 33/78    8/3/23 8/10/23 8/15/23 8/17/23 8/22/23   PROM        AC and SC mobs        subscap and teres minor release                Neuro Re-Ed        PNF        Reactionary tasks        Ther Ex        Shoulder taps 15 x 2       Bent over row CC Uni scap ret/ecc pro 12.5lbs 2x10 Uni scap ret row 17.5lbs 1x12 2x12 2x12 15lbs Box 3x12   Eccentric Biceps  Ecc lowering from Flexion 3lbs 1x10    Ecc lowering from flex Blue TB 1x12  2x10        9lbs KB 2x12 9lbs 8x        18lbs 10x 9lbs 8x        18lbs 10x   SL ER CC: 2.5 lb 10 x 2  2x12 2x12 at side    At 90 deg abd 2.5lbs 2x5    IR TBand CC: 4.0 lb 10 x 2  2x10 2x12 at side    At 90 deg abd 2x5lbs 2x5    Supine flexion        Serratus punch CC: 12.5 lb 10 x 2  2x10     Biceps curl 8 lb 10 x 2 Eccentrics 6lbs 1x10    Eccentrics Black TB 1x10 2x15      2x15  2x15      2x15   Tri ext CC: 9.0 lb 10 x 2   2x12 2x12   SL Abd        Ball dribble on wall    Lateral ball throw 12lbs 12x See below   IR SOS HBB W/ pulleys: 10 secx 5  10x     Med ball carry  18 lb KB: 50 feet x 2 x 2 rounds   15x  Ball toss 10x   Palloff press        OH Carry 9 lb: 5 x 2 with bicep curl  KB OH press down [place] and back     Wall walks Blue loop: 10 x 2        H Abd Hor add CC; 7.5 lb 10 x 2   Hor abd: Cc: 2.5 lb 10 x 2  H Add Black TB 2x10 TRX flys 2x12 2x12 CC 2x12   Truck drivers DY:46.3 lb 10 x    15.5 lb 10 x 17.5lbs 1x10 2x12 2x12    H add self stretch on table        PNF  D2 Black TB flex/Ect 2x10 CC and TB 2x10 ea 2x12 2x12   Supine ER                Unilateral pull down  CC: 9.0 lb 10x 2   2x12 2x12   sidelying tspine rotation mobs         OH Iso   2x15  5x5   Body blade     7 directions, 2 rounds ea 20s   Sled push     Added 70lbs Hallway 2x                   Ther Activity                        Modalities

## 2023-08-23 ENCOUNTER — TELEPHONE (OUTPATIENT)
Dept: FAMILY MEDICINE CLINIC | Facility: CLINIC | Age: 53
End: 2023-08-23

## 2023-08-23 NOTE — TELEPHONE ENCOUNTER
Submitted prior auth for Xarelto 20mg. Waiting for a response.     [place]: [email or web address]    Judy Lombard, LPN

## 2023-08-24 ENCOUNTER — OFFICE VISIT (OUTPATIENT)
Dept: PHYSICAL THERAPY | Facility: CLINIC | Age: 53
End: 2023-08-24
Payer: COMMERCIAL

## 2023-08-24 DIAGNOSIS — M25.511 ACUTE PAIN OF RIGHT SHOULDER: Primary | ICD-10-CM

## 2023-08-24 DIAGNOSIS — M75.101 TEAR OF RIGHT ROTATOR CUFF, UNSPECIFIED TEAR EXTENT, UNSPECIFIED WHETHER TRAUMATIC: ICD-10-CM

## 2023-08-24 PROCEDURE — 97110 THERAPEUTIC EXERCISES: CPT | Performed by: PHYSICAL THERAPIST

## 2023-08-24 NOTE — PROGRESS NOTES
Daily Note         Today's date: 2023  Patient name: Patsy Denis  : 1970  MRN: [id number]  Referring provider: Manjula Lopez*  Dx:   Encounter Diagnosis     ICD-10-CM    1. Acute pain of right shoulder  M25.511       2. Tear of right rotator cuff, unspecified tear extent, unspecified whether traumatic  M75.101           Subjective: Patsy Denis reports 1/10 pain level. States he has had general muscle soreness since last session. Objective: See treatment diary below    Assessment:  patient needs only minimal cuing for prope rform with ther ex. was highly challenged iwth diagonal patterns with CC and hor abduction. . The goal of the current treatment is to address patient's functional limitations as well as objective findings. Plan: progress as indicate dby priamry PT. Eval/ Re-eval Auth #/ Referral # Total visits Start date  Expiration date Total active units  Total manual units  PT only or  PT+OT?     [id number]   12 2/9 5/9      3/21/23 # [id number] approved  3/22/2023-2023  12 visits           # [id number]  58 [id number]-         23 Auth # [id number] pending-12 visits-2023-2023                     Precautions: Standard. PROTOCOL.      HEP update 23 Access Code [email or web address]        Visit 95/05 63/31 50/42 50/00 07/62    8/10/23 8/15/23 8/17/23 8/22/23 8/24/23   PROM     ** as per [place] [facility] is 6 out of 12    AC and SC mobs        subscap and teres minor release                Neuro Re-Ed        PNF        Reactionary tasks        Ther Ex        Shoulder taps        Bent over row Uni scap ret row 17.5lbs 1x12 2x12 2x12 15lbs Box 3x12 18 lb: 12 x 3    Eccentric Biceps Ecc lowering from Flexion 3lbs 1x10    Ecc lowering from flex Blue TB 1x12  2x10        9lbs KB 2x12 9lbs 8x        18lbs 10x 9lbs 8x        18lbs 10x Eccentric lowerinlb: 10 x 2  Half Knik supinated 9lb: 10 x 2   SL ER  2x12 2x12 at side    At 90 deg abd 2.5lbs 2x5     IR TBand  2x10 2x12 at side    At 90 deg abd 2x5lbs 2x5     Supine flexion        Serratus punch  2x10      Biceps curl Eccentrics 6lbs 1x10    Eccentrics Black TB 1x10 2x15      2x15  2x15      2x15    Tri ext   2x12 2x12    SL Abd        Ball dribble on wall   Lateral ball throw 12lbs 12x See below    IR SOS HBB  10x      Med ball carry   15x  Ball toss 10x    Palloff press        OH Carry  KB OH press down bl[place]soe and back      Wall walks        H Abd H Add Black TB 2x10 TRX flys 2x12 2x12 CC 2x12 CC: hor add 15 lb: 10 x 2  Hor abd: 4 lb 10 x2    Truck drivers 30.1CRQ 7K11 2x12 2x12     H add self stretch on table        PNF D2 Black TB flex/Ect 2x10 CC and TB 2x10 ea 2x12 2x12 CC: 14.0 lb 12 x 2 pulling down diagonals  Pulling up diagonals 2.5 lb 12x 2   Supine ER                Unilateral pull down    2x12 2x12 12.5 lb 12 x 2    sidelying tspine rotation mobs         OH Iso  2x15  5x5 5 sec x 5    Body blade    7 directions, 2 rounds ea 20s 30 sec x 2 rounds 7 directions   Sled push    Added 70lbs Hallway 2x         CC: scap rows: 9 lb 10 x 2    TRX push ups     10 x 2 elbows out    Ther Activity                        Modalities

## 2023-08-29 ENCOUNTER — OFFICE VISIT (OUTPATIENT)
Dept: PHYSICAL THERAPY | Facility: CLINIC | Age: 53
End: 2023-08-29
Payer: COMMERCIAL

## 2023-08-29 DIAGNOSIS — M75.101 TEAR OF RIGHT ROTATOR CUFF, UNSPECIFIED TEAR EXTENT, UNSPECIFIED WHETHER TRAUMATIC: ICD-10-CM

## 2023-08-29 DIAGNOSIS — M25.511 ACUTE PAIN OF RIGHT SHOULDER: Primary | ICD-10-CM

## 2023-08-29 PROCEDURE — 97110 THERAPEUTIC EXERCISES: CPT | Performed by: PHYSICAL THERAPIST

## 2023-08-29 NOTE — PROGRESS NOTES
Daily Note         Today's date: 2023  Patient name: Grecia Laws  : 1970  MRN: [id number]  Referring provider: Fuentes Perez*  Dx:   Encounter Diagnosis     ICD-10-CM    1. Acute pain of right shoulder  M25.511       2. Tear of right rotator cuff, unspecified tear extent, unspecified whether traumatic  M75.101           Subjective: Patient reports that his shoulder is doing better and motion is improving. Objective: See treatment diary below    Assessment:  Patient did well with treatment provided today. No reporting of symptom provocation during session. Occasional cueing needed for proper scapular positioning with rowing. Plan: Progress per primary therapist.       Alexa Damon #/ Referral # Total visits Start date  Expiration date Total active units  Total manual units  PT only or  PT+OT?     [id number]   12 2/9 5/9      3/21/23 # [id number] approved  3/22/2023-2023  12 visits           # [id number]  12 visits-2023-2023 Auth # [id number] pending-12 visits-2023-2023                     Precautions: Standard. PROTOCOL.      HEP update 23 Access Code [email or web address]    Visit 74/79 49/83 94/70 51/05     8/15/23 8/17/23 8/22/23 8/24/23 8/29/23   PROM    ** as per Justino Songster is 6 out of 12  auth is 7 of 12           Neuro Re-Ed        PNF                Ther Ex        Bent over row 2x12 2x12 15lbs Box 3x12 18 lb: 12 x 3  3*12, 18# KB   Eccentric Biceps 2x10        9lbs KB 2x12 9lbs 8x        18lbs 10x 9lbs 8x        18lbs 10x Eccentric lowerinlb: 10 x 2  Half [place] supinated 9lb: 10 x 2 Eccentric lowerin*10, 9# weight  Half [place] supinated 2*10, 9# KB   SL ER 2x12 2x12 at side  At 90 deg abd 2.5lbs 2x5      IR TBand 2x10 2x12 at side,   At 90 deg abd 2x5lbs 2x5      Biceps curl 2x15  2x15  2x15  2x15     Tri ext  2x12 2x12     Med ball carry  15x  Ball toss 10x     H Abd at CC TRX flys 2x12 2x12 CC 2x12 CC: hor add 15 lb: 10 x 2  Hor abd: 4 lb 10 x2  Horz.  Add. 2*10, 14.5#  Horz. abd 2*10, 4#   PNF CC and TB 2x10 ea 2x12 2x12 CC: 14.0 lb 12 x 2 pulling down diagonals  Pulling up diagonals 2.5 lb 12x 2 CC: 2*12, 14# D2 extn, D1 flex 2*12, 4#    D2 flex 2*12, 2.5#  D1 flex, 2*12 9#   Uni. pulldown  2x12 2x12 12.5 lb 12 x 2  2*12, 12.5#   Reverse OH Iso 2x15  5x5 5 sec x 5  5 set of 5*5''   Body blade   7 directions, 2 rounds ea 20s 30 sec x 2 rounds 7 directions 2*30" rounds 7 directions   Sled push   Added 70lbs Hallway 2x     Scap rows    CC: scap rows: 9 lb 10 x 2  CC 2*10, 9#   TRX push ups    10 x 2 elbows out  2*10 elbows out           Ther Activity                        Modalities

## 2023-08-31 ENCOUNTER — OFFICE VISIT (OUTPATIENT)
Dept: PHYSICAL THERAPY | Facility: CLINIC | Age: 53
End: 2023-08-31
Payer: COMMERCIAL

## 2023-08-31 DIAGNOSIS — M25.511 ACUTE PAIN OF RIGHT SHOULDER: Primary | ICD-10-CM

## 2023-08-31 DIAGNOSIS — M75.101 TEAR OF RIGHT ROTATOR CUFF, UNSPECIFIED TEAR EXTENT, UNSPECIFIED WHETHER TRAUMATIC: ICD-10-CM

## 2023-08-31 PROCEDURE — 97110 THERAPEUTIC EXERCISES: CPT | Performed by: PHYSICAL THERAPIST

## 2023-08-31 NOTE — PROGRESS NOTES
Daily Note     Today's date: 2023  Patient name: Marily Mendez  : 1970  MRN: [id number]  Referring provider: Pretty Diane*  Dx:   Encounter Diagnosis     ICD-10-CM    1. Acute pain of right shoulder  M25.511       2. Tear of right rotator cuff, unspecified tear extent, unspecified whether traumatic  M75. 101                      Subjective: Pt presents with slight soreness at start of session. Pt does feel good throughout his work out. He does feel his L arm is still stronger. Objective: See treatment diary below. Progress therex. Assessment: Tolerated treatment well. Patient is fatiuged post session but does feel good overall. Plan: Continue per plan of care. 4 more sessions, DC afterward. Eval/ Re-eval Auth #/ Referral # Total visits Start date  Expiration date Total active units  Total manual units  PT only or  PT+OT?     [id number]   12 2/9 5/9      3/21/23 # [id number] approved  3/22/2023-2023  12 visits           # [id number]  66 [id number]         23 Auth # [id number] pending-12 visits-2023-2023                     Precautions: Standard. PROTOCOL.      HEP update 23 Access Code [email or web address]    Visit  34/59 82/63 94/28      8/15/23 8/17/23 8/22/23 8/24/23 8/29/23 8/31/23   PROM    ** as per Niki Wallis is 6 out of 12  auth is 7 of 12 8 of 12            Neuro Re-Ed         PNF                  Ther Ex         Bent over row 2x12 2x12 15lbs Box 3x12 18 lb: 12 x 3  3*12, 18# KB    Eccentric Biceps 2x10        9lbs KB 2x12 9lbs 8x        18lbs 10x 9lbs 8x        18lbs 10x Eccentric lowerinlb: 10 x 2  Half [place] supinated 9lb: 10 x 2 Eccentric lowerin*10, 9# weight  Half [place] supinated 2*10, 9# KB Ecc lowering from OH 10lbs 2x8    Ecc lowering from elbow flexion 2x8 18lb KB   SL ER 2x12 2x12 at side  At 90 deg abd 2.5lbs 2x5       IR TBand 2x10 2x12 at side,   At 90 deg abd 2x5lbs 2x5       Biceps curl 2x15  2x15 2x15  2x15   CC biceps 19lbs 2x6   Tri ext  2x12 2x12      Med ball carry  15x  Ball toss 10x   OH toss and catch 12lbs 2x8    Med ball slam 12lbs 2x10   H Abd at CC TRX flys 2x12 2x12 CC 2x12 CC: hor add 15 lb: 10 x 2  Hor abd: 4 lb 10 x2  Horz.  Add. 2*10, 14.5#  Horz. abd 2*10, 4# H Add 2x6 17.5lbs    H Abd 2x6 5.5lbs   PNF CC and TB 2x10 ea 2x12 2x12 CC: 14.0 lb 12 x 2 pulling down diagonals  Pulling up diagonals 2.5 lb 12x 2 CC: 2*12, 14# D2 extn, D1 flex 2*12, 4#    D2 flex 2*12, 2.5#  D1 flex, 2*12 9#    Uni. pulldown  2x12 2x12 12.5 lb 12 x 2  2*12, 12.5# Half knee 27.5 lbs 2x6   Reverse OH Iso 2x15  5x5 5 sec x 5  5 set of 5*5''    Body blade   7 directions, 2 rounds ea 20s 30 sec x 2 rounds 7 directions 2*30" rounds 7 directions    Sled push   Added 70lbs Hallway 2x   With added 75lbs 2 [place] ways pushes   Scap rows    CC: scap rows: 9 lb 10 x 2  CC 2*10, 9#    TRX push ups    10 x 2 elbows out  2*10 elbows out    Blaze pods       Plank position outward reaching B UEs intermit 30-40 seconds    Perform in OH position 30s x 5   Ther Activity                           Modalities

## 2023-09-05 ENCOUNTER — OFFICE VISIT (OUTPATIENT)
Dept: PHYSICAL THERAPY | Facility: CLINIC | Age: 53
End: 2023-09-05
Payer: COMMERCIAL

## 2023-09-05 ENCOUNTER — RA CDI HCC (OUTPATIENT)
Dept: OTHER | Facility: HOSPITAL | Age: 53
End: 2023-09-05

## 2023-09-05 DIAGNOSIS — M25.511 ACUTE PAIN OF RIGHT SHOULDER: Primary | ICD-10-CM

## 2023-09-05 DIAGNOSIS — M75.101 TEAR OF RIGHT ROTATOR CUFF, UNSPECIFIED TEAR EXTENT, UNSPECIFIED WHETHER TRAUMATIC: ICD-10-CM

## 2023-09-05 PROCEDURE — 97110 THERAPEUTIC EXERCISES: CPT | Performed by: PHYSICAL THERAPIST

## 2023-09-05 NOTE — PROGRESS NOTES
[address] [facility] coding opportunities       Chart reviewed, no opportunity found: CHART REVIEWED, NO OPPORTUNITY FOUND        Patients Insurance        Commercial Insurance: [address]

## 2023-09-05 NOTE — PROGRESS NOTES
Daily Note     Today's date: 2023  Patient name: Gaston Gasca  : 1970  MRN: [id number]  Referring provider: Maye Bucio*  Dx:   Encounter Diagnosis     ICD-10-CM    1. Acute pain of right shoulder  M25.511       2. Tear of right rotator cuff, unspecified tear extent, unspecified whether traumatic  M75. 101                      Subjective: Pt reports he was sore after last session, still has a slight bit of soreness today. Objective: See treatment diary below. Pt finished today's session with self stretching with wand into extension, SOS into IR HBB, CC for OH flexion. Assessment: Tolerated treatment well. Patient cont to demo well toward LTG however soreness with inc activity load may limit consistent progression. Plan: Transition to I HEP following scheduled/authorized visits. Eval/ Re-eval Auth #/ Referral # Total visits Start date  Expiration date Total active units  Total manual units  PT only or  PT+OT?     [id number]   12 2/9 5/9      3/21/23 # [id number] approved  3/22/2023-2023  12 visits           # [id number]  87 [id number]         6/20/23 Auth # [id number] pending-12 visits-2023-2023                     Precautions: Standard. PROTOCOL.      HEP update 23 Access Code [email or web address]    Visit 48/48       23   PROM ** as per Rakesh howell [place] is 6 out of 12  auth is 7 of 12 8 of 12 9 of 12          Neuro Re-Ed       PNF              Ther Ex       Bent over row 18 lb: 12 x 3  3*12, 18# KB     Eccentric Biceps Eccentric lowerinlb: 10 x 2  Half [place] supinated 9lb: 10 x 2 Eccentric lowerin*10, 9# weight  Half [place] supinated 2*10, 9# KB Ecc lowering from OH 10lbs 2x8    Ecc lowering from elbow flexion 2x8 18lb KB Ecc lowering from OH 10lbs 2x8    Ecc lowering from elbow flexion 2x10 18lb KB   SL ER       IR TBand       Biceps curl   CC biceps 19lbs 2x6 2x8   Tri ext       Med ball carry    OH toss and catch 12lbs 2x8    Med ball slam 12lbs 2x10 OH toss and catch 12lbs 2x10   H Abd at CC CC: hor add 15 lb: 10 x 2  Hor abd: 4 lb 10 x2  Horz.  Add. 2*10, 14.5#  Horz. abd 2*10, 4# H Add 2x6 17.5lbs    H Abd 2x6 5.5lbs    PNF CC: 14.0 lb 12 x 2 pulling down diagonals  Pulling up diagonals 2.5 lb 12x 2 CC: 2*12, 14# D2 extn, D1 flex 2*12, 4#    D2 flex 2*12, 2.5#  D1 flex, 2*12 9#  CC: 2*12, 14# D2 extn, D1 flex 2*12, 4#    D2 flex 2*12, 2.5#  D1 flex, 2*12 9#   Uni. pulldown 12.5 lb 12 x 2  2*12, 12.5# Half knee 27.5 lbs 2x6    Reverse OH Iso 5 sec x 5  5 set of 5*5''     Body blade 30 sec x 2 rounds 7 directions 2*30" rounds 7 directions  30s x 3 ea flex at 90 deg, flex OH, abd to 90 deg    Sled push   With added 75lbs 2 [address] ways pushes Added 90lbs 3 hallway pushes   Scap rows CC: scap rows: 9 lb 10 x 2  CC 2*10, 9#     TRX push ups 10 x 2 elbows out  2*10 elbows out     Blaze pods    Plank position outward reaching B UEs intermit 30-40 seconds    Perform in OH position 30s x 5           Perform in OH and reach to floor position 60s x 5   Ther Activity                     Modalities

## 2023-09-07 ENCOUNTER — OFFICE VISIT (OUTPATIENT)
Dept: PHYSICAL THERAPY | Facility: CLINIC | Age: 53
End: 2023-09-07
Payer: COMMERCIAL

## 2023-09-07 DIAGNOSIS — M25.511 ACUTE PAIN OF RIGHT SHOULDER: Primary | ICD-10-CM

## 2023-09-07 DIAGNOSIS — M75.101 TEAR OF RIGHT ROTATOR CUFF, UNSPECIFIED TEAR EXTENT, UNSPECIFIED WHETHER TRAUMATIC: ICD-10-CM

## 2023-09-07 PROCEDURE — 97110 THERAPEUTIC EXERCISES: CPT | Performed by: PHYSICAL THERAPIST

## 2023-09-07 NOTE — PROGRESS NOTES
Daily Note     Today's date: 2023  Patient name: Marilia Durbin  : 1970  MRN: [id number]  Referring provider: Ruslan Mathews*  Dx:   Encounter Diagnosis     ICD-10-CM    1. Acute pain of right shoulder  M25.511       2. Tear of right rotator cuff, unspecified tear extent, unspecified whether traumatic  M75. 101                      Subjective: Pt reports he is sore today. Objective: See treatment diary below      Assessment: Tolerated treatment well. Patient demo great progress and is nearing DC to I HEP      Plan: Continue per plan of care. Eval/ Re-eval Auth #/ Referral # Total visits Start date  Expiration date Total active units  Total manual units  PT only or  PT+OT?     [id number]   12 2/9 5/9      3/21/23 # [id number] approved  3/22/2023-2023  12 visits           # [id number]  96 [id number] Auth # [id number] pending-12 visits-2023-2023                     Precautions: Standard. PROTOCOL. HEP update 23 Access Code [email or web address]    Visit        23   PROM [place] is 7 of 12 8 of 12 9 of 12 10 of           Neuro Re-Ed       PNF              Ther Ex       Bent over row 3*12, 18# KB   CC row 2x10 19lbs   Eccentric Biceps Eccentric lowerin*10, 9# weight  Half [place] supinated 2*10, 9# KB Ecc lowering from OH 10lbs 2x8    Ecc lowering from elbow flexion 2x8 18lb KB Ecc lowering from OH 10lbs 2x8    Ecc lowering from elbow flexion 2x10 18lb KB Ecc lowering from OH 10lbs 2x8    Ecc lowering from elbow flexion 2x10 18lb KB   Biceps curl  CC biceps 19lbs 2x6 2x8 2x8   Med ball carry   OH toss and catch 12lbs 2x8    Med ball slam 12lbs 2x10 OH toss and catch 12lbs 2x10 2x10   H Abd at [facility].  Add. 2*10, 14.5#  Horz. abd 2*10, 4# H Add 2x6 17.5lbs    H Abd 2x6 5.5lbs     PNF CC: 2*12, 14# D2 extn, D1 flex 2*12, 4#    D2 flex 2*12, 2.5#  D1 flex, 2*12 9#  CC: 2*12, 14# D2 extn, D1 flex 2*12, 4#    D2 flex 2*12, 2.5#  D1 flex, 2*12 9# CC: 2*12, 14# D2 extn, D1 flex 2*12, 4#    D2 flex 2*12, 2.5#  D1 flex, 2*12 9#   Uni. pulldown 2*12, 12.5# Half knee 27.5 lbs 2x6  2x10   Reverse OH Iso 5 set of 5*5''      Body blade 2*30" rounds 7 directions  30s x 3 ea flex at 90 deg, flex OH, abd to 90 deg     Sled push  With added 75lbs 2 bled[place] ways pushes Added 90lbs 3 hallway pushes 5 hallway pushes   Scap rows CC 2*10, 9#   See above   TRX push ups 2*10 elbows out   Table push ups 3x10   Blaze pods   Plank position outward reaching B UEs intermit 30-40 seconds    Perform in OH position 30s x 5           Perform in OH and reach to floor position 60s x 5    Ther Activity                     Modalities

## 2023-09-10 RX ORDER — ESOMEPRAZOLE MAGNESIUM 40 MG/1
CAPSULE, DELAYED RELEASE ORAL
COMMUNITY
Start: 2023-08-26

## 2023-09-12 ENCOUNTER — OFFICE VISIT (OUTPATIENT)
Dept: FAMILY MEDICINE CLINIC | Facility: CLINIC | Age: 53
End: 2023-09-12
Payer: COMMERCIAL

## 2023-09-12 VITALS
HEART RATE: 74 BPM | HEIGHT: 69 IN | OXYGEN SATURATION: 96 % | DIASTOLIC BLOOD PRESSURE: 84 MMHG | RESPIRATION RATE: 16 BRPM | WEIGHT: 216 LBS | TEMPERATURE: 97.8 F | BODY MASS INDEX: 31.99 KG/M2 | SYSTOLIC BLOOD PRESSURE: 122 MMHG

## 2023-09-12 DIAGNOSIS — Z12.5 PROSTATE CANCER SCREENING: ICD-10-CM

## 2023-09-12 DIAGNOSIS — Z13.83 SCREENING FOR CARDIOVASCULAR, RESPIRATORY, AND GENITOURINARY DISEASES: ICD-10-CM

## 2023-09-12 DIAGNOSIS — Z00.00 HEALTHCARE MAINTENANCE: Primary | ICD-10-CM

## 2023-09-12 DIAGNOSIS — D68.51 FACTOR 5 LEIDEN MUTATION, HETEROZYGOUS (HCC): ICD-10-CM

## 2023-09-12 DIAGNOSIS — Z13.1 SCREENING FOR DIABETES MELLITUS (DM): ICD-10-CM

## 2023-09-12 DIAGNOSIS — Z79.01 ANTICOAGULANT LONG-TERM USE: ICD-10-CM

## 2023-09-12 DIAGNOSIS — R73.03 PREDIABETES: ICD-10-CM

## 2023-09-12 DIAGNOSIS — Z13.6 SCREENING FOR CARDIOVASCULAR, RESPIRATORY, AND GENITOURINARY DISEASES: ICD-10-CM

## 2023-09-12 DIAGNOSIS — K21.9 GERD WITHOUT ESOPHAGITIS: ICD-10-CM

## 2023-09-12 DIAGNOSIS — Z13.89 SCREENING FOR CARDIOVASCULAR, RESPIRATORY, AND GENITOURINARY DISEASES: ICD-10-CM

## 2023-09-12 DIAGNOSIS — D68.51 ACTIVATED PROTEIN C RESISTANCE (HCC): ICD-10-CM

## 2023-09-12 PROBLEM — Z01.818 PREOPERATIVE CLEARANCE: Status: RESOLVED | Noted: 2023-01-14 | Resolved: 2023-09-12

## 2023-09-12 PROBLEM — Z01.818 PREOP TESTING: Status: RESOLVED | Noted: 2023-01-16 | Resolved: 2023-09-12

## 2023-09-12 PROCEDURE — 99396 PREV VISIT EST AGE 40-64: CPT | Performed by: FAMILY MEDICINE

## 2023-09-12 NOTE — PROGRESS NOTES
Assessment/Plan:    No problem-specific Assessment & Plan notes found for this encounter. cpe    Continue AC for FVL  Check labs    Prediabetes advised    Gi f/u  For gerd     Diagnoses and all orders for this visit:    Healthcare maintenance    Screening for cardiovascular, respiratory, and genitourinary diseases  -     Lipid Panel with Direct LDL reflex; Future    Screening for diabetes mellitus (DM)  -     Comprehensive metabolic panel; Future    Prostate cancer screening  -     PSA, ultrasensitive; Future    Anticoagulant long-term use  -     CBC and differential; Future    Prediabetes  -     Hemoglobin A1C; Future    Factor 5 Leiden mutation, heterozygous ([address])    Activated protein C resistance (HCC)    GERD without esophagitis    Other orders  -     esomeprazole (NexIUM) 40 MG capsule        Declines JONATHON    Return in about 6 months (around 3/12/2024) for Recheck. Subjective:      Patient ID: Jennifer Robin is a 46 y.o. male. Chief Complaint   Patient presents with   • Physical Exam     Grover Birch MA        HPI  Eating healthy  Low fat/salt/carb  Dinner is largest meal  Exercises daily  No bleeding noted in stool/urine  No nsaids  Drinks water mostly    The following portions of the patient's history were reviewed and updated as appropriate: allergies, current medications, past family history, past medical history, past social history, past surgical history and problem list.    Review of Systems   Constitutional: Negative for fever. Respiratory: Negative for shortness of breath. Current Outpatient Medications   Medication Sig Dispense Refill   • esomeprazole (NexIUM) 40 MG capsule      • Xarelto 20 MG tablet Take 1 tablet (20 mg total) by mouth daily 90 tablet 1     No current facility-administered medications for this visit.        Objective:    /84   Pulse 74   Temp 97.8 °F (36.6 °C)   Resp 16   Ht 5' 9" (1.753 m)   Wt 98 kg (216 lb)   SpO2 96%   BMI 31.90 kg/m² Physical Exam  Vitals and nursing note reviewed. Constitutional:       General: He is not in acute distress. Appearance: He is well-developed. He is not ill-appearing. HENT:      Head: Normocephalic. Right Ear: Tympanic membrane normal.      Left Ear: Tympanic membrane normal.   Eyes:      General: No scleral icterus. Conjunctiva/sclera: Conjunctivae normal.   Neck:      Vascular: No carotid bruit. Cardiovascular:      Rate and Rhythm: Normal rate and regular rhythm. Heart sounds: No murmur heard. Pulmonary:      Effort: Pulmonary effort is normal. No respiratory distress. Breath sounds: No wheezing. Abdominal:      General: There is no distension. Palpations: Abdomen is soft. Tenderness: There is no abdominal tenderness. Hernia: No hernia is present. Genitourinary:     Penis: Normal.       Testes: Normal.   Musculoskeletal:         General: No deformity. Cervical back: Neck supple. Right lower leg: No edema. Left lower leg: No edema. Skin:     General: Skin is warm and dry. Coloration: Skin is not pale. Neurological:      Mental Status: He is alert. Motor: No weakness. Gait: Gait normal.   Psychiatric:         Mood and Affect: Mood normal.         Behavior: Behavior normal.         Thought Content: Thought content normal.         BMI Counseling: Body mass index is 31.9 kg/m². The BMI is above normal. Nutrition recommendations include decreasing portion sizes and moderation in carbohydrate intake. Exercise recommendations include exercising 3-5 times per week. No pharmacotherapy was ordered. Rationale for BMI follow-up plan is due to patient being overweight or obese. Depression Screening and Follow-up Plan: Patient was screened for depression during today's encounter. They screened negative with a PHQ-2 score of 0.            Thelma Lynn,

## 2023-09-14 ENCOUNTER — APPOINTMENT (OUTPATIENT)
Dept: PHYSICAL THERAPY | Facility: CLINIC | Age: 53
End: 2023-09-14
Payer: COMMERCIAL

## 2023-09-18 ENCOUNTER — OFFICE VISIT (OUTPATIENT)
Dept: OBGYN CLINIC | Facility: CLINIC | Age: 53
End: 2023-09-18
Payer: COMMERCIAL

## 2023-09-18 VITALS
HEART RATE: 60 BPM | BODY MASS INDEX: 31.99 KG/M2 | WEIGHT: 216 LBS | HEIGHT: 69 IN | DIASTOLIC BLOOD PRESSURE: 96 MMHG | SYSTOLIC BLOOD PRESSURE: 142 MMHG

## 2023-09-18 DIAGNOSIS — Z98.890 S/P RIGHT ROTATOR CUFF REPAIR: Primary | ICD-10-CM

## 2023-09-18 DIAGNOSIS — M75.21 BICEPS TENDONITIS ON RIGHT: ICD-10-CM

## 2023-09-18 PROCEDURE — 99212 OFFICE O/P EST SF 10 MIN: CPT | Performed by: ORTHOPAEDIC SURGERY

## 2023-09-18 NOTE — PROGRESS NOTES
Orthopedic Sports Medicine Follow Up Patient Visit     Assesment:   46 y.o. male with right biceps tendinitis, 8 months s/p right rotator cuff repair    Plan:    Upon examination today, the patient demonstrates great range of motion and rotator cuff strength. As such, I recommended continued PT to work on further strengthening and transitioning to home exercise program. He can gradually return to activity as tolerated, using pain as a guide. Given his improved biceps tendinitis symptoms, I do not believe further intervention is necessary at this time, but if pain returns, we can consider other interventions in the future. He can use ice/heat and OTC medications as needed. Follow up:    As needed      Chief Complaint   Patient presents with   • Right Shoulder - Follow-up       History of Present Illness: The patient is a 46 y.o., right hand dominant, male, presenting for follow up of right biceps tendinitis and right rotator cuff repair nearly 8 months ago. He has been consistently attending PT per Dr. Praveen Bhagat protocol, specifically working on strengthening. He states his biceps pain has significantly improved and he is pleased with his progress following additional PT. The patient went bowling with a 16-pound ball last week and reports being sore afterwards, but overall doing well. He is able to complete all ADLs without issue. The patient denies new injury, weakness, swelling, or numbness/tingling.        Shoulder Surgical History:  Right rotator cuff repair and subacromial decompression performed by Dr. Nell Smith on 1/26/23    Past Medical, Social and Family History:  Past Medical History:   Diagnosis Date   • Anemia    • 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 07/12/2006   • DVT (deep venous thrombosis) ([address]) 2018   • Dysphagia     Last Assessed: 8/12/2014    • Factor 5 Leiden mutation, heterozygous ([address])    • GERD (gastroesophageal reflux disease)    • Globus sensation     Last Assessed: 6/4/2014    • H/O neoplasm of uncertain behavior of skin 06/06/2006   • Hyperlipidemia    • Hypertension 02/14/2006   • Lateral epicondylitis of right elbow     Last Assessed: 10/23/2015    • Pes planus     last assessed: 10/23/2013    • Plantar fascial fibromatosis     Last Assessed: 10/23/2013    • Pulmonary embolism ([address])    • Right patellofemoral syndrome     Last Assessed: 4/15/2016    • Sebaceous cyst 11/03/2007     Past Surgical History:   Procedure Laterality Date   • COLONOSCOPY     • ESOPHAGOGASTRODUODENOSCOPY N/A 10/7/2016    Procedure: ESOPHAGOGASTRODUODENOSCOPY (EGD); Surgeon: Holli Latif MD;  Location: [facility]; Service:    • NASAL SEPTUM SURGERY  1995   • IA ESOPHAGOGASTRODUODENOSCOPY TRANSORAL DIAGNOSTIC N/A 12/6/2018    Procedure: ESOPHAGOGASTRODUODENOSCOPY (EGD); Surgeon: Holli Latif MD;  Location: [facility]; Service: Gastroenterology   • IA SURGICAL ARTHROSCOPY SHOULDER W/ROTATOR CUFF RPR Right 1/26/2023    Procedure: Shoulder Arthroscopy with Rotator Cuff Repair, Subacromial Decompression, and Limited Debridement;  Surgeon: Lupe Tran MD;  Location: [facility];  Service: Orthopedics     No Known Allergies  Current Outpatient Medications on File Prior to Visit   Medication Sig Dispense Refill   • esomeprazole (NexIUM) 40 MG capsule      • Xarelto 20 MG tablet Take 1 tablet (20 mg total) by mouth daily 90 tablet 1     No current facility-administered medications on file prior to visit. Social History     Socioeconomic History   • Marital status: /Civil Union     Spouse name: Not on file   • Number of children: Not on file   • Years of education: Not on file   • Highest education level: Not on file   Occupational History   • Not on file   Tobacco Use   • Smoking status: Never   • Smokeless tobacco: Never   Vaping Use   • Vaping Use: Never used   Substance and Sexual Activity   • Alcohol use:  Yes     Alcohol/week: 4.0 standard drinks of alcohol     Types: 4 Cans of beer per week     Comment: socially   • Drug use: No   • Sexual activity: Yes     Partners: Female   Other Topics Concern   • Not on file   Social History Narrative   • Not on file     Social Determinants of Health     Financial Resource Strain: Not on file   Food Insecurity: Not on file   Transportation Needs: Not on file   Physical Activity: Not on file   Stress: Not on file   Social Connections: Not on file   Intimate Partner Violence: Not on file   Housing Stability: Not on file         I have reviewed the past medical, surgical, social and family history, medications and allergies as documented in the EMR. Review of systems: ROS is negative other than that noted in the HPI. Constitutional: Negative for fatigue and fever. HENT: Negative for sore throat. Respiratory: Negative for shortness of breath. Cardiovascular: Negative for chest pain. Gastrointestinal: Negative for abdominal pain. Endocrine: Negative for cold intolerance and heat intolerance. Genitourinary: Negative for flank pain. Musculoskeletal: Negative for back pain. Skin: Negative for rash. Allergic/Immunologic: Negative for immunocompromised state. Neurological: Negative for dizziness. Psychiatric/Behavioral: Negative for agitation. Physical Exam:    Blood pressure 142/96, pulse 60, height 5' 9" (1.753 m), weight 98 kg (216 lb). General/Constitutional: NAD, well developed, well nourished  HENT: Normocephalic, atraumatic  CV: Intact distal pulses, regular rate  Resp: No respiratory distress or labored breathing  Abdomen: soft, nondistended, non tender   Lymphatic: No lymphadenopathy palpated  Neuro: Alert and Oriented x 3, no focal deficits  Psych: Normal mood, normal affect  Skin: Warm, dry, no rashes, no erythema      Shoulder Exam:      Inspection: No ecchymosis, edema, or deformity. No visualized wounds or skin lesions   Palpation: mild bicipital groove tenderness.  No AC joint tenderness  Active Motion:       FF: 175       ER: 65 symmetric       IR: T12 symmetric  Strength: 5/5 empty can, 5/5 ER,  5/5 IR   Sensory - SILT in the Radial / Ulnar / Median / Axillary nerve distributions  Motor - AIN / PIN / Radial / Ulnar / Median / Axillary motor nerves in tact  Palpable Radial pulse  Cap refill <2secs in all digits      5/5 Belly Press  - Lift Off Test    - Evans      Imaging    I reviewed and interpreted MRI of the right shoulder which shows intact rotator cuff repair without recurrent tear. Supraspinatus, infraspinatus, and subscapularis insertional tendinosis. Intact long head of the biceps tendon. Mild GH and AC joint degenerative changes.

## 2023-09-19 ENCOUNTER — OFFICE VISIT (OUTPATIENT)
Dept: PHYSICAL THERAPY | Facility: CLINIC | Age: 53
End: 2023-09-19
Payer: COMMERCIAL

## 2023-09-19 DIAGNOSIS — M25.511 ACUTE PAIN OF RIGHT SHOULDER: Primary | ICD-10-CM

## 2023-09-19 DIAGNOSIS — M75.101 TEAR OF RIGHT ROTATOR CUFF, UNSPECIFIED TEAR EXTENT, UNSPECIFIED WHETHER TRAUMATIC: ICD-10-CM

## 2023-09-19 PROCEDURE — 97110 THERAPEUTIC EXERCISES: CPT | Performed by: PHYSICAL THERAPIST

## 2023-09-19 NOTE — PROGRESS NOTES
Daily Note - Discharge Summary    Today's date: 2023  Patient name: Patsy Denis  : 1970  MRN: [id number]  Referring provider: Manjula Lopez*  Dx:   Encounter Diagnosis     ICD-10-CM    1. Acute pain of right shoulder  M25.511       2. Tear of right rotator cuff, unspecified tear extent, unspecified whether traumatic  M75. 101                      Subjective: Pt saw MD last week, cleared per MD. Pt generally feels much better, has yet to play golf. Pt reports feeling he is at about 90% of his PLOF. Pt only experiencing soreness occass. "I feel good"      Objective: See treatment diary below. Trial golf swing. R shoulder AROM flexion 90%, abd 90% ER 75%, IR HBB 80% as compared to L shoulder. L shldr MMT: 4+ to 5/5 MMT. LTG accomplished. Assessment: Tolerated treatment well. Patient demo good I with therex, self pacing and technique. Pt demo good potential for transition to I self management at this time. Plan: Preo for DC to I HEP     Eval/ Re-eval Auth #/ Referral # Total visits Start date  Expiration date Total active units  Total manual units  PT only or  PT+OT?     [id number]   12 2/9 5/9      3/21/23 # [id number] approved  3/22/2023-2023  12 visits           # [id number]  12 visits-2023-2023 Auth # [id number] pending-12 visits-2023-2023                     Precautions: Standard. PROTOCOL.      HEP update 23 Access Code [email or web address]    Visit         23   PROM Deyanne Wilson is 7 of 12 8 of 12 9 of 12 10 of 12 11 of 12           Neuro Re-Ed        PNF                Ther Ex        Bent over row 3*12, 18# KB   CC row 2x10 19lbs 2x10   Eccentric Biceps Eccentric lowerin*10, 9# weight  Half [place] supinated 2*10, 9# KB Ecc lowering from OH 10lbs 2x8    Ecc lowering from elbow flexion 2x8 18lb KB Ecc lowering from OH 10lbs 2x8    Ecc lowering from elbow flexion 2x10 18lb KB Ecc lowering from OH 10lbs 2x8    Ecc lowering from elbow flexion 2x10 18lb KB 2x8        2x8   Biceps curl  CC biceps 19lbs 2x6 2x8 2x8 2x8   Med ball carry   OH toss and catch 12lbs 2x8    Med ball slam 12lbs 2x10 OH toss and catch 12lbs 2x10 2x10    H Abd at [address] [facility].  Add. 2*10, 14.5#  Horz. abd 2*10, 4# H Add 2x6 17.5lbs    H Abd 2x6 5.5lbs      PNF CC: 2*12, 14# D2 extn, D1 flex 2*12, 4#    D2 flex 2*12, 2.5#  D1 flex, 2*12 9#  CC: 2*12, 14# D2 extn, D1 flex 2*12, 4#    D2 flex 2*12, 2.5#  D1 flex, 2*12 9# CC: 2*12, 14# D2 extn, D1 flex 2*12, 4#    D2 flex 2*12, 2.5#  D1 flex, 2*12 9# CC: 2*12, 14# D2 extn, D1 flex 2*12, 4#    D2 flex 2*12, 2.5#  D1 flex, 2*12 9#   Uni. pulldown 2*12, 12.5# Half knee 27.5 lbs 2x6  2x10    Reverse OH Iso 5 set of 5*5''       Body blade 2*30" rounds 7 directions  30s x 3 ea flex at 90 deg, flex OH, abd to 90 deg      Sled push  With added 75lbs 2 [place]edso[place] ways pushes Added 90lbs 3 hallway pushes 5 hallway pushes    Scap rows CC 2*10, 9#   See above See above   TRX push ups 2*10 elbows out   Table push ups 3x10    Blaze pods   Plank position outward reaching B UEs intermit 30-40 seconds    Perform in OH position 30s x 5           Perform in OH and reach to floor position 60s x 5     Ther Activity                        Modalities

## 2023-10-20 LAB
ALBUMIN SERPL-MCNC: 4.6 G/DL (ref 3.8–4.9)
ALBUMIN/GLOB SERPL: 1.8 {RATIO} (ref 1.2–2.2)
ALP SERPL-CCNC: 79 IU/L (ref 44–121)
ALT SERPL-CCNC: 27 IU/L (ref 0–44)
AST SERPL-CCNC: 24 IU/L (ref 0–40)
BASOPHILS # BLD AUTO: 0.1 X10E3/UL (ref 0–0.2)
BASOPHILS NFR BLD AUTO: 1 %
BILIRUB SERPL-MCNC: 0.4 MG/DL (ref 0–1.2)
BUN SERPL-MCNC: 21 MG/DL (ref 6–24)
BUN/CREAT SERPL: 20 (ref 9–20)
CALCIUM SERPL-MCNC: 9.4 MG/DL (ref 8.7–10.2)
CHLORIDE SERPL-SCNC: 100 MMOL/L (ref 96–106)
CHOLEST SERPL-MCNC: 219 MG/DL (ref 100–199)
CO2 SERPL-SCNC: 23 MMOL/L (ref 20–29)
CREAT SERPL-MCNC: 1.05 MG/DL (ref 0.76–1.27)
EGFR: 84 ML/MIN/1.73
EOSINOPHIL # BLD AUTO: 0.3 X10E3/UL (ref 0–0.4)
EOSINOPHIL NFR BLD AUTO: 5 %
ERYTHROCYTE [DISTWIDTH] IN BLOOD BY AUTOMATED COUNT: 14.1 % (ref 11.6–15.4)
GLOBULIN SER-MCNC: 2.6 G/DL (ref 1.5–4.5)
GLUCOSE SERPL-MCNC: 111 MG/DL (ref 70–99)
HBA1C MFR BLD: 6 % (ref 4.8–5.6)
HCT VFR BLD AUTO: 40.1 % (ref 37.5–51)
HDLC SERPL-MCNC: 73 MG/DL
HGB BLD-MCNC: 12.7 G/DL (ref 13–17.7)
IMM GRANULOCYTES # BLD: 0 X10E3/UL (ref 0–0.1)
IMM GRANULOCYTES NFR BLD: 0 %
LDLC SERPL CALC-MCNC: 132 MG/DL (ref 0–99)
LYMPHOCYTES # BLD AUTO: 1.5 X10E3/UL (ref 0.7–3.1)
LYMPHOCYTES NFR BLD AUTO: 26 %
MCH RBC QN AUTO: 27.1 PG (ref 26.6–33)
MCHC RBC AUTO-ENTMCNC: 31.7 G/DL (ref 31.5–35.7)
MCV RBC AUTO: 86 FL (ref 79–97)
MICRODELETION SYND BLD/T FISH: NORMAL
MONOCYTES # BLD AUTO: 0.7 X10E3/UL (ref 0.1–0.9)
MONOCYTES NFR BLD AUTO: 12 %
NEUTROPHILS # BLD AUTO: 3.3 X10E3/UL (ref 1.4–7)
NEUTROPHILS NFR BLD AUTO: 56 %
PLATELET # BLD AUTO: 289 X10E3/UL (ref 150–450)
POTASSIUM SERPL-SCNC: 4.4 MMOL/L (ref 3.5–5.2)
PROT SERPL-MCNC: 7.2 G/DL (ref 6–8.5)
PSA SERPL DL<=0.01 NG/ML-MCNC: 0.79 NG/ML (ref 0–4)
RBC # BLD AUTO: 4.69 X10E6/UL (ref 4.14–5.8)
SODIUM SERPL-SCNC: 137 MMOL/L (ref 134–144)
TRIGL SERPL-MCNC: 82 MG/DL (ref 0–149)
WBC # BLD AUTO: 5.9 X10E3/UL (ref 3.4–10.8)

## 2024-02-21 PROBLEM — Z13.89 SCREENING FOR CARDIOVASCULAR, RESPIRATORY, AND GENITOURINARY DISEASES: Status: RESOLVED | Noted: 2018-05-22 | Resolved: 2024-02-21

## 2024-02-21 PROBLEM — Z13.83 SCREENING FOR CARDIOVASCULAR, RESPIRATORY, AND GENITOURINARY DISEASES: Status: RESOLVED | Noted: 2018-05-22 | Resolved: 2024-02-21

## 2024-02-21 PROBLEM — Z13.1 SCREENING FOR DIABETES MELLITUS (DM): Status: RESOLVED | Noted: 2018-05-22 | Resolved: 2024-02-21

## 2024-02-21 PROBLEM — Z13.6 SCREENING FOR CARDIOVASCULAR, RESPIRATORY, AND GENITOURINARY DISEASES: Status: RESOLVED | Noted: 2018-05-22 | Resolved: 2024-02-21

## 2024-02-21 PROBLEM — Z00.00 HEALTHCARE MAINTENANCE: Status: RESOLVED | Noted: 2018-06-26 | Resolved: 2024-02-21

## 2024-02-21 PROBLEM — Z12.5 PROSTATE CANCER SCREENING: Status: RESOLVED | Noted: 2018-05-22 | Resolved: 2024-02-21

## 2024-02-28 ENCOUNTER — HOSPITAL ENCOUNTER (INPATIENT)
Facility: HOSPITAL | Age: 54
LOS: 8 days | Discharge: HOME | DRG: 300 | End: 2024-03-07
Attending: EMERGENCY MEDICINE | Admitting: INTERNAL MEDICINE
Payer: COMMERCIAL

## 2024-02-28 ENCOUNTER — OFFICE VISIT (OUTPATIENT)
Dept: OBGYN CLINIC | Facility: CLINIC | Age: 54
End: 2024-02-28
Payer: COMMERCIAL

## 2024-02-28 ENCOUNTER — APPOINTMENT (EMERGENCY)
Dept: RADIOLOGY | Facility: HOSPITAL | Age: 54
DRG: 300 | End: 2024-02-28
Payer: COMMERCIAL

## 2024-02-28 ENCOUNTER — APPOINTMENT (OUTPATIENT)
Dept: RADIOLOGY | Facility: CLINIC | Age: 54
End: 2024-02-28
Payer: COMMERCIAL

## 2024-02-28 ENCOUNTER — HOSPITAL ENCOUNTER (OUTPATIENT)
Dept: NON INVASIVE DIAGNOSTICS | Facility: HOSPITAL | Age: 54
Discharge: HOME | End: 2024-02-28
Payer: COMMERCIAL

## 2024-02-28 VITALS
HEIGHT: 69 IN | DIASTOLIC BLOOD PRESSURE: 97 MMHG | WEIGHT: 216 LBS | SYSTOLIC BLOOD PRESSURE: 142 MMHG | HEART RATE: 96 BPM | BODY MASS INDEX: 31.99 KG/M2

## 2024-02-28 DIAGNOSIS — Z01.89 ENCOUNTER FOR LOWER EXTREMITY COMPARISON IMAGING STUDY: ICD-10-CM

## 2024-02-28 DIAGNOSIS — M25.562 LEFT KNEE PAIN, UNSPECIFIED CHRONICITY: ICD-10-CM

## 2024-02-28 DIAGNOSIS — M25.562 ACUTE PAIN OF LEFT KNEE: ICD-10-CM

## 2024-02-28 DIAGNOSIS — M79.662 PAIN OF LEFT CALF: ICD-10-CM

## 2024-02-28 DIAGNOSIS — Z86.718 HISTORY OF DVT (DEEP VEIN THROMBOSIS): Primary | ICD-10-CM

## 2024-02-28 DIAGNOSIS — K21.9 GERD WITHOUT ESOPHAGITIS: ICD-10-CM

## 2024-02-28 DIAGNOSIS — M79.605 LEFT LEG PAIN: ICD-10-CM

## 2024-02-28 DIAGNOSIS — I82.502 CHRONIC DEEP VEIN THROMBOSIS (DVT) OF LEFT LOWER EXTREMITY (HCC): ICD-10-CM

## 2024-02-28 DIAGNOSIS — M23.92 INTERNAL DERANGEMENT OF LEFT KNEE: ICD-10-CM

## 2024-02-28 DIAGNOSIS — Z92.29 HX OF LONG TERM USE OF BLOOD THINNERS: ICD-10-CM

## 2024-02-28 DIAGNOSIS — M25.562 LEFT KNEE PAIN, UNSPECIFIED CHRONICITY: Primary | ICD-10-CM

## 2024-02-28 DIAGNOSIS — R06.00 DYSPNEA: ICD-10-CM

## 2024-02-28 PROBLEM — Z86.2 HISTORY OF IRON DEFICIENCY ANEMIA: Status: ACTIVE | Noted: 2022-07-13

## 2024-02-28 LAB
ALBUMIN SERPL BCP-MCNC: 4.4 G/DL (ref 3.5–5)
ALP SERPL-CCNC: 60 U/L (ref 34–104)
ALT SERPL W P-5'-P-CCNC: 34 U/L (ref 7–52)
ANION GAP SERPL CALCULATED.3IONS-SCNC: 8 MMOL/L
APTT PPP: 32 SECONDS (ref 23–37)
AST SERPL W P-5'-P-CCNC: 40 U/L (ref 13–39)
BASOPHILS # BLD AUTO: 0.05 THOUSANDS/ÂΜL (ref 0–0.1)
BASOPHILS NFR BLD AUTO: 1 % (ref 0–1)
BILIRUB SERPL-MCNC: 0.61 MG/DL (ref 0.2–1)
BNP SERPL-MCNC: 15 PG/ML (ref 0–100)
BUN SERPL-MCNC: 22 MG/DL (ref 5–25)
CALCIUM SERPL-MCNC: 8.9 MG/DL (ref 8.4–10.2)
CARDIAC TROPONIN I PNL SERPL HS: 2 NG/L
CHLORIDE SERPL-SCNC: 104 MMOL/L (ref 96–108)
CO2 SERPL-SCNC: 23 MMOL/L (ref 21–32)
CREAT SERPL-MCNC: 0.96 MG/DL (ref 0.6–1.3)
EOSINOPHIL # BLD AUTO: 0.27 THOUSAND/ÂΜL (ref 0–0.61)
EOSINOPHIL NFR BLD AUTO: 4 % (ref 0–6)
ERYTHROCYTE [DISTWIDTH] IN BLOOD BY AUTOMATED COUNT: 15.1 % (ref 11.6–15.1)
GFR SERPL CREATININE-BSD FRML MDRD: 89 ML/MIN/1.73SQ M
GLUCOSE SERPL-MCNC: 106 MG/DL (ref 65–140)
HCT VFR BLD AUTO: 36.4 % (ref 36.5–49.3)
HGB BLD-MCNC: 11.3 G/DL (ref 12–17)
IMM GRANULOCYTES # BLD AUTO: 0.01 THOUSAND/UL (ref 0–0.2)
IMM GRANULOCYTES NFR BLD AUTO: 0 % (ref 0–2)
INR PPP: 1.26 (ref 0.84–1.19)
LYMPHOCYTES # BLD AUTO: 1.78 THOUSANDS/ÂΜL (ref 0.6–4.47)
LYMPHOCYTES NFR BLD AUTO: 27 % (ref 14–44)
MCH RBC QN AUTO: 25.1 PG (ref 26.8–34.3)
MCHC RBC AUTO-ENTMCNC: 31 G/DL (ref 31.4–37.4)
MCV RBC AUTO: 81 FL (ref 82–98)
MONOCYTES # BLD AUTO: 0.73 THOUSAND/ÂΜL (ref 0.17–1.22)
MONOCYTES NFR BLD AUTO: 11 % (ref 4–12)
NEUTROPHILS # BLD AUTO: 3.85 THOUSANDS/ÂΜL (ref 1.85–7.62)
NEUTS SEG NFR BLD AUTO: 57 % (ref 43–75)
NRBC BLD AUTO-RTO: 0 /100 WBCS
PLATELET # BLD AUTO: 242 THOUSANDS/UL (ref 149–390)
PMV BLD AUTO: 10.4 FL (ref 8.9–12.7)
POTASSIUM SERPL-SCNC: 5.2 MMOL/L (ref 3.5–5.3)
PROT SERPL-MCNC: 7.7 G/DL (ref 6.4–8.4)
PROTHROMBIN TIME: 16 SECONDS (ref 11.6–14.5)
RBC # BLD AUTO: 4.51 MILLION/UL (ref 3.88–5.62)
SODIUM SERPL-SCNC: 135 MMOL/L (ref 135–147)
WBC # BLD AUTO: 6.69 THOUSAND/UL (ref 4.31–10.16)

## 2024-02-28 PROCEDURE — 83880 ASSAY OF NATRIURETIC PEPTIDE: CPT | Performed by: EMERGENCY MEDICINE

## 2024-02-28 PROCEDURE — 82746 ASSAY OF FOLIC ACID SERUM: CPT | Performed by: NURSE PRACTITIONER

## 2024-02-28 PROCEDURE — 93005 ELECTROCARDIOGRAM TRACING: CPT

## 2024-02-28 PROCEDURE — 85025 COMPLETE CBC W/AUTO DIFF WBC: CPT | Performed by: EMERGENCY MEDICINE

## 2024-02-28 PROCEDURE — 71275 CT ANGIOGRAPHY CHEST: CPT

## 2024-02-28 PROCEDURE — 36415 COLL VENOUS BLD VENIPUNCTURE: CPT | Performed by: EMERGENCY MEDICINE

## 2024-02-28 PROCEDURE — 99285 EMERGENCY DEPT VISIT HI MDM: CPT | Performed by: EMERGENCY MEDICINE

## 2024-02-28 PROCEDURE — 96365 THER/PROPH/DIAG IV INF INIT: CPT

## 2024-02-28 PROCEDURE — 73564 X-RAY EXAM KNEE 4 OR MORE: CPT

## 2024-02-28 PROCEDURE — 85730 THROMBOPLASTIN TIME PARTIAL: CPT | Performed by: EMERGENCY MEDICINE

## 2024-02-28 PROCEDURE — 99284 EMERGENCY DEPT VISIT MOD MDM: CPT

## 2024-02-28 PROCEDURE — 85610 PROTHROMBIN TIME: CPT | Performed by: EMERGENCY MEDICINE

## 2024-02-28 PROCEDURE — 83550 IRON BINDING TEST: CPT | Performed by: NURSE PRACTITIONER

## 2024-02-28 PROCEDURE — 82728 ASSAY OF FERRITIN: CPT | Performed by: NURSE PRACTITIONER

## 2024-02-28 PROCEDURE — 96375 TX/PRO/DX INJ NEW DRUG ADDON: CPT

## 2024-02-28 PROCEDURE — 73562 X-RAY EXAM OF KNEE 3: CPT

## 2024-02-28 PROCEDURE — 80053 COMPREHEN METABOLIC PANEL: CPT | Performed by: EMERGENCY MEDICINE

## 2024-02-28 PROCEDURE — 82607 VITAMIN B-12: CPT | Performed by: NURSE PRACTITIONER

## 2024-02-28 PROCEDURE — 99214 OFFICE O/P EST MOD 30 MIN: CPT | Performed by: ORTHOPAEDIC SURGERY

## 2024-02-28 PROCEDURE — 93971 EXTREMITY STUDY: CPT

## 2024-02-28 PROCEDURE — 84484 ASSAY OF TROPONIN QUANT: CPT | Performed by: EMERGENCY MEDICINE

## 2024-02-28 PROCEDURE — 83540 ASSAY OF IRON: CPT | Performed by: NURSE PRACTITIONER

## 2024-02-28 PROCEDURE — 99222 1ST HOSP IP/OBS MODERATE 55: CPT | Performed by: NURSE PRACTITIONER

## 2024-02-28 RX ORDER — PANTOPRAZOLE SODIUM 40 MG/1
40 TABLET, DELAYED RELEASE ORAL
Status: DISCONTINUED | OUTPATIENT
Start: 2024-02-29 | End: 2024-03-07 | Stop reason: HOSPADM

## 2024-02-28 RX ORDER — HEPARIN SODIUM 1000 [USP'U]/ML
8000 INJECTION, SOLUTION INTRAVENOUS; SUBCUTANEOUS EVERY 6 HOURS PRN
Status: DISCONTINUED | OUTPATIENT
Start: 2024-02-28 | End: 2024-03-06

## 2024-02-28 RX ORDER — HEPARIN SODIUM 10000 [USP'U]/100ML
3-30 INJECTION, SOLUTION INTRAVENOUS
Status: DISCONTINUED | OUTPATIENT
Start: 2024-02-28 | End: 2024-03-06

## 2024-02-28 RX ORDER — ACETAMINOPHEN 325 MG/1
650 TABLET ORAL EVERY 6 HOURS PRN
Status: DISCONTINUED | OUTPATIENT
Start: 2024-02-28 | End: 2024-03-07 | Stop reason: HOSPADM

## 2024-02-28 RX ORDER — WARFARIN SODIUM 5 MG/1
5 TABLET ORAL
Status: DISCONTINUED | OUTPATIENT
Start: 2024-02-29 | End: 2024-03-03

## 2024-02-28 RX ORDER — HEPARIN SODIUM 1000 [USP'U]/ML
8000 INJECTION, SOLUTION INTRAVENOUS; SUBCUTANEOUS ONCE
Status: COMPLETED | OUTPATIENT
Start: 2024-02-28 | End: 2024-02-28

## 2024-02-28 RX ORDER — ONDANSETRON 2 MG/ML
4 INJECTION INTRAMUSCULAR; INTRAVENOUS EVERY 6 HOURS PRN
Status: DISCONTINUED | OUTPATIENT
Start: 2024-02-28 | End: 2024-03-07 | Stop reason: HOSPADM

## 2024-02-28 RX ORDER — HEPARIN SODIUM 1000 [USP'U]/ML
4000 INJECTION, SOLUTION INTRAVENOUS; SUBCUTANEOUS EVERY 6 HOURS PRN
Status: DISCONTINUED | OUTPATIENT
Start: 2024-02-28 | End: 2024-03-06

## 2024-02-28 RX ORDER — WARFARIN SODIUM 2.5 MG/1
5 TABLET ORAL
Status: COMPLETED | OUTPATIENT
Start: 2024-02-28 | End: 2024-02-28

## 2024-02-28 RX ADMIN — HEPARIN SODIUM 8000 UNITS: 1000 INJECTION INTRAVENOUS; SUBCUTANEOUS at 19:53

## 2024-02-28 RX ADMIN — HEPARIN SODIUM 18 UNITS/KG/HR: 10000 INJECTION, SOLUTION INTRAVENOUS at 19:53

## 2024-02-28 RX ADMIN — ACETAMINOPHEN 650 MG: 325 TABLET ORAL at 22:36

## 2024-02-28 RX ADMIN — IOHEXOL 85 ML: 350 INJECTION, SOLUTION INTRAVENOUS at 19:37

## 2024-02-28 RX ADMIN — WARFARIN SODIUM 5 MG: 2.5 TABLET ORAL at 20:30

## 2024-02-28 NOTE — ED PROVIDER NOTES
History  Chief Complaint   Patient presents with    Abnormal Lab     Patient had US of left leg for hx of knee pain. Was notified by [facility] to go to ED.     Pt is a 52yo M who presents for left leg pain and abnormal duplex.  Patient reports he has been having pain in his left knee for several weeks that has been worsening.  He states that the pain is now his entire left leg and has had difficulty ambulating secondary to the pain.  Patient reports he was seen by orthopedics today and recommended to have a duplex to rule out DVT.  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  Patient was recommended to come in for further evaluation.  Patient reports he has history of DVTs approximately 6 years ago.  Patient was followed with hematology at that time and found to have markers indicating hypercoagulability.  Patient reports after his 6 months on Xarelto there was a risks benefits discussion with hematology and decision made to continue on Xarelto indefinitely due to his increased risk of clotting.  Patient reports he has had no new clot since being on Xarelto until now.  Patient reports that he is compliant with his Xarelto with no missed doses.  Patient denies any chest pain but does report over the past 1 to 2 weeks he has had increased shortness of breath on exertion.  Patient states this may be due to deconditioning as they have not been as active as usual.         Prior to Admission Medications   Prescriptions Last Dose Informant Patient Reported? Taking?   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 07/12/2006    DVT (deep venous thrombosis) [facility]) 2018    BL legs    Dysphagia     Last  Assessed: 8/12/2014     Factor 5 Leiden mutation, heterozygous (HCC)     GERD (gastroesophageal reflux disease)     Globus sensation     Last Assessed: 6/4/2014     H/O neoplasm of uncertain behavior of skin 06/06/2006    Hyperlipidemia     Hypertension 02/14/2006    Lateral epicondylitis of right elbow     Last Assessed: 10/23/2015     Pes planus     last assessed: 10/23/2013     Plantar fascial fibromatosis     Last Assessed: 10/23/2013     Pulmonary embolism (HCC) 2018    Right patellofemoral syndrome     Last Assessed: 4/15/2016     Sebaceous cyst 11/03/2007       Past Surgical History:   Procedure Laterality Date    COLONOSCOPY      ESOPHAGOGASTRODUODENOSCOPY N/A 10/7/2016    Procedure: ESOPHAGOGASTRODUODENOSCOPY (EGD);  Surgeon: Crystal Valdovinos MD;  Location: [facility] GI LAB;  Service:     NASAL SEPTUM SURGERY  1995    SD ESOPHAGOGASTRODUODENOSCOPY TRANSORAL DIAGNOSTIC N/A 12/6/2018    Procedure: ESOPHAGOGASTRODUODENOSCOPY (EGD);  Surgeon: Crystal Valdovinos MD;  Location: [facility] GI LAB;  Service: Gastroenterology    SD SURGICAL ARTHROSCOPY SHOULDER W/ROTATOR CUFF RPR Right 1/26/2023    Procedure: Shoulder Arthroscopy with Rotator Cuff Repair, Subacromial Decompression, and Limited Debridement;  Surgeon: Johnnie Barcenas MD;  Location: [facility] MAIN OR;  Service: Orthopedics       Family History   Problem Relation Age of Onset    Heart disease Mother         valve 70s    Hypertension Mother     Cancer Father         colon    Colon cancer Father     Hypertension Father     Coronary artery disease Father         CABG    Heart disease Brother         MI exp age 45    Colon cancer Family     Cancer Brother         esophageal CA exp age 44    Cancer Brother      I have reviewed and agree with the history as documented.    E-Cigarette/Vaping    E-Cigarette Use Never User      E-Cigarette/Vaping Substances    Nicotine No     THC No     CBD No     Flavoring No     Other No     Unknown No      Social History     Tobacco  Use    Smoking status: Never    Smokeless tobacco: Never   Vaping Use    Vaping status: Never Used   Substance Use Topics    Alcohol use: Yes     Alcohol/week: 4.0 standard drinks of alcohol     Types: 4 Cans of beer per week     Comment: socially    Drug use: No       Review of Systems   Respiratory:  Positive for shortness of breath.    Musculoskeletal:         L leg pain   All other systems reviewed and are negative.      Physical Exam  Physical Exam  Vitals reviewed.   Constitutional:       General: He is not in acute distress.     Appearance: He is well-developed. He is not toxic-appearing or diaphoretic.   HENT:      Head: Normocephalic and atraumatic.      Right Ear: External ear normal.      Left Ear: External ear normal.      Nose: Nose normal.      Mouth/Throat:      Pharynx: Oropharynx is clear.   Eyes:      Extraocular Movements: Extraocular movements intact.      Pupils: Pupils are equal, round, and reactive to light.   Cardiovascular:      Rate and Rhythm: Normal rate and regular rhythm.      Heart sounds: Normal heart sounds. No murmur heard.  Pulmonary:      Effort: Pulmonary effort is normal. No respiratory distress.      Breath sounds: Normal breath sounds.   Abdominal:      General: Bowel sounds are normal. There is no distension.      Palpations: Abdomen is soft.      Tenderness: There is no abdominal tenderness.   Musculoskeletal:         General: No deformity or signs of injury.      Cervical back: Neck supple.      Comments: Posterior calf and thigh tenderness on L  Decreased ROM at the L knee   Skin:     General: Skin is warm and dry.      Capillary Refill: Capillary refill takes less than 2 seconds.      Coloration: Skin is not pale.      Findings: No erythema or rash.   Neurological:      General: No focal deficit present.      Mental Status: He is alert and oriented to person, place, and time.   Psychiatric:         Speech: Speech normal.         Behavior: Behavior is cooperative.          Vital Signs  ED Triage Vitals   Temperature Pulse Respirations Blood Pressure SpO2   02/28/24 1805 02/28/24 1805 02/28/24 1805 02/28/24 1805 02/28/24 1805   98.7 °F (37.1 °C) 83 18 133/91 95 %      Temp Source Heart Rate Source Patient Position - Orthostatic VS BP Location FiO2 (%)   02/28/24 1805 02/28/24 1805 02/28/24 1805 02/28/24 1805 --   Temporal Monitor Sitting Left arm       Pain Score       02/28/24 2104       8           Vitals:    02/28/24 2116 02/28/24 2117 02/29/24 0253 02/29/24 0830   BP: 141/98 141/98 147/83 133/88   Pulse:   65 80   Patient Position - Orthostatic VS:             Visual Acuity      ED Medications  Medications   heparin (porcine) 25,000 units in 0.45% NaCl 250 mL infusion (premix) (13 Units/kg/hr × 100 kg (Order-Specific) Intravenous Rate/Dose Change 2/29/24 1002)   heparin (porcine) injection 8,000 Units (has no administration in time range)   heparin (porcine) injection 4,000 Units (has no administration in time range)   pantoprazole (PROTONIX) EC tablet 40 mg (40 mg Oral Given 2/29/24 0613)   acetaminophen (TYLENOL) tablet 650 mg (650 mg Oral Given 2/28/24 2236)   ondansetron (ZOFRAN) injection 4 mg (has no administration in time range)   warfarin (COUMADIN) tablet 5 mg (has no administration in time range)   multivitamin stress formula tablet 1 tablet (1 tablet Oral Given 2/29/24 0831)   lidocaine (LIDODERM) 5 % patch 1 patch (1 patch Topical Medication Applied 2/29/24 0832)   traMADol (ULTRAM) tablet 50 mg (50 mg Oral Given 2/29/24 0831)   heparin (porcine) injection 8,000 Units (8,000 Units Intravenous Given 2/28/24 1953)   iohexol (OMNIPAQUE) 350 MG/ML injection (MULTI-DOSE) 100 mL (85 mL Intravenous Given 2/28/24 1937)   warfarin (COUMADIN) tablet 5 mg (5 mg Oral Given 2/28/24 2030)       Diagnostic Studies  Results Reviewed       Procedure Component Value Units Date/Time    Vitamin B12 [[id number]]  (Normal) Collected: 02/28/24 1840    Lab Status: Final result Specimen:  Blood from Arm, Left Updated: 02/29/24 0349     Vitamin B-12 746 pg/mL     Folate [[id number]]  (Normal) Collected: 02/28/24 1840    Lab Status: Final result Specimen: Blood from Arm, Left Updated: 02/29/24 0348     Folate >22.3 ng/mL     Ferritin [[id number]]  (Abnormal) Collected: 02/28/24 1840    Lab Status: Final result Specimen: Blood from Arm, Left Updated: 02/29/24 0348     Ferritin 6 ng/mL     TIBC Panel (incl. Iron, TIBC, % Iron Saturation) [[id number]]  (Abnormal) Collected: 02/28/24 1840    Lab Status: Final result Specimen: Blood from Arm, Left Updated: 02/29/24 0311     Iron Saturation 3 %      TIBC 449 ug/dL      Iron 14 ug/dL      UIBC 435 ug/dL     APTT [[id number]]  (Abnormal) Collected: 02/29/24 0201    Lab Status: Final result Specimen: Blood from Arm, Right Updated: 02/29/24 0234     PTT >210 seconds     APTT [[id number]]  (Normal) Collected: 02/28/24 1919    Lab Status: Final result Specimen: Blood from Arm, Right Updated: 02/28/24 1941     PTT 32 seconds     Protime-INR [[id number]]  (Abnormal) Collected: 02/28/24 1919    Lab Status: Final result Specimen: Blood from Arm, Right Updated: 02/28/24 1941     Protime 16.0 seconds      INR 1.26    HS Troponin 0hr (reflex protocol) [[id number]]  (Normal) Collected: 02/28/24 1840    Lab Status: Final result Specimen: Blood from Arm, Left Updated: 02/28/24 1922     hs TnI 0hr 2 ng/L     Comprehensive metabolic panel [[id number]]  (Abnormal) Collected: 02/28/24 1840    Lab Status: Final result Specimen: Blood from Arm, Left Updated: 02/28/24 1912     Sodium 135 mmol/L      Potassium 5.2 mmol/L      Chloride 104 mmol/L      CO2 23 mmol/L      ANION GAP 8 mmol/L      BUN 22 mg/dL      Creatinine 0.96 mg/dL      Glucose 106 mg/dL      Calcium 8.9 mg/dL      AST 40 U/L      ALT 34 U/L      Alkaline Phosphatase 60 U/L      Total Protein 7.7 g/dL      Albumin 4.4 g/dL      Total Bilirubin 0.61 mg/dL      eGFR 89 ml/min/1.73sq m     Narrative:      National Kidney  Disease [employer] guidelines for Chronic Kidney Disease (CKD):     Stage 1 with normal or high GFR (GFR > 90 mL/min/1.73 square meters)    Stage 2 Mild CKD (GFR = 60-89 mL/min/1.73 square meters)    Stage 3A Moderate CKD (GFR = 45-59 mL/min/1.73 square meters)    Stage 3B Moderate CKD (GFR = 30-44 mL/min/1.73 square meters)    Stage 4 Severe CKD (GFR = 15-29 mL/min/1.73 square meters)    Stage 5 End Stage CKD (GFR <15 mL/min/1.73 square meters)  Note: GFR calculation is accurate only with a steady state creatinine    B-Type Natriuretic Peptide(BNP) [[id number]]  (Normal) Collected: 02/28/24 1840    Lab Status: Final result Specimen: Blood from Arm, Left Updated: 02/28/24 1911     BNP 15 pg/mL     CBC and differential [[id number]]  (Abnormal) Collected: 02/28/24 1840    Lab Status: Final result Specimen: Blood from Arm, Left Updated: 02/28/24 1845     WBC 6.69 Thousand/uL      RBC 4.51 Million/uL      Hemoglobin 11.3 g/dL      Hematocrit 36.4 %      MCV 81 fL      MCH 25.1 pg      MCHC 31.0 g/dL      RDW 15.1 %      MPV 10.4 fL      Platelets 242 Thousands/uL      nRBC 0 /100 WBCs      Neutrophils Relative 57 %      Immat GRANS % 0 %      Lymphocytes Relative 27 %      Monocytes Relative 11 %      Eosinophils Relative 4 %      Basophils Relative 1 %      Neutrophils Absolute 3.85 Thousands/µL      Immature Grans Absolute 0.01 Thousand/uL      Lymphocytes Absolute 1.78 Thousands/µL      Monocytes Absolute 0.73 Thousand/µL      Eosinophils Absolute 0.27 Thousand/µL      Basophils Absolute 0.05 Thousands/µL                    CTA ED chest PE Study   Final Result by Raisa Silva MD (02/28 1953)      No pulmonary embolus.      Lungs clear.      Large hiatal hernia.            Workstation performed: [id number]                    Procedures  Procedures         ED Course  ED Course as of 02/29/24 1251   Wed Feb 28, 2024 1812 Per vascular study earlier today:  Evidence of chronic non-occlusive deep venous thrombosis  was noted in the distal femoral, popliteal and peroneal veins.  Last vascular study in 2022 only showed chronic DVT in popliteal.    1846 Hemoglobin(!): 11.3  Anemia stable from prior.    1846 CBC and differential(!)  Reviewed and without actionable derangement.    1850 Due to history, hematology made aware and are reviewing chart.    1912 Comprehensive metabolic panel(!)  Reviewed and without actionable derangement.    1912 BNP: 15  WNL   1912 Hematology in agreement for admission with hep bridge to warfarin. Pt made aware of plan and is agreeable.    1928 hs TnI 0hr: 2  WNL. No indication for delta.    1946 POCT INR(!): 1.26   1946 PTT: 32   1956 CTA ED chest PE Study  No pulmonary embolus.   1957 Procedure Note: EKG  Date/Time: 02/28/24 7:57 PM   Interpreted by: Cindi Rubalcava MD  Indications / Diagnosis: SOB on exertion  ECG reviewed by me, the ED Physician: yes   The EKG demonstrates:  Rhythm: normal sinus  Intervals: normal intervals  Axis: normal axis  QRS/Blocks: normal QRS  ST Changes: No acute ST Changes, no STD/LIZY.                               SBIRT 22yo+      Flowsheet Row Most Recent Value   Initial Alcohol Screen: US AUDIT-C     1. How often do you have a drink containing alcohol? 0 Filed at: 02/28/2024 1808   Audit-C Score 0 Filed at: 02/28/2024 1808   JESUS: How many times in the past year have you...    Used an illegal drug or used a prescription medication for non-medical reasons? Never Filed at: 02/28/2024 1808                      Medical Decision Making  Pt is a 52yo M who presents with leg pain and abnormal duplex.     Differential diagnosis to include but not limited to PE, failure of NOAC, hypercoagulable state.  Will plan for labs and CTA to rule out PE.  See ED course for results and details.    Plan to admit pt to [facility]. Pt discussed with admitting team and admission orders placed. Pt admitted without incident.       Amount and/or Complexity of Data Reviewed  Labs: ordered.  Decision-making details documented in ED Course.  Radiology: ordered. Decision-making details documented in ED Course.    Risk  Prescription drug management.  Decision regarding hospitalization.             Disposition  Final diagnoses:   History of DVT (deep vein thrombosis)   Dyspnea   Hx of long term use of blood thinners   Left leg pain     Time reflects when diagnosis was documented in both MDM as applicable and the Disposition within this note       Time User Action Codes Description Comment    2/28/2024  7:47 PM Cindi Rubalcava Add [Z86.718] History of DVT (deep vein thrombosis)     2/28/2024  7:47 PM Cindi Rubalcava Add [R06.00] Dyspnea     2/28/2024  7:48 PM Cindi Rubalcava Add [Z92.29] Hx of long term use of blood thinners     2/28/2024  7:50 PM Cindi Rubalcava Add [M79.605] Left leg pain     2/28/2024  9:48 PM Nicole Villasenor Add [I82.502] Chronic deep vein thrombosis (DVT) of left lower extremity (HCC)           ED Disposition       ED Disposition   Admit    Condition   Stable    Date/Time   Wed Feb 28, 2024 2009    Comment   Case was discussed with FREDERIC and the patient's admission status was agreed to be Admission Status: inpatient status to the service of Dr. Santiago.               Follow-up Information    None         Current Discharge Medication List        CONTINUE these medications which have NOT CHANGED    Details   Xarelto 20 MG tablet Take 1 tablet (20 mg total) by mouth daily  Qty: 90 tablet, Refills: 1    Associated Diagnoses: Other pulmonary embolism without acute cor pulmonale, unspecified chronicity (HCC)      esomeprazole (NexIUM) 40 MG capsule Take 40 mg by mouth daily as needed             No discharge procedures on file.    PDMP Review       None            ED Provider  Electronically Signed by             Cindi Rubalcava MD  02/29/24 6158

## 2024-02-28 NOTE — LETTER
[address] [address]  Dept: [phone]    March 7, 2024     Patient: Edward Conrad   YOB: 1970   Date of Visit: 2/28/2024       To Whom it May Concern:    Edward Conrad was admitted to the hospital from 2/28/2024 to 03/07/24. He may return to work on 03/11/24.    If you have any questions or concerns, please don't hesitate to call.         Sincerely,          Hadley Arreola MD

## 2024-02-28 NOTE — Clinical Note
Case was discussed with FREDERIC and the patient's admission status was agreed to be Admission Status: inpatient status to the service of Dr. Garcia.

## 2024-02-28 NOTE — PROGRESS NOTES
Assessment/Plan:  1. Left knee pain, unspecified chronicity  XR knee 4+ vw left injury    MRI knee left  wo contrast      2. Encounter for lower extremity comparison imaging study  XR knee 3 vw right non injury      3. Pain of left calf  VAS VENOUS DUPLEX -LOWER LIMB UNILATERAL      4. Internal derangement of left knee          Given the patient's history of multiple DVTs and his severe onset of calf and thigh pain, I do have concern for possible DVT despite the patient being on Xarelto.  I am ordering a stat vascular ultrasound to rule this out.  I do also have concern for medial meniscal root tear.  The patient is unable to bear weight with severe medial knee pain and effusion.  I am ordering an MRI to further evaluate for this.  I did explain that if he has an injury to the knee could certainly increase his risk for DVT of this lower extremity.  We will call him with the results of his vascular ultrasound.  If positive he will likely need to see vascular surgery and may need adjustment of his anticoagulation.      Subjective:   Edward Conrad is a 53 y.o. male who presents today for evaluation of his left knee. He notes this started to bother him a couple weeks ago, but then today this got very severe today and he is now unable to bear weight.  His pain was initially about the medial knee, which has gotten more severe, however he now also has pain about the calf and anterior thigh, which she notes are both quite tender to the touch.  He denies any fever/chills.  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  He does have factor V Leiden mutation and protein C resistance and is currently on Xarelto.  He notes good sensation of the left lower extremity and denies any back pain.      Review of Systems   Constitutional: Negative.  Negative for chills and fever.   HENT: Negative.  Negative for ear pain and sore throat.    Eyes: Negative.  Negative for pain and redness.   Respiratory: Negative.   Negative for shortness of breath and wheezing.    Cardiovascular:  Negative for chest pain and palpitations.   Gastrointestinal: Negative.  Negative for abdominal pain and blood in stool.   Endocrine: Negative.  Negative for polydipsia and polyuria.   Genitourinary: Negative.  Negative for difficulty urinating and dysuria.   Musculoskeletal:         As noted in HPI   Skin: Negative.  Negative for pallor and rash.   Neurological: Negative.  Negative for dizziness and numbness.   Hematological: Negative.  Negative for adenopathy. Does not bruise/bleed easily.   Psychiatric/Behavioral: Negative.  Negative for confusion and suicidal ideas.          Past Medical History:   Diagnosis Date   • Anemia    • 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 07/12/2006   • DVT (deep venous thrombosis) ([facility]) 2018    BL legs   • Dysphagia     Last Assessed: 8/12/2014    • Factor 5 Leiden mutation, heterozygous ([facility])    • GERD (gastroesophageal reflux disease)    • Globus sensation     Last Assessed: 6/4/2014    • H/O neoplasm of uncertain behavior of skin 06/06/2006   • Hyperlipidemia    • Hypertension 02/14/2006   • Lateral epicondylitis of right elbow     Last Assessed: 10/23/2015    • Pes planus     last assessed: 10/23/2013    • Plantar fascial fibromatosis     Last Assessed: 10/23/2013    • Pulmonary embolism ([facility]) 2018   • Right patellofemoral syndrome     Last Assessed: 4/15/2016    • Sebaceous cyst 11/03/2007       Past Surgical History:   Procedure Laterality Date   • COLONOSCOPY     • ESOPHAGOGASTRODUODENOSCOPY N/A 10/7/2016    Procedure: ESOPHAGOGASTRODUODENOSCOPY (EGD);  Surgeon: Crystal Valdovinos MD;  Location: [facility] GI LAB;  Service:    • NASAL SEPTUM SURGERY  1995   • OK ESOPHAGOGASTRODUODENOSCOPY TRANSORAL DIAGNOSTIC N/A 12/6/2018    Procedure: ESOPHAGOGASTRODUODENOSCOPY (EGD);  Surgeon: Crystal Valdovinos MD;  Location: [facility] GI LAB;  Service: Gastroenterology   • OK SURGICAL ARTHROSCOPY SHOULDER W/ROTATOR CUFF RPR Right  1/26/2023    Procedure: Shoulder Arthroscopy with Rotator Cuff Repair, Subacromial Decompression, and Limited Debridement;  Surgeon: Johnnie Barcenas MD;  Location: [facility] MAIN OR;  Service: Orthopedics       Family History   Problem Relation Age of Onset   • Heart disease Mother         valve 70s   • Hypertension Mother    • Cancer Father         colon   • Colon cancer Father    • Hypertension Father    • Coronary artery disease Father         CABG   • Heart disease Brother         MI exp age 45   • Colon cancer Family    • Cancer Brother         esophageal CA exp age 44   • Cancer Brother        Social History     Occupational History   • Not on file   Tobacco Use   • Smoking status: Never   • Smokeless tobacco: Never   Vaping Use   • Vaping status: Never Used   Substance and Sexual Activity   • Alcohol use: Yes     Alcohol/week: 4.0 standard drinks of alcohol     Types: 4 Cans of beer per week     Comment: socially   • Drug use: No   • Sexual activity: Yes     Partners: Female       No current facility-administered medications for this visit.  No current outpatient medications on file.    Facility-Administered Medications Ordered in Other Visits:   •  acetaminophen (TYLENOL) tablet 650 mg, 650 mg, Oral, Q6H PRN, Nicole Funezrik, CRNP, 650 mg at 02/28/24 2236  •  heparin (porcine) 25,000 units in 0.45% NaCl 250 mL infusion (premix), 3-30 Units/kg/hr (Order-Specific), Intravenous, Titrated, Nicole Funezrik, CRNP, Last Rate: 13 mL/hr at 02/29/24 1002, 13 Units/kg/hr at 02/29/24 1002  •  heparin (porcine) injection 4,000 Units, 4,000 Units, Intravenous, Q6H PRN, Maximinoiteofilo Funezrik, CRNP  •  heparin (porcine) injection 8,000 Units, 8,000 Units, Intravenous, Q6H PRN, Cuiyin Yurik, CRNP  •  lidocaine (LIDODERM) 5 % patch 1 patch, 1 patch, Topical, Daily, Nicole Villasenor, CRNP, 1 patch at 02/29/24 0832  •  multivitamin stress formula tablet 1 tablet, 1 tablet, Oral, Daily, Nicole Villasenor, CRNP, 1 tablet at 02/29/24 0831  •   ondansetron (ZOFRAN) injection 4 mg, 4 mg, Intravenous, Q6H PRN, Nicole Arriagak, CRNP  •  pantoprazole (PROTONIX) EC tablet 40 mg, 40 mg, Oral, Early Morning, Cuiteofilo Funezrik, CRNP, 40 mg at 02/29/24 0613  •  traMADol (ULTRAM) tablet 50 mg, 50 mg, Oral, Q6H PRN, Cuicaridadn Dee Deerik, CRNP, 50 mg at 02/29/24 0831  •  warfarin (COUMADIN) tablet 5 mg, 5 mg, Oral, Daily (warfarin), Cuiteofilo Funezrik, CRNP    No Known Allergies    Objective:  Vitals:    02/28/24 1459   BP: 142/97   Pulse: 96     Pain Score:   8      Left Knee Exam     Tenderness   The patient is experiencing tenderness in the medial joint line.    Range of Motion   Extension:  -5   Flexion:  100     Tests   Varus: negative Valgus: negative  Lachman:  Anterior - negative      Drawer:  Anterior - negative     Posterior - negative    Other   Erythema: absent  Sensation: normal  Pulse: present  Swelling: none  Effusion: effusion (1+) present    Comments:  Mild diffuse swelling of lower leg compared to contralateral side. Significant calf and anterior thigh tenderness.           Observations   Left Knee   Positive for effusion (1+).       Physical Exam  Constitutional:       General: He is not in acute distress.     Appearance: He is well-developed.   HENT:      Head: Normocephalic and atraumatic.   Eyes:      General: No scleral icterus.     Conjunctiva/sclera: Conjunctivae normal.   Neck:      Vascular: No JVD.   Cardiovascular:      Rate and Rhythm: Normal rate.   Pulmonary:      Effort: Pulmonary effort is normal. No respiratory distress.   Musculoskeletal:      Left knee: Effusion (1+) present.      Comments: As per HPI   Skin:     General: Skin is warm.   Neurological:      Mental Status: He is alert and oriented to person, place, and time.      Coordination: Coordination normal.         I have personally reviewed pertinent films in PACS and my interpretation is as follows:  Xrays left knee: Mild medial compartment narrowing. No acute osseous abnormality.      This  document was created using speech voice recognition software.   Grammatical errors, random word insertions, pronoun errors, and incomplete sentences are an occasional consequence of this system due to software limitations, ambient noise, and hardware issues.   Any formal questions or concerns about content, text, or information contained within the body of this dictation should be directly addressed to the provider for clarification.

## 2024-02-29 PROBLEM — M79.662 PAIN OF LEFT CALF: Status: ACTIVE | Noted: 2024-02-29

## 2024-02-29 PROBLEM — M23.92 INTERNAL DERANGEMENT OF LEFT KNEE: Status: ACTIVE | Noted: 2024-02-29

## 2024-02-29 LAB
ANION GAP SERPL CALCULATED.3IONS-SCNC: 7 MMOL/L
APTT PPP: 102 SECONDS (ref 23–37)
APTT PPP: 97 SECONDS (ref 23–37)
APTT PPP: >210 SECONDS (ref 23–37)
BUN SERPL-MCNC: 19 MG/DL (ref 5–25)
CALCIUM SERPL-MCNC: 8.5 MG/DL (ref 8.4–10.2)
CHLORIDE SERPL-SCNC: 104 MMOL/L (ref 96–108)
CO2 SERPL-SCNC: 24 MMOL/L (ref 21–32)
CREAT SERPL-MCNC: 1 MG/DL (ref 0.6–1.3)
D DIMER PPP FEU-MCNC: <0.27 UG/ML FEU
ERYTHROCYTE [DISTWIDTH] IN BLOOD BY AUTOMATED COUNT: 15.2 % (ref 11.6–15.1)
FERRITIN SERPL-MCNC: 6 NG/ML (ref 24–336)
FOLATE SERPL-MCNC: >22.3 NG/ML
GFR SERPL CREATININE-BSD FRML MDRD: 84 ML/MIN/1.73SQ M
GLUCOSE SERPL-MCNC: 177 MG/DL (ref 65–140)
HCT VFR BLD AUTO: 35.5 % (ref 36.5–49.3)
HGB BLD-MCNC: 10.9 G/DL (ref 12–17)
INR PPP: 1.21 (ref 0.84–1.19)
IRON SATN MFR SERPL: 3 % (ref 15–50)
IRON SERPL-MCNC: 14 UG/DL (ref 50–212)
MAGNESIUM SERPL-MCNC: 2.1 MG/DL (ref 1.9–2.7)
MCH RBC QN AUTO: 25 PG (ref 26.8–34.3)
MCHC RBC AUTO-ENTMCNC: 30.7 G/DL (ref 31.4–37.4)
MCV RBC AUTO: 81 FL (ref 82–98)
PLATELET # BLD AUTO: 236 THOUSANDS/UL (ref 149–390)
PMV BLD AUTO: 10.5 FL (ref 8.9–12.7)
POTASSIUM SERPL-SCNC: 4.1 MMOL/L (ref 3.5–5.3)
PROTHROMBIN TIME: 15.5 SECONDS (ref 11.6–14.5)
RBC # BLD AUTO: 4.36 MILLION/UL (ref 3.88–5.62)
SODIUM SERPL-SCNC: 135 MMOL/L (ref 135–147)
TIBC SERPL-MCNC: 449 UG/DL (ref 250–450)
TSH SERPL DL<=0.05 MIU/L-ACNC: 2.76 UIU/ML (ref 0.45–4.5)
UIBC SERPL-MCNC: 435 UG/DL (ref 155–355)
VIT B12 SERPL-MCNC: 746 PG/ML (ref 180–914)
WBC # BLD AUTO: 5.88 THOUSAND/UL (ref 4.31–10.16)

## 2024-02-29 PROCEDURE — 99233 SBSQ HOSP IP/OBS HIGH 50: CPT | Performed by: INTERNAL MEDICINE

## 2024-02-29 PROCEDURE — 80048 BASIC METABOLIC PNL TOTAL CA: CPT | Performed by: NURSE PRACTITIONER

## 2024-02-29 PROCEDURE — 85610 PROTHROMBIN TIME: CPT | Performed by: NURSE PRACTITIONER

## 2024-02-29 PROCEDURE — 83735 ASSAY OF MAGNESIUM: CPT | Performed by: NURSE PRACTITIONER

## 2024-02-29 PROCEDURE — 85027 COMPLETE CBC AUTOMATED: CPT | Performed by: NURSE PRACTITIONER

## 2024-02-29 PROCEDURE — 85730 THROMBOPLASTIN TIME PARTIAL: CPT | Performed by: INTERNAL MEDICINE

## 2024-02-29 PROCEDURE — 97530 THERAPEUTIC ACTIVITIES: CPT

## 2024-02-29 PROCEDURE — 85379 FIBRIN DEGRADATION QUANT: CPT | Performed by: INTERNAL MEDICINE

## 2024-02-29 PROCEDURE — 85730 THROMBOPLASTIN TIME PARTIAL: CPT | Performed by: NURSE PRACTITIONER

## 2024-02-29 PROCEDURE — 93971 EXTREMITY STUDY: CPT | Performed by: SURGERY

## 2024-02-29 PROCEDURE — 84443 ASSAY THYROID STIM HORMONE: CPT | Performed by: NURSE PRACTITIONER

## 2024-02-29 PROCEDURE — 97162 PT EVAL MOD COMPLEX 30 MIN: CPT

## 2024-02-29 RX ORDER — LIDOCAINE 50 MG/G
1 PATCH TOPICAL DAILY
Status: DISCONTINUED | OUTPATIENT
Start: 2024-02-29 | End: 2024-03-07 | Stop reason: HOSPADM

## 2024-02-29 RX ORDER — TRAMADOL HYDROCHLORIDE 50 MG/1
50 TABLET ORAL EVERY 6 HOURS PRN
Status: DISCONTINUED | OUTPATIENT
Start: 2024-02-29 | End: 2024-03-07

## 2024-02-29 RX ADMIN — LIDOCAINE 5% 1 PATCH: 700 PATCH TOPICAL at 01:53

## 2024-02-29 RX ADMIN — IRON SUCROSE 200 MG: 20 INJECTION, SOLUTION INTRAVENOUS at 13:57

## 2024-02-29 RX ADMIN — B-COMPLEX W/ C & FOLIC ACID TAB 1 TABLET: TAB at 08:31

## 2024-02-29 RX ADMIN — TRAMADOL HYDROCHLORIDE 50 MG: 50 TABLET, COATED ORAL at 14:48

## 2024-02-29 RX ADMIN — WARFARIN SODIUM 5 MG: 5 TABLET ORAL at 17:57

## 2024-02-29 RX ADMIN — PANTOPRAZOLE SODIUM 40 MG: 20 TABLET, DELAYED RELEASE ORAL at 06:13

## 2024-02-29 RX ADMIN — HEPARIN SODIUM 13 UNITS/KG/HR: 10000 INJECTION, SOLUTION INTRAVENOUS at 13:21

## 2024-02-29 RX ADMIN — LIDOCAINE 5% 1 PATCH: 700 PATCH TOPICAL at 08:32

## 2024-02-29 RX ADMIN — TRAMADOL HYDROCHLORIDE 50 MG: 50 TABLET, COATED ORAL at 08:31

## 2024-02-29 RX ADMIN — TRAMADOL HYDROCHLORIDE 50 MG: 50 TABLET, COATED ORAL at 01:53

## 2024-02-29 NOTE — ASSESSMENT & PLAN NOTE
Patient presents with left leg DVT, sent to ED by [facility] [facility].  Reports having history of bilateral lower extremity DVT and PE in 2018, on Xarelto. Reports started with left medial knee pain about 2 weeks ago and got worse today, started to have pain in left thigh and left lower leg today with trouble bearing weight/trouble walking today.  Seen Ortho on the day of admission was ordered venous Doppler to rule out DVT which came back positive.  Denies injuries to left knee 2 weeks ago.  Denies paresthesia to left lower extremity.  Chart reviewed.  History of bilateral lower extremity DVT and bilateral PE in 8/2018.  Thrombophilia evaluation showed factor V Leiden mutation, elevated APC resistance, anticardiolipin IgM level,antiphosphatidylserine IgM per hematology note.  Patient has been on Xarelto 20 mg p.o. daily ever since.  Patient saw orthopedic surgery today for left knee pain, was ordered x-ray left knee, MRI left knee, lower venous Doppler to rule out DVT due to history.  X-ray left knee was normal.  Lower venous Doppler showed -chronic nonocclusive DVT in the distal femoral, popliteal and peroneal veins.  Lower venous Doppler in 7/2022 showed -chronic nonocclusive DVT in the popliteal vein only.  Repeat lower extremity venous Doppler-no evidence of DVT in the right leg.  Chronic nonocclusive DVT in the distal femoral, popliteal and peroneal veins  CTA PE study no PE  ED provider discussed case with hematology on-call, recommend heparin drip to bridge with Coumadin.  Continue heparin drip with bridging with Coumadin.  Lovenox bridging with Coumadin discussed with the patient/family and patient is not very comfortable with Lovenox.  Consulted hematology  Ordered D-dimer

## 2024-02-29 NOTE — PLAN OF CARE
Problem: PAIN - ADULT  Goal: Verbalizes/displays adequate comfort level or baseline comfort level  Description: Interventions:  - Encourage patient to monitor pain and request assistance  - Assess pain using appropriate pain scale  - Administer analgesics based on type and severity of pain and evaluate response  - Implement non-pharmacological measures as appropriate and evaluate response  - Consider cultural and social influences on pain and pain management  - Notify physician/advanced practitioner if interventions unsuccessful or patient reports new pain  Outcome: Progressing     Problem: INFECTION - ADULT  Goal: Absence or prevention of progression during hospitalization  Description: INTERVENTIONS:  - Assess and monitor for signs and symptoms of infection  - Monitor lab/diagnostic results  - Monitor all insertion sites, i.e. indwelling lines, tubes, and drains  - Monitor endotracheal if appropriate and nasal secretions for changes in amount and color  - [place] appropriate cooling/warming therapies per order  - Administer medications as ordered  - Instruct and encourage patient and family to use good hand hygiene technique  - Identify and instruct in appropriate isolation precautions for identified infection/condition  Outcome: Progressing  Goal: Absence of fever/infection during neutropenic period  Description: INTERVENTIONS:  - Monitor WBC    Outcome: Progressing     Problem: SAFETY ADULT  Goal: Patient will remain free of falls  Description: INTERVENTIONS:  - Educate patient/family on patient safety including physical limitations  - Instruct patient to call for assistance with activity   - Consult OT/PT to assist with strengthening/mobility   - Keep Call bell within reach  - Keep bed low and locked with side rails adjusted as appropriate  - Keep care items and personal belongings within reach  - Initiate and maintain comfort rounds  - Make Fall Risk Sign visible to staff  - Offer Toileting every 2 Hours,  in advance of need  - Initiate/Maintain bed/chair alarm  - Obtain necessary fall risk management equipment: bracelet/socks   - Apply yellow socks and bracelet for high fall risk patients  - Consider moving patient to room near nurses station  Outcome: Progressing  Goal: Maintain or return to baseline ADL function  Description: INTERVENTIONS:  -  Assess patient's ability to carry out ADLs; assess patient's baseline for ADL function and identify physical deficits which impact ability to perform ADLs (bathing, care of mouth/teeth, toileting, grooming, dressing, etc.)  - Assess/evaluate cause of self-care deficits   - Assess range of motion  - Assess patient's mobility; develop plan if impaired  - Assess patient's need for assistive devices and provide as appropriate  - Encourage maximum independence but intervene and supervise when necessary  - Involve family in performance of ADLs  - Assess for home care needs following discharge   - Consider OT consult to assist with ADL evaluation and planning for discharge  - Provide patient education as appropriate  Outcome: Progressing  Goal: Maintains/Returns to pre admission functional level  Description: INTERVENTIONS:  - Perform AM-PAC 6 Click Basic Mobility/ Daily Activity assessment daily.  - Set and communicate daily mobility goal to care team and patient/family/caregiver.   - Collaborate with rehabilitation services on mobility goals if consulted  - Perform Range of Motion 12 times a day.  - Reposition patient every 2 hours.  - Dangle patient 3 times a day  - Stand patient 3 times a day  - Ambulate patient 3 times a day  - Out of bed to chair 3 times a day   - Out of bed for meals 3 times a day  - Out of bed for toileting  - Record patient progress and toleration of activity level   Outcome: Progressing     Problem: DISCHARGE PLANNING  Goal: Discharge to home or other facility with appropriate resources  Description: INTERVENTIONS:  - Identify barriers to discharge  w/patient and caregiver  - Arrange for needed discharge resources and transportation as appropriate  - Identify discharge learning needs (meds, wound care, etc.)  - Arrange for interpretive services to assist at discharge as needed  - Refer to Case Management Department for coordinating discharge planning if the patient needs post-hospital services based on physician/advanced practitioner order or complex needs related to functional status, cognitive ability, or social support system  Outcome: Progressing     Problem: Knowledge Deficit  Goal: Patient/family/caregiver demonstrates understanding of disease process, treatment plan, medications, and discharge instructions  Description: Complete learning assessment and assess knowledge base.  Interventions:  - Provide teaching at level of understanding  - Provide teaching via preferred learning methods  Outcome: Progressing

## 2024-02-29 NOTE — CONSULTS
Edward Conrad  1970    HEMATOLOGY/ONCOLOGY CONSULTATION REPORT - [facility]    DISCUSSION/SUMMARY:    53-year-old male with prior lower extremity DVTs, PE admitted with left knee pain.  Recent CTA/chest did not demonstrate any evidence of a new PE.  Additionally, the recent lower extremity Dopplers did not demonstrate any evidence of acute/new DVT.  Patient has evidence of chronic nonocclusive left lower extremity thromboses.  D-dimer was not elevated.    At this moment, I am not 100% convinced that the left knee pain is a Xarelto failure (as there is no evidence of an acute DVT).  Patient is on heparin, PTT is being monitored.  Patient has also been started on Coumadin at 5 milligrams a day.  INR is also being monitored.      Prior thrombophilia workup is listed below.  Besides the heterozygous factor V Leiden mutation, patient had an elevated anticardiolipin IgM and an elevated  antiphosphatidylserine IgM.  These were never serialized as patient understood that he required lifelong anticoagulation and did not feel that the tests needed to be repeated.  The concern was that patient had antiphospholipid antibody syndrome besides the factor V Leiden mutation.  Mr. Conrad has done well over the past 5 years without any bruising/bleeding issues or any additional clotting while on the Xarelto.    The below graph is from [facility], demonstrates which clinical settings and anticoagulants that may interfere with testing for a thrombophilia.  Regardless of the anticoagulation or the timing of the thrombotic event, antiphospholipid antibodies do not change.  For that reason, a repeat anticardiolipin panel and repeat antiphosphatidylserine panel will be ordered after patient has been discharged.              The above was discussed at length with Mr. Conrad; all questions were answered.  Patient demonstrated a good understanding of the situation and the fact that it has not been absolutely proven that he has a new DVT.  If  patient continues to have left knee pain with the anticoagulation change, other etiologies will need to be investigated.    When patient is ready for discharge, please contact Jayla Primo, [facility] hematology/oncology discharge coordinator via [employer] text.  She will get the patient scheduled for an outpatient hematology follow-up in [place].    Thank you for this consult.  _________________________________________________________________________________    Chief Complaint   Patient presents with    Abnormal Lab     Patient had US of left leg for hx of knee pain. Was notified by [employer] / [employer] to go to ED.     History of Present Illness: 53-year-old male previously seen/evaluated for bilateral DVTs and bilateral PE.  There was no clear provoking incident.  Patient was placed on Xarelto.  Thrombophilia workup demonstrated heterozygous factor V Leiden, slightly elevated anticardiolipin IgM level and an elevated antiphosphatidylserine IgM level.  Patient was last seen over 5 years ago.  At that time Mr. Conrad did not want to deal with the INR checks with Coumadin.  Up until just recently patient has done well with the Xarelto.    Mr. Conrad was admitted on February 28, 2024 with acute left knee pain.  Dopplers from February 28, 2024 did not demonstrate any evidence of thrombus in the right lower extremity; patient had evidence of chronic nonocclusive deep vein thrombosis in the left distal femoral, popliteal and peroneal veins.  No evidence of superficial thrombophlebitis.  CTA/chest did not demonstrate any evidence of a new PE.  D-dimer was within normal limits.  Patient was started on heparin, eventually Coumadin.    Presently Mr. Conrad states feeling +/-, still with pain in the left knee.  Patient denies any recent specific provoking incident or trauma but is very physically active at work climbing up and down ladders.  No problems with the Xarelto otherwise, no bruising or bleeding before the admission.  No  respiratory problems.  Routine health maintenance and medical care has been up-to-date.    Review of Systems   Constitutional: Negative.    HENT: Negative.     Eyes: Negative.    Respiratory: Negative.     Cardiovascular: Negative.    Gastrointestinal: Negative.    Endocrine: Negative.    Genitourinary: Negative.    Musculoskeletal: Negative.         Left knee pain   Skin: Negative.    Allergic/Immunologic: Negative.    Neurological: Negative.    Hematological: Negative.    Psychiatric/Behavioral: Negative.     All other systems reviewed and are negative.    Patient Active Problem List   Diagnosis    Allergic rhinitis    Hiatal hernia    GERD without esophagitis    Prediabetes    Obesity (BMI 30-39.9)    [place] cardiac risk <10% in next 10 years    Hyperlipidemia    History of pulmonary embolus (PE)    History of DVT (deep vein thrombosis)    Factor 5 Leiden mutation, heterozygous (HCC)    Hypercoagulable state (HCC)    Activated protein C resistance (HCC)    Family history of colon cancer    Anticoagulant long-term use    Spermatocele    Acquired bladder diverticulum    Calcium oxalate kidney stones    Left leg pain    History of iron deficiency anemia    Long-term use of high-risk medication    Acute pain of left knee    Chronic deep vein thrombosis (DVT) of left lower extremity (HCC)    Hypertension     Past Medical History:   Diagnosis Date    Anemia     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 07/12/2006    DVT (deep venous thrombosis) (HCC) 2018    BL legs    Dysphagia     Last Assessed: 8/12/2014     Factor 5 Leiden mutation, heterozygous (HCC)     GERD (gastroesophageal reflux disease)     Globus sensation     Last Assessed: 6/4/2014     H/O neoplasm of uncertain behavior of skin 06/06/2006    Hyperlipidemia     Hypertension 02/14/2006    Lateral epicondylitis of right elbow     Last Assessed: 10/23/2015     Pes planus     last assessed: 10/23/2013     Plantar fascial fibromatosis     Last Assessed:  10/23/2013     Pulmonary embolism (HCC) 2018    Right patellofemoral syndrome     Last Assessed: 4/15/2016     Sebaceous cyst 11/03/2007     Past Surgical History:   Procedure Laterality Date    COLONOSCOPY      ESOPHAGOGASTRODUODENOSCOPY N/A 10/7/2016    Procedure: ESOPHAGOGASTRODUODENOSCOPY (EGD);  Surgeon: Crystal Valdovinos MD;  Location: [facility] GI LAB;  Service:     NASAL SEPTUM SURGERY  1995    DE ESOPHAGOGASTRODUODENOSCOPY TRANSORAL DIAGNOSTIC N/A 12/6/2018    Procedure: ESOPHAGOGASTRODUODENOSCOPY (EGD);  Surgeon: Crystal Valdovinos MD;  Location: [facility] GI [facility];  Service: Gastroenterology    DE SURGICAL ARTHROSCOPY SHOULDER W/ROTATOR CUFF RPR Right 1/26/2023    Procedure: Shoulder Arthroscopy with Rotator Cuff Repair, Subacromial Decompression, and Limited Debridement;  Surgeon: Johnnie Barcenas MD;  Location: [facility];  Service: Orthopedics     Family History   Problem Relation Age of Onset    Heart disease Mother         valve 70s    Hypertension Mother     Cancer Father         colon    Colon cancer Father     Hypertension Father     Coronary artery disease Father         CABG    Heart disease Brother         MI exp age 45    Colon cancer Family     Cancer Brother         esophageal CA exp age 44    Cancer Brother      Social History     Socioeconomic History    Marital status: /Civil Union     Spouse name: Not on file    Number of children: Not on file    Years of education: Not on file    Highest education level: Not on file   Occupational History    Not on file   Tobacco Use    Smoking status: Never    Smokeless tobacco: Never   Vaping Use    Vaping status: Never Used   Substance and Sexual Activity    Alcohol use: Yes     Alcohol/week: 4.0 standard drinks of alcohol     Types: 4 Cans of beer per week     Comment: socially    Drug use: No    Sexual activity: Yes     Partners: Female   Other Topics Concern    Not on file   Social History Narrative    Not on file     Social Determinants of  Health     Financial Resource Strain: Not on file   Food Insecurity: Not on file   Transportation Needs: Not on file   Physical Activity: Not on file   Stress: Not on file   Social Connections: Not on file   Intimate Partner Violence: Not on file   Housing Stability: Not on file       Current Facility-Administered Medications:     acetaminophen (TYLENOL) tablet 650 mg, 650 mg, Oral, Q6H PRN, Cuiyin Yurik, CRNP, 650 mg at 02/28/24 2236    heparin (porcine) 25,000 units in 0.45% NaCl 250 mL infusion (premix), 3-30 Units/kg/hr (Order-Specific), Intravenous, Titrated, Cuiyin Yurik, CRNP, Last Rate: 15 mL/hr at 02/29/24 0338, 15 Units/kg/hr at 02/29/24 0338    heparin (porcine) injection 4,000 Units, 4,000 Units, Intravenous, Q6H PRN, Cuiyin Yurik, CRNP    heparin (porcine) injection 8,000 Units, 8,000 Units, Intravenous, Q6H PRN, Cuiyin Yurik, CRNP    lidocaine (LIDODERM) 5 % patch 1 patch, 1 patch, Topical, Daily, Cuiyin Yurik, CRNP, 1 patch at 02/29/24 0832    multivitamin stress formula tablet 1 tablet, 1 tablet, Oral, Daily, Cuiyin Yurik, CRNP, 1 tablet at 02/29/24 0831    ondansetron (ZOFRAN) injection 4 mg, 4 mg, Intravenous, Q6H PRN, Cuiyin Yurik, CRNP    pantoprazole (PROTONIX) EC tablet 40 mg, 40 mg, Oral, Early Morning, Cuiyin Yurik, CRNP, 40 mg at 02/29/24 0613    traMADol (ULTRAM) tablet 50 mg, 50 mg, Oral, Q6H PRN, Cuiyin Yurik, CRNP, 50 mg at 02/29/24 0831    warfarin (COUMADIN) tablet 5 mg, 5 mg, Oral, Daily (warfarin), Cuiyin Yurik, CRNP    No Known Allergies    Vitals:    02/29/24 0830   BP: 133/88   Pulse: 80   Resp: 16   Temp: 97.5 °F (36.4 °C)   SpO2: 96%     Physical Exam  Constitutional:       Appearance: He is well-developed.   HENT:      Head: Normocephalic and atraumatic.      Right Ear: External ear normal.      Left Ear: External ear normal.   Eyes:      Conjunctiva/sclera: Conjunctivae normal.      Pupils: Pupils are equal, round, and reactive to light.   Cardiovascular:      Rate and Rhythm:  Normal rate and regular rhythm.      Heart sounds: Normal heart sounds.   Pulmonary:      Effort: Pulmonary effort is normal.      Breath sounds: Normal breath sounds.   Abdominal:      General: Bowel sounds are normal.      Palpations: Abdomen is soft.   Musculoskeletal:         General: Normal range of motion.      Cervical back: Normal range of motion and neck supple.   Skin:     General: Skin is warm.      Comments: Warm, moist, good color, no petechiae or ecchymoses   Neurological:      Mental Status: He is alert and oriented to person, place, and time.      Deep Tendon Reflexes: Reflexes are normal and symmetric.   Psychiatric:         Behavior: Behavior normal.         Thought Content: Thought content normal.         Judgment: Judgment normal.     Extremities: No evidence swelling in the left knee, left lower extremity, no obvious cords, pulses are 2+ bilaterally  Lymphatics: No adenopathy in the neck, supraclavicular region, axilla bilaterally    Labs    3/1/2024 PT = 16.5 INR = 1.31 PTT = 93    2/29/2024 D-dimer quantitative <0.27 (WNL)    2/29/2024 WBC = 5.88 hemoglobin = 10.9 hematocrit = 35.5 MCV = 81 RDW = 15.2 platelet = 236    2/28/2024 ferritin = 6 iron = 14 iron saturation = 3% TIBC = 449 BUN = 22 creatinine = 0.96 calcium = 8.9 AST = 40 ALT = 34 alkaline phosphatase = 60 total protein = 7.7 total bilirubin = 0.61    Imaging    2/28/2023 CTA chest PE study.  Impression stated no PE, lungs clear, large hiatal hernia.    2/28/2024 venous duplex lower limb unilateral    CONCLUSION:  RIGHT LOWER LIMB LIMITED:  Evaluation shows no evidence of thrombus in the common femoral vein.  Doppler evaluation shows a normal response to augmentation maneuvers.     LEFT LOWER LIMB:  Evidence of chronic non-occlusive deep venous thrombosis was noted in the  distal femoral, popliteal and peroneal veins.  No evidence of superficial thrombophlebitis noted.  Doppler evaluation shows a normal response to augmentation  maneuvers.  Popliteal, posterior tibial and anterior tibial arterial Doppler waveform's are  triphasic.    Pathology    12/15/2018 [facility] thrombophilia evaluation  Homocystine = 13.0  Plasminogen = 114%  Antithrombin activity = 108%  Protein C, functional = 123%  Protein S, free = 134%  Activated protein C resistance = 1.7 = low (2.2-3.5)  Factor 5 Leiden demonstrated single R506Q mutation (heterozygote)  Lupus anticoagulant profile did not demonstrated a LA  Dilute PT = 66.6 = elevated  Dilute PT confirm ratio = 0.76 = WNL  Anti cardiolipin IgM = 17 = elevated  Anti cardiolipin IgA WNL  Beta 2 glycoprotein 1 antibody IgG, IgM and IgA WNL  Prothrombin IgG WNL  Antiphosphatidylserine IgM = 49 = elevated (0-25)  Antiphosphatidylserine IgG and IgA WNL  Factor 2 DNA analysis = no mutation identified

## 2024-02-29 NOTE — CASE MANAGEMENT
Case Management Assessment & Discharge Planning Note    Patient name Edward Conrad  Location [address] [address]/[address] [place] [address]-* MRN [id number]  : 1970 Date 2024       Current Admission Date: 2024  Current Admission Diagnosis:Chronic deep vein thrombosis (DVT) of left lower extremity (HCC)   Patient Active Problem List    Diagnosis Date Noted    Internal derangement of left knee 2024    Pain of left calf 2024    Acute pain of left knee 2024    Chronic deep vein thrombosis (DVT) of left lower extremity (HCC) 2024    Left leg pain 2022    History of iron deficiency anemia 2022    Long-term use of high-risk medication 2022    Calcium oxalate kidney stones 2021    Acquired bladder diverticulum 2021    Spermatocele 2021    Family history of colon cancer 2020    Anticoagulant long-term use 2020    Hypercoagulable state (HCC) 2019    Activated protein C resistance (HCC) 2019    Factor 5 Leiden mutation, heterozygous (HCC) 01/15/2019    History of pulmonary embolus (PE) 08/15/2018    History of DVT (deep vein thrombosis) 08/15/2018    Hyperlipidemia     [place] cardiac risk <10% in next 10 years 2018    Obesity (BMI 30-39.9) 2018    Allergic rhinitis 2012    Hiatal hernia 2012    GERD without esophagitis 2012    Prediabetes 2012    Hypertension 2006      LOS (days): 1  Geometric Mean LOS (GMLOS) (days): 3.1  Days to GMLOS:2.4     OBJECTIVE:    Risk of Unplanned Readmission Score: 9.5       Current admission status: Inpatient  Referral Reason: Other (Discharge planning)    Preferred Pharmacy:   [facility] - [place], [place] - [address] [place] [address]  Phone: [phone] Fax: [phone]    [facility] - [facility] [place] - [address]  [address]  [facility] [address]  Phone:  [phone] Fax: [phone]    Primary Care Provider: Evelyn Tang DO    Primary Insurance: BLUE CROSS  Secondary Insurance:     ASSESSMENT:  Active Health Care Proxies    There are no active Health Care Proxies on file.        Readmission Root Cause  30 Day Readmission: No    Patient Information  Admitted from:: Home  Mental Status: Alert  During Assessment patient was accompanied by: Spouse, Daughter  Assessment information provided by:: Patient  Primary Caregiver: Self  Support Systems: Spouse/significant other, Daughter, Family members  County of Residence: [place]  What city do you live in?: [place]  Home entry access options. Select all that apply.: Stairs  Number of steps to enter home.: 2  Do the steps have railings?: Yes  Type of Current Residence: 2 story home  Upon entering residence, is there a bedroom on the main floor (no further steps)?: No  A bedroom is located on the following floor levels of residence (select all that apply):: 2nd Floor  Upon entering residence, is there a bathroom on the main floor (no further steps)?: No  Indicate which floors of current residence have a bathroom (select all the apply):: 2nd Floor  Number of steps to 2nd floor from main floor: One Flight  Living Arrangements: Lives w/ Spouse/significant other    Activities of Daily Living Prior to Admission  Functional Status: Independent  Completes ADLs independently?: Yes  Ambulates independently?: Yes  Does patient use assisted devices?: Yes  Assisted Devices (DME) used: Crutches, Straight Cane  Does patient currently own DME?: Yes  What DME does the patient currently own?: Crutches, Straight Cane  Does patient have a history of Outpatient Therapy (PT/OT)?: Yes  Does the patient have a history of Short-Term Rehab?: No  Does patient have a history of HHC?: No  Does patient currently have HHC?: No    Patient Information Continued  Income Source: Employed  Does patient have prescription coverage?: Yes  Does patient receive  dialysis treatments?: No    Means of Transportation  Means of Transport to Appts:: Drives Self      Social Determinants of Health (SDOH)      Flowsheet Row Most Recent Value   Housing Stability    In the last 12 months, was there a time when you were not able to pay the mortgage or rent on time? N   In the last 12 months, was there a time when you did not have a steady place to sleep or slept in a shelter (including now)? N   Transportation Needs    In the past 12 months, has lack of transportation kept you from medical appointments or from getting medications? no   In the past 12 months, has lack of transportation kept you from meetings, work, or from getting things needed for daily living? No   Food Insecurity    Within the past 12 months, you worried that your food would run out before you got the money to buy more. Never true   Within the past 12 months, the food you bought just didn't last and you didn't have money to get more. Never true   Utilities    In the past 12 months has the electric, gas, oil, or water company threatened to shut off services in your home? No            DISCHARGE DETAILS:    Discharge planning discussed with:: Patient and family  Freedom of Choice: Yes    Comments - Freedom of Choice: SW spoke with patient and family at bedside to introduce role of CM, conduct assessment and discuss discharge planning.  Patient lives with his wife, is independent at baseline and works.  His preference is to return home at discharge and he is assessed as having no rehabilitation needs.  At this time there are no anticipated CM needs for discharge and SW will continue to follow.      CM contacted family/caregiver?: Yes  Were Treatment Team discharge recommendations reviewed with patient/caregiver?: Yes  Did patient/caregiver verbalize understanding of patient care needs?: Yes  Were patient/caregiver advised of the risks associated with not following Treatment Team discharge recommendations?:  Yes    Contacts  Patient Contacts: Natalie Conrad (spouse)  Relationship to Patient:: Family  Contact Method: In Person  Reason/Outcome: Emergency Contact, Discharge Planning    Requested Home Health Care         Is the patient interested in HHC at discharge?: No    DME Referral Provided  Referral made for DME?: No    Other Referral/Resources/Interventions Provided:  Interventions: None Indicated    Would you like to participate in our [facility] service program?  : No - Declined    Treatment Team Recommendation: Home  Discharge Destination Plan:: Home  Transport at Discharge : Family

## 2024-02-29 NOTE — ASSESSMENT & PLAN NOTE
As above  Suspect Xarelto failure  IV heparin and Coumadin as above  Daily INR  Consult hematology

## 2024-02-29 NOTE — H&P
[facility]  H&P  Name: Edward Conrad 53 y.o. male I MRN: [id number]  Unit/Bed#: [address] [place] [address] Date of Admission: 2/28/2024   Date of Service: 2/29/2024 I Hospital Day: 1      Assessment/Plan   * Chronic deep vein thrombosis (DVT) of left lower extremity (HCC)  Assessment & Plan  Patient presents with left leg DVT, sent to ED by US tech.  Reports having history of bilateral lower extremity DVT and PE in 2018, on Xarelto. Reports started with left medial knee pain about 2 weeks ago and got worse today, started to have pain in left thigh and left lower leg today with trouble bearing weight/trouble walking today.  Seen Ortho today, was ordered venous Doppler to rule out DVT which came back positive.  Denies injuries to left knee 2 weeks ago.  Denies paresthesia to left lower extremity.  Chart reviewed.  History of bilateral lower extremity DVT and bilateral PE in 8/2018.  Thrombophilia evaluation showed factor V Leiden mutation, elevated APC resistance, anticardiolipin IgM level,antiphosphatidylserine IgM per hematology note.  Patient has been on Xarelto 20 mg p.o. daily ever since.  Patient saw orthopedic surgery today for left knee pain, was ordered x-ray left knee, MRI left knee, lower venous Doppler to rule out DVT due to history.  X-ray left knee was normal.  Lower venous Doppler showed -chronic nonocclusive DVT in the distal femoral, popliteal and peroneal veins.  Lower venous Doppler in 7/2022 showed -chronic nonocclusive DVT in the popliteal vein only.  Progressively worsening DVT of left lower extremity, suspect Xarelto failure.  CTA PE study no PE  ED provider discussed case with hematology on-call, recommend heparin drip to bridge with Coumadin.  Started on heparin drip in ED, will continue.  Given Coumadin 5 milligram p.o. in ED, will continue daily.  Daily INR.  Consult hematology      Acute pain of left knee  Assessment & Plan  As above, x-ray left knee unremarkable.  MRI of  left knee ordered as outpatient.  Chronic DVT may be contributing to pain  Pain control  Patient given crutches by orthopedic surgery today  PT eval and treat  Follow-up orthopedic surgery as outpatient    Hypertension  Assessment & Plan  Diet controlled  BP acceptable    History of iron deficiency anemia  Assessment & Plan  History of iron deficient anemia with hemoglobin 9 in 2021 per wife .  Denies overt bleeding at the time.  Patient was prescribed iron supplement at that time, with improvement in hemoglobin afterwards.  Underwent EGD colonoscopy with no source of bleeding but found to have hiatal hernia.  Hemoglobin today 11.3  Repeat iron panel along with B12 folate TSH free T4   Continue MVI daily  Monitor CBC    Hypercoagulable state (HCC)  Assessment & Plan  As above  Suspect Xarelto failure  IV heparin and Coumadin as above  Daily INR  Consult hematology    Hyperlipidemia  Assessment & Plan  Diet controlled    Obesity (BMI 30-39.9)  Assessment & Plan  Body mass index is 31.08 kg/m².   Diet and lifestyle modification     GERD without esophagitis  Assessment & Plan  Large hiatal hernia on CTA  On Nexium as needed at home.  Will order PPI daily while inpatient.             VTE Prophylaxis: Heparin Drip  / reason for no mechanical VTE prophylaxis on heparin drip    Code Status: Full code  POLST: POLST form is not discussed and not completed at this time.    Anticipated Length of Stay:  Patient will be admitted on an Inpatient basis with an anticipated length of stay of  > 2 midnights.   Justification for Hospital Stay: Left leg DVT    Total Time for Visit, including Counseling / Coordination of Care: 45 minutes.  Greater than 50% of this total time spent on direct patient counseling and coordination of care.    Chief Complaint:   Left leg DVT    History of Present Illness:    Edward Conrad is a 53 y.o. male with PMH of bilateral lower extremity DVT, PE, hypercoagulable state, hypertension, hyperlipidemia, iron  deficient anemia, GERD, hiatal hernia, obesity who presents with left leg DVT, sent to ED by US tech.  Patient reports having history of bilateral lower extremity DVT and PE in 2018, on Xarelto. Reports started with left medial knee pain about 2 weeks ago and got worse today, started to have pain in left thigh and left lower leg today with trouble bearing weight/trouble walking today.  Seen Ortho today, was ordered venous Doppler to rule out DVT which came back positive.  Denies injuries to left knee 2 weeks ago.  Denies paresthesia to left lower extremity.  Patient denies chest pain, headache, dizziness, SOB, nausea vomiting, diarrhea constipation, fever, chills.  No other complaints.      Review of Systems:    Review of Systems   Constitutional:  Positive for activity change.   Musculoskeletal:         Left knee pain, left thigh and left lower leg pain   All other systems reviewed and are negative.      Past Medical and Surgical History:     Past Medical History:   Diagnosis Date    Anemia     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 07/12/2006    DVT (deep venous thrombosis) ([facility]) 2018    BL legs    Dysphagia     Last Assessed: 8/12/2014     Factor 5 Leiden mutation, heterozygous ([facility])     GERD (gastroesophageal reflux disease)     Globus sensation     Last Assessed: 6/4/2014     H/O neoplasm of uncertain behavior of skin 06/06/2006    Hyperlipidemia     Hypertension 02/14/2006    Lateral epicondylitis of right elbow     Last Assessed: 10/23/2015     Pes planus     last assessed: 10/23/2013     Plantar fascial fibromatosis     Last Assessed: 10/23/2013     Pulmonary embolism (HCC) 2018    Right patellofemoral syndrome     Last Assessed: 4/15/2016     Sebaceous cyst 11/03/2007       Past Surgical History:   Procedure Laterality Date    COLONOSCOPY      ESOPHAGOGASTRODUODENOSCOPY N/A 10/7/2016    Procedure: ESOPHAGOGASTRODUODENOSCOPY (EGD);  Surgeon: Crystal Valdovinos MD;  Location: [facility] GI LAB;  Service:     NASAL  "SEPTUM SURGERY  1995    TN ESOPHAGOGASTRODUODENOSCOPY TRANSORAL DIAGNOSTIC N/A 12/6/2018    Procedure: ESOPHAGOGASTRODUODENOSCOPY (EGD);  Surgeon: Crystal Valdovinos MD;  Location: [facility] GI LAB;  Service: Gastroenterology    TN SURGICAL ARTHROSCOPY SHOULDER W/ROTATOR CUFF RPR Right 1/26/2023    Procedure: Shoulder Arthroscopy with Rotator Cuff Repair, Subacromial Decompression, and Limited Debridement;  Surgeon: Johnnie Barcenas MD;  Location: [facility] OR;  Service: Orthopedics       Meds/Allergies:    Prior to Admission medications    Medication Sig Start Date End Date Taking? Authorizing Provider   Xarelto 20 MG tablet Take 1 tablet (20 mg total) by mouth daily 8/21/23  Yes Lauro Machuca,    esomeprazole (NexIUM) 40 MG capsule Take 40 mg by mouth daily as needed 8/26/23   Historical Provider, MD     I have reviewed home medications with patient personally.    Allergies: No Known Allergies    Social History:     Marital Status: /Civil Union   Occupation: Unclear  Patient Pre-hospital Living Situation: Wife  Patient Pre-hospital Level of Mobility: Independent  Patient Pre-hospital Diet Restrictions: Regular  Substance Use History:   Social History     Substance and Sexual Activity   Alcohol Use Yes    Alcohol/week: 4.0 standard drinks of alcohol    Types: 4 Cans of beer per week    Comment: socially     Social History     Tobacco Use   Smoking Status Never   Smokeless Tobacco Never     Social History     Substance and Sexual Activity   Drug Use No       Family History:    non-contributory    Physical Exam:     Vitals:   Blood Pressure: 141/98 (02/28/24 2117)  Pulse: 70 (02/28/24 2030)  Temperature: 98.5 °F (36.9 °C) (02/28/24 2117)  Temp Source: Temporal (02/28/24 1805)  Respirations: 18 (02/28/24 2117)  Height: 5' 10\" (177.8 cm) (02/28/24 2104)  Weight - Scale: 98.2 kg (216 lb 9.6 oz) (02/28/24 2104)  SpO2: 97 % (02/28/24 2030)    Physical Exam  Vitals and nursing note reviewed.   Constitutional:  "      Appearance: He is well-developed. He is obese.   HENT:      Head: Normocephalic and atraumatic.   Neck:      Thyroid: No thyromegaly.      Vascular: No JVD.      Trachea: No tracheal deviation.   Cardiovascular:      Rate and Rhythm: Normal rate and regular rhythm.      Heart sounds: Normal heart sounds.   Pulmonary:      Effort: Pulmonary effort is normal. No respiratory distress.      Breath sounds: No wheezing or rales.      Comments: Breath sounds clear diminished bilateral, on room air, respiration easy  Abdominal:      General: Bowel sounds are normal. There is no distension.      Palpations: Abdomen is soft.      Tenderness: There is no abdominal tenderness. There is no guarding.   Musculoskeletal:      Cervical back: Neck supple.      Comments: Left lower extremity slight edema.  Left knee non tender.  Mild tenderness left thigh and left lower leg.   Skin:     General: Skin is warm and dry.   Neurological:      General: No focal deficit present.      Mental Status: He is alert and oriented to person, place, and time.   Psychiatric:         Mood and Affect: Mood normal.         Judgment: Judgment normal.         Additional Data:     Lab Results: I have personally reviewed pertinent reports.      Results from last 7 days   Lab Units 02/28/24  1840   WBC Thousand/uL 6.69   HEMOGLOBIN g/dL 11.3*   HEMATOCRIT % 36.4*   PLATELETS Thousands/uL 242   NEUTROS PCT % 57   LYMPHS PCT % 27   MONOS PCT % 11   EOS PCT % 4     Results from last 7 days   Lab Units 02/28/24  1840   POTASSIUM mmol/L 5.2   CHLORIDE mmol/L 104   CO2 mmol/L 23   BUN mg/dL 22   CREATININE mg/dL 0.96   CALCIUM mg/dL 8.9   ALK PHOS U/L 60   ALT U/L 34   AST U/L 40*     Results from last 7 days   Lab Units 02/28/24  1919   INR  1.26*       Imaging: I have personally reviewed pertinent reports.      XR knee 4+ vw left injury    Result Date: 2/28/2024  Narrative: LEFT KNEE INDICATION:   Pain in left knee. COMPARISON: 8/24/2022 VIEWS:  XR KNEE 4+  VW LEFT INJURY FINDINGS: There is no acute fracture or dislocation. There is no joint effusion. No significant degenerative changes. No lytic or blastic osseous lesion. Unchanged mild calcifications in the proximal calf subcutaneous soft tissues suggestive of heterotopic ossification.     Impression: Normal left knee. Electronically signed: 02/28/2024 08:26 PM Juan Manuel Uribe MD    XR knee 3 vw right non injury    Result Date: 2/28/2024  Narrative: RIGHT KNEE INDICATION:   Encounter for other specified special examinations.   COMPARISON: 8/24/2022 VIEWS:  XR KNEE 3 VW RIGHT NON INJURY FINDINGS: There is no acute fracture or dislocation. There is no joint effusion. No significant degenerative changes. No lytic or blastic osseous lesion. Soft tissues are unremarkable.     Impression: Normal right knee. Electronically signed: 02/28/2024 08:20 PM Juan Manuel Uribe MD    CTA ED chest PE Study    Result Date: 2/28/2024  Narrative: CTA - CHEST WITH IV CONTRAST - PULMONARY ANGIOGRAM INDICATION:   Known DVT; Dyspnea on exertion. Per my review of the medical record, left lower extremity DVT on venous duplex from today. COMPARISON: Chest radiograph 2/7/2020, renal CT 3/23/2021, chest CT 3/5/2019. TECHNIQUE: CT angiogram timed for optimal opacification of the pulmonary arteries.  Axial, sagittal, and coronal 2D reformats created from source data.  Coronal 3D MIP postprocessing on the acquisition scanner. Radiation dose length product (DLP):  743.04 mGy-cm .  Radiation dose exposure minimized using iterative reconstruction and automated exposure control. IV Contrast:  85 mL of iohexol (OMNIPAQUE) FINDINGS: PULMONARY ARTERIES:  No pulmonary embolus. LUNGS:  Lungs clear. AIRWAYS: No significant filling defects. PLEURA:  Unremarkable. HEART/GREAT VESSELS: Normal heart size. Mild coronary artery calcification indicating atherosclerotic heart disease. MEDIASTINUM AND KEILA: Large hiatal hernia. CHEST WALL AND LOWER NECK: Unremarkable.  UPPER ABDOMEN: Colonic diverticuli. OSSEOUS STRUCTURES: Mild degenerative disease in the spine.     Impression: No pulmonary embolus. Lungs clear. Large hiatal hernia. Workstation performed: [id number]       EKG, Pathology, and Other Studies Reviewed on Admission:   EKG: NA    Allscripts Records Reviewed: Yes     ** Please Note: Dragon 360 Dictation voice to text software may have been used in the creation of this document. **

## 2024-02-29 NOTE — ASSESSMENT & PLAN NOTE
As above  Questionable Xarelto failure  IV heparin and Coumadin as above  Daily INR  Consulted hematology

## 2024-02-29 NOTE — PHYSICAL THERAPY NOTE
"       PHYSICAL THERAPY EVALUATION/TREATMENT       02/29/24 1019   PT Last Visit   PT Visit Date 02/29/24   Note Type   Note type Evaluation   Pain Assessment   Pain Assessment Tool 0-10   Pain Score 6   Pain Location/Orientation Orientation: Left;Location: Knee   Pain Onset/Description Onset: Ongoing;Descriptor: Sore;Frequency: Constant/Continuous   Effect of Pain on Daily Activities limits mobility   Patient's Stated Pain Goal No pain   Hospital Pain Intervention(s) Repositioned;Ambulation/increased activity   Multiple Pain Sites No   Restrictions/Precautions   Weight Bearing Precautions Per Order No   Other Precautions Pain   Home Living   Type of Home House   Home Layout Two level;Bed/bath upstairs;Stairs to enter with rails  (2 LIZY)   Bathroom Toilet Standard   Home Equipment Crutches;Cane  (provided at MD apppointment yesterday)   Prior Function   Level of [place] Independent with ADLs;Independent with functional mobility;Independent with IADLS   Lives With Spouse   Receives Help From Family   IADLs Independent with driving;Independent with meal prep;Independent with medication management   Falls in the last 6 months 0   Vocational Full time employment   General   Additional Pertinent History Pt is a 53 year-old male who was admitted to the hospital on 2/28/24 due to L knee pain, history of DVT + abnormal lab values. CT negative for acute PE   Family/Caregiver Present Yes  (wife + family at bedside throughout session)   Cognition   Overall Cognitive Status WFL   Arousal/Participation Cooperative   Orientation Level Oriented X4   Memory Unable to assess   Following Commands Follows all commands and directions without difficulty   Subjective   Subjective \"It feels better than it did yesterday\"   RLE Assessment   RLE Assessment WFL   LLE Assessment   LLE Assessment WFL  (L knee at least 3/5, sow by pain ; lacks terminal knee extension approx 2-5 degrees in standing, pt reports chroninc from prior knee " injuries)   Bed Mobility   Supine to Sit Unable to assess   Sit to Supine Unable to assess   Additional Comments received sitting in bedside chair   Transfers   Sit to Stand 5  Supervision   Additional items Armrests   Stand to Sit 7  Independent   Ambulation/Elevation   Gait pattern Antalgic;Decreased L stance;Short stride;Step through pattern   Gait Assistance 5  Supervision   Additional items Verbal cues   Assistive Device Crutches   Distance 75 feet   Stair Management Assistance 5  Supervision   Additional items Verbal cues   Stair Management Technique One rail R;With crutches;Step to pattern  (using crutches on bottom step, able to step up using rail only with step to pattern)   Number of Stairs 3   Balance   Static Sitting Normal   Dynamic Sitting Good   Static Standing Fair +   Dynamic Standing Fair   Ambulatory Fair -  (crutches)   Activity Tolerance   Activity Tolerance Patient tolerated treatment well;Patient limited by pain   Nurse Made Aware yes JACY Lugo   Assessment   Prognosis Good   Problem List Decreased strength;Decreased range of motion;Decreased endurance;Impaired balance;Decreased mobility;Pain   Assessment Patient seen for Physical Therapy evaluation. Patient admitted with Chronic deep vein thrombosis (DVT) of left lower extremity (HCC).  Comorbidities affecting patient's physical performance include: DVT, GERD, hypertension, and PE.  Personal factors affecting patient at time of initial evaluation include: lives in 2 story house and stairs to enter home. Prior to admission, patient was independent with functional mobility without assistive device, independent with ADLS, independent with IADLS, living with spouse  in a 2 level home with 2 steps to enter, ambulating household distance, ambulating community distances, and works full time.  Please find objective findings from Physical Therapy assessment regarding body systems outlined above with impairments and limitations including decreased  ROM, impaired balance, gait deviations, pain, decreased activity tolerance, decreased functional mobility tolerance, and fall risk.  The Barthel Index was used as a functional outcome tool presenting with a score of Barthel Index Score: 90 today indicating minimal limitations of functional mobility and ADLS.  Patient's clinical presentation is currently evolving as seen in patient's presentation of changing level of pain. Pt would benefit from continued Physical Therapy treatment to address deficits as defined above and maximize level of functional mobility. As demonstrated by objective findings, the assigned level of complexity for this evaluation is moderate.The patient's [facility] Basic Mobility Inpatient Short Form Raw Score is 21. A Raw score of greater than 16 suggests the patient may benefit from discharge to home. Please also refer to the recommendation of the Physical Therapist for safe discharge planning.   Goals   Patient Goals to decrease L knee pain   STG Expiration Date 03/07/24   Short Term Goal #1 Pt will perform bed mobility/transfers IND ; Pt will ambulate x 150 feet Mod I with crutches ; Pt will negotiate x 12 steps with supervision + rail/crutch to navigate to bedroom ; AMPAC score will improve >22/24 to demonstrate improved functional independence   LTG Expiration Date 03/14/24   Long Term Goal #1 Pt will ambulate x 300 feet Mod I with least restrictive device ; Pt will negotiate x 12 steps Mod I + rail/crutch to navigate to bedroom   Plan   Treatment/Interventions Functional transfer training;LE strengthening/ROM;Therapeutic exercise;Endurance training;Gait training;Spoke to nursing   PT Frequency 2-3x/wk   Discharge Recommendation   Rehab Resource Intensity Level, PT No post-acute rehabilitation needs  (to follow-up with orthopedics + L knee MRI scheduled 3/10)   [facility] Basic Mobility Inpatient   Turning in Flat Bed Without Bedrails 4   Lying on Back to Sitting on Edge of Flat Bed Without  Bedrails 4   Moving Bed to Chair 4   Standing Up From Chair Using Arms 3   Walk in Room 3   Climb 3-5 Stairs With Railing 3   Basic Mobility Inpatient Raw Score 21   Basic Mobility Standardized Score 45.55   Highest Level Of Mobility   -HLM Goal 6: Walk 10 steps or more   JH-HLM Achieved 7: Walk 25 feet or more   Barthel Index   Feeding 10   Bathing 5   Grooming Score 5   Dressing Score 10   Bladder Score 10   Bowels Score 10   Toilet Use Score 10   Transfers (Bed/Chair) Score 15   Mobility (Level Surface) Score 10   Stairs Score 5   Barthel Index Score 90   Additional Treatment Session   Start Time 1009   End Time 1019   Treatment Assessment S: Pt agreeable to PT session this AM O/A: Able to ambulate additional 75 feet with supervision to Mod i with crutches - able to progress to step through gait pattern with no change in L knee pain reported throughout session. P: pt will benefit from skilled PT to address deficits to maximize IND for safe d/c, plan to follow-up with orthopedics at discharge   Equipment Use crutches   End of Consult   Patient Position at End of Consult Bedside chair;All needs within reach   Licensure   [place] License Number  Steph Hung [id number]

## 2024-02-29 NOTE — ASSESSMENT & PLAN NOTE
As above, x-ray left knee unremarkable.  MRI of left knee ordered as outpatient.  Chronic DVT may be contributing to pain  Pain control  Patient given crutches by orthopedic surgery today  PT eval and treat  Follow-up orthopedic surgery as outpatient

## 2024-02-29 NOTE — UTILIZATION REVIEW
Initial Clinical Review    Admission: Date/Time/Statement:   Admission Orders (From admission, onward)       Ordered        02/28/24 2010  INPATIENT ADMISSION  Once                          Orders Placed This Encounter   Procedures    INPATIENT ADMISSION     Standing Status:   Standing     Number of Occurrences:   1     Order Specific Question:   Level of Care     Answer:   Med Surg [16]     Order Specific Question:   Estimated length of stay     Answer:   More than 2 Midnights     Order Specific Question:   Certification     Answer:   I certify that inpatient services are medically necessary for this patient for a duration of greater than two midnights. See H&P and MD Progress Notes for additional information about the patient's course of treatment.     ED Arrival Information       Expected   -    Arrival   2/28/2024 17:50    Acuity   Urgent              Means of arrival   Walk-In    Escorted by   Family Member    Service   Hospitalist    Admission type   Emergency              Arrival complaint   POSSIBLE BLOOD CLOTS             Chief Complaint   Patient presents with    Abnormal Lab     Patient had US of left leg for hx of knee pain. Was notified by [employer] to go to ED.       Initial Presentation:   53 yom to ER by US [email or web address] with DVT. Pt reports having history of bilateral lower extremity DVT and PE in 2018, on Xarelto. Reports started with left medial knee pain about 2 weeks ago and got worse today, started to have pain in left thigh and left lower leg today with trouble bearing weight/trouble walking today.  Seen Ortho today, was ordered venous Doppler to rule out DVT which came back positive. Hx bilateral lower extremity DVT, PE, hypercoagulable state, hypertension, hyperlipidemia, iron deficient anemia, GERD, hiatal hernia, obesity. Presents with edema LLE, left knee non tender.  Mild tenderness left thigh and left lower leg.  OP US showing Lower venous Doppler showed -chronic nonocclusive DVT in the distal  femoral, popliteal and peroneal veins per MD note, await final read.  Admitted to inpatient status for chronic DVT LLE. Started on IV weight based Heparin gtt, Coumadin.    Date: 2/29/24   Day 2:   Repeat lower extremity venous Doppler-no evidence of DVT in the right leg.  Chronic nonocclusive DVT in the distal femoral, popliteal and peroneal veins. Continue heparin drip with bridging with Coumadin. Lovenox bridging with Coumadin. Iron panel suggesting iron deficiency-iron level 14, percent saturation 6, ferritin 3, TIBC 440.  B12 level was normal, ordered for Venofer 300 mg IV daily     Per heme:  Recent CTA/chest did not demonstrate any evidence of a new PE.  Additionally, the recent lower extremity Dopplers did not demonstrate any evidence of acute/new DVT.  Patient has evidence of chronic nonocclusive left lower extremity thromboses.  D-dimer was not elevated.  At this moment, I am not 100% convinced that the left knee pain is a Xarelto failure (as there is no evidence of an acute DVT).  Patient is on heparin, PTT is being monitored.  Patient has also been started on Coumadin at 5 milligrams a day.  INR is also being monitored.    Prior thrombophilia workup is listed below.  Besides the heterozygous factor V Leiden mutation, patient had an elevated anticardiolipin IgM and an elevated antiphosphatidylserine IgM.  These were never serialized as patient understood that he required lifelong anticoagulation and did not feel that the tests needed to be repeated.  The concern was that patient had antiphospholipid antibody syndrome besides the factor V Leiden mutation.       ED Triage Vitals   Temperature Pulse Respirations Blood Pressure SpO2   02/28/24 1805 02/28/24 1805 02/28/24 1805 02/28/24 1805 02/28/24 1805   98.7 °F (37.1 °C) 83 18 133/91 95 %      Temp Source Heart Rate Source Patient Position - Orthostatic VS BP Location FiO2 (%)   02/28/24 1805 02/28/24 1805 02/28/24 1805 02/28/24 1805 --   Temporal Monitor  Sitting Left arm       Pain Score       02/28/24 2104       8          Wt Readings from Last 1 Encounters:   02/28/24 98.2 kg (216 lb 9.6 oz)     Additional Vital Signs:   Date/Time Temp Pulse Resp BP MAP (mmHg) SpO2 O2 Device Patient Position - Orthostatic VS   02/29/24 02:53:32 98.2 °F (36.8 °C) 65 16 147/83 104 97 % -- --   02/28/24 2320 -- -- -- -- -- -- None (Room air) --   02/28/24 21:17:37 98.5 °F (36.9 °C) -- 18 141/98 112 -- -- --   02/28/24 21:16:13 -- -- 19 141/98 112 -- -- --   02/28/24 2030 -- 70 20 143/95 114 97 % -- --   02/28/24 2000 -- 74 18 150/94 115 97 % -- --   02/28/24 1805 98.7 °F (37.1 °C) 83 18 133/91 -- 95 % None (Room air) Sitting     Pertinent Labs/Diagnostic Test Results:   CTA ED chest PE Study   Final Result by Raisa Silva MD (02/28 1953)      No pulmonary embolus.      Lungs clear.      Large hiatal hernia.     Results from last 7 days   Lab Units 02/29/24  0920 02/28/24  1840   WBC Thousand/uL 5.88 6.69   HEMOGLOBIN g/dL 10.9* 11.3*   HEMATOCRIT % 35.5* 36.4*   PLATELETS Thousands/uL 236 242   NEUTROS ABS Thousands/µL  --  3.85     Results from last 7 days   Lab Units 02/29/24  0920 02/28/24  1840   SODIUM mmol/L 135 135   POTASSIUM mmol/L 4.1 5.2   CHLORIDE mmol/L 104 104   CO2 mmol/L 24 23   ANION GAP mmol/L 7 8   BUN mg/dL 19 22   CREATININE mg/dL 1.00 0.96   EGFR ml/min/1.73sq m 84 89   CALCIUM mg/dL 8.5 8.9   MAGNESIUM mg/dL 2.1  --      Results from last 7 days   Lab Units 02/28/24  1840   AST U/L 40*   ALT U/L 34   ALK PHOS U/L 60   TOTAL PROTEIN g/dL 7.7   ALBUMIN g/dL 4.4   TOTAL BILIRUBIN mg/dL 0.61     Results from last 7 days   Lab Units 02/29/24  0920 02/28/24  1840   GLUCOSE RANDOM mg/dL 177* 106     Results from last 7 days   Lab Units 02/28/24  1840   HS TNI 0HR ng/L 2     Results from last 7 days   Lab Units 03/01/24  1340 03/01/24  0621 03/01/24  0001 02/29/24  1639 02/29/24  0201 02/28/24  1919   PROTIME seconds  --  16.5*  --  15.5*  --  16.0*   INR   --   1.31*  --  1.21*  --  1.26*   PTT seconds 75* 93* 103* 102*   < > 32    < > = values in this interval not displayed.     Results from last 7 days   Lab Units 02/28/24  1840   BNP pg/mL 15     Results from last 7 days   Lab Units 02/28/24  1840   FERRITIN ng/mL 6*   IRON SATURATION % 3*   IRON ug/dL 14*   TIBC ug/dL 449       ED Treatment:   Medication Administration from 02/28/2024 1750 to 02/28/2024 2100         Date/Time Order Dose Route Action     02/28/2024 1953 EST heparin (porcine) injection 8,000 Units 8,000 Units Intravenous Given     02/28/2024 1953 EST heparin (porcine) 25,000 units in 0.45% NaCl 250 mL infusion (premix) 18 Units/kg/hr Intravenous New Bag     02/28/2024 1937 EST iohexol (OMNIPAQUE) 350 MG/ML injection (MULTI-DOSE) 100 mL 85 mL Intravenous Given     02/28/2024 2030 EST warfarin (COUMADIN) tablet 5 mg 5 mg Oral Given          Past Medical History:   Diagnosis Date    Anemia     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 07/12/2006    DVT (deep venous thrombosis) ([facility]) 2018    BL legs    Dysphagia     Last Assessed: 8/12/2014     Factor 5 Leiden mutation, heterozygous ([facility])     GERD (gastroesophageal reflux disease)     Globus sensation     Last Assessed: 6/4/2014     H/O neoplasm of uncertain behavior of skin 06/06/2006    Hyperlipidemia     Hypertension 02/14/2006    Lateral epicondylitis of right elbow     Last Assessed: 10/23/2015     Pes planus     last assessed: 10/23/2013     Plantar fascial fibromatosis     Last Assessed: 10/23/2013     Pulmonary embolism ([facility]) 2018    Right patellofemoral syndrome     Last Assessed: 4/15/2016     Sebaceous cyst 11/03/2007     Present on Admission:   Obesity (BMI 30-39.9)   Hyperlipidemia   GERD without esophagitis   Hypercoagulable state ([facility])      Admitting Diagnosis: Dyspnea [R06.00]  Abnormal laboratory test [R89.9]  Hx of long term use of blood thinners [Z92.29]  Left leg pain [M79.605]  History of DVT (deep vein thrombosis) [Z86.718]  Age/Sex: 53  y.o. male  Admission Orders:  PT eval & tx  Consult hematology    Scheduled Medications:  Medications 02/21 02/22 02/23 02/24 02/25 02/26 02/27 02/28 02/29 03/01   heparin (porcine) injection 8,000 Units  Dose: 8,000 Units  Freq: Once Route: IV  Start: 02/28/24 1915 End: 02/28/24 1953   Admin Instructions:              1953          heparin (VTE/PE) high  Freq: Once Route: IV  Start: 02/28/24 1915 End: 02/28/24 1913   Admin Instructions:      Order specific questions:              1913-D/C'd        iron sucrose (VENOFER) 200 mg in sodium chloride 0.9 % 100 mL IVPB  Dose: 200 mg  Freq: Daily Route: IV  Start: 02/29/24 1400            1357      0900        lidocaine (LIDODERM) 5 % patch 1 patch  Dose: 1 patch  Freq: Daily Route: TP  Start: 02/29/24 0130   Admin Instructions:      Order specific questions:               0153 [C]     0831     0832 [C]     2111      0900        multivitamin stress formula tablet 1 tablet  Dose: 1 tablet  Freq: Daily Route: PO  Start: 02/29/24 0900   Admin Instructions:               0831      0900        pantoprazole (PROTONIX) EC tablet 40 mg  Dose: 40 mg  Freq: Daily (early morning) Route: PO  Start: 02/29/24 0600   Admin Instructions:               0613      0617        warfarin (COUMADIN) tablet 5 mg  Dose: 5 mg  Freq: Daily (warfarin) Route: PO  Start: 02/29/24 1800   Admin Instructions:      Order specific questions:               1757      1800        warfarin (COUMADIN) tablet 5 mg  Dose: 5 mg  Freq: Once (warfarin) Route: PO  Start: 02/28/24 2015 End: 02/28/24 2030   Admin Instructions:      Order specific questions:              2030          Legend:       [id number]/2102/2202/2302/2402/2502/2602/2702/2802/2903/01        Continuous Meds Sorted by Name  for Edward Conrad as of 02/21/24 through 3/1/24  Legend:     Given Hold Not Given Due Canceled Entry Other Actions     Time Time (Time) Time Time-Action         Inactive     Active     Other Encounter     Linked               Medications 02/21 02/22 02/23 02/24 02/25 02/26 02/27 02/28 02/29 03/01   heparin (porcine) 25,000 units in 0.45% NaCl 250 mL infusion (premix)  Rate: 3-30 mL/hr Dose: 3-30 Units/kg/hr  Weight Dosing Info: 100 kg (Order-Specific)  Freq: Titrated Route: IV  Last Dose: 7 Units/kg/hr (03/01/24 0738)  Start: 02/28/24 1915   Admin Instructions:      Order specific questions:              1953      0238 [C]     0338     1002 [C]     1321     1755 [C]      0037     0738 [C]        Legend:     Given Hold Not Given Due Canceled Entry Other Actions     Time Time (Time) Time Time-Action         Inactive     Active     Other Encounter     Linked              [id number]/2102/2202/2302/2402/2502/2602/2702/2802/2903/01        PRN Meds Sorted by Name  for Edward Conrad as of 02/21/24 through 3/1/24  Legend:       Medications 02/21 02/22 02/23 02/24 02/25 02/26 02/27 02/28 02/29 03/01   acetaminophen (TYLENOL) tablet 650 mg  Dose: 650 mg  Freq: Every 6 hours PRN Route: PO  PRN Reasons: mild pain,headaches,fever  Indications of Use: FEVER,HEADACHE,MILD PAIN  Start: 02/28/24 2147 2236          heparin (porcine) injection 4,000 Units  Dose: 4,000 Units  Freq: Every 6 hours PRN Route: IV  PRN Comment: PTT 43 - 59 seconds  Start: 02/28/24 1912   Admin Instructions:                   heparin (porcine) injection 8,000 Units  Dose: 8,000 Units  Freq: Every 6 hours PRN Route: IV  PRN Comment: PTT less than or equal to 42 seconds  Start: 02/28/24 1912   Admin Instructions:                   iohexol (OMNIPAQUE) 350 MG/ML injection (MULTI-DOSE) 100 mL  Dose: 100 mL  Freq: Once in imaging Route: IV  PRN Reason: contrast  Start: 02/28/24 1937 End: 02/28/24 1937 1937          ondansetron (ZOFRAN) injection 4 mg  Dose: 4 mg  Freq: Every 6 hours PRN Route: IV  PRN Reasons: nausea,vomiting  Start: 02/28/24 6332   Admin Instructions:                   traMADol (ULTRAM) tablet 50 mg  Dose: 50 mg  Freq: Every 6 hours PRN Route:  PO  PRN Reasons: moderate pain,severe pain  Start: 02/29/24 0120   Admin Instructions:               0153     0831     1448      0005                      Network Utilization Review Department  ATTENTION: Please call with any questions or concerns to [phone] and carefully listen to the prompts so that you are directed to the right person. All voicemails are confidential.   For Discharge needs, contact Care Management DC Support Team at [phone] opt. [phone]  Send all requests for admission clinical reviews, approved or denied determinations and any other requests to dedicated fax number below belonging to the [facility] where the patient is receiving treatment. List of dedicated fax numbers for the Facilities:  FACILITY NAME UR FAX NUMBER   ADMISSION DENIALS (Administrative/Medical Necessity) [phone]   DISCHARGE SUPPORT TEAM (NETWORK) [phone]   PARENT CHILD HEALTH (Maternity/NICU/Pediatrics) [phone]   [facility] [phone]   [facility] [phone]   [facility] [phone]   [facility] [phone]   [facility] [phone]   [facility] [phone]   [facility] [phone]   [facility] [phone]   [facility] [phone]   Count includes the [facility] [phone]   [facility] [phone]   [facility] [phone]

## 2024-02-29 NOTE — ASSESSMENT & PLAN NOTE
Patient presents with left leg DVT, sent to ED by [facility].  Reports having history of bilateral lower extremity DVT and PE in 2018, on Xarelto. Reports started with left medial knee pain about 2 weeks ago and got worse today, started to have pain in left thigh and left lower leg today with trouble bearing weight/trouble walking today.  Seen Ortho today, was ordered venous Doppler to rule out DVT which came back positive.  Denies injuries to left knee 2 weeks ago.  Denies paresthesia to left lower extremity.  Chart reviewed.  History of bilateral lower extremity DVT and bilateral PE in 8/2018.  Thrombophilia evaluation showed factor V Leiden mutation, elevated APC resistance, anticardiolipin IgM level,antiphosphatidylserine IgM per hematology note.  Patient has been on Xarelto 20 mg p.o. daily ever since.  Patient saw orthopedic surgery today for left knee pain, was ordered x-ray left knee, MRI left knee, lower venous Doppler to rule out DVT due to history.  X-ray left knee was normal.  Lower venous Doppler showed -chronic nonocclusive DVT in the distal femoral, popliteal and peroneal veins.  Lower venous Doppler in 7/2022 showed -chronic nonocclusive DVT in the popliteal vein only.  Progressively worsening DVT of left lower extremity, suspect Xarelto failure.  CTA PE study no PE  ED provider discussed case with hematology on-call, recommend heparin drip to bridge with Coumadin.  Started on heparin drip in ED, will continue.  Given Coumadin 5 milligram p.o. in ED, will continue daily.  Daily INR.  Consult hematology

## 2024-02-29 NOTE — PLAN OF CARE
Problem: PHYSICAL THERAPY ADULT  Goal: Performs mobility at highest level of function for planned discharge setting.  See evaluation for individualized goals.  Description: Treatment/Interventions: Functional transfer training, LE strengthening/ROM, Therapeutic exercise, Endurance training, Gait training, Spoke to nursing          See flowsheet documentation for full assessment, interventions and recommendations.  Note: Prognosis: Good  Problem List: Decreased strength, Decreased range of motion, Decreased endurance, Impaired balance, Decreased mobility, Pain  Assessment: Patient seen for Physical Therapy evaluation. Patient admitted with Chronic deep vein thrombosis (DVT) of left lower extremity (HCC).  Comorbidities affecting patient's physical performance include: DVT, GERD, hypertension, and PE.  Personal factors affecting patient at time of initial evaluation include: lives in 2 story house and stairs to enter home. Prior to admission, patient was independent with functional mobility without assistive device, independent with ADLS, independent with IADLS, living with spouse  in a 2 level home with 2 steps to enter, ambulating household distance, ambulating community distances, and works full time.  Please find objective findings from Physical Therapy assessment regarding body systems outlined above with impairments and limitations including decreased ROM, impaired balance, gait deviations, pain, decreased activity tolerance, decreased functional mobility tolerance, and fall risk.  The Barthel Index was used as a functional outcome tool presenting with a score of Barthel Index Score: 90 today indicating minimal limitations of functional mobility and ADLS.  Patient's clinical presentation is currently evolving as seen in patient's presentation of changing level of pain. Pt would benefit from continued Physical Therapy treatment to address deficits as defined above and maximize level of functional mobility. As  demonstrated by objective findings, the assigned level of complexity for this evaluation is moderate.The patient's AM-PAC Basic Mobility Inpatient Short Form Raw Score is 21. A Raw score of greater than 16 suggests the patient may benefit from discharge to home. Please also refer to the recommendation of the Physical Therapist for safe discharge planning.        Rehab Resource Intensity Level, PT: No post-acute rehabilitation needs (to follow-up with orthopedics + L knee MRI scheduled 3/10)    See flowsheet documentation for full assessment.         You can access the FollowMyHealth Patient Portal offered by [facility] by registering at the following website: [email or web address]. By joining [facility]’s FollowMyHealth portal, you will also be able to view your health information using other applications (apps) compatible with our system.

## 2024-02-29 NOTE — ASSESSMENT & PLAN NOTE
History of iron deficient anemia with hemoglobin 9 in 2021 per wife .  Denies overt bleeding at the time.  Patient was prescribed iron supplement at that time, with improvement in hemoglobin afterwards.  Underwent EGD colonoscopy with no source of bleeding but found to have hiatal hernia.  Hemoglobin today 11.3  Repeat iron panel along with B12 folate TSH free T4   Continue MVI daily  Monitor CBC

## 2024-02-29 NOTE — PROGRESS NOTES
[facility]  Progress Note  Name: Edward Conrad I  MRN: [id number]  Unit/Bed#: 4 [place] 412-01 I Date of Admission: 2/28/2024   Date of Service: 2/29/2024 I Hospital Day: 1    Assessment/Plan   * Chronic deep vein thrombosis (DVT) of left lower extremity (HCC)  Assessment & Plan  Patient presents with left leg DVT, sent to ED by [employer] tech.  Reports having history of bilateral lower extremity DVT and PE in 2018, on Xarelto. Reports started with left medial knee pain about 2 weeks ago and got worse today, started to have pain in left thigh and left lower leg today with trouble bearing weight/trouble walking today.  Seen Ortho on the day of admission was ordered venous Doppler to rule out DVT which came back positive.  Denies injuries to left knee 2 weeks ago.  Denies paresthesia to left lower extremity.  Chart reviewed.  History of bilateral lower extremity DVT and bilateral PE in 8/2018.  Thrombophilia evaluation showed factor V Leiden mutation, elevated APC resistance, anticardiolipin IgM level,antiphosphatidylserine IgM per hematology note.  Patient has been on Xarelto 20 mg p.o. daily ever since.  Patient saw orthopedic surgery today for left knee pain, was ordered x-ray left knee, MRI left knee, lower venous Doppler to rule out DVT due to history.  X-ray left knee was normal.  Lower venous Doppler showed -chronic nonocclusive DVT in the distal femoral, popliteal and peroneal veins.  Lower venous Doppler in 7/2022 showed -chronic nonocclusive DVT in the popliteal vein only.  Repeat lower extremity venous Doppler-no evidence of DVT in the right leg.  Chronic nonocclusive DVT in the distal femoral, popliteal and peroneal veins  CTA PE study no PE  ED provider discussed case with hematology on-call, recommend heparin drip to bridge with Coumadin.  Continue heparin drip with bridging with Coumadin.  Lovenox bridging with Coumadin discussed with the patient/family and patient is not very  comfortable with Lovenox.  Consulted hematology  Ordered D-dimer      Acute pain of left knee  Assessment & Plan  As above, x-ray left knee unremarkable.  MRI of left knee ordered as outpatient.  Chronic DVT may be contributing to pain  Pain control  Patient given crutches by orthopedic surgery today  PT eval and treat  Follow-up orthopedic surgery as outpatient    History of iron deficiency anemia  Assessment & Plan  History of iron deficient anemia with hemoglobin 9 in 2021 per wife .  Denies overt bleeding at the time.  Patient was prescribed iron supplement at that time, with improvement in hemoglobin afterwards.  Underwent EGD colonoscopy with no source of bleeding but found to have hiatal hernia.  Hemoglobin  11.3 upon admission with low MCV  Iron panel suggesting iron deficiency-iron level 14, percent saturation 6, ferritin 3, TIBC 440.  B12 level was normal   ordered for Venofer 300 mg IV daily  Known history of iron deficiency anemia and was on iron supplementation previously  Patient did have EGD and colonoscopy in 2021 which showed hiatal hernia with mucosal erosion at the GE junction, few diverticuli in the sigmoid colon  Patient denies any active GI bleeding- will need  to follow-up with GI for capsule endoscopy    Hypertension  Assessment & Plan  Diet controlled  BP acceptable    Hypercoagulable state (HCC)  Assessment & Plan  As above  Questionable Xarelto failure  IV heparin and Coumadin as above  Daily INR  Consulted hematology    Hyperlipidemia  Assessment & Plan  Diet controlled    Obesity (BMI 30-39.9)  Assessment & Plan  Body mass index is 31.08 kg/m².   Diet and lifestyle modification     GERD without esophagitis  Assessment & Plan  Large hiatal hernia on CTA  On Nexium as needed at home.  Continue PPI daily               VTE Pharmacologic Prophylaxis:   High Risk (Score >/= 5) - Pharmacological DVT Prophylaxis Ordered: heparin drip. Sequential Compression Devices Ordered.    Mobility:   Basic  Mobility Inpatient Raw Score: 21  JH-HLM Goal: 6: Walk 10 steps or more  JH-HLM Achieved: 7: Walk 25 feet or more  HLM Goal achieved. Continue to encourage appropriate mobility.    Patient Centered Rounds: I performed bedside rounds with nursing staff today.   Discussions with Specialists or Other Care Team Provider: Hematology    Education and Discussions with Family / Patient: Updated  (wife and daughter) at bedside.    Total Time Spent on Date of Encounter in care of patient: 45 mins. This time was spent on one or more of the following: performing physical exam; counseling and coordination of care; obtaining or reviewing history; documenting in the medical record; reviewing/ordering tests, medications or procedures; communicating with other healthcare professionals and discussing with patient's family/caregivers.    Current Length of Stay: 1 day(s)  Current Patient Status: Inpatient   Certification Statement: The patient will continue to require additional inpatient hospital stay due to left leg pain with DVT  Discharge Plan: Anticipate discharge in 48-72 hrs to home.    Code Status: Level 1 - Full Code    Subjective:   Patient has improved pain in the left knee and left leg.  Tolerated physical therapy well.    Objective:     Vitals:   Temp (24hrs), Av.2 °F (36.8 °C), Min:97.5 °F (36.4 °C), Max:98.7 °F (37.1 °C)    Temp:  [97.5 °F (36.4 °C)-98.7 °F (37.1 °C)] 97.5 °F (36.4 °C)  HR:  [65-96] 80  Resp:  [16-20] 16  BP: (133-150)/(83-98) 133/88  SpO2:  [95 %-97 %] 96 %  Body mass index is 31.08 kg/m².     Input and Output Summary (last 24 hours):     Intake/Output Summary (Last 24 hours) at 2024 1454  Last data filed at 2024 0856  Gross per 24 hour   Intake 360 ml   Output --   Net 360 ml       Physical Exam:   Physical Exam  Constitutional:       Appearance: Normal appearance.   HENT:      Head: Normocephalic and atraumatic.   Eyes:      Extraocular Movements: Extraocular movements  intact.      Pupils: Pupils are equal, round, and reactive to light.   Cardiovascular:      Rate and Rhythm: Normal rate and regular rhythm.      Heart sounds: No murmur heard.     No gallop.   Pulmonary:      Effort: Pulmonary effort is normal.      Breath sounds: Normal breath sounds.   Abdominal:      General: Bowel sounds are normal.      Palpations: Abdomen is soft.      Tenderness: There is no abdominal tenderness.   Musculoskeletal:         General: No swelling or deformity. Normal range of motion.      Cervical back: Normal range of motion and neck supple.      Comments: Left leg swelling noted   Skin:     General: Skin is warm and dry.   Neurological:      General: No focal deficit present.      Mental Status: He is alert.          Additional Data:     Labs:  Results from last 7 days   Lab Units 02/29/24  0920 02/28/24  1840   WBC Thousand/uL 5.88 6.69   HEMOGLOBIN g/dL 10.9* 11.3*   HEMATOCRIT % 35.5* 36.4*   PLATELETS Thousands/uL 236 242   NEUTROS PCT %  --  57   LYMPHS PCT %  --  27   MONOS PCT %  --  11   EOS PCT %  --  4     Results from last 7 days   Lab Units 02/29/24  0920 02/28/24  1840   SODIUM mmol/L 135 135   POTASSIUM mmol/L 4.1 5.2   CHLORIDE mmol/L 104 104   CO2 mmol/L 24 23   BUN mg/dL 19 22   CREATININE mg/dL 1.00 0.96   ANION GAP mmol/L 7 8   CALCIUM mg/dL 8.5 8.9   ALBUMIN g/dL  --  4.4   TOTAL BILIRUBIN mg/dL  --  0.61   ALK PHOS U/L  --  60   ALT U/L  --  34   AST U/L  --  40*   GLUCOSE RANDOM mg/dL 177* 106     Results from last 7 days   Lab Units 02/28/24  1919   INR  1.26*                   Lines/Drains:  Invasive Devices       Peripheral Intravenous Line  Duration             Peripheral IV 02/28/24 Dorsal (posterior);Left Hand <1 day    Peripheral IV 02/28/24 Proximal;Right;Ventral (anterior) Forearm <1 day                          Imaging: Reviewed radiology reports from this admission including: chest CT scan    Recent Cultures (last 7 days):         Last 24 Hours Medication  List:   Current Facility-Administered Medications   Medication Dose Route Frequency Provider Last Rate    acetaminophen  650 mg Oral Q6H PRN Cuiteofilo Funezrik, CRNP      heparin (porcine)  3-30 Units/kg/hr (Order-Specific) Intravenous Titrated Cuicaridadn Dee Deerik, CRNP 13 Units/kg/hr (02/29/24 1321)    heparin (porcine)  4,000 Units Intravenous Q6H PRN Cuiyin Yurik, CRNP      heparin (porcine)  8,000 Units Intravenous Q6H PRN Cuiyin Yurik, CRNP      iron sucrose  200 mg Intravenous Daily Julio César Santiago MD      lidocaine  1 patch Topical Daily Nicole Villasenor, TONIENP      multivitamin stress formula  1 tablet Oral Daily Nicole Villasenor, TONIENP      ondansetron  4 mg Intravenous Q6H PRN Maximinoiteofilo Funezrik, CRNP      pantoprazole  40 mg Oral Early Morning Cualdair Arriagak, CRNP      traMADol  50 mg Oral Q6H PRN Nicole Funezrik, CRNP      warfarin  5 mg Oral Daily (warfarin) MIRNA Bailey          Today, Patient Was Seen By: Julio César Santiago MD    **Please Note: This note may have been constructed using a voice recognition system.**

## 2024-02-29 NOTE — ASSESSMENT & PLAN NOTE
History of iron deficient anemia with hemoglobin 9 in 2021 per wife .  Denies overt bleeding at the time.  Patient was prescribed iron supplement at that time, with improvement in hemoglobin afterwards.  Underwent EGD colonoscopy with no source of bleeding but found to have hiatal hernia.  Hemoglobin  11.3 upon admission with low MCV  Iron panel suggesting iron deficiency-iron level 14, percent saturation 6, ferritin 3, TIBC 440.  B12 level was normal   ordered for Venofer 300 mg IV daily  Known history of iron deficiency anemia and was on iron supplementation previously  Patient did have EGD and colonoscopy in 2021 which showed hiatal hernia with mucosal erosion at the GE junction, few diverticuli in the sigmoid colon  Patient denies any active GI bleeding- will need  to follow-up with GI for capsule endoscopy

## 2024-03-01 ENCOUNTER — TELEPHONE (OUTPATIENT)
Dept: HEMATOLOGY ONCOLOGY | Facility: CLINIC | Age: 54
End: 2024-03-01

## 2024-03-01 LAB
APTT PPP: 103 SECONDS (ref 23–37)
APTT PPP: 55 SECONDS (ref 23–37)
APTT PPP: 75 SECONDS (ref 23–37)
APTT PPP: 93 SECONDS (ref 23–37)
ATRIAL RATE: 69 BPM
INR PPP: 1.31 (ref 0.84–1.19)
P AXIS: 23 DEGREES
PR INTERVAL: 180 MS
PROTHROMBIN TIME: 16.5 SECONDS (ref 11.6–14.5)
QRS AXIS: -8 DEGREES
QRSD INTERVAL: 84 MS
QT INTERVAL: 388 MS
QTC INTERVAL: 415 MS
T WAVE AXIS: 13 DEGREES
VENTRICULAR RATE: 69 BPM

## 2024-03-01 PROCEDURE — 99223 1ST HOSP IP/OBS HIGH 75: CPT | Performed by: INTERNAL MEDICINE

## 2024-03-01 PROCEDURE — NC001 PR NO CHARGE: Performed by: INTERNAL MEDICINE

## 2024-03-01 PROCEDURE — 85730 THROMBOPLASTIN TIME PARTIAL: CPT | Performed by: INTERNAL MEDICINE

## 2024-03-01 PROCEDURE — 93010 ELECTROCARDIOGRAM REPORT: CPT | Performed by: INTERNAL MEDICINE

## 2024-03-01 PROCEDURE — 85610 PROTHROMBIN TIME: CPT | Performed by: NURSE PRACTITIONER

## 2024-03-01 PROCEDURE — 99232 SBSQ HOSP IP/OBS MODERATE 35: CPT | Performed by: INTERNAL MEDICINE

## 2024-03-01 RX ORDER — WARFARIN SODIUM 5 MG/1
5 TABLET ORAL
Status: COMPLETED | OUTPATIENT
Start: 2024-03-01 | End: 2024-03-01

## 2024-03-01 RX ADMIN — B-COMPLEX W/ C & FOLIC ACID TAB 1 TABLET: TAB at 09:05

## 2024-03-01 RX ADMIN — HEPARIN SODIUM 7 UNITS/KG/HR: 10000 INJECTION, SOLUTION INTRAVENOUS at 13:00

## 2024-03-01 RX ADMIN — LIDOCAINE 5% 1 PATCH: 700 PATCH TOPICAL at 09:05

## 2024-03-01 RX ADMIN — PANTOPRAZOLE SODIUM 40 MG: 20 TABLET, DELAYED RELEASE ORAL at 06:17

## 2024-03-01 RX ADMIN — HEPARIN SODIUM 4000 UNITS: 1000 INJECTION INTRAVENOUS; SUBCUTANEOUS at 21:04

## 2024-03-01 RX ADMIN — WARFARIN SODIUM 5 MG: 5 TABLET ORAL at 17:06

## 2024-03-01 RX ADMIN — TRAMADOL HYDROCHLORIDE 50 MG: 50 TABLET, COATED ORAL at 09:05

## 2024-03-01 RX ADMIN — TRAMADOL HYDROCHLORIDE 50 MG: 50 TABLET, COATED ORAL at 00:05

## 2024-03-01 RX ADMIN — IRON SUCROSE 200 MG: 20 INJECTION, SOLUTION INTRAVENOUS at 09:06

## 2024-03-01 NOTE — ASSESSMENT & PLAN NOTE
As above, x-ray left knee unremarkable.  MRI of left knee ordered as outpatient.  Chronic DVT may be contributing to pain  Pain has improved significantly.  Patient given crutches by orthopedic surgery and able to ambulate without crutches now  PT eval and treat  Follow-up orthopedic surgery as outpatient

## 2024-03-01 NOTE — TELEPHONE ENCOUNTER
New Patient Intake Form   Patient Details:    Edward Conrad  1970    Appointment Information   Who is calling to schedule? Jayla Tillman   If not self, what is the caller's name?   NA   DID YOU CONFIRM INSURANCE WITH PATIENT? E- Verified, Routed to finance   Referring provider Dr. Patrick   What is the diagnosis? chronic nonocclusive left lower extremity thromboses      Is there a confirmed tissue diagnosis?   No     Is there a biopsy ordered or pending?  Please specify dates  If yes, route to /OCC   NA     Is patient aware of diagnosis?   Yes     Have you had any imaging or labs done?  If yes, where?  (If imaging done outside of [facility], please remind patient to bring a disk.) Yes-[facility]     If imaging done at outside facility, did you instruct patient to obtain discs and bring to visit? NA   Have you been seen by another Oncologist/Hematologist?  If so, who and where? No   Are the records in [facility] or Care Everywhere? Yes   Does the patient have records at another facility/hospital?    If yes, Name of facility, city and state where facility is located. [facility]     Did you instruct patient to have records faxed to [facility] and provide rightfax number?   NA   Preferred [place]   Is the patient willing to be seen by another provider?  (This is for breast patients only) NA     Did you send new patient paperwork?  Email or mail? NA   Miscellaneous Information: The patient is scheduled for his HFU appointment with MIRNA Gonzalez on 3/18 at 1330 in the [place] office.

## 2024-03-01 NOTE — PROGRESS NOTES
[facility]  Progress Note  Name: Edward Conrad I  MRN: [id number]  Unit/Bed#: 4 [place] [address]-01 I Date of Admission: 2/28/2024   Date of Service: 3/1/2024 I Hospital Day: 2    Assessment/Plan   * Chronic deep vein thrombosis (DVT) of left lower extremity (HCC)  Assessment & Plan  Patient presents with left leg DVT, sent to ED by [employer] tech.  Reports having history of bilateral lower extremity DVT and PE in 2018, on Xarelto. Reports started with left medial knee pain about 2 weeks ago and got worse today, started to have pain in left thigh and left lower leg today with trouble bearing weight/trouble walking today.  Seen Ortho on the day of admission was ordered venous Doppler to rule out DVT which came back positive.  Denies injuries to left knee 2 weeks ago.  Denies paresthesia to left lower extremity.  Chart reviewed.  History of bilateral lower extremity DVT and bilateral PE in 8/2018.  Thrombophilia evaluation showed factor V Leiden mutation, elevated APC resistance, anticardiolipin IgM level,antiphosphatidylserine IgM per hematology note.  Patient has been on Xarelto 20 mg p.o. daily ever since.  Patient saw orthopedic surgery today for left knee pain, was ordered x-ray left knee, MRI left knee, lower venous Doppler to rule out DVT due to history.  X-ray left knee was normal.  Lower venous Doppler showed -chronic nonocclusive DVT in the distal femoral, popliteal and peroneal veins.  Lower venous Doppler in 7/2022 showed -chronic nonocclusive DVT in the popliteal vein only.  Repeat lower extremity venous Doppler-no evidence of DVT in the right leg.  Chronic nonocclusive DVT in the distal femoral, popliteal and peroneal veins  CTA PE study no PE  ED provider discussed case with hematology on-call, recommend heparin drip to bridge with Coumadin.  Continue heparin drip with bridging with Coumadin.  Lovenox bridging with Coumadin discussed with the patient/family and patient is not very comfortable  with Lovenox.  Discussed with hematology in detail  D-dimer was negative  Patient ordered for 10 mg of Coumadin today      Acute pain of left knee  Assessment & Plan  As above, x-ray left knee unremarkable.  MRI of left knee ordered as outpatient.  Chronic DVT may be contributing to pain  Pain has improved significantly.  Patient given crutches by orthopedic surgery and able to ambulate without crutches now  PT eval and treat  Follow-up orthopedic surgery as outpatient    History of iron deficiency anemia  Assessment & Plan  History of iron deficient anemia with hemoglobin 9 in 2021 per wife .  Denies overt bleeding at the time.  Patient was prescribed iron supplement at that time, with improvement in hemoglobin afterwards.  Underwent EGD colonoscopy with no source of bleeding but found to have hiatal hernia.  Hemoglobin  11.3 upon admission with low MCV  Iron panel suggesting iron deficiency-iron level 14, percent saturation 6, ferritin 3, TIBC 440.  B12 level was normal  Continue Venofer 300 mg IV daily  Known history of iron deficiency anemia and was on iron supplementation previously  Patient did have EGD and colonoscopy in 2021 which showed hiatal hernia with mucosal erosion at the GE junction, few diverticuli in the sigmoid colon  Patient denies any active GI bleeding- will need  to follow-up with GI for capsule endoscopy    Hypertension  Assessment & Plan  Diet controlled  BP acceptable    Hypercoagulable state (HCC)  Assessment & Plan  As above  Not 100% sure if patient has Xarelto failure  IV heparin and Coumadin as above  Daily INR  Discussed with hematology    Hyperlipidemia  Assessment & Plan  Diet controlled    Obesity (BMI 30-39.9)  Assessment & Plan  Body mass index is 31.08 kg/m².   Diet and lifestyle modification     GERD without esophagitis  Assessment & Plan  Large hiatal hernia on CTA  On Nexium as needed at home.  Continue PPI daily               VTE Pharmacologic Prophylaxis:   High Risk (Score  >/= 5) - Pharmacological DVT Prophylaxis Ordered: heparin drip. Sequential Compression Devices Ordered.    Mobility:   Basic Mobility Inpatient Raw Score: 23  JH-HLM Goal: 7: Walk 25 feet or more  JH-HLM Achieved: 7: Walk 25 feet or more  HLM Goal achieved. Continue to encourage appropriate mobility.    Patient Centered Rounds: I performed bedside rounds with nursing staff today.   Discussions with Specialists or Other Care Team Provider: yes    Education and Discussions with Family / Patient: Updated  (wife) at bedside.    Total Time Spent on Date of Encounter in care of patient: 45 mins. This time was spent on one or more of the following: performing physical exam; counseling and coordination of care; obtaining or reviewing history; documenting in the medical record; reviewing/ordering tests, medications or procedures; communicating with other healthcare professionals and discussing with patient's family/caregivers.    Current Length of Stay: 2 day(s)  Current Patient Status: Inpatient   Certification Statement: The patient will continue to require additional inpatient hospital stay due to chronic DVT, hypercoagulable state  Discharge Plan: Anticipate discharge in 48-72 hrs to home.    Code Status: Level 1 - Full Code    Subjective:   Patient has improved pain in the left knee and left leg.  Patient ambulated the hallway without crutches.  Denies any shortness of breath or chest pain.    Objective:     Vitals:   Temp (24hrs), Av.6 °F (36.4 °C), Min:97.5 °F (36.4 °C), Max:97.6 °F (36.4 °C)    Temp:  [97.5 °F (36.4 °C)-97.6 °F (36.4 °C)] 97.5 °F (36.4 °C)  HR:  [61-75] 75  Resp:  [18] 18  BP: (136-143)/(93-99) 136/93  SpO2:  [94 %-98 %] 98 %  Body mass index is 31.08 kg/m².     Input and Output Summary (last 24 hours):   No intake or output data in the 24 hours ending 24 1356    Physical Exam:   Physical Exam  Constitutional:       Appearance: Normal appearance.   HENT:      Head:  Normocephalic and atraumatic.   Eyes:      Extraocular Movements: Extraocular movements intact.      Pupils: Pupils are equal, round, and reactive to light.   Cardiovascular:      Rate and Rhythm: Normal rate and regular rhythm.      Heart sounds: No murmur heard.     No gallop.   Pulmonary:      Effort: Pulmonary effort is normal.      Breath sounds: Normal breath sounds.   Abdominal:      General: Bowel sounds are normal.      Palpations: Abdomen is soft.      Tenderness: There is no abdominal tenderness.   Musculoskeletal:         General: No swelling or deformity. Normal range of motion.      Cervical back: Normal range of motion and neck supple.      Left lower leg: Edema present.   Skin:     General: Skin is warm and dry.   Neurological:      General: No focal deficit present.      Mental Status: He is alert.          Additional Data:     Labs:  Results from last 7 days   Lab Units 02/29/24  0920 02/28/24  1840   WBC Thousand/uL 5.88 6.69   HEMOGLOBIN g/dL 10.9* 11.3*   HEMATOCRIT % 35.5* 36.4*   PLATELETS Thousands/uL 236 242   NEUTROS PCT %  --  57   LYMPHS PCT %  --  27   MONOS PCT %  --  11   EOS PCT %  --  4     Results from last 7 days   Lab Units 02/29/24  0920 02/28/24  1840   SODIUM mmol/L 135 135   POTASSIUM mmol/L 4.1 5.2   CHLORIDE mmol/L 104 104   CO2 mmol/L 24 23   BUN mg/dL 19 22   CREATININE mg/dL 1.00 0.96   ANION GAP mmol/L 7 8   CALCIUM mg/dL 8.5 8.9   ALBUMIN g/dL  --  4.4   TOTAL BILIRUBIN mg/dL  --  0.61   ALK PHOS U/L  --  60   ALT U/L  --  34   AST U/L  --  40*   GLUCOSE RANDOM mg/dL 177* 106     Results from last 7 days   Lab Units 03/01/24  0621   INR  1.31*                   Lines/Drains:  Invasive Devices       Peripheral Intravenous Line  Duration             Peripheral IV 02/28/24 Dorsal (posterior);Left Hand 1 day    Peripheral IV 02/28/24 Proximal;Right;Ventral (anterior) Forearm 1 day                          Imaging: Reviewed radiology reports from this admission including:  chest CT scan    Recent Cultures (last 7 days):         Last 24 Hours Medication List:   Current Facility-Administered Medications   Medication Dose Route Frequency Provider Last Rate    acetaminophen  650 mg Oral Q6H PRN Cuiyin Yurik, CRNP      heparin (porcine)  3-30 Units/kg/hr (Order-Specific) Intravenous Titrated Cuiyin Yurik, CRNP 7 Units/kg/hr (03/01/24 1300)    heparin (porcine)  4,000 Units Intravenous Q6H PRN Cuiyin Yurik, CRNP      heparin (porcine)  8,000 Units Intravenous Q6H PRN Cuiyin Yurik, CRNP      iron sucrose  200 mg Intravenous Daily Julio César Santiago MD      lidocaine  1 patch Topical Daily Cuiteofilo Funezrik, CRNP      multivitamin stress formula  1 tablet Oral Daily Cuiyin Yurik, CRNP      ondansetron  4 mg Intravenous Q6H PRN Cuiyin Yurik, CRNP      pantoprazole  40 mg Oral Early Morning Cuiyin Yurik, CRNP      traMADol  50 mg Oral Q6H PRN Cuiyin Yurik, CRNP      warfarin  5 mg Oral Daily (warfarin) Cuiyin Yurik, CRNP      warfarin  5 mg Oral Once (warfarin) Julio César Santiago MD          Today, Patient Was Seen By: Julio César Santiago MD    **Please Note: This note may have been constructed using a voice recognition system.**

## 2024-03-01 NOTE — ASSESSMENT & PLAN NOTE
As above  Not 100% sure if patient has Xarelto failure  IV heparin and Coumadin as above  Daily INR  Discussed with hematology

## 2024-03-01 NOTE — PLAN OF CARE
Problem: PAIN - ADULT  Goal: Verbalizes/displays adequate comfort level or baseline comfort level  Description: Interventions:  - Encourage patient to monitor pain and request assistance  - Assess pain using appropriate pain scale  - Administer analgesics based on type and severity of pain and evaluate response  - Implement non-pharmacological measures as appropriate and evaluate response  - Consider cultural and social influences on pain and pain management  - Notify physician/advanced practitioner if interventions unsuccessful or patient reports new pain  Outcome: Progressing     Problem: INFECTION - ADULT  Goal: Absence or prevention of progression during hospitalization  Description: INTERVENTIONS:  - Assess and monitor for signs and symptoms of infection  - Monitor lab/diagnostic results  - Monitor all insertion sites, i.e. indwelling lines, tubes, and drains  - Monitor endotracheal if appropriate and nasal secretions for changes in amount and color  - [place] appropriate cooling/warming therapies per order  - Administer medications as ordered  - Instruct and encourage patient and family to use good hand hygiene technique  - Identify and instruct in appropriate isolation precautions for identified infection/condition  Outcome: Progressing  Goal: Absence of fever/infection during neutropenic period  Description: INTERVENTIONS:  - Monitor WBC    Outcome: Progressing     Problem: SAFETY ADULT  Goal: Patient will remain free of falls  Description: INTERVENTIONS:  - Educate patient/family on patient safety including physical limitations  - Instruct patient to call for assistance with activity   - Consult OT/PT to assist with strengthening/mobility   - Keep Call bell within reach  - Keep bed low and locked with side rails adjusted as appropriate  - Keep care items and personal belongings within reach  - Initiate and maintain comfort rounds  - Make Fall Risk Sign visible to staff  - Offer Toileting every 2 Hours,  in advance of need  - Initiate/Maintain bed alarm  - Obtain necessary fall risk management equipment: yellow socks   - Apply yellow socks and bracelet for high fall risk patients  - Consider moving patient to room near nurses station  Outcome: Progressing  Goal: Maintain or return to baseline ADL function  Description: INTERVENTIONS:  -  Assess patient's ability to carry out ADLs; assess patient's baseline for ADL function and identify physical deficits which impact ability to perform ADLs (bathing, care of mouth/teeth, toileting, grooming, dressing, etc.)  - Assess/evaluate cause of self-care deficits   - Assess range of motion  - Assess patient's mobility; develop plan if impaired  - Assess patient's need for assistive devices and provide as appropriate  - Encourage maximum independence but intervene and supervise when necessary  - Involve family in performance of ADLs  - Assess for home care needs following discharge   - Consider OT consult to assist with ADL evaluation and planning for discharge  - Provide patient education as appropriate  Outcome: Progressing  Goal: Maintains/Returns to pre admission functional level  Description: INTERVENTIONS:  - Perform AM-PAC 6 Click Basic Mobility/ Daily Activity assessment daily.  - Set and communicate daily mobility goal to care team and patient/family/caregiver.   - Collaborate with rehabilitation services on mobility goals if consulted  - Perform Range of Motion 3 times a day.  - Reposition patient every 2 hours.  - Dangle patient 3 times a day  - Stand patient 3 times a day  - Ambulate patient 3 times a day  - Out of bed to chair 3 times a day   - Out of bed for meals 3 times a day  - Out of bed for toileting  - Record patient progress and toleration of activity level   Outcome: Progressing

## 2024-03-01 NOTE — ASSESSMENT & PLAN NOTE
Patient presents with left leg DVT, sent to ED by [facility].  Reports having history of bilateral lower extremity DVT and PE in 2018, on Xarelto. Reports started with left medial knee pain about 2 weeks ago and got worse today, started to have pain in left thigh and left lower leg today with trouble bearing weight/trouble walking today.  Seen Ortho on the day of admission was ordered venous Doppler to rule out DVT which came back positive.  Denies injuries to left knee 2 weeks ago.  Denies paresthesia to left lower extremity.  Chart reviewed.  History of bilateral lower extremity DVT and bilateral PE in 8/2018.  Thrombophilia evaluation showed factor V Leiden mutation, elevated APC resistance, anticardiolipin IgM level,antiphosphatidylserine IgM per hematology note.  Patient has been on Xarelto 20 mg p.o. daily ever since.  Patient saw orthopedic surgery today for left knee pain, was ordered x-ray left knee, MRI left knee, lower venous Doppler to rule out DVT due to history.  X-ray left knee was normal.  Lower venous Doppler showed -chronic nonocclusive DVT in the distal femoral, popliteal and peroneal veins.  Lower venous Doppler in 7/2022 showed -chronic nonocclusive DVT in the popliteal vein only.  Repeat lower extremity venous Doppler-no evidence of DVT in the right leg.  Chronic nonocclusive DVT in the distal femoral, popliteal and peroneal veins  CTA PE study no PE  ED provider discussed case with hematology on-call, recommend heparin drip to bridge with Coumadin.  Continue heparin drip with bridging with Coumadin.  Lovenox bridging with Coumadin discussed with the patient/family and patient is not very comfortable with Lovenox.  Discussed with hematology in detail  D-dimer was negative  Patient ordered for 10 mg of Coumadin today

## 2024-03-01 NOTE — ASSESSMENT & PLAN NOTE
History of iron deficient anemia with hemoglobin 9 in 2021 per wife .  Denies overt bleeding at the time.  Patient was prescribed iron supplement at that time, with improvement in hemoglobin afterwards.  Underwent EGD colonoscopy with no source of bleeding but found to have hiatal hernia.  Hemoglobin  11.3 upon admission with low MCV  Iron panel suggesting iron deficiency-iron level 14, percent saturation 6, ferritin 3, TIBC 440.  B12 level was normal  Continue Venofer 300 mg IV daily  Known history of iron deficiency anemia and was on iron supplementation previously  Patient did have EGD and colonoscopy in 2021 which showed hiatal hernia with mucosal erosion at the GE junction, few diverticuli in the sigmoid colon  Patient denies any active GI bleeding- will need  to follow-up with GI for capsule endoscopy

## 2024-03-02 LAB
APTT PPP: 129 SECONDS (ref 23–37)
APTT PPP: 49 SECONDS (ref 23–37)
APTT PPP: 61 SECONDS (ref 23–37)
HEMOCCULT STL QL: NEGATIVE
HEMOCCULT STL QL: NORMAL
HEMOCCULT STL QL: NORMAL
INR PPP: 1.43 (ref 0.84–1.19)
PROTHROMBIN TIME: 17.7 SECONDS (ref 11.6–14.5)

## 2024-03-02 PROCEDURE — 99232 SBSQ HOSP IP/OBS MODERATE 35: CPT | Performed by: INTERNAL MEDICINE

## 2024-03-02 PROCEDURE — 85730 THROMBOPLASTIN TIME PARTIAL: CPT | Performed by: INTERNAL MEDICINE

## 2024-03-02 PROCEDURE — 85610 PROTHROMBIN TIME: CPT | Performed by: INTERNAL MEDICINE

## 2024-03-02 PROCEDURE — 82272 OCCULT BLD FECES 1-3 TESTS: CPT | Performed by: INTERNAL MEDICINE

## 2024-03-02 RX ORDER — DOCUSATE SODIUM 100 MG/1
100 CAPSULE, LIQUID FILLED ORAL 2 TIMES DAILY
Status: DISCONTINUED | OUTPATIENT
Start: 2024-03-02 | End: 2024-03-07 | Stop reason: HOSPADM

## 2024-03-02 RX ORDER — POLYETHYLENE GLYCOL 3350 17 G/17G
17 POWDER, FOR SOLUTION ORAL DAILY PRN
Status: DISCONTINUED | OUTPATIENT
Start: 2024-03-02 | End: 2024-03-07 | Stop reason: HOSPADM

## 2024-03-02 RX ORDER — WARFARIN SODIUM 5 MG/1
5 TABLET ORAL
Status: COMPLETED | OUTPATIENT
Start: 2024-03-02 | End: 2024-03-02

## 2024-03-02 RX ADMIN — WARFARIN SODIUM 5 MG: 5 TABLET ORAL at 17:08

## 2024-03-02 RX ADMIN — LIDOCAINE 5% 1 PATCH: 700 PATCH TOPICAL at 09:50

## 2024-03-02 RX ADMIN — TRAMADOL HYDROCHLORIDE 50 MG: 50 TABLET, COATED ORAL at 03:06

## 2024-03-02 RX ADMIN — PANTOPRAZOLE SODIUM 40 MG: 20 TABLET, DELAYED RELEASE ORAL at 05:26

## 2024-03-02 RX ADMIN — B-COMPLEX W/ C & FOLIC ACID TAB 1 TABLET: TAB at 09:50

## 2024-03-02 RX ADMIN — HEPARIN SODIUM 4000 UNITS: 1000 INJECTION INTRAVENOUS; SUBCUTANEOUS at 17:49

## 2024-03-02 RX ADMIN — DOCUSATE SODIUM 100 MG: 100 CAPSULE, LIQUID FILLED ORAL at 12:05

## 2024-03-02 RX ADMIN — IRON SUCROSE 200 MG: 20 INJECTION, SOLUTION INTRAVENOUS at 09:51

## 2024-03-02 NOTE — ASSESSMENT & PLAN NOTE
Patient presents with left leg DVT, sent to ED by [facility].  Reports having history of bilateral lower extremity DVT and PE in 2018, on Xarelto. Reports started with left medial knee pain about 2 weeks ago and got worse today, started to have pain in left thigh and left lower leg today with trouble bearing weight/trouble walking today.  Seen Ortho on the day of admission was ordered venous Doppler to rule out DVT which came back positive.  Denies injuries to left knee 2 weeks ago.  Denies paresthesia to left lower extremity.  Chart reviewed.  History of bilateral lower extremity DVT and bilateral PE in 8/2018.  Thrombophilia evaluation showed factor V Leiden mutation, elevated APC resistance, anticardiolipin IgM level,antiphosphatidylserine IgM per hematology note.  Patient has been on Xarelto 20 mg p.o. daily ever since.  Patient saw orthopedic surgery today for left knee pain, was ordered x-ray left knee, MRI left knee, lower venous Doppler to rule out DVT due to history.  X-ray left knee was normal.  Lower venous Doppler showed -chronic nonocclusive DVT in the distal femoral, popliteal and peroneal veins.  Lower venous Doppler in 7/2022 showed -chronic nonocclusive DVT in the popliteal vein only.  Repeat lower extremity venous Doppler-no evidence of DVT in the right leg.  Chronic nonocclusive DVT in the distal femoral, popliteal and peroneal veins  CTA PE study no PE  ED provider discussed case with hematology on-call, recommend heparin drip to bridge with Coumadin.  Continue heparin drip with bridging with Coumadin.  Lovenox bridging with Coumadin discussed with the patient/family and patient is not very comfortable with Lovenox.  Discussed with hematology in detail  D-dimer was negative  Patient will be given 10 mg of Coumadin today.  INR is 1.41

## 2024-03-02 NOTE — PLAN OF CARE
Problem: PAIN - ADULT  Goal: Verbalizes/displays adequate comfort level or baseline comfort level  Description: Interventions:  - Encourage patient to monitor pain and request assistance  - Assess pain using appropriate pain scale  - Administer analgesics based on type and severity of pain and evaluate response  - Implement non-pharmacological measures as appropriate and evaluate response  - Consider cultural and social influences on pain and pain management  - Notify physician/advanced practitioner if interventions unsuccessful or patient reports new pain  Outcome: Progressing     Problem: INFECTION - ADULT  Goal: Absence or prevention of progression during hospitalization  Description: INTERVENTIONS:  - Assess and monitor for signs and symptoms of infection  - Monitor lab/diagnostic results  - Monitor all insertion sites, i.e. indwelling lines, tubes, and drains  - Monitor endotracheal if appropriate and nasal secretions for changes in amount and color  - [place] appropriate cooling/warming therapies per order  - Administer medications as ordered  - Instruct and encourage patient and family to use good hand hygiene technique  - Identify and instruct in appropriate isolation precautions for identified infection/condition  Outcome: Progressing  Goal: Absence of fever/infection during neutropenic period  Description: INTERVENTIONS:  - Monitor WBC    Outcome: Progressing     Problem: SAFETY ADULT  Goal: Patient will remain free of falls  Description: INTERVENTIONS:  - Educate patient/family on patient safety including physical limitations  - Instruct patient to call for assistance with activity   - Consult OT/PT to assist with strengthening/mobility   - Keep Call bell within reach  - Keep bed low and locked with side rails adjusted as appropriate  - Keep care items and personal belongings within reach  - Initiate and maintain comfort rounds  - Make Fall Risk Sign visible to staff  - Offer Toileting every  Hours,  in advance of need  - Initiate/Maintain alarm  - Obtain necessary fall risk management equipment:   - Apply yellow socks and bracelet for high fall risk patients  - Consider moving patient to room near nurses station  Outcome: Progressing  Goal: Maintain or return to baseline ADL function  Description: INTERVENTIONS:  -  Assess patient's ability to carry out ADLs; assess patient's baseline for ADL function and identify physical deficits which impact ability to perform ADLs (bathing, care of mouth/teeth, toileting, grooming, dressing, etc.)  - Assess/evaluate cause of self-care deficits   - Assess range of motion  - Assess patient's mobility; develop plan if impaired  - Assess patient's need for assistive devices and provide as appropriate  - Encourage maximum independence but intervene and supervise when necessary  - Involve family in performance of ADLs  - Assess for home care needs following discharge   - Consider OT consult to assist with ADL evaluation and planning for discharge  - Provide patient education as appropriate  Outcome: Progressing  Goal: Maintains/Returns to pre admission functional level  Description: INTERVENTIONS:  - Perform AM-PAC 6 Click Basic Mobility/ Daily Activity assessment daily.  - Set and communicate daily mobility goal to care team and patient/family/caregiver.   - Collaborate with rehabilitation services on mobility goals if consulted  - Perform Range of Motion  times a day.  - Reposition patient every  hours.  - Dangle patient  times a day  - Stand patient  times a day  - Ambulate patient  times a day  - Out of bed to chair  times a day   - Out of bed for meals times a day  - Out of bed for toileting  - Record patient progress and toleration of activity level   Outcome: Progressing     Problem: DISCHARGE PLANNING  Goal: Discharge to home or other facility with appropriate resources  Description: INTERVENTIONS:  - Identify barriers to discharge w/patient and caregiver  - Arrange for  needed discharge resources and transportation as appropriate  - Identify discharge learning needs (meds, wound care, etc.)  - Arrange for interpretive services to assist at discharge as needed  - Refer to Case Management Department for coordinating discharge planning if the patient needs post-hospital services based on physician/advanced practitioner order or complex needs related to functional status, cognitive ability, or social support system  Outcome: Progressing     Problem: Knowledge Deficit  Goal: Patient/family/caregiver demonstrates understanding of disease process, treatment plan, medications, and discharge instructions  Description: Complete learning assessment and assess knowledge base.  Interventions:  - Provide teaching at level of understanding  - Provide teaching via preferred learning methods  Outcome: Progressing

## 2024-03-02 NOTE — PROGRESS NOTES
[facility]  Progress Note  Name: Edward Conrad I  MRN: [id number]  Unit/Bed#: [address] [address]-01 I Date of Admission: 2/28/2024   Date of Service: 3/2/2024 I Hospital Day: 3    Assessment/Plan   * Chronic deep vein thrombosis (DVT) of left lower extremity (HCC)  Assessment & Plan  Patient presents with left leg DVT, sent to ED by [employer] [facility].  Reports having history of bilateral lower extremity DVT and PE in 2018, on Xarelto. Reports started with left medial knee pain about 2 weeks ago and got worse today, started to have pain in left thigh and left lower leg today with trouble bearing weight/trouble walking today.  Seen Ortho on the day of admission was ordered venous Doppler to rule out DVT which came back positive.  Denies injuries to left knee 2 weeks ago.  Denies paresthesia to left lower extremity.  Chart reviewed.  History of bilateral lower extremity DVT and bilateral PE in 8/2018.  Thrombophilia evaluation showed factor V Leiden mutation, elevated APC resistance, anticardiolipin IgM level,antiphosphatidylserine IgM per hematology note.  Patient has been on Xarelto 20 mg p.o. daily ever since.  Patient saw orthopedic surgery today for left knee pain, was ordered x-ray left knee, MRI left knee, lower venous Doppler to rule out DVT due to history.  X-ray left knee was normal.  Lower venous Doppler showed -chronic nonocclusive DVT in the distal femoral, popliteal and peroneal veins.  Lower venous Doppler in 7/2022 showed -chronic nonocclusive DVT in the popliteal vein only.  Repeat lower extremity venous Doppler-no evidence of DVT in the right leg.  Chronic nonocclusive DVT in the distal femoral, popliteal and peroneal veins  CTA PE study no PE  ED provider discussed case with hematology on-call, recommend heparin drip to bridge with Coumadin.  Continue heparin drip with bridging with Coumadin.  Lovenox bridging with Coumadin discussed with the patient/family and patient is not very comfortable  with Lovenox.  Discussed with hematology in detail  D-dimer was negative  Patient will be given 10 mg of Coumadin today.  INR is 1.41      Acute pain of left knee  Assessment & Plan  As above, x-ray left knee unremarkable.  MRI of left knee ordered as outpatient.  Chronic DVT may be contributing to pain  Pain has improved significantly.  Patient given crutches by orthopedic surgery and able to ambulate without crutches now  PT eval and treat  Follow-up orthopedic surgery as outpatient    History of iron deficiency anemia  Assessment & Plan  History of iron deficient anemia with hemoglobin 9 in 2021 per wife .  Denies overt bleeding at the time.  Patient was prescribed iron supplement at that time, with improvement in hemoglobin afterwards.  Underwent EGD colonoscopy with no source of bleeding but found to have hiatal hernia.  Hemoglobin  11.3 upon admission with low MCV  Iron panel suggesting iron deficiency-iron level 14, percent saturation 6, ferritin 3, TIBC 440.  B12 level was normal  Continue Venofer 300 mg IV daily  Known history of iron deficiency anemia and was on iron supplementation previously  Patient did have EGD and colonoscopy in 2021 which showed hiatal hernia with mucosal erosion at the GE junction, few diverticuli in the sigmoid colon  Patient denies any active GI bleeding- will need  to follow-up with GI for capsule endoscopy    Hypertension  Assessment & Plan  Diet controlled  BP acceptable    Hypercoagulable state (HCC)  Assessment & Plan  As above  Not 100% sure if patient has Xarelto failure  IV heparin and Coumadin as above  Daily INR  Discussed with hematology    Hyperlipidemia  Assessment & Plan  Diet controlled    Obesity (BMI 30-39.9)  Assessment & Plan  Body mass index is 31.08 kg/m².   Diet and lifestyle modification     GERD without esophagitis  Assessment & Plan  Large hiatal hernia on CTA  On Nexium as needed at home.  Continue PPI daily               VTE Pharmacologic Prophylaxis:    High Risk (Score >/= 5) - Pharmacological DVT Prophylaxis Ordered: heparin drip. Sequential Compression Devices Ordered.    Mobility:   Basic Mobility Inpatient Raw Score: 24  JH-HLM Goal: 8: Walk 250 feet or more  JH-HLM Achieved: 8: Walk 250 feet ot more  HLM Goal achieved. Continue to encourage appropriate mobility.    Patient Centered Rounds: I performed bedside rounds with nursing staff today.   Discussions with Specialists or Other Care Team Provider: No    Education and Discussions with Family / Patient: Updated  (wife) at bedside.    Total Time Spent on Date of Encounter in care of patient: 35 mins. This time was spent on one or more of the following: performing physical exam; counseling and coordination of care; obtaining or reviewing history; documenting in the medical record; reviewing/ordering tests, medications or procedures; communicating with other healthcare professionals and discussing with patient's family/caregivers.    Current Length of Stay: 3 day(s)  Current Patient Status: Inpatient   Certification Statement: The patient will continue to require additional inpatient hospital stay due to chronic DVT currently on heparin drip with Coumadin  Discharge Plan: Anticipate discharge in 48-72 hrs to home.    Code Status: Level 1 - Full Code    Subjective:   Patient still having some left knee pain especially with walking.  Did not have a bowel movement.  Denies any shortness of breath or chest pain    Objective:     Vitals:   Temp (24hrs), Av.7 °F (36.5 °C), Min:97.3 °F (36.3 °C), Max:98.1 °F (36.7 °C)    Temp:  [97.3 °F (36.3 °C)-98.1 °F (36.7 °C)] 97.3 °F (36.3 °C)  HR:  [68-79] 68  Resp:  [17-18] 17  BP: (124-142)/(84-91) 124/84  SpO2:  [95 %-96 %] 95 %  Body mass index is 31.08 kg/m².     Input and Output Summary (last 24 hours):     Intake/Output Summary (Last 24 hours) at 3/2/2024 1433  Last data filed at 3/2/2024 1300  Gross per 24 hour   Intake 240 ml   Output --   Net 240 ml        Physical Exam:   Physical Exam  Constitutional:       Appearance: Normal appearance.   HENT:      Head: Normocephalic and atraumatic.   Eyes:      Extraocular Movements: Extraocular movements intact.      Pupils: Pupils are equal, round, and reactive to light.   Cardiovascular:      Rate and Rhythm: Normal rate and regular rhythm.      Heart sounds: No murmur heard.     No gallop.   Pulmonary:      Effort: Pulmonary effort is normal.      Breath sounds: Normal breath sounds.   Abdominal:      General: Bowel sounds are normal.      Palpations: Abdomen is soft.      Tenderness: There is no abdominal tenderness.   Musculoskeletal:         General: No swelling or deformity. Normal range of motion.      Cervical back: Normal range of motion and neck supple.      Left lower leg: Edema present.   Skin:     General: Skin is warm and dry.   Neurological:      General: No focal deficit present.      Mental Status: He is alert.          Additional Data:     Labs:  Results from last 7 days   Lab Units 02/29/24  0920 02/28/24  1840   WBC Thousand/uL 5.88 6.69   HEMOGLOBIN g/dL 10.9* 11.3*   HEMATOCRIT % 35.5* 36.4*   PLATELETS Thousands/uL 236 242   NEUTROS PCT %  --  57   LYMPHS PCT %  --  27   MONOS PCT %  --  11   EOS PCT %  --  4     Results from last 7 days   Lab Units 02/29/24  0920 02/28/24  1840   SODIUM mmol/L 135 135   POTASSIUM mmol/L 4.1 5.2   CHLORIDE mmol/L 104 104   CO2 mmol/L 24 23   BUN mg/dL 19 22   CREATININE mg/dL 1.00 0.96   ANION GAP mmol/L 7 8   CALCIUM mg/dL 8.5 8.9   ALBUMIN g/dL  --  4.4   TOTAL BILIRUBIN mg/dL  --  0.61   ALK PHOS U/L  --  60   ALT U/L  --  34   AST U/L  --  40*   GLUCOSE RANDOM mg/dL 177* 106     Results from last 7 days   Lab Units 03/02/24  0301   INR  1.43*                   Lines/Drains:  Invasive Devices       Peripheral Intravenous Line  Duration             Peripheral IV 02/28/24 Dorsal (posterior);Left Hand 2 days    Peripheral IV 02/28/24 Proximal;Right;Ventral  (anterior) Forearm 2 days                          Imaging: Reviewed radiology reports from this admission including: chest CT scan    Recent Cultures (last 7 days):         Last 24 Hours Medication List:   Current Facility-Administered Medications   Medication Dose Route Frequency Provider Last Rate    acetaminophen  650 mg Oral Q6H PRN Cuicaridadn Dee Deerik, CRNP      docusate sodium  100 mg Oral BID Julio César Santiago MD      heparin (porcine)  3-30 Units/kg/hr (Order-Specific) Intravenous Titrated Cuicaridadn Dee Deerik, CRNP 6 Units/kg/hr (03/02/24 0508)    heparin (porcine)  4,000 Units Intravenous Q6H PRN Cuicaridadn Yurik, CRNP      heparin (porcine)  8,000 Units Intravenous Q6H PRN Cuiteofilo Funezrik, CRNP      iron sucrose  200 mg Intravenous Daily Julio César Santiago MD      lidocaine  1 patch Topical Daily Cualdair Villasenor, CRNP      multivitamin stress formula  1 tablet Oral Daily Cualdair Villasenor, CRNP      ondansetron  4 mg Intravenous Q6H PRN Nicole Funezrik, CRNP      pantoprazole  40 mg Oral Early Morning Cuiteofilo Arriagak, CRNP      polyethylene glycol  17 g Oral Daily PRN Julio César Santiago MD      traMADol  50 mg Oral Q6H PRN Nicole Arriagak, CRNP      warfarin  5 mg Oral Daily (warfarin) Nicole Villasenor, CRNP      warfarin  5 mg Oral Once (warfarin) Julio César Santiago MD          Today, Patient Was Seen By: Julio César Santiago MD    **Please Note: This note may have been constructed using a voice recognition system.**

## 2024-03-02 NOTE — PLAN OF CARE
Problem: PAIN - ADULT  Goal: Verbalizes/displays adequate comfort level or baseline comfort level  Description: Interventions:  - Encourage patient to monitor pain and request assistance  - Assess pain using appropriate pain scale  - Administer analgesics based on type and severity of pain and evaluate response  - Implement non-pharmacological measures as appropriate and evaluate response  - Consider cultural and social influences on pain and pain management  - Notify physician/advanced practitioner if interventions unsuccessful or patient reports new pain  Outcome: Progressing     Problem: INFECTION - ADULT  Goal: Absence or prevention of progression during hospitalization  Description: INTERVENTIONS:  - Assess and monitor for signs and symptoms of infection  - Monitor lab/diagnostic results  - Monitor all insertion sites, i.e. indwelling lines, tubes, and drains  - Monitor endotracheal if appropriate and nasal secretions for changes in amount and color  - [place] appropriate cooling/warming therapies per order  - Administer medications as ordered  - Instruct and encourage patient and family to use good hand hygiene technique  - Identify and instruct in appropriate isolation precautions for identified infection/condition  Outcome: Progressing  Goal: Absence of fever/infection during neutropenic period  Description: INTERVENTIONS:  - Monitor WBC    Outcome: Progressing     Problem: SAFETY ADULT  Goal: Patient will remain free of falls  Description: INTERVENTIONS:  - Educate patient/family on patient safety including physical limitations  - Instruct patient to call for assistance with activity   - Consult OT/PT to assist with strengthening/mobility   - Keep Call bell within reach  - Keep bed low and locked with side rails adjusted as appropriate  - Keep care items and personal belongings within reach  - Initiate and maintain comfort rounds  - Make Fall Risk Sign visible to staff  - Offer Toileting every 2 Hours,  in advance of need  - Initiate/Maintain bed alarm  - Obtain necessary fall risk management equipment: yellow socks  - Apply yellow socks and bracelet for high fall risk patients  - Consider moving patient to room near nurses station  Outcome: Progressing  Goal: Maintain or return to baseline ADL function  Description: INTERVENTIONS:  -  Assess patient's ability to carry out ADLs; assess patient's baseline for ADL function and identify physical deficits which impact ability to perform ADLs (bathing, care of mouth/teeth, toileting, grooming, dressing, etc.)  - Assess/evaluate cause of self-care deficits   - Assess range of motion  - Assess patient's mobility; develop plan if impaired  - Assess patient's need for assistive devices and provide as appropriate  - Encourage maximum independence but intervene and supervise when necessary  - Involve family in performance of ADLs  - Assess for home care needs following discharge   - Consider OT consult to assist with ADL evaluation and planning for discharge  - Provide patient education as appropriate  Outcome: Progressing  Goal: Maintains/Returns to pre admission functional level  Description: INTERVENTIONS:  - Perform AM-PAC 6 Click Basic Mobility/ Daily Activity assessment daily.  - Set and communicate daily mobility goal to care team and patient/family/caregiver.   - Collaborate with rehabilitation services on mobility goals if consulted  - Perform Range of Motion 3 times a day.  - Reposition patient every 2 hours.  - Dangle patient 3 times a day  - Stand patient 3times a day  - Ambulate patient 3 times a day  - Out of bed to chair 3 times a day   - Out of bed for meals 3 times a day  - Out of bed for toileting  - Record patient progress and toleration of activity level   Outcome: Progressing

## 2024-03-03 LAB
APTT PPP: 66 SECONDS (ref 23–37)
APTT PPP: 68 SECONDS (ref 23–37)
APTT PPP: 99 SECONDS (ref 23–37)
INR PPP: 1.93 (ref 0.84–1.19)
PROTHROMBIN TIME: 22.2 SECONDS (ref 11.6–14.5)

## 2024-03-03 PROCEDURE — 85730 THROMBOPLASTIN TIME PARTIAL: CPT | Performed by: INTERNAL MEDICINE

## 2024-03-03 PROCEDURE — 99232 SBSQ HOSP IP/OBS MODERATE 35: CPT | Performed by: INTERNAL MEDICINE

## 2024-03-03 PROCEDURE — 85610 PROTHROMBIN TIME: CPT | Performed by: INTERNAL MEDICINE

## 2024-03-03 RX ORDER — WARFARIN SODIUM 2 MG/1
2 TABLET ORAL
Status: COMPLETED | OUTPATIENT
Start: 2024-03-03 | End: 2024-03-03

## 2024-03-03 RX ADMIN — PANTOPRAZOLE SODIUM 40 MG: 20 TABLET, DELAYED RELEASE ORAL at 06:41

## 2024-03-03 RX ADMIN — IRON SUCROSE 200 MG: 20 INJECTION, SOLUTION INTRAVENOUS at 08:23

## 2024-03-03 RX ADMIN — HEPARIN SODIUM 8 UNITS/KG/HR: 10000 INJECTION, SOLUTION INTRAVENOUS at 00:06

## 2024-03-03 RX ADMIN — LIDOCAINE 5% 1 PATCH: 700 PATCH TOPICAL at 08:12

## 2024-03-03 RX ADMIN — B-COMPLEX W/ C & FOLIC ACID TAB 1 TABLET: TAB at 08:12

## 2024-03-03 RX ADMIN — DOCUSATE SODIUM 100 MG: 100 CAPSULE, LIQUID FILLED ORAL at 08:12

## 2024-03-03 RX ADMIN — WARFARIN SODIUM 2 MG: 2 TABLET ORAL at 17:08

## 2024-03-03 NOTE — ASSESSMENT & PLAN NOTE
Patient presents with left leg DVT, sent to ED by [employer].  Reports having history of bilateral lower extremity DVT and PE in 2018, on Xarelto. Reports started with left medial knee pain about 2 weeks ago and got worse today, started to have pain in left thigh and left lower leg today with trouble bearing weight/trouble walking today.  Seen Ortho on the day of admission was ordered venous Doppler to rule out DVT which came back positive.  Denies injuries to left knee 2 weeks ago.  Denies paresthesia to left lower extremity.  Chart reviewed.  History of bilateral lower extremity DVT and bilateral PE in 8/2018.  Thrombophilia evaluation showed factor V Leiden mutation, elevated APC resistance, anticardiolipin IgM level,antiphosphatidylserine IgM per hematology note.  Patient has been on Xarelto 20 mg p.o. daily ever since.  Patient saw orthopedic surgery today for left knee pain, was ordered x-ray left knee, MRI left knee, lower venous Doppler to rule out DVT due to history.  X-ray left knee was normal.  Lower venous Doppler showed -chronic nonocclusive DVT in the distal femoral, popliteal and peroneal veins.  Lower venous Doppler in 7/2022 showed -chronic nonocclusive DVT in the popliteal vein only.  Repeat lower extremity venous Doppler-no evidence of DVT in the right leg.  Chronic nonocclusive DVT in the distal femoral, popliteal and peroneal veins  CTA PE study no PE  ED provider discussed case with hematology on-call, recommend heparin drip to bridge with Coumadin.  Continue heparin drip with bridging with Coumadin.  Lovenox bridging with Coumadin discussed with the patient/family and patient is not very comfortable with Lovenox.  Discussed with hematology in detail  D-dimer was negative  INR has has improved to 1.9.  Patient to be given Coumadin 2 mg today

## 2024-03-03 NOTE — PROGRESS NOTES
[facility]  Progress Note  Name: Edward Conrad I  MRN: [id number]  Unit/Bed#: [address] [place] [address] I Date of Admission: 2/28/2024   Date of Service: 3/3/2024 I Hospital Day: 4    Assessment/Plan   * Chronic deep vein thrombosis (DVT) of left lower extremity (HCC)  Assessment & Plan  Patient presents with left leg DVT, sent to ED by [employer] tech.  Reports having history of bilateral lower extremity DVT and PE in 2018, on Xarelto. Reports started with left medial knee pain about 2 weeks ago and got worse today, started to have pain in left thigh and left lower leg today with trouble bearing weight/trouble walking today.  Seen Ortho on the day of admission was ordered venous Doppler to rule out DVT which came back positive.  Denies injuries to left knee 2 weeks ago.  Denies paresthesia to left lower extremity.  Chart reviewed.  History of bilateral lower extremity DVT and bilateral PE in 8/2018.  Thrombophilia evaluation showed factor V Leiden mutation, elevated APC resistance, anticardiolipin IgM level,antiphosphatidylserine IgM per hematology note.  Patient has been on Xarelto 20 mg p.o. daily ever since.  Patient saw orthopedic surgery today for left knee pain, was ordered x-ray left knee, MRI left knee, lower venous Doppler to rule out DVT due to history.  X-ray left knee was normal.  Lower venous Doppler showed -chronic nonocclusive DVT in the distal femoral, popliteal and peroneal veins.  Lower venous Doppler in 7/2022 showed -chronic nonocclusive DVT in the popliteal vein only.  Repeat lower extremity venous Doppler-no evidence of DVT in the right leg.  Chronic nonocclusive DVT in the distal femoral, popliteal and peroneal veins  CTA PE study no PE  ED provider discussed case with hematology on-call, recommend heparin drip to bridge with Coumadin.  Continue heparin drip with bridging with Coumadin.  Lovenox bridging with Coumadin discussed with the patient/family and patient is not very comfortable  with Lovenox.  Discussed with hematology in detail  D-dimer was negative  INR has has improved to 1.9.  Patient to be given Coumadin 2 mg today      Acute pain of left knee  Assessment & Plan  As above, x-ray left knee unremarkable.  MRI of left knee ordered as outpatient.  Chronic DVT may be contributing to pain  Pain has improved significantly.  Patient given crutches by orthopedic surgery and able to ambulate without crutches now  PT eval and treat  Follow-up orthopedic surgery as outpatient  Continue Lidoderm patch and as needed pain medication    History of iron deficiency anemia  Assessment & Plan  History of iron deficient anemia with hemoglobin 9 in 2021 per wife .  Denies overt bleeding at the time.  Patient was prescribed iron supplement at that time, with improvement in hemoglobin afterwards.  Underwent EGD colonoscopy with no source of bleeding but found to have hiatal hernia.  Hemoglobin  11.3 upon admission with low MCV  Iron panel suggesting iron deficiency-iron level 14, percent saturation 6, ferritin 3, TIBC 440.  B12 level was normal  Patient received full dose of IV Venofer.  Stool for occult blood x 1 is negative  Known history of iron deficiency anemia and was on iron supplementation previously  Patient did have EGD and colonoscopy in 2021 which showed hiatal hernia with mucosal erosion at the GE junction, few diverticuli in the sigmoid colon  Patient denies any active GI bleeding- will need  to follow-up with GI for capsule endoscopy    Hypertension  Assessment & Plan  Diet controlled  BP acceptable    Hypercoagulable state (HCC)  Assessment & Plan  As above  Not 100% sure if patient has Xarelto failure  IV heparin and Coumadin as above  Daily INR  Discussed with hematology    Hyperlipidemia  Assessment & Plan  Diet controlled    Obesity (BMI 30-39.9)  Assessment & Plan  Body mass index is 31.08 kg/m².   Diet and lifestyle modification     GERD without esophagitis  Assessment & Plan  Large  hiatal hernia on CTA  On Nexium as needed at home.  Continue PPI daily               VTE Pharmacologic Prophylaxis:   High Risk (Score >/= 5) - Pharmacological DVT Prophylaxis Ordered: warfarin (Coumadin). Sequential Compression Devices Ordered.    Mobility:   Basic Mobility Inpatient Raw Score: 24  JH-HLM Goal: 8: Walk 250 feet or more  JH-HLM Achieved: 8: Walk 250 feet ot more  HLM Goal achieved. Continue to encourage appropriate mobility.    Patient Centered Rounds: I performed bedside rounds with nursing staff today.   Discussions with Specialists or Other Care Team Provider: No    Education and Discussions with Family / Patient: yes    Total Time Spent on Date of Encounter in care of patient: 35 mins. This time was spent on one or more of the following: performing physical exam; counseling and coordination of care; obtaining or reviewing history; documenting in the medical record; reviewing/ordering tests, medications or procedures; communicating with other healthcare professionals and discussing with patient's family/caregivers.    Current Length of Stay: 4 day(s)  Current Patient Status: Inpatient   Certification Statement: The patient will continue to require additional inpatient hospital stay due to left leg DVT  Discharge Plan: Anticipate discharge in 24-48 hrs to home.    Code Status: Level 1 - Full Code    Subjective:   Patient still complaining of some pain in the left knee which is 4/10 intensity.  Patient did have a bowel movement yesterday.  Denies any chest pain or shortness of breath    Objective:     Vitals:   Temp (24hrs), Av.8 °F (36.6 °C), Min:97.4 °F (36.3 °C), Max:98.3 °F (36.8 °C)    Temp:  [97.4 °F (36.3 °C)-98.3 °F (36.8 °C)] 97.7 °F (36.5 °C)  HR:  [65-79] 76  Resp:  [18-19] 19  BP: (119-152)/(80-93) 119/83  SpO2:  [94 %-99 %] 97 %  Body mass index is 31.08 kg/m².     Input and Output Summary (last 24 hours):     Intake/Output Summary (Last 24 hours) at 3/3/2024 1875  Last data filed  at 3/3/2024 0901  Gross per 24 hour   Intake 1398.8 ml   Output 400 ml   Net 998.8 ml       Physical Exam:   Physical Exam  Constitutional:       Appearance: Normal appearance.   HENT:      Head: Normocephalic and atraumatic.   Eyes:      Extraocular Movements: Extraocular movements intact.      Pupils: Pupils are equal, round, and reactive to light.   Cardiovascular:      Rate and Rhythm: Normal rate and regular rhythm.      Heart sounds: No murmur heard.     No gallop.   Pulmonary:      Effort: Pulmonary effort is normal.      Breath sounds: Normal breath sounds.   Abdominal:      General: Bowel sounds are normal.      Palpations: Abdomen is soft.      Tenderness: There is no abdominal tenderness.   Musculoskeletal:         General: No swelling or deformity. Normal range of motion.      Cervical back: Normal range of motion and neck supple.      Left lower leg: Edema present.   Skin:     General: Skin is warm and dry.   Neurological:      General: No focal deficit present.      Mental Status: He is alert.         Additional Data:     Labs:  Results from last 7 days   Lab Units 02/29/24  0920 02/28/24  1840   WBC Thousand/uL 5.88 6.69   HEMOGLOBIN g/dL 10.9* 11.3*   HEMATOCRIT % 35.5* 36.4*   PLATELETS Thousands/uL 236 242   NEUTROS PCT %  --  57   LYMPHS PCT %  --  27   MONOS PCT %  --  11   EOS PCT %  --  4     Results from last 7 days   Lab Units 02/29/24  0920 02/28/24  1840   SODIUM mmol/L 135 135   POTASSIUM mmol/L 4.1 5.2   CHLORIDE mmol/L 104 104   CO2 mmol/L 24 23   BUN mg/dL 19 22   CREATININE mg/dL 1.00 0.96   ANION GAP mmol/L 7 8   CALCIUM mg/dL 8.5 8.9   ALBUMIN g/dL  --  4.4   TOTAL BILIRUBIN mg/dL  --  0.61   ALK PHOS U/L  --  60   ALT U/L  --  34   AST U/L  --  40*   GLUCOSE RANDOM mg/dL 177* 106     Results from last 7 days   Lab Units 03/03/24  0717   INR  1.93*                   Lines/Drains:  Invasive Devices       Peripheral Intravenous Line  Duration             Peripheral IV 02/28/24 Dorsal  (posterior);Left Hand 3 days    Peripheral IV 02/28/24 Proximal;Right;Ventral (anterior) Forearm 3 days                          Imaging: Reviewed radiology reports from this admission including: chest CT scan    Recent Cultures (last 7 days):         Last 24 Hours Medication List:   Current Facility-Administered Medications   Medication Dose Route Frequency Provider Last Rate    acetaminophen  650 mg Oral Q6H PRN Cuiyin Yurik, CRNP      docusate sodium  100 mg Oral BID Julio César Santiago MD      heparin (porcine)  3-30 Units/kg/hr (Order-Specific) Intravenous Titrated Cuiyin Yurik, CRNP 6 Units/kg/hr (03/03/24 0144)    heparin (porcine)  4,000 Units Intravenous Q6H PRN Cuiyin Yurik, CRNP      heparin (porcine)  8,000 Units Intravenous Q6H PRN Cuiyin Yurik, CRNP      lidocaine  1 patch Topical Daily Cuiyin Yurik, CRNP      multivitamin stress formula  1 tablet Oral Daily Cuiyin Yurik, CRNP      ondansetron  4 mg Intravenous Q6H PRN Cuiyin Yurik, CRNP      pantoprazole  40 mg Oral Early Morning Cuiyin Yurik, CRNP      polyethylene glycol  17 g Oral Daily PRN Julio César Santiago MD      traMADol  50 mg Oral Q6H PRN Cuiyin Yurik, CRNP      warfarin  2 mg Oral Once (warfarin) Julio César Santiago MD          Today, Patient Was Seen By: Julio César Santiago MD    **Please Note: This note may have been constructed using a voice recognition system.**

## 2024-03-03 NOTE — ASSESSMENT & PLAN NOTE
As above, x-ray left knee unremarkable.  MRI of left knee ordered as outpatient.  Chronic DVT may be contributing to pain  Pain has improved significantly.  Patient given crutches by orthopedic surgery and able to ambulate without crutches now  PT eval and treat  Follow-up orthopedic surgery as outpatient  Continue Lidoderm patch and as needed pain medication

## 2024-03-03 NOTE — ASSESSMENT & PLAN NOTE
History of iron deficient anemia with hemoglobin 9 in 2021 per wife .  Denies overt bleeding at the time.  Patient was prescribed iron supplement at that time, with improvement in hemoglobin afterwards.  Underwent EGD colonoscopy with no source of bleeding but found to have hiatal hernia.  Hemoglobin  11.3 upon admission with low MCV  Iron panel suggesting iron deficiency-iron level 14, percent saturation 6, ferritin 3, TIBC 440.  B12 level was normal  Patient received full dose of IV Venofer.  Stool for occult blood x 1 is negative  Known history of iron deficiency anemia and was on iron supplementation previously  Patient did have EGD and colonoscopy in 2021 which showed hiatal hernia with mucosal erosion at the GE junction, few diverticuli in the sigmoid colon  Patient denies any active GI bleeding- will need  to follow-up with GI for capsule endoscopy

## 2024-03-04 LAB
APTT PPP: 61 SECONDS (ref 23–37)
INR PPP: 1.91 (ref 0.84–1.19)
PROTHROMBIN TIME: 22 SECONDS (ref 11.6–14.5)

## 2024-03-04 PROCEDURE — 85730 THROMBOPLASTIN TIME PARTIAL: CPT | Performed by: INTERNAL MEDICINE

## 2024-03-04 PROCEDURE — 85610 PROTHROMBIN TIME: CPT | Performed by: INTERNAL MEDICINE

## 2024-03-04 PROCEDURE — 99232 SBSQ HOSP IP/OBS MODERATE 35: CPT | Performed by: INTERNAL MEDICINE

## 2024-03-04 RX ORDER — WARFARIN SODIUM 5 MG/1
5 TABLET ORAL
Status: DISCONTINUED | OUTPATIENT
Start: 2024-03-04 | End: 2024-03-06

## 2024-03-04 RX ADMIN — WARFARIN SODIUM 5 MG: 5 TABLET ORAL at 17:53

## 2024-03-04 RX ADMIN — LIDOCAINE 5% 1 PATCH: 700 PATCH TOPICAL at 09:58

## 2024-03-04 RX ADMIN — HEPARIN SODIUM 6 UNITS/KG/HR: 10000 INJECTION, SOLUTION INTRAVENOUS at 15:04

## 2024-03-04 RX ADMIN — B-COMPLEX W/ C & FOLIC ACID TAB 1 TABLET: TAB at 09:59

## 2024-03-04 RX ADMIN — PANTOPRAZOLE SODIUM 40 MG: 20 TABLET, DELAYED RELEASE ORAL at 05:39

## 2024-03-04 NOTE — ASSESSMENT & PLAN NOTE
Patient presents with left leg DVT, sent to ED by [employer].  Reports having history of bilateral lower extremity DVT and PE in 2018, on Xarelto. Reports started with left medial knee pain about 2 weeks ago and got worse today, started to have pain in left thigh and left lower leg today with trouble bearing weight/trouble walking today.  Seen Ortho on the day of admission was ordered venous Doppler to rule out DVT which came back positive.  Denies injuries to left knee 2 weeks ago.  Denies paresthesia to left lower extremity.  Chart reviewed.  History of bilateral lower extremity DVT and bilateral PE in 8/2018.  Thrombophilia evaluation showed factor V Leiden mutation, elevated APC resistance, anticardiolipin IgM level,antiphosphatidylserine IgM per hematology note.  Patient has been on Xarelto 20 mg p.o. daily ever since.  Patient saw orthopedic surgery today for left knee pain, was ordered x-ray left knee, MRI left knee, lower venous Doppler to rule out DVT due to history.  X-ray left knee was normal.  Lower venous Doppler showed -chronic nonocclusive DVT in the distal femoral, popliteal and peroneal veins.  Lower venous Doppler in 7/2022 showed -chronic nonocclusive DVT in the popliteal vein only.  Repeat lower extremity venous Doppler-no evidence of DVT in the right leg.  Chronic nonocclusive DVT in the distal femoral, popliteal and peroneal veins  CTA PE study no PE  ED provider discussed case with hematology on-call, recommend heparin drip to bridge with Coumadin.  Continue heparin drip with bridging with Coumadin.  Lovenox bridging with Coumadin discussed with the patient/family and patient is not very comfortable with Lovenox.  Discussed with hematology in detail  D-dimer was negative  And INR has improved and has been stable at 1.9  Coumadin 5 mg p.o. daily ordered

## 2024-03-04 NOTE — UTILIZATION REVIEW
Initial Clinical Review    Admission: Date/Time/Statement:   Admission Orders (From admission, onward)       Ordered        02/28/24 2010  INPATIENT ADMISSION  Once                          Orders Placed This Encounter   Procedures    INPATIENT ADMISSION     Standing Status:   Standing     Number of Occurrences:   1     Order Specific Question:   Level of Care     Answer:   Med Surg [16]     Order Specific Question:   Estimated length of stay     Answer:   More than 2 Midnights     Order Specific Question:   Certification     Answer:   I certify that inpatient services are medically necessary for this patient for a duration of greater than two midnights. See H&P and MD Progress Notes for additional information about the patient's course of treatment.     ED Arrival Information       Expected   -    Arrival   2/28/2024 17:50    Acuity   Urgent              Means of arrival   Walk-In    Escorted by   Family Member    Service   Hospitalist    Admission type   Emergency              Arrival complaint   POSSIBLE BLOOD CLOTS             Chief Complaint   Patient presents with    Abnormal Lab     Patient had US of left leg for hx of knee pain. Was notified by [facility] to go to ED.       Initial Presentation:   53 yom to ER by US [facility] with DVT. Pt reports having history of bilateral lower extremity DVT and PE in 2018, on Xarelto. Reports started with left medial knee pain about 2 weeks ago and got worse today, started to have pain in left thigh and left lower leg today with trouble bearing weight/trouble walking today.  Seen Ortho today, was ordered venous Doppler to rule out DVT which came back positive. Hx bilateral lower extremity DVT, PE, hypercoagulable state, hypertension, hyperlipidemia, iron deficient anemia, GERD, hiatal hernia, obesity. Presents with edema LLE, left knee non tender.  Mild tenderness left thigh and left lower leg.  OP US showing Lower venous Doppler showed -chronic nonocclusive DVT in the distal  femoral, popliteal and peroneal veins per MD note, await final read.  Admitted to inpatient status for chronic DVT LLE. Started on IV weight based Heparin gtt, Coumadin.    Date: 2/29/24   Day 2:   Repeat lower extremity venous Doppler-no evidence of DVT in the right leg.  Chronic nonocclusive DVT in the distal femoral, popliteal and peroneal veins. Continue heparin drip with bridging with Coumadin. Lovenox bridging with Coumadin. Iron panel suggesting iron deficiency-iron level 14, percent saturation 6, ferritin 3, TIBC 440.  B12 level was normal, ordered for Venofer 300 mg IV daily     Per heme:  Recent CTA/chest did not demonstrate any evidence of a new PE.  Additionally, the recent lower extremity Dopplers did not demonstrate any evidence of acute/new DVT.  Patient has evidence of chronic nonocclusive left lower extremity thromboses.  D-dimer was not elevated.  At this moment, I am not 100% convinced that the left knee pain is a Xarelto failure (as there is no evidence of an acute DVT).  Patient is on heparin, PTT is being monitored.  Patient has also been started on Coumadin at 5 milligrams a day.  INR is also being monitored.    Prior thrombophilia workup is listed below.  Besides the heterozygous factor V Leiden mutation, patient had an elevated anticardiolipin IgM and an elevated antiphosphatidylserine IgM.  These were never serialized as patient understood that he required lifelong anticoagulation and did not feel that the tests needed to be repeated.  The concern was that patient had antiphospholipid antibody syndrome besides the factor V Leiden mutation.       ED Triage Vitals   Temperature Pulse Respirations Blood Pressure SpO2   02/28/24 1805 02/28/24 1805 02/28/24 1805 02/28/24 1805 02/28/24 1805   98.7 °F (37.1 °C) 83 18 133/91 95 %      Temp Source Heart Rate Source Patient Position - Orthostatic VS BP Location FiO2 (%)   02/28/24 1805 02/28/24 1805 02/28/24 1805 02/28/24 1805 --   Temporal Monitor  Sitting Left arm       Pain Score       02/28/24 2104       8          Wt Readings from Last 1 Encounters:   02/28/24 98.2 kg (216 lb 9.6 oz)     Additional Vital Signs:   Date/Time Temp Pulse Resp BP MAP (mmHg) SpO2 O2 Device Patient Position - Orthostatic VS   02/29/24 02:53:32 98.2 °F (36.8 °C) 65 16 147/83 104 97 % -- --   02/28/24 2320 -- -- -- -- -- -- None (Room air) --   02/28/24 21:17:37 98.5 °F (36.9 °C) -- 18 141/98 112 -- -- --   02/28/24 21:16:13 -- -- 19 141/98 112 -- -- --   02/28/24 2030 -- 70 20 143/95 114 97 % -- --   02/28/24 2000 -- 74 18 150/94 115 97 % -- --   02/28/24 1805 98.7 °F (37.1 °C) 83 18 133/91 -- 95 % None (Room air) Sitting     Pertinent Labs/Diagnostic Test Results:   CTA ED chest PE Study   Final Result by Raisa Silva MD (02/28 1953)      No pulmonary embolus.      Lungs clear.      Large hiatal hernia.     Results from last 7 days   Lab Units 02/29/24  0920 02/28/24  1840   WBC Thousand/uL 5.88 6.69   HEMOGLOBIN g/dL 10.9* 11.3*   HEMATOCRIT % 35.5* 36.4*   PLATELETS Thousands/uL 236 242   NEUTROS ABS Thousands/µL  --  3.85     Results from last 7 days   Lab Units 02/29/24  0920 02/28/24  1840   SODIUM mmol/L 135 135   POTASSIUM mmol/L 4.1 5.2   CHLORIDE mmol/L 104 104   CO2 mmol/L 24 23   ANION GAP mmol/L 7 8   BUN mg/dL 19 22   CREATININE mg/dL 1.00 0.96   EGFR ml/min/1.73sq m 84 89   CALCIUM mg/dL 8.5 8.9   MAGNESIUM mg/dL 2.1  --      Results from last 7 days   Lab Units 02/28/24  1840   AST U/L 40*   ALT U/L 34   ALK PHOS U/L 60   TOTAL PROTEIN g/dL 7.7   ALBUMIN g/dL 4.4   TOTAL BILIRUBIN mg/dL 0.61     Results from last 7 days   Lab Units 02/29/24  0920 02/28/24  1840   GLUCOSE RANDOM mg/dL 177* 106     Results from last 7 days   Lab Units 02/28/24  1840   HS TNI 0HR ng/L 2     Results from last 7 days   Lab Units 03/04/24  0540 03/03/24  1408 03/03/24  0717 03/02/24  1105 03/02/24  0301   PROTIME seconds 22.0*  --  22.2*  --  17.7*   INR  1.91*  --  1.93*  --   1.43*   PTT seconds 61* 66* 68*   < > 129*    < > = values in this interval not displayed.     Results from last 7 days   Lab Units 02/28/24  1840   BNP pg/mL 15     Results from last 7 days   Lab Units 02/28/24  1840   FERRITIN ng/mL 6*   IRON SATURATION % 3*   IRON ug/dL 14*   TIBC ug/dL 449       ED Treatment:   Medication Administration from 02/28/2024 1750 to 02/28/2024 2100         Date/Time Order Dose Route Action     02/28/2024 1953 EST heparin (porcine) injection 8,000 Units 8,000 Units Intravenous Given     02/28/2024 1953 EST heparin (porcine) 25,000 units in 0.45% NaCl 250 mL infusion (premix) 18 Units/kg/hr Intravenous New Bag     02/28/2024 1937 EST iohexol (OMNIPAQUE) 350 MG/ML injection (MULTI-DOSE) 100 mL 85 mL Intravenous Given     02/28/2024 2030 EST warfarin (COUMADIN) tablet 5 mg 5 mg Oral Given          Past Medical History:   Diagnosis Date    Anemia     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 07/12/2006    DVT (deep venous thrombosis) (HCC) 2018    BL legs    Dysphagia     Last Assessed: 8/12/2014     Factor 5 Leiden mutation, heterozygous ([facility])     GERD (gastroesophageal reflux disease)     Globus sensation     Last Assessed: 6/4/2014     H/O neoplasm of uncertain behavior of skin 06/06/2006    Hyperlipidemia     Hypertension 02/14/2006    Lateral epicondylitis of right elbow     Last Assessed: 10/23/2015     Pes planus     last assessed: 10/23/2013     Plantar fascial fibromatosis     Last Assessed: 10/23/2013     Pulmonary embolism (HCC) 2018    Right patellofemoral syndrome     Last Assessed: 4/15/2016     Sebaceous cyst 11/03/2007     Present on Admission:   Obesity (BMI 30-39.9)   Hyperlipidemia   GERD without esophagitis   Hypercoagulable state (HCC)      Admitting Diagnosis: Dyspnea [R06.00]  Abnormal laboratory test [R89.9]  Hx of long term use of blood thinners [Z92.29]  Left leg pain [M79.605]  History of DVT (deep vein thrombosis) [Z86.718]  Age/Sex: 53 y.o. male  Admission  Orders:  PT eval & tx  Consult hematology    Scheduled Medications:  Medications 02/21 02/22 02/23 02/24 02/25 02/26 02/27 02/28 02/29 03/01   heparin (porcine) injection 8,000 Units  Dose: 8,000 Units  Freq: Once Route: IV  Start: 02/28/24 1915 End: 02/28/24 1953   Admin Instructions:              1953          heparin (VTE/PE) high  Freq: Once Route: IV  Start: 02/28/24 1915 End: 02/28/24 1913   Admin Instructions:      Order specific questions:              1913-D/C'd        iron sucrose (VENOFER) 200 mg in sodium chloride 0.9 % 100 mL IVPB  Dose: 200 mg  Freq: Daily Route: IV  Start: 02/29/24 1400            1357      0900        lidocaine (LIDODERM) 5 % patch 1 patch  Dose: 1 patch  Freq: Daily Route: TP  Start: 02/29/24 0130   Admin Instructions:      Order specific questions:               0153 [C]     0831     0832 [C]     2111      0900        multivitamin stress formula tablet 1 tablet  Dose: 1 tablet  Freq: Daily Route: PO  Start: 02/29/24 0900   Admin Instructions:               0831      0900        pantoprazole (PROTONIX) EC tablet 40 mg  Dose: 40 mg  Freq: Daily (early morning) Route: PO  Start: 02/29/24 0600   Admin Instructions:               0613      0617        warfarin (COUMADIN) tablet 5 mg  Dose: 5 mg  Freq: Daily (warfarin) Route: PO  Start: 02/29/24 1800   Admin Instructions:      Order specific questions:               1757      1800        warfarin (COUMADIN) tablet 5 mg  Dose: 5 mg  Freq: Once (warfarin) Route: PO  Start: 02/28/24 2015 End: 02/28/24 2030   Admin Instructions:      Order specific questions:              2030          Legend:       [id number]/2102/2202/2302/2402/2502/2602/2702/2802/2903/01        Continuous Meds Sorted by Name  for Edward Conrad as of 02/21/24 through 3/1/24  Legend:     Given Hold Not Given Due Canceled Entry Other Actions     Time Time (Time) Time Time-Action         Inactive     Active     Other Encounter     Linked              Medications 02/21  02/22 02/23 02/24 02/25 02/26 02/27 02/28 02/29 03/01   heparin (porcine) 25,000 units in 0.45% NaCl 250 mL infusion (premix)  Rate: 3-30 mL/hr Dose: 3-30 Units/kg/hr  Weight Dosing Info: 100 kg (Order-Specific)  Freq: Titrated Route: IV  Last Dose: 7 Units/kg/hr (03/01/24 0738)  Start: 02/28/24 1915   Admin Instructions:      Order specific questions:              1953      0238 [C]     0338     1002 [C]     1321     1755 [C]      0037     0738 [C]        Legend:     Given Hold Not Given Due Canceled Entry Other Actions     Time Time (Time) Time Time-Action         Inactive     Active     Other Encounter     Linked              [id number]/2102/2202/2302/2402/2502/2602/2702/2802/2903/01        PRN Meds Sorted by Name  for Edward Conrad as of 02/21/24 through 3/1/24  Legend:       Medications 02/21 02/22 02/23 02/24 02/25 02/26 02/27 02/28 02/29 03/01   acetaminophen (TYLENOL) tablet 650 mg  Dose: 650 mg  Freq: Every 6 hours PRN Route: PO  PRN Reasons: mild pain,headaches,fever  Indications of Use: FEVER,HEADACHE,MILD PAIN  Start: 02/28/24 2147 2236          heparin (porcine) injection 4,000 Units  Dose: 4,000 Units  Freq: Every 6 hours PRN Route: IV  PRN Comment: PTT 43 - 59 seconds  Start: 02/28/24 1912   Admin Instructions:                   heparin (porcine) injection 8,000 Units  Dose: 8,000 Units  Freq: Every 6 hours PRN Route: IV  PRN Comment: PTT less than or equal to 42 seconds  Start: 02/28/24 1912   Admin Instructions:                   iohexol (OMNIPAQUE) 350 MG/ML injection (MULTI-DOSE) 100 mL  Dose: 100 mL  Freq: Once in imaging Route: IV  PRN Reason: contrast  Start: 02/28/24 1937 End: 02/28/24 1937 1937          ondansetron (ZOFRAN) injection 4 mg  Dose: 4 mg  Freq: Every 6 hours PRN Route: IV  PRN Reasons: nausea,vomiting  Start: 02/28/24 2147   Admin Instructions:                   traMADol (ULTRAM) tablet 50 mg  Dose: 50 mg  Freq: Every 6 hours PRN Route: PO  PRN Reasons:  moderate pain,severe pain  Start: 02/29/24 0120   Admin Instructions:               0153     0831     1448      0005                      Network Utilization Review Department  ATTENTION: Please call with any questions or concerns to [phone] and carefully listen to the prompts so that you are directed to the right person. All voicemails are confidential.   For Discharge needs, contact Care Management DC Support Team at [phone] opt. 2  Send all requests for admission clinical reviews, approved or denied determinations and any other requests to dedicated fax number below belonging to the campus where the patient is receiving treatment. List of dedicated fax numbers for the Facilities:  FACILITY NAME UR FAX NUMBER   ADMISSION DENIALS (Administrative/Medical Necessity) [phone]   DISCHARGE SUPPORT TEAM (NETWORK) [phone]   PARENT CHILD HEALTH (Maternity/NICU/Pediatrics) [phone]   [facility] [phone]   [facility] [phone]   [facility] [phone]   [facility] [phone]   [facility] [phone]   [facility] [phone]   [facility] [phone]   [facility] [phone]   [facility] [phone]   [facility] [phone]   [facility] [phone]   [facility] [phone]

## 2024-03-04 NOTE — PLAN OF CARE
Problem: PAIN - ADULT  Goal: Verbalizes/displays adequate comfort level or baseline comfort level  Description: Interventions:  - Encourage patient to monitor pain and request assistance  - Assess pain using appropriate pain scale  - Administer analgesics based on type and severity of pain and evaluate response  - Implement non-pharmacological measures as appropriate and evaluate response  - Consider cultural and social influences on pain and pain management  - Notify physician/advanced practitioner if interventions unsuccessful or patient reports new pain  Outcome: Progressing     Problem: INFECTION - ADULT  Goal: Absence or prevention of progression during hospitalization  Description: INTERVENTIONS:  - Assess and monitor for signs and symptoms of infection  - Monitor lab/diagnostic results      Outcome: Progressing  Goal: Absence of fever/infection during neutropenic period  Description: INTERVENTIONS:  - Monitor WBC    Outcome: Progressing     Problem: SAFETY ADULT  Goal: Patient will remain free of falls  Description: INTERVENTIONS:  - Educate patient/family on patient safety including physical limitations  - Instruct patient to call for assistance with activity   - Consult OT/PT to assist with strengthening/mobility   - Keep Call bell within reach    Outcome: Progressing  Goal: Maintain or return to baseline ADL function  Description: INTERVENTIONS:  -  Assess patient's ability to carry out ADLs; assess patient's baseline for ADL function and identify physical deficits which impact ability to perform ADLs (bathing, care of mouth/teeth, toileting, grooming, dressing, etc.)  - Assess/evaluate cause of self-care deficits   - Assess range of motion  - Assess patient's mobility; develop plan if impaired  - Assess patient's need for assistive devices and provide as appropriate  - Encourage maximum independence but intervene and supervise when necessary  - Involve family in performance of ADLs  - Assess for home  care needs following discharge   - Consider OT consult to assist with ADL evaluation and planning for discharge  - Provide patient education as appropriate  Outcome: Progressing  Goal: Maintains/Returns to pre admission functional level  Description: INTERVENTIONS:  - Perform AM-PAC 6 Click Basic Mobility/ Daily Activity assessment daily.  - Set and communicate daily mobility goal to care team and patient/family/caregiver.     - Out of bed for toileting  - Record patient progress and toleration of activity level   Outcome: Progressing     Problem: DISCHARGE PLANNING  Goal: Discharge to home or other facility with appropriate resources  Description: INTERVENTIONS:  - Identify barriers to discharge w/patient and caregiver  - Arrange for needed discharge resources and transportation as appropriate  - Identify discharge learning needs (meds, wound care, etc.)  - Arrange for interpretive services to assist at discharge as needed  - Refer to Case Management Department for coordinating discharge planning if the patient needs post-hospital services based on physician/advanced practitioner order or complex needs related to functional status, cognitive ability, or social support system  Outcome: Progressing     Problem: Knowledge Deficit  Goal: Patient/family/caregiver demonstrates understanding of disease process, treatment plan, medications, and discharge instructions  Description: Complete learning assessment and assess knowledge base.  Interventions:  - Provide teaching at level of understanding  - Provide teaching via preferred learning methods  Outcome: Progressing

## 2024-03-04 NOTE — PROGRESS NOTES
[facility]  Progress Note  Name: Edward Conrad I  MRN: [id number]  Unit/Bed#: [address] [place] [address]-01 I Date of Admission: 2/28/2024   Date of Service: 3/4/2024 I Hospital Day: 5    Assessment/Plan   * Chronic deep vein thrombosis (DVT) of left lower extremity (HCC)  Assessment & Plan  Patient presents with left leg DVT, sent to ED by [employer] tech.  Reports having history of bilateral lower extremity DVT and PE in 2018, on Xarelto. Reports started with left medial knee pain about 2 weeks ago and got worse today, started to have pain in left thigh and left lower leg today with trouble bearing weight/trouble walking today.  Seen Ortho on the day of admission was ordered venous Doppler to rule out DVT which came back positive.  Denies injuries to left knee 2 weeks ago.  Denies paresthesia to left lower extremity.  Chart reviewed.  History of bilateral lower extremity DVT and bilateral PE in 8/2018.  Thrombophilia evaluation showed factor V Leiden mutation, elevated APC resistance, anticardiolipin IgM level,antiphosphatidylserine IgM per hematology note.  Patient has been on Xarelto 20 mg p.o. daily ever since.  Patient saw orthopedic surgery today for left knee pain, was ordered x-ray left knee, MRI left knee, lower venous Doppler to rule out DVT due to history.  X-ray left knee was normal.  Lower venous Doppler showed -chronic nonocclusive DVT in the distal femoral, popliteal and peroneal veins.  Lower venous Doppler in 7/2022 showed -chronic nonocclusive DVT in the popliteal vein only.  Repeat lower extremity venous Doppler-no evidence of DVT in the right leg.  Chronic nonocclusive DVT in the distal femoral, popliteal and peroneal veins  CTA PE study no PE  ED provider discussed case with hematology on-call, recommend heparin drip to bridge with Coumadin.  Continue heparin drip with bridging with Coumadin.  Lovenox bridging with Coumadin discussed with the patient/family and patient is not very comfortable  with Lovenox.  Discussed with hematology in detail  D-dimer was negative  And INR has improved and has been stable at 1.9  Coumadin 5 mg p.o. daily ordered      Acute pain of left knee  Assessment & Plan  As above, x-ray left knee unremarkable.  MRI of left knee ordered as outpatient.  Chronic DVT may be contributing to pain  Pain has improved significantly.  Patient given crutches by orthopedic surgery and able to ambulate without crutches now  PT eval and treat  Follow-up orthopedic surgery as outpatient  Continue Lidoderm patch and as needed pain medication    History of iron deficiency anemia  Assessment & Plan  History of iron deficient anemia with hemoglobin 9 in 2021 per wife .  Denies overt bleeding at the time.  Patient was prescribed iron supplement at that time, with improvement in hemoglobin afterwards.  Underwent EGD colonoscopy with no source of bleeding but found to have hiatal hernia.  Hemoglobin  11.3 upon admission with low MCV  Iron panel suggesting iron deficiency-iron level 14, percent saturation 6, ferritin 3, TIBC 440.  B12 level was normal  Patient received 4 doses of IV Venofer  Stool for occult blood x 1 is negative  Known history of iron deficiency anemia and was on iron supplementation previously  Patient did have EGD and colonoscopy in 2021 which showed hiatal hernia with mucosal erosion at the GE junction, few diverticuli in the sigmoid colon  Patient denies any active GI bleeding- will need  to follow-up with GI for capsule endoscopy    Hypertension  Assessment & Plan  Diet controlled  BP acceptable    Hypercoagulable state (HCC)  Assessment & Plan  As above  Not 100% sure if patient has Xarelto failure  IV heparin and Coumadin as above  Daily INR  Discussed with hematology    Hyperlipidemia  Assessment & Plan  Diet controlled    Obesity (BMI 30-39.9)  Assessment & Plan  Body mass index is 31.08 kg/m².   Diet and lifestyle modification     GERD without esophagitis  Assessment &  Plan  Large hiatal hernia on CTA  On Nexium as needed at home.  Continue PPI daily               VTE Pharmacologic Prophylaxis:   High Risk (Score >/= 5) - Pharmacological DVT Prophylaxis Ordered: warfarin (Coumadin). Sequential Compression Devices Ordered.    Mobility:   Basic Mobility Inpatient Raw Score: 24  JH-HLM Goal: 8: Walk 250 feet or more  JH-HLM Achieved: 8: Walk 250 feet ot more  HLM Goal achieved. Continue to encourage appropriate mobility.    Patient Centered Rounds: I performed bedside rounds with nursing staff today.   Discussions with Specialists or Other Care Team Provider: no    Education and Discussions with Family / Patient: yes    Total Time Spent on Date of Encounter in care of patient: 35 mins. This time was spent on one or more of the following: performing physical exam; counseling and coordination of care; obtaining or reviewing history; documenting in the medical record; reviewing/ordering tests, medications or procedures; communicating with other healthcare professionals and discussing with patient's family/caregivers.    Current Length of Stay: 5 day(s)  Current Patient Status: Inpatient   Certification Statement: The patient will continue to require additional inpatient hospital stay due to chronic DVT, hypercoagulable state  Discharge Plan: Anticipate discharge in 24-48 hrs to home.    Code Status: Level 1 - Full Code    Subjective:   Patient still complaining of 4/10 left knee pain.  Patient has been ambulating well.  Did have a bowel movement yesterday which was normal    Objective:     Vitals:   Temp (24hrs), Av.6 °F (36.4 °C), Min:97.1 °F (36.2 °C), Max:98 °F (36.7 °C)    Temp:  [97.1 °F (36.2 °C)-98 °F (36.7 °C)] 97.6 °F (36.4 °C)  HR:  [67-77] 76  Resp:  [16-20] 17  BP: (104-132)/(79-89) 104/79  SpO2:  [95 %-96 %] 96 %  Body mass index is 31.08 kg/m².     Input and Output Summary (last 24 hours):     Intake/Output Summary (Last 24 hours) at 3/4/2024 6851  Last data filed at  3/3/2024 2000  Gross per 24 hour   Intake 410 ml   Output --   Net 410 ml       Physical Exam:   Physical Exam  Constitutional:       Appearance: Normal appearance.   HENT:      Head: Normocephalic and atraumatic.   Eyes:      Extraocular Movements: Extraocular movements intact.      Pupils: Pupils are equal, round, and reactive to light.   Cardiovascular:      Rate and Rhythm: Normal rate and regular rhythm.      Heart sounds: No murmur heard.     No gallop.   Pulmonary:      Effort: Pulmonary effort is normal.      Breath sounds: Normal breath sounds.   Abdominal:      General: Bowel sounds are normal.      Palpations: Abdomen is soft.      Tenderness: There is no abdominal tenderness.   Musculoskeletal:         General: No swelling or deformity. Normal range of motion.      Cervical back: Normal range of motion and neck supple.      Left lower leg: Edema present.   Skin:     General: Skin is warm and dry.   Neurological:      General: No focal deficit present.      Mental Status: He is alert.          Additional Data:     Labs:  Results from last 7 days   Lab Units 02/29/24  0920 02/28/24  1840   WBC Thousand/uL 5.88 6.69   HEMOGLOBIN g/dL 10.9* 11.3*   HEMATOCRIT % 35.5* 36.4*   PLATELETS Thousands/uL 236 242   NEUTROS PCT %  --  57   LYMPHS PCT %  --  27   MONOS PCT %  --  11   EOS PCT %  --  4     Results from last 7 days   Lab Units 02/29/24  0920 02/28/24  1840   SODIUM mmol/L 135 135   POTASSIUM mmol/L 4.1 5.2   CHLORIDE mmol/L 104 104   CO2 mmol/L 24 23   BUN mg/dL 19 22   CREATININE mg/dL 1.00 0.96   ANION GAP mmol/L 7 8   CALCIUM mg/dL 8.5 8.9   ALBUMIN g/dL  --  4.4   TOTAL BILIRUBIN mg/dL  --  0.61   ALK PHOS U/L  --  60   ALT U/L  --  34   AST U/L  --  40*   GLUCOSE RANDOM mg/dL 177* 106     Results from last 7 days   Lab Units 03/04/24  0540   INR  1.91*                   Lines/Drains:  Invasive Devices       Peripheral Intravenous Line  Duration             Peripheral IV 02/28/24 Dorsal  (posterior);Left Hand 4 days    Peripheral IV 02/28/24 Proximal;Right;Ventral (anterior) Forearm 4 days                          Imaging: Reviewed radiology reports from this admission including: chest CT scan    Recent Cultures (last 7 days):         Last 24 Hours Medication List:   Current Facility-Administered Medications   Medication Dose Route Frequency Provider Last Rate    acetaminophen  650 mg Oral Q6H PRN Cuiyin Yurik, CRNP      docusate sodium  100 mg Oral BID Julio César Santiago MD      heparin (porcine)  3-30 Units/kg/hr (Order-Specific) Intravenous Titrated Cuiyin Yurik, CRNP 6 Units/kg/hr (03/04/24 0716)    heparin (porcine)  4,000 Units Intravenous Q6H PRN Cuiyin Yurik, CRNP      heparin (porcine)  8,000 Units Intravenous Q6H PRN Cuiyin Yurik, CRNP      lidocaine  1 patch Topical Daily Cuiyin Yurik, CRNP      multivitamin stress formula  1 tablet Oral Daily Cuiyin Yurik, CRNP      ondansetron  4 mg Intravenous Q6H PRN Cuiyin Yurik, CRNP      pantoprazole  40 mg Oral Early Morning Cuiyin Yurik, CRNP      polyethylene glycol  17 g Oral Daily PRN Julio César Santiago MD      traMADol  50 mg Oral Q6H PRN Cuiyin Yurik, CRNP      warfarin  5 mg Oral Daily (warfarin) Julio César Santiago MD          Today, Patient Was Seen By: Julio César Santiago MD    **Please Note: This note may have been constructed using a voice recognition system.**

## 2024-03-04 NOTE — ASSESSMENT & PLAN NOTE
History of iron deficient anemia with hemoglobin 9 in 2021 per wife .  Denies overt bleeding at the time.  Patient was prescribed iron supplement at that time, with improvement in hemoglobin afterwards.  Underwent EGD colonoscopy with no source of bleeding but found to have hiatal hernia.  Hemoglobin  11.3 upon admission with low MCV  Iron panel suggesting iron deficiency-iron level 14, percent saturation 6, ferritin 3, TIBC 440.  B12 level was normal  Patient received 4 doses of IV Venofer  Stool for occult blood x 1 is negative  Known history of iron deficiency anemia and was on iron supplementation previously  Patient did have EGD and colonoscopy in 2021 which showed hiatal hernia with mucosal erosion at the GE junction, few diverticuli in the sigmoid colon  Patient denies any active GI bleeding- will need  to follow-up with GI for capsule endoscopy

## 2024-03-05 LAB
APTT PPP: 48 SECONDS (ref 23–37)
APTT PPP: 59 SECONDS (ref 23–37)
APTT PPP: 77 SECONDS (ref 23–37)
HEMOCCULT STL QL: NEGATIVE
HEMOCCULT STL QL: NORMAL
HEMOCCULT STL QL: NORMAL
INR PPP: 1.6 (ref 0.84–1.19)
PROTHROMBIN TIME: 19.3 SECONDS (ref 11.6–14.5)

## 2024-03-05 PROCEDURE — 85610 PROTHROMBIN TIME: CPT | Performed by: INTERNAL MEDICINE

## 2024-03-05 PROCEDURE — 99232 SBSQ HOSP IP/OBS MODERATE 35: CPT | Performed by: INTERNAL MEDICINE

## 2024-03-05 PROCEDURE — 85730 THROMBOPLASTIN TIME PARTIAL: CPT | Performed by: INTERNAL MEDICINE

## 2024-03-05 PROCEDURE — 82272 OCCULT BLD FECES 1-3 TESTS: CPT | Performed by: INTERNAL MEDICINE

## 2024-03-05 RX ORDER — WARFARIN SODIUM 1 MG/1
1 TABLET ORAL
Status: COMPLETED | OUTPATIENT
Start: 2024-03-05 | End: 2024-03-05

## 2024-03-05 RX ADMIN — WARFARIN SODIUM 5 MG: 5 TABLET ORAL at 17:44

## 2024-03-05 RX ADMIN — HEPARIN SODIUM 4000 UNITS: 1000 INJECTION INTRAVENOUS; SUBCUTANEOUS at 21:46

## 2024-03-05 RX ADMIN — WARFARIN SODIUM 1 MG: 1 TABLET ORAL at 17:44

## 2024-03-05 RX ADMIN — LIDOCAINE 5% 1 PATCH: 700 PATCH TOPICAL at 10:48

## 2024-03-05 RX ADMIN — PANTOPRAZOLE SODIUM 40 MG: 20 TABLET, DELAYED RELEASE ORAL at 05:46

## 2024-03-05 RX ADMIN — B-COMPLEX W/ C & FOLIC ACID TAB 1 TABLET: TAB at 10:48

## 2024-03-05 RX ADMIN — HEPARIN SODIUM 4000 UNITS: 1000 INJECTION INTRAVENOUS; SUBCUTANEOUS at 07:42

## 2024-03-05 NOTE — PROGRESS NOTES
[facility]  Progress Note  Name: Edward Conrad I  MRN: [id number]  Unit/Bed#: 4 [address] I Date of Admission: 2/28/2024   Date of Service: 3/5/2024 I Hospital Day: 6    Assessment/Plan   Acute pain of left knee  Assessment & Plan  As above, x-ray left knee unremarkable.  MRI of left knee ordered as outpatient.  Chronic DVT may be contributing to pain  Pain has improved significantly.  Patient given crutches by orthopedic surgery and able to ambulate without crutches now  PT eval and treat  Follow-up orthopedic surgery as outpatient.    Continue Lidoderm patch and as needed pain medication    Hypercoagulable state (HCC)  Assessment & Plan  As above  Not 100% sure if patient has Xarelto failure  IV heparin and Coumadin as above  Daily INR  Discussed with hematology    * Chronic deep vein thrombosis (DVT) of left lower extremity (HCC)  Assessment & Plan  Patient presents with left leg DVT, sent to ED by [employer] tech.  Reports having history of bilateral lower extremity DVT and PE in 2018, on Xarelto. Reports started with left medial knee pain about 2 weeks ago and got worse today, started to have pain in left thigh and left lower leg today with trouble bearing weight/trouble walking today.  Seen Ortho on the day of admission was ordered venous Doppler to rule out DVT which came back positive.  Denies injuries to left knee 2 weeks ago.  Denies paresthesia to left lower extremity.  Chart reviewed.  History of bilateral lower extremity DVT and bilateral PE in 8/2018.  Thrombophilia evaluation showed factor V Leiden mutation, elevated APC resistance, anticardiolipin IgM level,antiphosphatidylserine IgM per hematology note.  Patient has been on Xarelto 20 mg p.o. daily ever since.  Patient saw orthopedic surgery today for left knee pain, was ordered x-ray left knee, MRI left knee, lower venous Doppler to rule out DVT due to history.  X-ray left knee was normal.  Lower venous Doppler showed -chronic  nonocclusive DVT in the distal femoral, popliteal and peroneal veins.  Lower venous Doppler in 7/2022 showed -chronic nonocclusive DVT in the popliteal vein only.  Repeat lower extremity venous Doppler-no evidence of DVT in the right leg.  Chronic nonocclusive DVT in the distal femoral, popliteal and peroneal veins  CTA PE study no PE  ED provider discussed case with hematology on-call, recommend heparin drip to bridge with Coumadin.  Continue heparin drip with bridging with Coumadin.  Lovenox bridging with Coumadin discussed with the patient/family and patient is not very comfortable with Lovenox.  Coordinated with hematology in detail during hospitalization  D-dimer was negative  INR normal at 1.6 today  Will increase Coumadin to 6 mg  Nutrition evaluation for Coumadin    History of iron deficiency anemia  Assessment & Plan  History of iron deficient anemia with hemoglobin 9 in 2021 per wife .  Denies overt bleeding at the time.  Patient was prescribed iron supplement at that time, with improvement in hemoglobin afterwards.  Underwent EGD colonoscopy with no source of bleeding but found to have hiatal hernia.  Hemoglobin  11.3 upon admission with low MCV  Iron panel suggesting iron deficiency-iron level 14, percent saturation 6, ferritin 3, TIBC 440.  B12 level was normal  Patient received 4 doses of IV Venofer  Stool for occult blood x 1 is negative  Known history of iron deficiency anemia and was on iron supplementation previously  Patient did have EGD and colonoscopy in 2021 which showed hiatal hernia with mucosal erosion at the GE junction, few diverticuli in the sigmoid colon  Patient denies any active GI bleeding- will need  to follow-up with GI for capsule endoscopy    Hypertension  Assessment & Plan  Diet controlled  BP acceptable    Hyperlipidemia  Assessment & Plan  Diet controlled    Obesity (BMI 30-39.9)  Assessment & Plan  Body mass index is 31.08 kg/m².   Diet and lifestyle modification     GERD  without esophagitis  Assessment & Plan  Large hiatal hernia on CTA  On Nexium as needed at home.  Continue PPI daily               VTE Pharmacologic Prophylaxis:   High Risk (Score >/= 5) - Pharmacological DVT Prophylaxis Ordered: heparin drip. Sequential Compression Devices Ordered.    Mobility:   Basic Mobility Inpatient Raw Score: 24  JH-HLM Goal: 8: Walk 250 feet or more  JH-HLM Achieved: 8: Walk 250 feet ot more  HLM Goal achieved. Continue to encourage appropriate mobility.    Patient Centered Rounds: I performed bedside rounds with nursing staff today.   Discussions with Specialists or Other Care Team Provider: yes    Education and Discussions with Family / Patient:  yes.     Total Time Spent on Date of Encounter in care of patient: 35 mins. This time was spent on one or more of the following: performing physical exam; counseling and coordination of care; obtaining or reviewing history; documenting in the medical record; reviewing/ordering tests, medications or procedures; communicating with other healthcare professionals and discussing with patient's family/caregivers.    Current Length of Stay: 6 day(s)  Current Patient Status: Inpatient   Certification Statement: The patient will continue to require additional inpatient hospital stay due to I heparin to Coumadin bridge  Discharge Plan: Anticipate discharge in 24-48 hrs to home.    Code Status: Level 1 - Full Code    Subjective:   Reports improvement in knee pain, ambulating well now  Denies chest pain shortness of breath or dizziness  Concerned about decreasing INR today, discussed at length regarding Coumadin treatment, monitoring and restrictions    Objective:     Vitals:   Temp (24hrs), Av.6 °F (36.4 °C), Min:97.5 °F (36.4 °C), Max:97.6 °F (36.4 °C)    Temp:  [97.5 °F (36.4 °C)-97.6 °F (36.4 °C)] 97.5 °F (36.4 °C)  HR:  [73-86] 86  Resp:  [17-19] 19  BP: (104-124)/(79-82) 108/81  SpO2:  [95 %-96 %] 96 %  Body mass index is 31.08 kg/m².     Input and  Output Summary (last 24 hours):   No intake or output data in the 24 hours ending 03/05/24 0024    Physical Exam:   Physical Exam  Vitals reviewed.   Constitutional:       General: He is not in acute distress.     Appearance: He is well-developed.   HENT:      Head: Normocephalic and atraumatic.   Eyes:      Conjunctiva/sclera: Conjunctivae normal.   Cardiovascular:      Rate and Rhythm: Normal rate and regular rhythm.      Heart sounds: No murmur heard.  Pulmonary:      Effort: Pulmonary effort is normal. No respiratory distress.      Breath sounds: Normal breath sounds.   Abdominal:      Palpations: Abdomen is soft.      Tenderness: There is no abdominal tenderness.   Musculoskeletal:         General: No swelling. Normal range of motion.      Right lower leg: No edema.      Left lower leg: No edema.   Skin:     General: Skin is warm and dry.      Capillary Refill: Capillary refill takes less than 2 seconds.   Neurological:      Mental Status: He is alert.   Psychiatric:         Mood and Affect: Mood normal.        Additional Data:     Labs:  Results from last 7 days   Lab Units 02/29/24  0920 02/28/24  1840   WBC Thousand/uL 5.88 6.69   HEMOGLOBIN g/dL 10.9* 11.3*   HEMATOCRIT % 35.5* 36.4*   PLATELETS Thousands/uL 236 242   NEUTROS PCT %  --  57   LYMPHS PCT %  --  27   MONOS PCT %  --  11   EOS PCT %  --  4     Results from last 7 days   Lab Units 02/29/24  0920 02/28/24  1840   SODIUM mmol/L 135 135   POTASSIUM mmol/L 4.1 5.2   CHLORIDE mmol/L 104 104   CO2 mmol/L 24 23   BUN mg/dL 19 22   CREATININE mg/dL 1.00 0.96   ANION GAP mmol/L 7 8   CALCIUM mg/dL 8.5 8.9   ALBUMIN g/dL  --  4.4   TOTAL BILIRUBIN mg/dL  --  0.61   ALK PHOS U/L  --  60   ALT U/L  --  34   AST U/L  --  40*   GLUCOSE RANDOM mg/dL 177* 106     Results from last 7 days   Lab Units 03/04/24  0540   INR  1.91*                   Lines/Drains:  Invasive Devices       Peripheral Intravenous Line  Duration             Peripheral IV 02/28/24 Dorsal  (posterior);Left Hand 5 days    Peripheral IV 02/28/24 Proximal;Right;Ventral (anterior) Forearm 5 days                          Imaging: Reviewed radiology reports from this admission including: chest CT scan    Recent Cultures (last 7 days):         Last 24 Hours Medication List:   Current Facility-Administered Medications   Medication Dose Route Frequency Provider Last Rate    acetaminophen  650 mg Oral Q6H PRN Cuiyin Yurik, CRNP      docusate sodium  100 mg Oral BID Julio César Santiago MD      heparin (porcine)  3-30 Units/kg/hr (Order-Specific) Intravenous Titrated Cuiyin Yurik, CRNP 6 Units/kg/hr (03/04/24 1504)    heparin (porcine)  4,000 Units Intravenous Q6H PRN Cuiyin Yurik, CRNP      heparin (porcine)  8,000 Units Intravenous Q6H PRN Cuiyin Yurik, CRNP      lidocaine  1 patch Topical Daily Cuiyin Yurik, CRNP      multivitamin stress formula  1 tablet Oral Daily Cuiyin Yurik, CRNP      ondansetron  4 mg Intravenous Q6H PRN Cuiyin Yurik, CRNP      pantoprazole  40 mg Oral Early Morning Cuiyin Yurik, CRNP      polyethylene glycol  17 g Oral Daily PRN Julio César Santiago MD      traMADol  50 mg Oral Q6H PRN Cuiyin Yurik, CRNP      warfarin  5 mg Oral Daily (warfarin) Julio César Santiago MD          Today, Patient Was Seen By: Hadley Arreola MD    **Please Note: This note may have been constructed using a voice recognition system.**

## 2024-03-05 NOTE — CASE MANAGEMENT
Case Management Progress Note    Patient name Edward Conrad  Location [address] [address]/4 [place] [address]-* MRN [id number]  : 1970 Date 3/5/2024       LOS (days): 6  Geometric Mean LOS (GMLOS) (days): 3.1  Days to GMLOS:-2.7        OBJECTIVE:      Current admission status: Inpatient  Preferred Pharmacy:   [facility] - [address] [address]  Phone: [phone] Fax: [phone]    [facility] - [place], [place] - [address]  [address]  [facility] [address]  Phone: [phone] Fax: [phone]    Primary Care Provider: Evelyn Tang DO    Primary Insurance: BLUE CROSS  Secondary Insurance:     PROGRESS NOTE:    Patient is not yet medically cleared for discharge per attending.  Plan is for discharge home when cleared.  SW will continue to follow.

## 2024-03-05 NOTE — ASSESSMENT & PLAN NOTE
As above, x-ray left knee unremarkable.  MRI of left knee ordered as outpatient.  Chronic DVT may be contributing to pain  Pain has improved significantly.  Patient given crutches by orthopedic surgery and able to ambulate without crutches now  PT eval and treat  Follow-up orthopedic surgery as outpatient.    Continue Lidoderm patch and as needed pain medication

## 2024-03-05 NOTE — ASSESSMENT & PLAN NOTE
Patient presents with left leg DVT, sent to ED by [employer]  Reports having history of bilateral lower extremity DVT and PE in 2018, on Xarelto. Reports started with left medial knee pain about 2 weeks ago and got worse today, started to have pain in left thigh and left lower leg today with trouble bearing weight/trouble walking today.  Seen Ortho on the day of admission was ordered venous Doppler to rule out DVT which came back positive.  Denies injuries to left knee 2 weeks ago.  Denies paresthesia to left lower extremity.  Chart reviewed.  History of bilateral lower extremity DVT and bilateral PE in 8/2018.  Thrombophilia evaluation showed factor V Leiden mutation, elevated APC resistance, anticardiolipin IgM level,antiphosphatidylserine IgM per hematology note.  Patient has been on Xarelto 20 mg p.o. daily ever since.  Patient saw orthopedic surgery today for left knee pain, was ordered x-ray left knee, MRI left knee, lower venous Doppler to rule out DVT due to history.  X-ray left knee was normal.  Lower venous Doppler showed -chronic nonocclusive DVT in the distal femoral, popliteal and peroneal veins.  Lower venous Doppler in 7/2022 showed -chronic nonocclusive DVT in the popliteal vein only.  Repeat lower extremity venous Doppler-no evidence of DVT in the right leg.  Chronic nonocclusive DVT in the distal femoral, popliteal and peroneal veins  CTA PE study no PE  ED provider discussed case with hematology on-call, recommend heparin drip to bridge with Coumadin.  Continue heparin drip with bridging with Coumadin.  Lovenox bridging with Coumadin discussed with the patient/family and patient is not very comfortable with Lovenox.  Coordinated with hematology in detail during hospitalization  D-dimer was negative  INR normal at 1.6 today  Will increase Coumadin to 6 mg

## 2024-03-06 LAB
ANION GAP SERPL CALCULATED.3IONS-SCNC: 8 MMOL/L
APTT PPP: 126 SECONDS (ref 23–37)
BUN SERPL-MCNC: 17 MG/DL (ref 5–25)
CALCIUM SERPL-MCNC: 8.7 MG/DL (ref 8.4–10.2)
CHLORIDE SERPL-SCNC: 104 MMOL/L (ref 96–108)
CO2 SERPL-SCNC: 24 MMOL/L (ref 21–32)
CREAT SERPL-MCNC: 0.98 MG/DL (ref 0.6–1.3)
ERYTHROCYTE [DISTWIDTH] IN BLOOD BY AUTOMATED COUNT: 17.1 % (ref 11.6–15.1)
GFR SERPL CREATININE-BSD FRML MDRD: 87 ML/MIN/1.73SQ M
GLUCOSE SERPL-MCNC: 107 MG/DL (ref 65–140)
HCT VFR BLD AUTO: 35 % (ref 36.5–49.3)
HGB BLD-MCNC: 10.9 G/DL (ref 12–17)
INR PPP: 1.71 (ref 0.84–1.19)
MCH RBC QN AUTO: 25.9 PG (ref 26.8–34.3)
MCHC RBC AUTO-ENTMCNC: 31.1 G/DL (ref 31.4–37.4)
MCV RBC AUTO: 83 FL (ref 82–98)
PLATELET # BLD AUTO: 212 THOUSANDS/UL (ref 149–390)
PMV BLD AUTO: 10.4 FL (ref 8.9–12.7)
POTASSIUM SERPL-SCNC: 4.2 MMOL/L (ref 3.5–5.3)
PROTHROMBIN TIME: 20.2 SECONDS (ref 11.6–14.5)
RBC # BLD AUTO: 4.21 MILLION/UL (ref 3.88–5.62)
SODIUM SERPL-SCNC: 136 MMOL/L (ref 135–147)
WBC # BLD AUTO: 6.2 THOUSAND/UL (ref 4.31–10.16)

## 2024-03-06 PROCEDURE — 85027 COMPLETE CBC AUTOMATED: CPT | Performed by: INTERNAL MEDICINE

## 2024-03-06 PROCEDURE — 85730 THROMBOPLASTIN TIME PARTIAL: CPT | Performed by: INTERNAL MEDICINE

## 2024-03-06 PROCEDURE — 99232 SBSQ HOSP IP/OBS MODERATE 35: CPT | Performed by: INTERNAL MEDICINE

## 2024-03-06 PROCEDURE — 80048 BASIC METABOLIC PNL TOTAL CA: CPT | Performed by: INTERNAL MEDICINE

## 2024-03-06 PROCEDURE — 85610 PROTHROMBIN TIME: CPT | Performed by: INTERNAL MEDICINE

## 2024-03-06 RX ORDER — WARFARIN SODIUM 7.5 MG/1
7.5 TABLET ORAL
Status: DISCONTINUED | OUTPATIENT
Start: 2024-03-06 | End: 2024-03-07

## 2024-03-06 RX ORDER — ENOXAPARIN SODIUM 100 MG/ML
1 INJECTION SUBCUTANEOUS EVERY 12 HOURS SCHEDULED
Status: DISCONTINUED | OUTPATIENT
Start: 2024-03-06 | End: 2024-03-07

## 2024-03-06 RX ADMIN — HEPARIN SODIUM 10 UNITS/KG/HR: 10000 INJECTION, SOLUTION INTRAVENOUS at 02:40

## 2024-03-06 RX ADMIN — WARFARIN SODIUM 7.5 MG: 7.5 TABLET ORAL at 17:29

## 2024-03-06 RX ADMIN — ENOXAPARIN SODIUM 100 MG: 100 INJECTION SUBCUTANEOUS at 21:31

## 2024-03-06 RX ADMIN — ENOXAPARIN SODIUM 100 MG: 100 INJECTION SUBCUTANEOUS at 10:48

## 2024-03-06 RX ADMIN — LIDOCAINE 5% 1 PATCH: 700 PATCH TOPICAL at 08:18

## 2024-03-06 RX ADMIN — DOCUSATE SODIUM 100 MG: 100 CAPSULE, LIQUID FILLED ORAL at 08:18

## 2024-03-06 RX ADMIN — PANTOPRAZOLE SODIUM 40 MG: 20 TABLET, DELAYED RELEASE ORAL at 05:09

## 2024-03-06 NOTE — PROGRESS NOTES
[facility]  Progress Note  Name: Edward Conrad I  MRN: [id number]  Unit/Bed#: 4 [address] I Date of Admission: 2/28/2024   Date of Service: 3/6/2024 I Hospital Day: 7    Assessment/Plan   Acute pain of left knee  Assessment & Plan  As above, x-ray left knee unremarkable.  MRI of left knee ordered as outpatient.  Chronic DVT may be contributing to pain as pain significantly improved after resumption of anticoagulation  Pain has improved significantly.  Ambulating independently at present  Patient given crutches by orthopedic surgery and able to ambulate without crutches now  PT eval and treat  Follow-up orthopedic surgery as outpatient.    Continue Lidoderm patch and as needed pain medication    Hypercoagulable state (HCC)  Assessment & Plan  As above  Hematology input appreciated, not 100% sure if patient has Xarelto failure  IV heparin/Lovenox and Coumadin as above  Daily INR  Follow-up with hematology after discharge    * Chronic deep vein thrombosis (DVT) of left lower extremity (HCC)  Assessment & Plan  Patient presents with left leg DVT, sent to ED by [employer].  Reports having history of bilateral lower extremity DVT and PE in 2018, on Xarelto. Reports started with left medial knee pain about 2 weeks ago and got worse today, started to have pain in left thigh and left lower leg today with trouble bearing weight/trouble walking today.  Seen Ortho on the day of admission was ordered venous Doppler to rule out DVT which came back positive.  Denies injuries to left knee 2 weeks ago.  Denies paresthesia to left lower extremity.  Chart reviewed.  History of bilateral lower extremity DVT and bilateral PE in 8/2018.  Thrombophilia evaluation showed factor V Leiden mutation, elevated APC resistance, anticardiolipin IgM level,antiphosphatidylserine IgM per hematology note.  Patient has been on Xarelto 20 mg p.o. daily ever since.  Patient saw orthopedic surgery today for left knee pain, was  ordered x-ray left knee, MRI left knee, lower venous Doppler to rule out DVT due to history.  X-ray left knee was normal.  Lower venous Doppler showed -chronic nonocclusive DVT in the distal femoral, popliteal and peroneal veins.  Lower venous Doppler in 7/2022 showed -chronic nonocclusive DVT in the popliteal vein only.  Repeat lower extremity venous Doppler-no evidence of DVT in the right leg.  Chronic nonocclusive DVT in the distal femoral, popliteal and peroneal veins  CTA PE study no PE  ED provider discussed case with hematology on-call, recommend heparin drip to bridge with Coumadin.  Continue heparin drip with bridging with Coumadin.  Lovenox bridging with Coumadin discussed with the patient/family and patient is not very comfortable with Lovenox.  Coordinated with hematology in detail during hospitalization by admitting team  INR is trending up but remains subtherapeutic.  Will transition to subcu Lovenox from IV heparin for convenience.  Hemoglobin is stable  D-dimer was negative  INR normal at 1.6 today  Will increase Coumadin to 7.5 mg today  Nutrition evaluation for Coumadin    History of iron deficiency anemia  Assessment & Plan  History of iron deficient anemia with hemoglobin 9 in 2021 per wife .  Denies overt bleeding at the time.  Patient was prescribed iron supplement at that time, with improvement in hemoglobin afterwards.  Underwent EGD colonoscopy with no source of bleeding but found to have hiatal hernia.  Hemoglobin  11.3 upon admission with low MCV  Iron panel suggesting iron deficiency-iron level 14, percent saturation 6, ferritin 3, TIBC 440.  B12 level was normal  Patient received 4 doses of IV Venofer  Stool for occult blood x 1 is negative  Known history of iron deficiency anemia and was on iron supplementation previously  Patient did have EGD and colonoscopy in 2021 which showed hiatal hernia with mucosal erosion at the GE junction, few diverticuli in the sigmoid colon  Patient denies  any active GI bleeding- will need  to follow-up with GI for capsule endoscopy  Follow-up with hematology after discharge    Hypertension  Assessment & Plan  Diet controlled  BP acceptable    Hyperlipidemia  Assessment & Plan  Diet controlled    Obesity (BMI 30-39.9)  Assessment & Plan  Body mass index is 31.08 kg/m².   Diet and lifestyle modification     GERD without esophagitis  Assessment & Plan  Large hiatal hernia on CTA  On Nexium as needed at home.  Continue PPI daily               VTE Pharmacologic Prophylaxis:   High Risk (Score >/= 5) - Pharmacological DVT Prophylaxis Ordered: heparin drip. Sequential Compression Devices Ordered.    Mobility:   Basic Mobility Inpatient Raw Score: 24  JH-HLM Goal: 8: Walk 250 feet or more  JH-HLM Achieved: 8: Walk 250 feet ot more  HLM Goal achieved. Continue to encourage appropriate mobility.    Patient Centered Rounds: I performed bedside rounds with nursing staff today.   Discussions with Specialists or Other Care Team Provider: yes    Education and Discussions with Family / Patient:  yes.  Discussed with wife over the phone    Total Time Spent on Date of Encounter in care of patient: 35 mins. This time was spent on one or more of the following: performing physical exam; counseling and coordination of care; obtaining or reviewing history; documenting in the medical record; reviewing/ordering tests, medications or procedures; communicating with other healthcare professionals and discussing with patient's family/caregivers.    Current Length of Stay: 7 day(s)  Current Patient Status: Inpatient   Certification Statement: The patient will continue to require additional inpatient hospital stay due to Coumadin bridge  Discharge Plan: Anticipate discharge tomorrow to home.    Code Status: Level 1 - Full Code    Subjective:     Frustrated earlier today due to requiring multiple blood draws for PTT monitoring on IV heparin  Also concerned about slow improvement in INR  Could not  sleep well overnight due to above.      Left knee is significantly better  Denies any chest pain shortness of breath or dizziness      Objective:     Vitals:   Temp (24hrs), Av.9 °F (36.6 °C), Min:97.9 °F (36.6 °C), Max:97.9 °F (36.6 °C)    Temp:  [97.9 °F (36.6 °C)] 97.9 °F (36.6 °C)  HR:  [56-63] 63  Resp:  [19] 19  BP: (116-149)/(83-93) 116/83  SpO2:  [95 %-97 %] 97 %  Body mass index is 31.08 kg/m².     Input and Output Summary (last 24 hours):   No intake or output data in the 24 hours ending 24    Physical Exam:   Physical Exam  Vitals and nursing note reviewed.   Constitutional:       General: He is not in acute distress.     Appearance: He is well-developed.   HENT:      Head: Normocephalic and atraumatic.   Eyes:      Conjunctiva/sclera: Conjunctivae normal.   Cardiovascular:      Rate and Rhythm: Normal rate and regular rhythm.      Heart sounds: No murmur heard.  Pulmonary:      Effort: Pulmonary effort is normal. No respiratory distress.      Breath sounds: Normal breath sounds.   Abdominal:      Palpations: Abdomen is soft.      Tenderness: There is no abdominal tenderness.   Musculoskeletal:         General: No swelling.      Right lower leg: No edema.      Left lower leg: No edema.   Skin:     General: Skin is warm and dry.      Capillary Refill: Capillary refill takes less than 2 seconds.   Neurological:      Mental Status: He is alert. Mental status is at baseline.   Psychiatric:         Mood and Affect: Mood normal.        Additional Data:     Labs:  Results from last 7 days   Lab Units 24  0427   WBC Thousand/uL 6.20   HEMOGLOBIN g/dL 10.9*   HEMATOCRIT % 35.0*   PLATELETS Thousands/uL 212     Results from last 7 days   Lab Units 24  0427   SODIUM mmol/L 136   POTASSIUM mmol/L 4.2   CHLORIDE mmol/L 104   CO2 mmol/L 24   BUN mg/dL 17   CREATININE mg/dL 0.98   ANION GAP mmol/L 8   CALCIUM mg/dL 8.7   GLUCOSE RANDOM mg/dL 107     Results from last 7 days   Lab Units  03/06/24  0427   INR  1.71*                   Lines/Drains:  Invasive Devices       Peripheral Intravenous Line  Duration             Peripheral IV 02/28/24 Proximal;Right;Ventral (anterior) Forearm 7 days    Peripheral IV 02/28/24 Dorsal (posterior);Left Hand 6 days                          Imaging: Reviewed radiology reports from this admission including: chest CT scan    Recent Cultures (last 7 days):         Last 24 Hours Medication List:   Current Facility-Administered Medications   Medication Dose Route Frequency Provider Last Rate    acetaminophen  650 mg Oral Q6H PRN Cuiteofilo Funezrik, CRNP      docusate sodium  100 mg Oral BID Julio César Santiago MD      enoxaparin  1 mg/kg Subcutaneous Q12H CIRILO Hadley Arreola MD      lidocaine  1 patch Topical Daily Cuiyin Yurik, CRNP      ondansetron  4 mg Intravenous Q6H PRN Cuiyin Yurik, CRNP      pantoprazole  40 mg Oral Early Morning Cuiyin Yurik, CRNP      polyethylene glycol  17 g Oral Daily PRN Julio César Santiago MD      traMADol  50 mg Oral Q6H PRN Cuiteofilo Yurik, CRNP      warfarin  7.5 mg Oral Daily (warfarin) Hadley Arreola MD          Today, Patient Was Seen By: Hadley Arreola MD    **Please Note: This note may have been constructed using a voice recognition system.**

## 2024-03-06 NOTE — ASSESSMENT & PLAN NOTE
As above, x-ray left knee unremarkable.  MRI of left knee ordered as outpatient.  Chronic DVT may be contributing to pain as pain significantly improved after resumption of anticoagulation  Pain has improved significantly.  Ambulating independently at present  Patient given crutches by orthopedic surgery and able to ambulate without crutches now  PT eval and treat  Follow-up orthopedic surgery as outpatient.    Continue Lidoderm patch and as needed pain medication

## 2024-03-06 NOTE — ASSESSMENT & PLAN NOTE
Diet controlled   NOC Shift Report    Pt in bed at beginning of shift, breathing quiet and unlabored. Pt slept through shift. Pt slept 7 hours.     No pt complaints or concerns at this time.     No PRNs given. Will continue to monitor.     Precautions: SI, SIB

## 2024-03-06 NOTE — ASSESSMENT & PLAN NOTE
As above  Hematology input appreciated, not 100% sure if patient has Xarelto failure  IV heparin/Lovenox and Coumadin as above  Daily INR  Follow-up with hematology after discharge

## 2024-03-06 NOTE — ASSESSMENT & PLAN NOTE
History of iron deficient anemia with hemoglobin 9 in 2021 per wife .  Denies overt bleeding at the time.  Patient was prescribed iron supplement at that time, with improvement in hemoglobin afterwards.  Underwent EGD colonoscopy with no source of bleeding but found to have hiatal hernia.  Hemoglobin  11.3 upon admission with low MCV  Iron panel suggesting iron deficiency-iron level 14, percent saturation 6, ferritin 3, TIBC 440.  B12 level was normal  Patient received 4 doses of IV Venofer  Stool for occult blood x 1 is negative  Known history of iron deficiency anemia and was on iron supplementation previously  Patient did have EGD and colonoscopy in 2021 which showed hiatal hernia with mucosal erosion at the GE junction, few diverticuli in the sigmoid colon  Patient denies any active GI bleeding- will need  to follow-up with GI for capsule endoscopy  Follow-up with hematology after discharge

## 2024-03-06 NOTE — ASSESSMENT & PLAN NOTE
Patient presents with left leg DVT, sent to ED by [employer].  Reports having history of bilateral lower extremity DVT and PE in 2018, on Xarelto. Reports started with left medial knee pain about 2 weeks ago and got worse today, started to have pain in left thigh and left lower leg today with trouble bearing weight/trouble walking today.  Seen Ortho on the day of admission was ordered venous Doppler to rule out DVT which came back positive.  Denies injuries to left knee 2 weeks ago.  Denies paresthesia to left lower extremity.  Chart reviewed.  History of bilateral lower extremity DVT and bilateral PE in 8/2018.  Thrombophilia evaluation showed factor V Leiden mutation, elevated APC resistance, anticardiolipin IgM level,antiphosphatidylserine IgM per hematology note.  Patient has been on Xarelto 20 mg p.o. daily ever since.  Patient saw orthopedic surgery today for left knee pain, was ordered x-ray left knee, MRI left knee, lower venous Doppler to rule out DVT due to history.  X-ray left knee was normal.  Lower venous Doppler showed -chronic nonocclusive DVT in the distal femoral, popliteal and peroneal veins.  Lower venous Doppler in 7/2022 showed -chronic nonocclusive DVT in the popliteal vein only.  Repeat lower extremity venous Doppler-no evidence of DVT in the right leg.  Chronic nonocclusive DVT in the distal femoral, popliteal and peroneal veins  CTA PE study no PE  ED provider discussed case with hematology on-call, recommend heparin drip to bridge with Coumadin.  Continue heparin drip with bridging with Coumadin.  Lovenox bridging with Coumadin discussed with the patient/family and patient is not very comfortable with Lovenox.  Coordinated with hematology in detail during hospitalization by admitting team  INR is trending up but remains subtherapeutic.  Will transition to subcu Lovenox from IV heparin for convenience.  Hemoglobin is stable  D-dimer was negative  INR normal at 1.6 today  Will increase Coumadin  to 7.5 mg today  Nutrition evaluation for Coumadin

## 2024-03-06 NOTE — PHYSICAL THERAPY NOTE
Physical Therapy Cancellation Note       03/06/24 1420   Note Type   Note Type Cancelled Session   Cancel Reasons Other  (Attempted to see pt this afternoon for PT treatment. Pt reports being frustrated that he is still hospitalized, did not sleep well. Pt observed ambulating in hallways yesterday x 500+ feet IND without AD.) (no longer needs crutches)    Pt reports mild L knee pain but otherwise has returned to baseline functional level + no concerns for mobility at this time. Will D/C PT orders. Please re-order if needs arise.    Licensure   [place] License Number  Steph Hung [id number]

## 2024-03-07 ENCOUNTER — TELEPHONE (OUTPATIENT)
Dept: FAMILY MEDICINE CLINIC | Facility: CLINIC | Age: 54
End: 2024-03-07

## 2024-03-07 ENCOUNTER — TRANSITIONAL CARE MANAGEMENT (OUTPATIENT)
Dept: FAMILY MEDICINE CLINIC | Facility: CLINIC | Age: 54
End: 2024-03-07

## 2024-03-07 ENCOUNTER — TELEPHONE (OUTPATIENT)
Dept: HEMATOLOGY ONCOLOGY | Facility: CLINIC | Age: 54
End: 2024-03-07

## 2024-03-07 VITALS
SYSTOLIC BLOOD PRESSURE: 138 MMHG | WEIGHT: 216.6 LBS | TEMPERATURE: 98.1 F | BODY MASS INDEX: 31.01 KG/M2 | RESPIRATION RATE: 18 BRPM | HEIGHT: 70 IN | HEART RATE: 68 BPM | DIASTOLIC BLOOD PRESSURE: 68 MMHG | OXYGEN SATURATION: 98 %

## 2024-03-07 LAB
INR PPP: 2.29 (ref 0.84–1.19)
PROTHROMBIN TIME: 25.4 SECONDS (ref 11.6–14.5)

## 2024-03-07 PROCEDURE — 99239 HOSP IP/OBS DSCHRG MGMT >30: CPT | Performed by: INTERNAL MEDICINE

## 2024-03-07 PROCEDURE — 85610 PROTHROMBIN TIME: CPT | Performed by: INTERNAL MEDICINE

## 2024-03-07 RX ORDER — WARFARIN SODIUM 1 MG/1
TABLET ORAL
Qty: 15 TABLET | Refills: 0 | Status: SHIPPED | OUTPATIENT
Start: 2024-03-07

## 2024-03-07 RX ORDER — ESOMEPRAZOLE MAGNESIUM 40 MG/1
40 CAPSULE, DELAYED RELEASE ORAL DAILY
Start: 2024-03-07

## 2024-03-07 RX ORDER — LIDOCAINE 4 G/G
1 PATCH TOPICAL DAILY
Qty: 10 PATCH | Refills: 0 | Status: SHIPPED | OUTPATIENT
Start: 2024-03-07

## 2024-03-07 RX ORDER — WARFARIN SODIUM 5 MG/1
TABLET ORAL
Qty: 15 TABLET | Refills: 0 | Status: SHIPPED | OUTPATIENT
Start: 2024-03-07

## 2024-03-07 RX ORDER — WARFARIN SODIUM 3 MG/1
6 TABLET ORAL
Status: DISCONTINUED | OUTPATIENT
Start: 2024-03-07 | End: 2024-03-07 | Stop reason: HOSPADM

## 2024-03-07 RX ADMIN — LIDOCAINE 5% 1 PATCH: 700 PATCH TOPICAL at 09:18

## 2024-03-07 RX ADMIN — PANTOPRAZOLE SODIUM 40 MG: 20 TABLET, DELAYED RELEASE ORAL at 05:39

## 2024-03-07 NOTE — PLAN OF CARE
Problem: PAIN - ADULT  Goal: Verbalizes/displays adequate comfort level or baseline comfort level  Description: Interventions:  - Encourage patient to monitor pain and request assistance  - Assess pain using appropriate pain scale  - Administer analgesics based on type and severity of pain and evaluate response  - Implement non-pharmacological measures as appropriate and evaluate response  - Consider cultural and social influences on pain and pain management  - Notify physician/advanced practitioner if interventions unsuccessful or patient reports new pain  Outcome: Progressing     Problem: INFECTION - ADULT  Goal: Absence or prevention of progression during hospitalization  Description: INTERVENTIONS:  - Assess and monitor for signs and symptoms of infection  - Monitor lab/diagnostic results      Outcome: Progressing  Goal: Absence of fever/infection during neutropenic period  Description: INTERVENTIONS:  - Monitor WBC    Outcome: Progressing     Problem: SAFETY ADULT  Goal: Patient will remain free of falls  Description: INTERVENTIONS:  - Educate patient/family on patient safety including physical limitations  - Instruct patient to call for assistance with activity   - Consult OT/PT to assist with strengthening/mobility   - Keep Call bell within reach    Outcome: Progressing  Goal: Maintain or return to baseline ADL function  Description: INTERVENTIONS:  -  Assess patient's ability to carry out ADLs; assess patient's baseline for ADL function and identify physical deficits which impact ability to perform ADLs (bathing, care of mouth/teeth, toileting, grooming, dressing, etc.)  - Assess/evaluate cause of self-care deficits   - Assess range of motion  - Assess patient's mobility; develop plan if impaired  - Assess patient's need for assistive devices and provide as appropriate  - Encourage maximum independence but intervene and supervise when necessary  - Involve family in performance of ADLs  - Assess for home  care needs following discharge   - Consider OT consult to assist with ADL evaluation and planning for discharge  - Provide patient education as appropriate  Outcome: Progressing  Goal: Maintains/Returns to pre admission functional level  Description: INTERVENTIONS:  - Perform AM-PAC 6 Click Basic Mobility/ Daily Activity assessment daily.  - Set and communicate daily mobility goal to care team and patient/family/caregiver.     - Out of bed for toileting  - Record patient progress and toleration of activity level   Outcome: Progressing     Problem: DISCHARGE PLANNING  Goal: Discharge to home or other facility with appropriate resources  Description: INTERVENTIONS:  - Identify barriers to discharge w/patient and caregiver  - Arrange for needed discharge resources and transportation as appropriate  - Identify discharge learning needs (meds, wound care, etc.)  - Arrange for interpretive services to assist at discharge as needed  - Refer to Case Management Department for coordinating discharge planning if the patient needs post-hospital services based on physician/advanced practitioner order or complex needs related to functional status, cognitive ability, or social support system  Outcome: Progressing     Problem: Knowledge Deficit  Goal: Patient/family/caregiver demonstrates understanding of disease process, treatment plan, medications, and discharge instructions  Description: Complete learning assessment and assess knowledge base.  Interventions:  - Provide teaching at level of understanding  - Provide teaching via preferred learning methods  Outcome: Progressing     Problem: Nutrition/Hydration-ADULT  Goal: Nutrient/Hydration intake appropriate for improving, restoring or maintaining nutritional needs  Description: Monitor and assess patient's nutrition/hydration status for malnutrition. Collaborate with interdisciplinary team and initiate plan and interventions as ordered.  Monitor patient's weight and dietary  intake as ordered or per policy. Utilize nutrition screening tool and intervene as necessary. Determine patient's food preferences and provide high-protein, high-caloric foods as appropriate.     INTERVENTIONS:  - Monitor oral intake, urinary output, labs, and treatment plans  - Assess nutrition and hydration status and recommend course of action  - Evaluate amount of meals eaten  - Assist patient with eating if necessary   - Allow adequate time for meals  - Recommend/ encourage appropriate diets, oral nutritional supplements, and vitamin/mineral supplements  - Order, calculate, and assess calorie counts as needed  - Recommend, monitor, and adjust tube feedings and TPN/PPN based on assessed needs  - Assess need for intravenous fluids  - Provide specific nutrition/hydration education as appropriate  - Include patient/family/caregiver in decisions related to nutrition  Outcome: Progressing

## 2024-03-07 NOTE — TELEPHONE ENCOUNTER
I phoned the patient and left a VM message indicating that I was calling from [facility] to review the details of his upcoming appointment. I provided the appointment information (3/18, Shaye Esteban PA-C, [id number] [place] office), the office address and location, and the [facility] number as the office contact.

## 2024-03-07 NOTE — TELEPHONE ENCOUNTER
Patient discharged from hospital on Coumadin  Was instructed to have INR done on 3/11/2024  Will discuss result with Dr Patrick and instruct patient  Patient verbalizes understanding of plan

## 2024-03-07 NOTE — PLAN OF CARE
Problem: PAIN - ADULT  Goal: Verbalizes/displays adequate comfort level or baseline comfort level  Description: Interventions:  - Encourage patient to monitor pain and request assistance  - Assess pain using appropriate pain scale  - Administer analgesics based on type and severity of pain and evaluate response  - Implement non-pharmacological measures as appropriate and evaluate response  - Consider cultural and social influences on pain and pain management  - Notify physician/advanced practitioner if interventions unsuccessful or patient reports new pain  Outcome: Progressing     Problem: INFECTION - ADULT  Goal: Absence or prevention of progression during hospitalization  Description: INTERVENTIONS:  - Assess and monitor for signs and symptoms of infection  - Monitor lab/diagnostic results      Outcome: Progressing  Goal: Absence of fever/infection during neutropenic period  Description: INTERVENTIONS:  - Monitor WBC    Outcome: Progressing     Problem: SAFETY ADULT  Goal: Patient will remain free of falls  Description: INTERVENTIONS:  - Educate patient/family on patient safety including physical limitations  - Instruct patient to call for assistance with activity   - Consult OT/PT to assist with strengthening/mobility   - Keep Call bell within reach    Outcome: Progressing  Goal: Maintain or return to baseline ADL function  Description: INTERVENTIONS:  -  Assess patient's ability to carry out ADLs; assess patient's baseline for ADL function and identify physical deficits which impact ability to perform ADLs (bathing, care of mouth/teeth, toileting, grooming, dressing, etc.)  - Assess/evaluate cause of self-care deficits   - Assess range of motion  - Assess patient's mobility; develop plan if impaired  - Assess patient's need for assistive devices and provide as appropriate  - Encourage maximum independence but intervene and supervise when necessary  - Involve family in performance of ADLs  - Assess for home  care needs following discharge   - Consider OT consult to assist with ADL evaluation and planning for discharge  - Provide patient education as appropriate  Outcome: Progressing  Goal: Maintains/Returns to pre admission functional level  Description: INTERVENTIONS:  - Perform AM-PAC 6 Click Basic Mobility/ Daily Activity assessment daily.  - Set and communicate daily mobility goal to care team and patient/family/caregiver.     - Out of bed for toileting  - Record patient progress and toleration of activity level   Outcome: Progressing

## 2024-03-07 NOTE — DISCHARGE SUMMARY
Discharge Summary - [facility] Internal Medicine    Patient Information: Edward Conrad 53 y.o. male MRN: [id number]  Unit/Bed#: [address] [place] [address] Encounter: [id number]    Discharging Physician / Practitioner: Hadley Arreola MD  PCP: Evelyn Tang DO  Admission Date: 2/28/2024  Discharge Date: 03/07/24    Reason for Admission: Abnormal Lab (Patient had US of left leg for hx of knee pain. Was notified by US tech to go to ED.)      Discharge Diagnoses:     Principal Problem:    Chronic deep vein thrombosis (DVT) of left lower extremity (HCC)  Active Problems:    Hypercoagulable state (HCC)    Acute pain of left knee    History of iron deficiency anemia    GERD without esophagitis    Obesity (BMI 30-39.9)    Hyperlipidemia    Hypertension  Resolved Problems:    * No resolved hospital problems. *        Acute pain of left knee  Assessment & Plan  As above, x-ray left knee unremarkable.  MRI of left knee ordered as outpatient.  Chronic DVT may be contributing to pain as pain significantly improved after resumption of anticoagulation  Pain has improved significantly.  Ambulating independently at present  Patient given crutches by orthopedic surgery and able to ambulate without crutches now  PT eval and treat  Follow-up orthopedic surgery as outpatient.    Continue Lidoderm patch and as needed pain medication    Hypercoagulable state (HCC)  Assessment & Plan  As above  Hematology input appreciated, not 100% sure if patient has Xarelto failure  IV heparin/Lovenox and Coumadin as above  Daily INR  Follow-up with hematology after discharge    * Chronic deep vein thrombosis (DVT) of left lower extremity (HCC)  Assessment & Plan  Patient presents with left leg DVT, sent to ED by [facility].  Reports having history of bilateral lower extremity DVT and PE in 2018, on Xarelto. Reports started with left medial knee pain about 2 weeks ago and got worse today, started to have pain in left thigh and left lower leg today with trouble bearing  weight/trouble walking today.  Seen Ortho on the day of admission was ordered venous Doppler to rule out DVT which came back positive.  Denies injuries to left knee 2 weeks ago.  Denies paresthesia to left lower extremity.  Chart reviewed.  History of bilateral lower extremity DVT and bilateral PE in 8/2018.  Thrombophilia evaluation showed factor V Leiden mutation, elevated APC resistance, anticardiolipin IgM level,antiphosphatidylserine IgM per hematology note.  Patient has been on Xarelto 20 mg p.o. daily ever since.  Patient saw orthopedic surgery today for left knee pain, was ordered x-ray left knee, MRI left knee, lower venous Doppler to rule out DVT due to history.  X-ray left knee was normal.  Lower venous Doppler showed -chronic nonocclusive DVT in the distal femoral, popliteal and peroneal veins.  Lower venous Doppler in 7/2022 showed -chronic nonocclusive DVT in the popliteal vein only.  Repeat lower extremity venous Doppler-no evidence of DVT in the right leg.  Chronic nonocclusive DVT in the distal femoral, popliteal and peroneal veins  CTA PE study no PE  ED provider discussed case with hematology on-call, recommend heparin drip to bridge with Coumadin.  Continue heparin drip with bridging with Coumadin.  Lovenox bridging with Coumadin discussed with the patient/family and patient is not very comfortable with Lovenox.  Coordinated with hematology in detail during hospitalization by admitting team  INR is trending up but remains subtherapeutic.  Will transition to subcu Lovenox from IV heparin for convenience.  Hemoglobin is stable  D-dimer was negative  INR normal at 1.6 today  Will increase Coumadin to 7.5 mg today  Nutrition evaluation for Coumadin    History of iron deficiency anemia  Assessment & Plan  History of iron deficient anemia with hemoglobin 9 in 2021 per wife .  Denies overt bleeding at the time.  Patient was prescribed iron supplement at that time, with improvement in hemoglobin  afterwards.  Underwent EGD colonoscopy with no source of bleeding but found to have hiatal hernia.  Hemoglobin  11.3 upon admission with low MCV  Iron panel suggesting iron deficiency-iron level 14, percent saturation 6, ferritin 3, TIBC 440.  B12 level was normal  Patient received 4 doses of IV Venofer  Stool for occult blood x 1 is negative  Known history of iron deficiency anemia and was on iron supplementation previously  Patient did have EGD and colonoscopy in 2021 which showed hiatal hernia with mucosal erosion at the GE junction, few diverticuli in the sigmoid colon  Patient denies any active GI bleeding- will need  to follow-up with GI for capsule endoscopy  Follow-up with hematology after discharge    Hypertension  Assessment & Plan  Diet controlled  BP acceptable    Hyperlipidemia  Assessment & Plan  Diet controlled    Obesity (BMI 30-39.9)  Assessment & Plan  Body mass index is 31.08 kg/m².   Diet and lifestyle modification     GERD without esophagitis  Assessment & Plan  Large hiatal hernia on CTA  On Nexium as needed at home.  Continue PPI daily        Consultations During Hospital Stay:  IP CONSULT TO HEMATOLOGY  IP CONSULT TO NUTRITION SERVICES    Procedures Performed:     None    Significant Findings:     Refer to hospital course and above listed diagnosis related plan for details    Imaging while in hospital:    VAS VENOUS DUPLEX -LOWER LIMB UNILATERAL    Result Date: 2/29/2024  Narrative:  [facility] REPORT CLINICAL: Indications: Patient presents with pain in the left lower extremity x 2 weeks, however, patient reports pain has been worsening today. Risk Factors The patient has history of HTN and Hyperlipidemia.  FINDINGS:  Left       Impression            FV Dist    Thrombosed (Chronic)  Popliteal  Thrombosed (Chronic)  Peroneal   Thrombosed (Chronic)     CONCLUSION:  Impression:  RIGHT LOWER LIMB LIMITED: Evaluation shows no evidence of thrombus in the common femoral vein. Doppler  evaluation shows a normal response to augmentation maneuvers.  LEFT LOWER LIMB: Evidence of chronic non-occlusive deep venous thrombosis was noted in the distal femoral, popliteal and peroneal veins. No evidence of superficial thrombophlebitis noted. Doppler evaluation shows a normal response to augmentation maneuvers. Popliteal, posterior tibial and anterior tibial arterial Doppler waveform's are triphasic.  Technical findings were given to Dustin Rose at 5:05 PM.  SIGNATURE: Electronically Signed by: AIDEN GEORGE DO, RPVI on 2024-02-29 09:50:13 AM    XR knee 4+ vw left injury    Result Date: 2/28/2024  Narrative: LEFT KNEE INDICATION:   Pain in left knee. COMPARISON: 8/24/2022 VIEWS:  XR KNEE 4+ VW LEFT INJURY FINDINGS: There is no acute fracture or dislocation. There is no joint effusion. No significant degenerative changes. No lytic or blastic osseous lesion. Unchanged mild calcifications in the proximal calf subcutaneous soft tissues suggestive of heterotopic ossification.     Impression: Normal left knee. Electronically signed: 02/28/2024 08:26 PM Juan Manuel Uribe MD    CTA ED chest PE Study    Result Date: 2/28/2024  Narrative: CTA - CHEST WITH IV CONTRAST - PULMONARY ANGIOGRAM INDICATION:   Known DVT; Dyspnea on exertion. Per my review of the medical record, left lower extremity DVT on venous duplex from today. COMPARISON: Chest radiograph 2/7/2020, renal CT 3/23/2021, chest CT 3/5/2019. TECHNIQUE: CT angiogram timed for optimal opacification of the pulmonary arteries.  Axial, sagittal, and coronal 2D reformats created from source data.  Coronal 3D MIP postprocessing on the acquisition scanner. Radiation dose length product (DLP):  743.04 mGy-cm .  Radiation dose exposure minimized using iterative reconstruction and automated exposure control. IV Contrast:  85 mL of iohexol (OMNIPAQUE) FINDINGS: PULMONARY ARTERIES:  No pulmonary embolus. LUNGS:  Lungs clear. AIRWAYS: No significant filling defects.  "PLEURA:  Unremarkable. HEART/GREAT VESSELS: Normal heart size. Mild coronary artery calcification indicating atherosclerotic heart disease. MEDIASTINUM AND KEILA: Large hiatal hernia. CHEST WALL AND LOWER NECK: Unremarkable. UPPER ABDOMEN: Colonic diverticuli. OSSEOUS STRUCTURES: Mild degenerative disease in the spine.     Impression: No pulmonary embolus. Lungs clear. Large hiatal hernia. Workstation performed: [id number]       Incidental Findings:   None    Test Results Pending at Discharge (will require follow up):   As per After Visit Summary     Outpatient Tests Requested:  PT/INR on 3/11/2024    Complications:  Refer to hospital course and above listed diagnosis related plan, if any    Hospital Course:   As per HPI  Edward Conrad is a 53 y.o. male patient who originally presented to the hospital on 2/28/2024 due to ***        Please see above list of diagnoses and related plan for additional information.       Condition at Discharge: stable     Discharge Day Visit / Exam:     Subjective: Noted to be ambulating in the hallway  Feels much better  Minimal left knee discomfort  Reports that his chronic left leg swelling appears better  Denies any bleeding or bruising    Reviewed Coumadin dosing to continue 6 mg (5 mg tablet along with 1 mg tablet )daily until next Monday  Repeat PT/INR on 3/11/2024 to be followed with PCP and hematology  Bleeding precautions reviewed along with dietary restriction.  Discussed about avoiding any changes on dietary intake of vitamin K containing food at present until patient has stable INR to determine Coumadin dosing    Vitals: Blood Pressure: 138/68 (03/06/24 2000)  Pulse: 68 (03/06/24 2000)  Temperature: 98.1 °F (36.7 °C) (03/06/24 2000)  Temp Source: Oral (03/06/24 2000)  Respirations: 18 (03/06/24 2000)  Height: 5' 10\" (177.8 cm) (02/28/24 2104)  Weight - Scale: 98.2 kg (216 lb 9.6 oz) (02/28/24 2104)  SpO2: 98 % (03/06/24 1400)  Exam:   Physical Exam  Constitutional:       " General: He is not in acute distress.  HENT:      Head: Normocephalic and atraumatic.   Cardiovascular:      Rate and Rhythm: Normal rate.   Pulmonary:      Effort: Pulmonary effort is normal. No respiratory distress.      Breath sounds: No wheezing, rhonchi or rales.   Chest:      Chest wall: No tenderness.   Abdominal:      General: Bowel sounds are normal. There is no distension.      Palpations: Abdomen is soft.      Tenderness: There is no abdominal tenderness. There is no guarding or rebound.   Musculoskeletal:         General: No swelling or tenderness. Normal range of motion.      Right lower leg: No edema.      Left lower leg: No edema.      Comments: Chronic left leg varicosities   Skin:     General: Skin is warm and dry.      Findings: No rash.   Neurological:      Mental Status: He is alert. Mental status is at baseline.      Cranial Nerves: No cranial nerve deficit.         Discharge instructions/Information to patient and family:(Discharge Medications and Follow up):   See after visit summary for information provided to patient and family.      Provisions for Follow-Up Care:  See after visit summary for information related to follow-up care and any pertinent home health orders.      Disposition: Home    Planned Readmission:  No     Discharge Statement:  I spent 45 minutes discharging the patient. This time was spent on the day of discharge. I had direct contact with the patient on the day of discharge. Greater than 50% of the total time was spent examining patient, answering all patient questions, arranging and discussing plan of care with patient as well as directly providing post-discharge instructions.  Additional time then spent on discharge activities.  Coordinated with hematology regarding Coumadin dosing and follow-up plan with Dr. Patrick through [facility] text.    Discharge Medications:  See after visit summary for reconciled discharge medications provided to patient and family.      ** Please Note:  "\"This note has been constructed using a voice recognition system.Therefore there may be syntax, spelling, and/or grammatical errors. Please call if you have any questions. \"**        " "for reconciled discharge medications provided to patient and family.      ** Please Note: \"This note has been constructed using a voice recognition system.Therefore there may be syntax, spelling, and/or grammatical errors. Please call if you have any questions. \"**        "

## 2024-03-08 NOTE — UTILIZATION REVIEW
NOTIFICATION OF ADMISSION DISCHARGE   This is a Notification of Discharge from [facility]. Please be advised that this patient has been discharge from our facility. Below you will find the admission and discharge date and time including the patient’s disposition.   UTILIZATION REVIEW CONTACT:  Cordelia Christiansen  Utilization   Network Utilization Review Department  Phone: [phone] x carefully listen to the prompts. All voicemails are confidential.  Email: [email or web address]     ADMISSION INFORMATION  PRESENTATION DATE: 2/28/2024  6:11 PM  OBERVATION ADMISSION DATE:   INPATIENT ADMISSION DATE: 2/28/24  8:10 PM   DISCHARGE DATE: 3/7/2024 11:30 AM   DISPOSITION:Home/Self Care    Network Utilization Review Department  ATTENTION: Please call with any questions or concerns to [phone] and carefully listen to the prompts so that you are directed to the right person. All voicemails are confidential.   For Discharge needs, contact Care Management DC Support Team at [phone] opt. 2  Send all requests for admission clinical reviews, approved or denied determinations and any other requests to dedicated fax number below belonging to the campus where the patient is receiving treatment. List of dedicated fax numbers for the Facilities:  FACILITY NAME UR FAX NUMBER   ADMISSION DENIALS (Administrative/Medical Necessity) [phone]   DISCHARGE SUPPORT TEAM ([facility]) [phone]   PARENT CHILD HEALTH (Maternity/NICU/Pediatrics) [phone]   [facility] [phone]   [facility] [phone]   [facility] [phone]   [facility] [phone]   [facility] [phone]   [facility] [phone]   [facility] [phone]   [facility] [phone]    [facility] [phone]   [facility] [phone]   General acute hospital [phone]   [facility] [phone]

## 2024-03-11 ENCOUNTER — APPOINTMENT (OUTPATIENT)
Dept: LAB | Facility: HOSPITAL | Age: 54
End: 2024-03-11
Attending: INTERNAL MEDICINE
Payer: COMMERCIAL

## 2024-03-11 DIAGNOSIS — Z86.718 HISTORY OF DVT (DEEP VEIN THROMBOSIS): ICD-10-CM

## 2024-03-11 DIAGNOSIS — Z92.29 HX OF LONG TERM USE OF BLOOD THINNERS: ICD-10-CM

## 2024-03-11 DIAGNOSIS — I82.502 CHRONIC DEEP VEIN THROMBOSIS (DVT) OF LEFT LOWER EXTREMITY (HCC): ICD-10-CM

## 2024-03-11 LAB
INR PPP: 1.91 (ref 0.84–1.19)
PROTHROMBIN TIME: 22 SECONDS (ref 11.6–14.5)

## 2024-03-11 PROCEDURE — 36415 COLL VENOUS BLD VENIPUNCTURE: CPT

## 2024-03-11 PROCEDURE — 85610 PROTHROMBIN TIME: CPT

## 2024-03-11 NOTE — TELEPHONE ENCOUNTER
INR reviewed by Dr Patrick  Patient will continue coumadin at 6mg daily  Patient has hospital f/u with PA on 3/18/2024 at which time INR schedule will be determined  Patient has my TEAMs number for contact

## 2024-03-18 ENCOUNTER — TELEPHONE (OUTPATIENT)
Age: 54
End: 2024-03-18

## 2024-03-18 ENCOUNTER — TELEPHONE (OUTPATIENT)
Dept: HEMATOLOGY ONCOLOGY | Facility: MEDICAL CENTER | Age: 54
End: 2024-03-18

## 2024-03-18 ENCOUNTER — OFFICE VISIT (OUTPATIENT)
Dept: HEMATOLOGY ONCOLOGY | Facility: MEDICAL CENTER | Age: 54
End: 2024-03-18
Payer: COMMERCIAL

## 2024-03-18 ENCOUNTER — APPOINTMENT (OUTPATIENT)
Dept: LAB | Facility: HOSPITAL | Age: 54
End: 2024-03-18
Payer: COMMERCIAL

## 2024-03-18 VITALS
HEART RATE: 73 BPM | TEMPERATURE: 98.8 F | OXYGEN SATURATION: 97 % | SYSTOLIC BLOOD PRESSURE: 118 MMHG | HEIGHT: 70 IN | WEIGHT: 224 LBS | RESPIRATION RATE: 17 BRPM | DIASTOLIC BLOOD PRESSURE: 76 MMHG | BODY MASS INDEX: 32.07 KG/M2

## 2024-03-18 DIAGNOSIS — D68.51 FACTOR 5 LEIDEN MUTATION, HETEROZYGOUS (HCC): ICD-10-CM

## 2024-03-18 DIAGNOSIS — I87.099 CHRONIC VENOUS HYPERTENSION DUE TO DEEP VEIN THROMBOSIS (DVT): ICD-10-CM

## 2024-03-18 DIAGNOSIS — I82.592 CHRONIC DEEP VEIN THROMBOSIS (DVT) OF OTHER VEIN OF LEFT LOWER EXTREMITY (HCC): Primary | ICD-10-CM

## 2024-03-18 DIAGNOSIS — D68.59 HYPERCOAGULABLE STATE (HCC): ICD-10-CM

## 2024-03-18 DIAGNOSIS — Z79.01 ANTICOAGULANT LONG-TERM USE: ICD-10-CM

## 2024-03-18 DIAGNOSIS — D64.9 ANEMIA, UNSPECIFIED TYPE: ICD-10-CM

## 2024-03-18 LAB
INR PPP: 2.2 (ref 0.84–1.19)
PROTHROMBIN TIME: 24.6 SECONDS (ref 11.6–14.5)

## 2024-03-18 PROCEDURE — 85610 PROTHROMBIN TIME: CPT

## 2024-03-18 PROCEDURE — 36415 COLL VENOUS BLD VENIPUNCTURE: CPT

## 2024-03-18 PROCEDURE — 99214 OFFICE O/P EST MOD 30 MIN: CPT | Performed by: PHYSICIAN ASSISTANT

## 2024-03-18 RX ORDER — WARFARIN SODIUM 5 MG/1
TABLET ORAL
Qty: 30 TABLET | Refills: 0 | Status: SHIPPED | OUTPATIENT
Start: 2024-03-18

## 2024-03-18 RX ORDER — WARFARIN SODIUM 1 MG/1
TABLET ORAL
Qty: 30 TABLET | Refills: 0 | Status: SHIPPED | OUTPATIENT
Start: 2024-03-18

## 2024-03-18 NOTE — PROGRESS NOTES
[facility]  [address] [place] [id number]  Hematology Ambulatory Follow-Up  Edward Conrad, 1970, [id number]  3/18/2024      Assessment and Plan     This is a 53-year-old male with prior lower extremity DVTs, PE recently admitted with left knee pain.  CTA/chest did not demonstrate any evidence of a new PE.  Additionally, the recent lower extremity Dopplers did not demonstrate any evidence of acute/new DVT.  Patient has evidence of chronic nonocclusive left lower extremity thromboses.  D-dimer was not elevated.     Unclear if this was truly a Xarelto failure as there was no evidence of an acute DVT. Ultimately, however, Edward was changed from Xarelto to heparin/now coumadin. His INR was 2.20 today. He is taking 6mg coumadin daily which he knows to continue.    He is aware that we will defer management of INR to his PCP. He has a visit scheduled on 3/29. We will dose his coumadin until that time.    He has no pain in the left lower extremity now.    Prior thrombophilia workup is listed below.  Besides the heterozygous factor V Leiden mutation, patient had an elevated anticardiolipin IgM and an elevated antiphosphatidylserine IgM.  These were never serialized as patient understood that he required lifelong anticoagulation and did not feel that the tests needed to be repeated.  The concern was that patient had antiphospholipid antibody syndrome besides the factor V Leiden mutation.  Given that Edward is now on longterm anticoagulation with coumadin we can defer repeating anticardiolipin panel and antiphosphatidylserine panel     Mr. Conrad is anemic dating back to at least 2020. Will check iron studies. Last GI studies were in 2021.    Mr. Conrad can be seen again on an as-needed basis. As above, defer management of INR to PCP.    Barrier(s) to care: None  The patient is able to self care.    Discussed with Dr. Patrick.    Subjective     Chief  Complaint   Patient presents with    Follow-up    DVT, hypercoagulable state       History of present illness: Patient is a 53-year-old male who was previously seen/evaluated for bilateral DVTs and bilateral PE.  There was no clear provoking incident.  Patient was placed on Xarelto.  Thrombophilia workup demonstrated heterozygous factor V Leiden, slightly elevated anticardiolipin IgM level and an elevated antiphosphatidylserine IgM level.  Patient was last seen in clinic over 5 years ago.  At that time Mr. Conrad did not want to deal with the INR checks with Coumadin.  Up until just recently patient has done well with the Xarelto.    Interval History:   Mr. Conrad was admitted on February 28, 2024 with acute left knee pain.  We were asked to evaluate him during that hospital stay. Dopplers from February 28, 2024 did not demonstrate any evidence of thrombus in the right lower extremity; patient had evidence of chronic nonocclusive deep vein thrombosis in the left distal femoral, popliteal and peroneal veins. CTA/chest did not demonstrate any evidence of a new PE.  D-dimer was within normal limits.  Patient was started on heparin, eventually Coumadin.     Presently Mr. Conrad states feeling much better. He denies any pain in the left knee currently. No issues with the coumadin. INR today is 2.20. He is currently taking 6mg per day. No respiratory problems.  Routine health maintenance and medical care has been up-to-date.    Review of Systems   Constitutional:  Negative for activity change, appetite change, fatigue and fever.   HENT:  Negative for nosebleeds.    Respiratory:  Negative for cough, choking and shortness of breath.    Cardiovascular:  Negative for chest pain, palpitations and leg swelling.   Gastrointestinal:  Negative for abdominal distention, abdominal pain, anal bleeding, blood in stool, constipation, diarrhea, nausea and vomiting.   Endocrine: Negative for cold intolerance.   Genitourinary:  Negative  for hematuria.   Musculoskeletal:  Negative for myalgias.   Skin:  Negative for color change, pallor and rash.   Allergic/Immunologic: Negative for immunocompromised state.   Neurological:  Negative for headaches.   Hematological:  Negative for adenopathy. Does not bruise/bleed easily.   All other systems reviewed and are negative.      Patient Active Problem List   Diagnosis    Allergic rhinitis    Hiatal hernia    GERD without esophagitis    Prediabetes    Obesity (BMI 30-39.9)    [place] cardiac risk <10% in next 10 years    Hyperlipidemia    History of pulmonary embolus (PE)    History of DVT (deep vein thrombosis)    Factor 5 Leiden mutation, heterozygous (HCC)    Hypercoagulable state (HCC)    Activated protein C resistance (HCC)    Family history of colon cancer    Anticoagulant long-term use    Spermatocele    Acquired bladder diverticulum    Calcium oxalate kidney stones    Left leg pain    History of iron deficiency anemia    Long-term use of high-risk medication    Acute pain of left knee    Chronic deep vein thrombosis (DVT) of left lower extremity (HCC)    Hypertension    Internal derangement of left knee    Pain of left calf     Past Medical History:   Diagnosis Date    Anemia     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 07/12/2006    DVT (deep venous thrombosis) ([facility]) 2018    BL legs    Dysphagia     Last Assessed: 8/12/2014     Factor 5 Leiden mutation, heterozygous (HCC)     GERD (gastroesophageal reflux disease)     Globus sensation     Last Assessed: 6/4/2014     H/O neoplasm of uncertain behavior of skin 06/06/2006    Hyperlipidemia     Hypertension 02/14/2006    Lateral epicondylitis of right elbow     Last Assessed: 10/23/2015     Pes planus     last assessed: 10/23/2013     Plantar fascial fibromatosis     Last Assessed: 10/23/2013     Pulmonary embolism ([facility]) 2018    Right patellofemoral syndrome     Last Assessed: 4/15/2016     Sebaceous cyst 11/03/2007     Past Surgical History:   Procedure  Laterality Date    COLONOSCOPY      ESOPHAGOGASTRODUODENOSCOPY N/A 10/7/2016    Procedure: ESOPHAGOGASTRODUODENOSCOPY (EGD);  Surgeon: Crystal Valdovinos MD;  Location: [facility] GI LAB;  Service:     NASAL SEPTUM SURGERY  1995    NM ESOPHAGOGASTRODUODENOSCOPY TRANSORAL DIAGNOSTIC N/A 12/6/2018    Procedure: ESOPHAGOGASTRODUODENOSCOPY (EGD);  Surgeon: Crystal Valdovinos MD;  Location: [facility] GI [facility];  Service: Gastroenterology    NM SURGICAL ARTHROSCOPY SHOULDER W/ROTATOR CUFF RPR Right 1/26/2023    Procedure: Shoulder Arthroscopy with Rotator Cuff Repair, Subacromial Decompression, and Limited Debridement;  Surgeon: Johnnie Barcenas MD;  Location: [facility] OR;  Service: Orthopedics     Family History   Problem Relation Age of Onset    Heart disease Mother         valve 70s    Hypertension Mother     Cancer Father         colon    Colon cancer Father     Hypertension Father     Coronary artery disease Father         CABG    Heart disease Brother         MI exp age 45    Colon cancer Family     Cancer Brother         esophageal CA exp age 44    Cancer Brother      Social History     Socioeconomic History    Marital status: /Civil Union     Spouse name: None    Number of children: None    Years of education: None    Highest education level: None   Occupational History    None   Tobacco Use    Smoking status: Never    Smokeless tobacco: Never   Vaping Use    Vaping status: Never Used   Substance and Sexual Activity    Alcohol use: Yes     Alcohol/week: 4.0 standard drinks of alcohol     Types: 4 Cans of beer per week     Comment: socially    Drug use: No    Sexual activity: Yes     Partners: Female   Other Topics Concern    None   Social History Narrative    None     Social Determinants of Health     Financial Resource Strain: Not on file   Food Insecurity: No Food Insecurity (2/29/2024)    Hunger Vital Sign     Worried About Running Out of Food in the Last Year: Never true     Ran Out of Food in the Last Year:  "Never true   Transportation Needs: No Transportation Needs (2/29/2024)    PRAPARE - Transportation     Lack of Transportation (Medical): No     Lack of Transportation (Non-Medical): No   Physical Activity: Not on file   Stress: Not on file   Social Connections: Not on file   Intimate Partner Violence: Not on file   Housing Stability: Low Risk  (3/3/2024)    Housing Stability Vital Sign     Unable to Pay for Housing in the Last Year: No     Number of Places Lived in the Last Year: 1     Unstable Housing in the Last Year: No       Current Outpatient Medications:     esomeprazole (NexIUM) 40 MG capsule, Take 1 capsule (40 mg total) by mouth in the morning, Disp: , Rfl:     Lidocaine 4 % PTCH, Apply 1 patch topically in the morning Patch may remain in place for up to 12 hours in a 24hr period (Patient taking differently: Apply 1 patch topically in the morning Patch may remain in place for up to 12 hours in a 24hr period - PRN), Disp: 10 patch, Rfl: 0    warfarin (Coumadin) 1 mg tablet, Take  1 mg tablet with 5 mg once a day-total dose 6 mg daily, Disp: 15 tablet, Rfl: 0    warfarin (Coumadin) 1 mg tablet, Take 1(5mg) tablet daily with 1(1mg) tablet daily., Disp: 30 tablet, Rfl: 0    warfarin (COUMADIN) 5 mg tablet, Take  5 mg tablet with 1 mg once a day-total dose 6 mg daily, Disp: 15 tablet, Rfl: 0    warfarin (Coumadin) 5 mg tablet, Take 1(5mg) tablet daily with 1(1mg) tablet daily., Disp: 30 tablet, Rfl: 0  No Known Allergies    Objective   /76 (BP Location: Left arm, Patient Position: Sitting, Cuff Size: Adult)   Pulse 73   Temp 98.8 °F (37.1 °C) (Temporal)   Resp 17   Ht 5' 10\" (1.778 m)   Wt 102 kg (224 lb)   SpO2 97%   BMI 32.14 kg/m²    Physical Exam  Constitutional:       General: He is not in acute distress.     Appearance: He is well-developed and normal weight.   HENT:      Head: Normocephalic and atraumatic.      Right Ear: External ear normal.      Left Ear: External ear normal.      Nose: " Nose normal.      Mouth/Throat:      Mouth: Mucous membranes are moist.   Eyes:      General: No scleral icterus.     Conjunctiva/sclera: Conjunctivae normal.   Cardiovascular:      Rate and Rhythm: Normal rate and regular rhythm.   Pulmonary:      Effort: Pulmonary effort is normal. No respiratory distress.      Breath sounds: Normal breath sounds.   Abdominal:      General: Abdomen is flat. There is no distension.      Palpations: Abdomen is soft.   Skin:     Findings: No rash (on exposed skin.).   Neurological:      Mental Status: He is alert and oriented to person, place, and time.   Psychiatric:         Mood and Affect: Mood normal.         Thought Content: Thought content normal.         Judgment: Judgment normal.     Extremities: 1+ Edema LLE. No edema RLE.     Result Review  Labs:   Latest Reference Range & Units 24 04:27 24 05:54 24 06:55 24 14:27   PROTIME 11.6 - 14.5 seconds 20.2 (H) 25.4 (H) 22.0 (H) 24.6 (H)   POCT INR 0.84 - 1.19  1.71 (H) 2.29 (H) 1.91 (H) 2.20 (H)   (H): Data is abnormally high  Imagin2024 CTA ED chest PE study  IMPRESSION:     No pulmonary embolus.     Lungs clear.     Large hiatal hernia.    2024 Venous duplex- lower limb unilateral  CONCLUSION:     Impression:     RIGHT LOWER LIMB LIMITED:  Evaluation shows no evidence of thrombus in the common femoral vein.  Doppler evaluation shows a normal response to augmentation maneuvers.     LEFT LOWER LIMB:  Evidence of chronic non-occlusive deep venous thrombosis was noted in the  distal femoral, popliteal and peroneal veins.  No evidence of superficial thrombophlebitis noted.  Doppler evaluation shows a normal response to augmentation maneuvers.  Popliteal, posterior tibial and anterior tibial arterial Doppler waveform's are  triphasic.     Technical findings were given to Dustin Rose at 5:05 PM.     Please note:  This report has been generated by a voice recognition software system. Therefore  there may be syntax, spelling, and/or grammatical errors. Please call if you have any questions.

## 2024-03-18 NOTE — TELEPHONE ENCOUNTER
Caller: Jordi from [facility]    Doctor: Dr. Bianchi    Reason for call: Jordi calling stating that MRI was denied.  Jordi asking if Dr. Bianchi can call [facility] and they will instruct what needs to take place to file an appeal.      Call back: [phone]  Case #: [id number]    Call back#: [phone]

## 2024-03-25 ENCOUNTER — TELEPHONE (OUTPATIENT)
Dept: HEMATOLOGY ONCOLOGY | Facility: MEDICAL CENTER | Age: 54
End: 2024-03-25

## 2024-03-25 ENCOUNTER — APPOINTMENT (OUTPATIENT)
Dept: LAB | Facility: HOSPITAL | Age: 54
End: 2024-03-25
Payer: COMMERCIAL

## 2024-03-25 DIAGNOSIS — D64.9 ANEMIA, UNSPECIFIED TYPE: ICD-10-CM

## 2024-03-25 DIAGNOSIS — I82.592 CHRONIC DEEP VEIN THROMBOSIS (DVT) OF OTHER VEIN OF LEFT LOWER EXTREMITY (HCC): ICD-10-CM

## 2024-03-25 LAB
FERRITIN SERPL-MCNC: 50 NG/ML (ref 24–336)
INR PPP: 2.88 (ref 0.84–1.19)
IRON SATN MFR SERPL: 12 % (ref 15–50)
IRON SERPL-MCNC: 42 UG/DL (ref 50–212)
PROTHROMBIN TIME: 30.3 SECONDS (ref 11.6–14.5)
TIBC SERPL-MCNC: 354 UG/DL (ref 250–450)
UIBC SERPL-MCNC: 312 UG/DL (ref 155–355)

## 2024-03-25 PROCEDURE — 82728 ASSAY OF FERRITIN: CPT

## 2024-03-25 PROCEDURE — 83550 IRON BINDING TEST: CPT

## 2024-03-25 PROCEDURE — 36415 COLL VENOUS BLD VENIPUNCTURE: CPT

## 2024-03-25 PROCEDURE — 83540 ASSAY OF IRON: CPT

## 2024-03-25 PROCEDURE — 85610 PROTHROMBIN TIME: CPT

## 2024-03-29 ENCOUNTER — OFFICE VISIT (OUTPATIENT)
Dept: FAMILY MEDICINE CLINIC | Facility: CLINIC | Age: 54
End: 2024-03-29
Payer: COMMERCIAL

## 2024-03-29 VITALS
HEART RATE: 78 BPM | TEMPERATURE: 98.6 F | WEIGHT: 215 LBS | DIASTOLIC BLOOD PRESSURE: 90 MMHG | SYSTOLIC BLOOD PRESSURE: 134 MMHG | BODY MASS INDEX: 30.85 KG/M2 | RESPIRATION RATE: 18 BRPM

## 2024-03-29 DIAGNOSIS — I82.502 CHRONIC DEEP VEIN THROMBOSIS (DVT) OF LEFT LOWER EXTREMITY (HCC): ICD-10-CM

## 2024-03-29 DIAGNOSIS — Z86.718 HISTORY OF DVT (DEEP VEIN THROMBOSIS): ICD-10-CM

## 2024-03-29 DIAGNOSIS — D68.51 FACTOR 5 LEIDEN MUTATION, HETEROZYGOUS (HCC): Primary | ICD-10-CM

## 2024-03-29 DIAGNOSIS — R73.03 PREDIABETES: ICD-10-CM

## 2024-03-29 DIAGNOSIS — E78.5 DYSLIPIDEMIA: ICD-10-CM

## 2024-03-29 DIAGNOSIS — E61.1 LOW SERUM IRON: ICD-10-CM

## 2024-03-29 PROCEDURE — 99214 OFFICE O/P EST MOD 30 MIN: CPT | Performed by: FAMILY MEDICINE

## 2024-03-29 RX ORDER — WARFARIN SODIUM 1 MG/1
TABLET ORAL
Qty: 30 TABLET | Refills: 5 | Status: SHIPPED | OUTPATIENT
Start: 2024-03-29

## 2024-03-29 NOTE — PROGRESS NOTES
Name: Edward Conrad      : 1970      MRN: [id number]  Encounter Provider: Evelyn Tang DO  Encounter Date: 3/29/2024   Encounter department: [facility]    Assessment & Plan     1. Factor 5 Leiden mutation, heterozygous (HCC)  Assessment & Plan:  Patient has had 2 DVTs  Will need lifelong anticoagulation with warfarin  We did discuss home INR machines.  Will consider after he has been steady for 3 to 6 months    Orders:  -     Protime-INR; Standing    2. Prediabetes  Assessment & Plan:  Stable  Hemoglobin A1C   Date Value Ref Range Status   10/19/2023 6.0 (H) 4.8 - 5.6 % Final     Comment:              Prediabetes: 5.7 - 6.4           Diabetes: >6.4           Glycemic control for adults with diabetes: <7.0          Orders:  -     Hemoglobin A1C; Future    3. Low serum iron  -     CBC; Future  -     TIBC Panel (incl. Iron, TIBC, % Iron Saturation); Future    4. Dyslipidemia  Assessment & Plan:  Stable     Orders:  -     Comprehensive metabolic panel; Future  -     Lipid Panel with Direct LDL reflex; Future    Return in about 1 month (around 2024) for Next scheduled follow up.       Subjective      He was recently hospitalized with a DVT in his left leg.  The swelling and pain in his leg has improved significantly since starting on Coumadin.  He is longer be following with hematology and has asked me to take over his INR management.  He would like to  check his INR weekly for the next month as he introduces new foods      Review of Systems    Current Outpatient Medications on File Prior to Visit   Medication Sig   • esomeprazole (NexIUM) 40 MG capsule Take 1 capsule (40 mg total) by mouth in the morning   • Lidocaine 4 % PTCH Apply 1 patch topically in the morning Patch may remain in place for up to 12 hours in a 24hr period (Patient taking differently: Apply 1 patch topically in the morning Patch may remain in place for up to 12 hours in a 24hr period - PRN)   • warfarin (Coumadin) 1 mg  tablet Take  1 mg tablet with 5 mg once a day-total dose 6 mg daily   • warfarin (COUMADIN) 5 mg tablet Take  5 mg tablet with 1 mg once a day-total dose 6 mg daily   • warfarin (Coumadin) 1 mg tablet Take 1(5mg) tablet daily with 1(1mg) tablet daily. (Patient not taking: Reported on 3/29/2024)   • warfarin (Coumadin) 5 mg tablet Take 1(5mg) tablet daily with 1(1mg) tablet daily. (Patient not taking: Reported on 3/29/2024)       Objective     /90   Pulse 78   Temp 98.6 °F (37 °C)   Resp 18   Wt 97.5 kg (215 lb)   BMI 30.85 kg/m²     Physical Exam  Vitals and nursing note reviewed.   Constitutional:       Appearance: He is well-developed.   HENT:      Head: Normocephalic and atraumatic.      Right Ear: Tympanic membrane and external ear normal.      Left Ear: Tympanic membrane and external ear normal.   Cardiovascular:      Rate and Rhythm: Normal rate and regular rhythm.      Heart sounds: Normal heart sounds. No murmur heard.  Pulmonary:      Effort: Pulmonary effort is normal. No respiratory distress.      Breath sounds: Normal breath sounds. No wheezing or rales.   Musculoskeletal:      Right lower leg: No edema.      Left lower leg: Edema (mild) present.               Evelyn Tang DO

## 2024-03-29 NOTE — ASSESSMENT & PLAN NOTE
Stable  Hemoglobin A1C   Date Value Ref Range Status   10/19/2023 6.0 (H) 4.8 - 5.6 % Final     Comment:              Prediabetes: 5.7 - 6.4           Diabetes: >6.4           Glycemic control for adults with diabetes: <7.0

## 2024-03-29 NOTE — ASSESSMENT & PLAN NOTE
Patient has had 2 DVTs  Will need lifelong anticoagulation with warfarin  We did discuss home INR machines.  Will consider after he has been steady for 3 to 6 months

## 2024-04-01 ENCOUNTER — ANTICOAG VISIT (OUTPATIENT)
Dept: FAMILY MEDICINE CLINIC | Facility: CLINIC | Age: 54
End: 2024-04-01

## 2024-04-01 ENCOUNTER — TELEPHONE (OUTPATIENT)
Dept: FAMILY MEDICINE CLINIC | Facility: CLINIC | Age: 54
End: 2024-04-01

## 2024-04-01 ENCOUNTER — APPOINTMENT (OUTPATIENT)
Dept: LAB | Facility: HOSPITAL | Age: 54
End: 2024-04-01
Payer: COMMERCIAL

## 2024-04-01 DIAGNOSIS — D68.51 FACTOR 5 LEIDEN MUTATION, HETEROZYGOUS (HCC): ICD-10-CM

## 2024-04-01 DIAGNOSIS — E78.5 DYSLIPIDEMIA: ICD-10-CM

## 2024-04-01 DIAGNOSIS — R73.03 PREDIABETES: ICD-10-CM

## 2024-04-01 DIAGNOSIS — E61.1 LOW SERUM IRON: ICD-10-CM

## 2024-04-01 LAB
INR PPP: 2.65 (ref 0.84–1.19)
PROTHROMBIN TIME: 28.4 SECONDS (ref 11.6–14.5)

## 2024-04-01 PROCEDURE — 36415 COLL VENOUS BLD VENIPUNCTURE: CPT

## 2024-04-01 PROCEDURE — 85610 PROTHROMBIN TIME: CPT

## 2024-04-01 NOTE — TELEPHONE ENCOUNTER
----- Message from Evelyn Tang DO sent at 4/1/2024  7:55 AM EDT -----  Same dose recheck in 1 week  Evelyn Tang DO

## 2024-04-01 NOTE — TELEPHONE ENCOUNTER
I created an anticoagulation encounter for patient to update the calendar but there is no INR episode information, please enter and I will update the calendar appropriately.   Steph Callejas, CMA

## 2024-04-01 NOTE — TELEPHONE ENCOUNTER
Left message on machine for a call back. Please advise patient of provider message, confirm dose, and update calendar.  Steph Callejas, CMA

## 2024-04-08 ENCOUNTER — OFFICE VISIT (OUTPATIENT)
Age: 54
End: 2024-04-08
Payer: COMMERCIAL

## 2024-04-08 VITALS
BODY MASS INDEX: 30.78 KG/M2 | SYSTOLIC BLOOD PRESSURE: 132 MMHG | WEIGHT: 215 LBS | DIASTOLIC BLOOD PRESSURE: 86 MMHG | HEIGHT: 70 IN | RESPIRATION RATE: 17 BRPM

## 2024-04-08 DIAGNOSIS — M21.41 ACQUIRED FLAT FOOT, RIGHT: ICD-10-CM

## 2024-04-08 DIAGNOSIS — M21.42 ACQUIRED FLAT FOOT, LEFT: ICD-10-CM

## 2024-04-08 DIAGNOSIS — M72.2 PLANTAR FASCIITIS: Primary | ICD-10-CM

## 2024-04-08 DIAGNOSIS — M25.572 ARTHRALGIA OF LEFT FOOT: ICD-10-CM

## 2024-04-08 DIAGNOSIS — M21.962 ACQUIRED DEFORMITY OF LEFT FOOT: ICD-10-CM

## 2024-04-08 DIAGNOSIS — M21.961 ACQUIRED DEFORMITY OF RIGHT FOOT: ICD-10-CM

## 2024-04-08 PROCEDURE — 99212 OFFICE O/P EST SF 10 MIN: CPT | Performed by: PODIATRIST

## 2024-04-08 PROCEDURE — 20605 DRAIN/INJ JOINT/BURSA W/O US: CPT | Performed by: PODIATRIST

## 2024-04-08 NOTE — PROGRESS NOTES
"   Assessment/Plan:  Pain upon ambulation.  Acquired deformity foot.  Acquired pes planus.  Plantar fasciitis.  Arthralgia left Lisfranc joint.     Plan.  Foot exam performed.  Patient educated on condition.  Patient will start a stretching program.  He declines injection therapy at this point.  He will take Aleve p.r.n..  In order to help with pronation syndrome, patient will use orthotics daily to control deformity and ease pain.  Arthrocentesis done left Lisfranc joint.  He will take Tylenol.    Medium joint arthrocentesis  Universal Protocol:  Consent: Verbal consent obtained.  Risks and benefits: risks, benefits and alternatives were discussed  Consent given by: patient  Time out: Immediately prior to procedure a \"time out\" was called to verify the correct patient, procedure, equipment, support staff and site/side marked as required.  Timeout called at: 4/8/2024 3:10 PM.  Patient understanding: patient states understanding of the procedure being performed  Patient identity confirmed: verbally with patient  Supporting Documentation  Indications: pain and joint swelling   Procedure Details  Location: -   Location: Left Lisfranc joint.Needle size: 25 G  Ultrasound guidance: no  Approach: anterolateral  Medications administered: 10 mg triamcinolone acetonide 10 mg/mL    Patient tolerance: patient tolerated the procedure well with no immediate complications    Patient does not have significant improvement, return for serial injection.                 Diagnoses and all orders for this visit:     Plantar fasciitis     Acquired flat foot, right     Acquired flat foot, left     Acquired deformity of right foot     Acquired deformity of left foot            Subjective:  Patient has return of pain in his left foot.  Patient has history of pain upon ambulation.  He has pain upon rising.  No history of trauma.  He has history of plantar fasciitis     No Known Allergies        Current Outpatient Medications:     esomeprazole " (NexIUM) 40 MG capsule, Take 1 capsule (40 mg total) by mouth daily, Disp: 90 capsule, Rfl: 3    Xarelto 20 MG tablet, TAKE 1 TABLET BY MOUTH DAILY, Disp: 90 tablet, Rfl: 1         Patient Active Problem List   Diagnosis    Allergic rhinitis    Hiatal hernia    GERD without esophagitis    Prediabetes    Screening for cardiovascular, respiratory, and genitourinary diseases    Screening for diabetes mellitus (DM)    Prostate cancer screening    Obesity (BMI 30-39.9)    [place] cardiac risk <10% in next 10 years    Healthcare maintenance    Hyperlipidemia    History of pulmonary embolus (PE)    History of DVT (deep vein thrombosis)    Factor 5 Leiden mutation, heterozygous (HCC)    Hypercoagulable state (HCC)    Activated protein C resistance (HCC)    Family history of colon cancer    Anticoagulant long-term use    Spermatocele    Acquired bladder diverticulum    Calcium oxalate kidney stones    Colon cancer screening    Left leg pain    Iron deficiency    Long-term use of high-risk medication             Patient ID: Edward Conrad is a 52 y.o. male.     HPI     The following portions of the patient's history were reviewed and updated as appropriate:      family history includes Cancer in his brother and father; Colon cancer in his family and father; Coronary artery disease in his father; Heart disease in his brother and mother; Hypertension in his father and mother.       reports that he has never smoked. He quit smokeless tobacco use about 27 years ago. He reports current alcohol use. He reports that he does not use drugs.      Objective:  Patient's shoes and socks removed.   Foot ExamPhysical Exam       Foot Exam     General  General Appearance: appears stated age and healthy   Orientation: alert and oriented to person, place, and time   Affect: appropriate   Gait: unimpaired         Right Foot/Ankle      Inspection and Palpation  Ecchymosis: none  Swelling: dorsum and metatarsals   Arch: pes planus  Hammertoes:  fifth toe  Hallux valgus: no  Hallux limitus: yes     Neurovascular  Dorsalis pedis: 3+  Posterior tibial: 3+  Achilles reflex: 2+     Muscle Strength  Ankle dorsiflexion: 4+        Left Foot/Ankle       Inspection and Palpation  Ecchymosis: none  Swelling: dorsum and metatarsals   Arch: pes planus  Hammertoes: fifth toe  Hallux valgus: no  Hallux limitus: yes     Neurovascular  Dorsalis pedis: 3+  Posterior tibial: 3+  Superficial peroneal nerve sensation: diminished  Deep peroneal nerve sensation: diminished  Achilles reflex: 1+     Muscle Strength  Ankle dorsiflexion: 4           Physical Exam   Constitutional: He is oriented to person, place, and time. He appears well-developed and well-nourished.   Cardiovascular: Normal rate and regular rhythm.   Pulses:       Dorsalis pedis pulses are 3+ on the right side, and 3+ on the left side.        Posterior tibial pulses are 3+ on the right side, and 3+ on the left side.   Musculoskeletal: He exhibits deformity.   Patient is pronated in stance and gait.  Bilateral pes planus.  Early hallux limitus noted bilateral.  Pain with palpation lateral aspect left Lisfranc joint  Neurological: He is alert and oriented to person, place, and time.   Reflex Scores:       Achilles reflexes are 2+ on the right side and 1+ on the left side.  Skin: Skin is warm. Capillary refill takes less than 2 seconds.   Psychiatric: He has a normal mood and affect. His behavior is normal. Judgment and thought content normal.   Vitals reviewed.

## 2024-04-10 ENCOUNTER — LAB (OUTPATIENT)
Dept: LAB | Facility: HOSPITAL | Age: 54
End: 2024-04-10
Payer: COMMERCIAL

## 2024-04-10 ENCOUNTER — ANTICOAG VISIT (OUTPATIENT)
Dept: FAMILY MEDICINE CLINIC | Facility: CLINIC | Age: 54
End: 2024-04-10

## 2024-04-10 ENCOUNTER — TELEPHONE (OUTPATIENT)
Dept: FAMILY MEDICINE CLINIC | Facility: CLINIC | Age: 54
End: 2024-04-10

## 2024-04-10 DIAGNOSIS — D68.51 FACTOR 5 LEIDEN MUTATION, HETEROZYGOUS (HCC): ICD-10-CM

## 2024-04-10 LAB
INR PPP: 3.22 (ref 0.84–1.19)
PROTHROMBIN TIME: 33 SECONDS (ref 11.6–14.5)

## 2024-04-10 PROCEDURE — 85610 PROTHROMBIN TIME: CPT

## 2024-04-10 PROCEDURE — 36415 COLL VENOUS BLD VENIPUNCTURE: CPT

## 2024-04-10 NOTE — PROGRESS NOTES
Patient informed INR 3.22  Patient verified current dose  Patient was advised to  Cut down coumadin dose to 5 mg Monday, Wednesday, Friday. Continue 6 mg on all other days  Recheck INR in 2 week  Calendar updated     Lina Holt CMA

## 2024-04-12 DIAGNOSIS — I82.592 CHRONIC DEEP VEIN THROMBOSIS (DVT) OF OTHER VEIN OF LEFT LOWER EXTREMITY (HCC): ICD-10-CM

## 2024-04-15 ENCOUNTER — TELEPHONE (OUTPATIENT)
Dept: HEMATOLOGY ONCOLOGY | Facility: MEDICAL CENTER | Age: 54
End: 2024-04-15

## 2024-04-15 DIAGNOSIS — I82.592 CHRONIC DEEP VEIN THROMBOSIS (DVT) OF OTHER VEIN OF LEFT LOWER EXTREMITY (HCC): ICD-10-CM

## 2024-04-15 DIAGNOSIS — I82.502 CHRONIC DEEP VEIN THROMBOSIS (DVT) OF LEFT LOWER EXTREMITY (HCC): ICD-10-CM

## 2024-04-15 DIAGNOSIS — Z86.718 HISTORY OF DVT (DEEP VEIN THROMBOSIS): ICD-10-CM

## 2024-04-15 RX ORDER — WARFARIN SODIUM 5 MG/1
TABLET ORAL
Qty: 30 TABLET | Refills: 0 | OUTPATIENT
Start: 2024-04-15

## 2024-04-15 NOTE — TELEPHONE ENCOUNTER
Message sent to RN to let patient know that this office is not able to manage coumadin (was discussed with patient at last OV). He needs to f/u with other provider/ PCP for management of INR and all coumadin refills.

## 2024-04-15 NOTE — TELEPHONE ENCOUNTER
----- Message from Shaye Esteban PA-C sent at 4/15/2024  8:15 AM EDT -----  Tanja, I received a request for coumadin refill for this patient but per Dr. Patrick PCP needs to manage his coumadin. Can you please let him know he needs to f/u with PCP.    Voicemail left for patient to inform of above and to  call my TEAMs number with questions

## 2024-04-15 NOTE — TELEPHONE ENCOUNTER
Reason for call:   [x] Refill   [] Prior Auth  [x] Other: was previously ordered by specialty but they told her that her PCP can now handle it.     Office:   [x] PCP/Provider - Evelyn Tang  [] Specialty/Provider -     Medication: warfarin (COUMADIN) 5 mg tablet Take 5 mg tablet with 1 mg once a day-total dose 6 mg daily   #15        warfarin (Coumadin) 1 mg tablet Take 1(5mg) tablet daily with 1(1mg) tablet daily.  #30            Pharmacy: [facility] [phone]     Does the patient have enough for 3 days?   [x] Yes   [] No - Send as HP to POD

## 2024-04-16 RX ORDER — WARFARIN SODIUM 1 MG/1
TABLET ORAL
Qty: 30 TABLET | Refills: 5 | Status: SHIPPED | OUTPATIENT
Start: 2024-04-16

## 2024-04-16 RX ORDER — WARFARIN SODIUM 5 MG/1
TABLET ORAL
Qty: 30 TABLET | Refills: 5 | Status: SHIPPED | OUTPATIENT
Start: 2024-04-16

## 2024-04-22 ENCOUNTER — ANTICOAG VISIT (OUTPATIENT)
Dept: FAMILY MEDICINE CLINIC | Facility: CLINIC | Age: 54
End: 2024-04-22

## 2024-04-22 ENCOUNTER — APPOINTMENT (OUTPATIENT)
Dept: LAB | Facility: HOSPITAL | Age: 54
End: 2024-04-22
Payer: COMMERCIAL

## 2024-04-22 DIAGNOSIS — D68.51 FACTOR 5 LEIDEN MUTATION, HETEROZYGOUS (HCC): ICD-10-CM

## 2024-04-22 LAB
ALBUMIN SERPL BCP-MCNC: 4 G/DL (ref 3.5–5)
ALP SERPL-CCNC: 59 U/L (ref 34–104)
ALT SERPL W P-5'-P-CCNC: 23 U/L (ref 7–52)
ANION GAP SERPL CALCULATED.3IONS-SCNC: 8 MMOL/L (ref 4–13)
AST SERPL W P-5'-P-CCNC: 19 U/L (ref 13–39)
BILIRUB SERPL-MCNC: 0.56 MG/DL (ref 0.2–1)
BUN SERPL-MCNC: 23 MG/DL (ref 5–25)
CALCIUM SERPL-MCNC: 8.8 MG/DL (ref 8.4–10.2)
CHLORIDE SERPL-SCNC: 104 MMOL/L (ref 96–108)
CHOLEST SERPL-MCNC: 194 MG/DL
CO2 SERPL-SCNC: 26 MMOL/L (ref 21–32)
CREAT SERPL-MCNC: 0.93 MG/DL (ref 0.6–1.3)
ERYTHROCYTE [DISTWIDTH] IN BLOOD BY AUTOMATED COUNT: 19.1 % (ref 11.6–15.1)
EST. AVERAGE GLUCOSE BLD GHB EST-MCNC: 128 MG/DL
GFR SERPL CREATININE-BSD FRML MDRD: 92 ML/MIN/1.73SQ M
GLUCOSE P FAST SERPL-MCNC: 101 MG/DL (ref 65–99)
HBA1C MFR BLD: 6.1 %
HCT VFR BLD AUTO: 38.5 % (ref 36.5–49.3)
HDLC SERPL-MCNC: 59 MG/DL
HGB BLD-MCNC: 12.3 G/DL (ref 12–17)
INR PPP: 2.02 (ref 0.84–1.19)
IRON SATN MFR SERPL: 11 % (ref 15–50)
IRON SERPL-MCNC: 42 UG/DL (ref 50–212)
LDLC SERPL CALC-MCNC: 116 MG/DL (ref 0–100)
MCH RBC QN AUTO: 26.6 PG (ref 26.8–34.3)
MCHC RBC AUTO-ENTMCNC: 31.9 G/DL (ref 31.4–37.4)
MCV RBC AUTO: 83 FL (ref 82–98)
PLATELET # BLD AUTO: 224 THOUSANDS/UL (ref 149–390)
PMV BLD AUTO: 9.8 FL (ref 8.9–12.7)
POTASSIUM SERPL-SCNC: 4 MMOL/L (ref 3.5–5.3)
PROT SERPL-MCNC: 6.9 G/DL (ref 6.4–8.4)
PROTHROMBIN TIME: 23 SECONDS (ref 11.6–14.5)
RBC # BLD AUTO: 4.63 MILLION/UL (ref 3.88–5.62)
SODIUM SERPL-SCNC: 138 MMOL/L (ref 135–147)
TIBC SERPL-MCNC: 395 UG/DL (ref 250–450)
TRIGL SERPL-MCNC: 94 MG/DL
UIBC SERPL-MCNC: 353 UG/DL (ref 155–355)
WBC # BLD AUTO: 6.17 THOUSAND/UL (ref 4.31–10.16)

## 2024-04-22 PROCEDURE — 85610 PROTHROMBIN TIME: CPT

## 2024-04-22 PROCEDURE — 83550 IRON BINDING TEST: CPT

## 2024-04-22 PROCEDURE — 83036 HEMOGLOBIN GLYCOSYLATED A1C: CPT

## 2024-04-22 PROCEDURE — 80061 LIPID PANEL: CPT

## 2024-04-22 PROCEDURE — 83540 ASSAY OF IRON: CPT

## 2024-04-22 PROCEDURE — 36415 COLL VENOUS BLD VENIPUNCTURE: CPT

## 2024-04-22 PROCEDURE — 80053 COMPREHEN METABOLIC PANEL: CPT

## 2024-04-22 PROCEDURE — 85027 COMPLETE CBC AUTOMATED: CPT

## 2024-04-26 ENCOUNTER — RA CDI HCC (OUTPATIENT)
Dept: OTHER | Facility: HOSPITAL | Age: 54
End: 2024-04-26

## 2024-04-26 NOTE — PROGRESS NOTES
HCC coding opportunities       Chart reviewed, no opportunity found: CHART REVIEWED, NO OPPORTUNITY FOUND        Patients Insurance        Commercial Insurance: [employer]

## 2024-04-29 ENCOUNTER — APPOINTMENT (OUTPATIENT)
Dept: LAB | Facility: HOSPITAL | Age: 54
End: 2024-04-29
Payer: COMMERCIAL

## 2024-04-29 ENCOUNTER — ANTICOAG VISIT (OUTPATIENT)
Dept: FAMILY MEDICINE CLINIC | Facility: CLINIC | Age: 54
End: 2024-04-29

## 2024-04-29 ENCOUNTER — TELEPHONE (OUTPATIENT)
Dept: FAMILY MEDICINE CLINIC | Facility: CLINIC | Age: 54
End: 2024-04-29

## 2024-04-29 DIAGNOSIS — D68.51 FACTOR 5 LEIDEN MUTATION, HETEROZYGOUS (HCC): ICD-10-CM

## 2024-04-29 LAB
INR PPP: 2.12 (ref 0.84–1.19)
PROTHROMBIN TIME: 23.9 SECONDS (ref 11.6–14.5)

## 2024-04-29 PROCEDURE — 36415 COLL VENOUS BLD VENIPUNCTURE: CPT

## 2024-04-29 PROCEDURE — 85610 PROTHROMBIN TIME: CPT

## 2024-04-29 NOTE — TELEPHONE ENCOUNTER
Patient informed and confirmed dose of 5mg M,W,F and 6 T, Th, Sat,Sun. Please update calendar.  Ginger Hart

## 2024-04-29 NOTE — TELEPHONE ENCOUNTER
----- Message from Evelyn Tang DO sent at 4/29/2024  7:51 AM EDT -----  Same dose recheck in 2 weeks  Evelyn Tang DO

## 2024-04-29 NOTE — TELEPHONE ENCOUNTER
Left message on machine for a call back please inform patient of instructions and confirm dosage.  Steph Callejas, CMA

## 2024-05-06 ENCOUNTER — OFFICE VISIT (OUTPATIENT)
Dept: FAMILY MEDICINE CLINIC | Facility: CLINIC | Age: 54
End: 2024-05-06
Payer: COMMERCIAL

## 2024-05-06 VITALS
RESPIRATION RATE: 16 BRPM | BODY MASS INDEX: 31.64 KG/M2 | SYSTOLIC BLOOD PRESSURE: 126 MMHG | TEMPERATURE: 97.6 F | DIASTOLIC BLOOD PRESSURE: 80 MMHG | HEART RATE: 61 BPM | HEIGHT: 70 IN | WEIGHT: 221 LBS | OXYGEN SATURATION: 96 %

## 2024-05-06 DIAGNOSIS — Z86.718 HISTORY OF DVT (DEEP VEIN THROMBOSIS): ICD-10-CM

## 2024-05-06 DIAGNOSIS — Z13.0 SCREENING FOR DEFICIENCY ANEMIA: ICD-10-CM

## 2024-05-06 DIAGNOSIS — E78.5 DYSLIPIDEMIA: Primary | ICD-10-CM

## 2024-05-06 DIAGNOSIS — I82.592 CHRONIC DEEP VEIN THROMBOSIS (DVT) OF OTHER VEIN OF LEFT LOWER EXTREMITY (HCC): ICD-10-CM

## 2024-05-06 DIAGNOSIS — R73.03 PREDIABETES: ICD-10-CM

## 2024-05-06 DIAGNOSIS — D68.51 FACTOR 5 LEIDEN MUTATION, HETEROZYGOUS (HCC): ICD-10-CM

## 2024-05-06 PROCEDURE — 99214 OFFICE O/P EST MOD 30 MIN: CPT | Performed by: FAMILY MEDICINE

## 2024-05-06 RX ORDER — WARFARIN SODIUM 1 MG/1
TABLET ORAL
Qty: 90 TABLET | Refills: 3 | Status: SHIPPED | OUTPATIENT
Start: 2024-05-06

## 2024-05-06 RX ORDER — WARFARIN SODIUM 5 MG/1
TABLET ORAL
Qty: 90 TABLET | Refills: 3 | Status: SHIPPED | OUTPATIENT
Start: 2024-05-06

## 2024-05-06 NOTE — PROGRESS NOTES
Name: Edward Conrad      : 1970      MRN: [id number]  Encounter Provider: Evelyn Tang DO  Encounter Date: 2024   Encounter department: [facility]    Assessment & Plan     1. Dyslipidemia  Assessment & Plan:  Improved with diet     Orders:  -     Comprehensive metabolic panel; Future; Expected date: 10/18/2024  -     Lipid Panel with Direct LDL reflex; Future; Expected date: 10/18/2024    2. Factor 5 Leiden mutation, heterozygous (HCC)  Assessment & Plan:  Has had multiple DVTs  Requires lifelong anticoagulation with warfarin.  Patient has failed Xarelto      Orders:  -      VAS VENOUS DUPLEX - LOWER LIMB BILATERAL; Future; Expected date: 2024    3. Prediabetes  Assessment & Plan:  Hemoglobin A1c is in prediabetic range and patient has a family history of diabetes  Diet and exercise discussed    Orders:  -     Hemoglobin A1C; Future; Expected date: 10/18/2024    4. History of DVT (deep vein thrombosis)  -      VAS VENOUS DUPLEX - LOWER LIMB BILATERAL; Future; Expected date: 2024    5. Screening for deficiency anemia  -     CBC; Future; Expected date: 10/18/2024    6. Chronic deep vein thrombosis (DVT) of other vein of left lower extremity (HCC)  Assessment & Plan:  Continue warfarin  Follow-up vascular study ordered    Orders:  -     warfarin (Coumadin) 5 mg tablet; Take 1(5mg) tablet daily with 1(1mg) tablet daily.  -     warfarin (Coumadin) 1 mg tablet; Take 1(5mg) tablet daily with 1(1mg) tablet daily.    Return in about 6 months (around 2024) for Annual physical.       Subjective      Patient reports that he has been try to be consistent with his diet.  He has stopped eating salads and is eating a vegetable with dinner every night.  He has been walking regularly.  He is taking his Coumadin regularly.  He has not had any trouble with his medication.      Review of Systems    Current Outpatient Medications on File Prior to Visit   Medication Sig   • esomeprazole (NexIUM)  "40 MG capsule Take 1 capsule (40 mg total) by mouth in the morning   • warfarin (Coumadin) 1 mg tablet Take  1 mg tablet with 5 mg once a day-total dose 6 mg daily   • warfarin (COUMADIN) 5 mg tablet Take as directed by physician   • Lidocaine 4 % PTCH Apply 1 patch topically in the morning Patch may remain in place for up to 12 hours in a 24hr period (Patient not taking: Reported on 5/6/2024)   • [DISCONTINUED] warfarin (Coumadin) 1 mg tablet Take 1(5mg) tablet daily with 1(1mg) tablet daily. (Patient not taking: Reported on 5/6/2024)   • [DISCONTINUED] warfarin (Coumadin) 5 mg tablet Take 1(5mg) tablet daily with 1(1mg) tablet daily. (Patient not taking: Reported on 3/29/2024)       Objective     /80   Pulse 61   Temp 97.6 °F (36.4 °C)   Resp 16   Ht 5' 10\" (1.778 m)   Wt 100 kg (221 lb)   SpO2 96%   BMI 31.71 kg/m²     Physical Exam  Vitals and nursing note reviewed.   Constitutional:       Appearance: He is well-developed.   HENT:      Head: Normocephalic and atraumatic.      Right Ear: Tympanic membrane and external ear normal.      Left Ear: Tympanic membrane and external ear normal.   Cardiovascular:      Rate and Rhythm: Normal rate and regular rhythm.      Heart sounds: Normal heart sounds. No murmur heard.  Pulmonary:      Effort: Pulmonary effort is normal. No respiratory distress.      Breath sounds: Normal breath sounds. No wheezing or rales.   Musculoskeletal:      Right lower leg: No edema.      Left lower leg: No edema.       Evelyn Tang, DO    "

## 2024-05-06 NOTE — ASSESSMENT & PLAN NOTE
Has had multiple DVTs  Requires lifelong anticoagulation with warfarin.  Patient has failed Xarelto

## 2024-05-06 NOTE — ASSESSMENT & PLAN NOTE
Hemoglobin A1c is in prediabetic range and patient has a family history of diabetes  Diet and exercise discussed

## 2024-05-15 ENCOUNTER — APPOINTMENT (OUTPATIENT)
Dept: LAB | Facility: HOSPITAL | Age: 54
End: 2024-05-15
Payer: COMMERCIAL

## 2024-05-15 ENCOUNTER — ANTICOAG VISIT (OUTPATIENT)
Dept: FAMILY MEDICINE CLINIC | Facility: CLINIC | Age: 54
End: 2024-05-15

## 2024-05-15 DIAGNOSIS — D68.51 FACTOR 5 LEIDEN MUTATION, HETEROZYGOUS (HCC): ICD-10-CM

## 2024-05-15 LAB
INR PPP: 1.77 (ref 0.84–1.19)
PROTHROMBIN TIME: 20.8 SECONDS (ref 11.6–14.5)

## 2024-05-15 PROCEDURE — 36415 COLL VENOUS BLD VENIPUNCTURE: CPT

## 2024-05-15 PROCEDURE — 85610 PROTHROMBIN TIME: CPT

## 2024-05-22 ENCOUNTER — APPOINTMENT (OUTPATIENT)
Dept: LAB | Facility: HOSPITAL | Age: 54
End: 2024-05-22
Payer: COMMERCIAL

## 2024-05-22 ENCOUNTER — ANTICOAG VISIT (OUTPATIENT)
Dept: FAMILY MEDICINE CLINIC | Facility: CLINIC | Age: 54
End: 2024-05-22

## 2024-05-22 DIAGNOSIS — D68.51 FACTOR 5 LEIDEN MUTATION, HETEROZYGOUS (HCC): ICD-10-CM

## 2024-05-22 LAB
INR PPP: 2.24 (ref 0.84–1.19)
PROTHROMBIN TIME: 24.9 SECONDS (ref 11.6–14.5)

## 2024-05-22 PROCEDURE — 36415 COLL VENOUS BLD VENIPUNCTURE: CPT

## 2024-05-22 PROCEDURE — 85610 PROTHROMBIN TIME: CPT

## 2024-06-21 ENCOUNTER — HOSPITAL ENCOUNTER (OUTPATIENT)
Dept: RADIOLOGY | Facility: HOSPITAL | Age: 54
Discharge: HOME | End: 2024-06-21
Payer: COMMERCIAL

## 2024-06-21 ENCOUNTER — APPOINTMENT (OUTPATIENT)
Dept: LAB | Facility: HOSPITAL | Age: 54
End: 2024-06-21
Payer: COMMERCIAL

## 2024-06-21 ENCOUNTER — ANTICOAG VISIT (OUTPATIENT)
Dept: FAMILY MEDICINE CLINIC | Facility: CLINIC | Age: 54
End: 2024-06-21

## 2024-06-21 DIAGNOSIS — D68.51 FACTOR 5 LEIDEN MUTATION, HETEROZYGOUS (HCC): ICD-10-CM

## 2024-06-21 DIAGNOSIS — Z86.718 HISTORY OF DVT (DEEP VEIN THROMBOSIS): ICD-10-CM

## 2024-06-21 LAB
INR PPP: 2.33 (ref 0.84–1.19)
PROTHROMBIN TIME: 25.7 SECONDS (ref 11.6–14.5)

## 2024-06-21 PROCEDURE — 85610 PROTHROMBIN TIME: CPT

## 2024-06-21 PROCEDURE — 36415 COLL VENOUS BLD VENIPUNCTURE: CPT

## 2024-06-21 PROCEDURE — 93970 EXTREMITY STUDY: CPT | Performed by: SURGERY

## 2024-06-21 PROCEDURE — 93970 EXTREMITY STUDY: CPT

## 2024-07-13 DIAGNOSIS — K21.9 GERD WITHOUT ESOPHAGITIS: ICD-10-CM

## 2024-07-15 RX ORDER — ESOMEPRAZOLE MAGNESIUM 40 MG/1
40 CAPSULE, DELAYED RELEASE ORAL
Qty: 90 CAPSULE | Refills: 3 | Status: SHIPPED | OUTPATIENT
Start: 2024-07-15

## 2024-07-22 ENCOUNTER — TELEPHONE (OUTPATIENT)
Dept: FAMILY MEDICINE CLINIC | Facility: CLINIC | Age: 54
End: 2024-07-22

## 2024-07-22 ENCOUNTER — APPOINTMENT (OUTPATIENT)
Dept: LAB | Facility: HOSPITAL | Age: 54
End: 2024-07-22
Payer: COMMERCIAL

## 2024-07-22 DIAGNOSIS — D68.51 FACTOR 5 LEIDEN MUTATION, HETEROZYGOUS (HCC): ICD-10-CM

## 2024-07-22 LAB
INR PPP: 2.5 (ref 0.84–1.19)
PROTHROMBIN TIME: 27.1 SECONDS (ref 11.6–14.5)

## 2024-07-22 PROCEDURE — 85610 PROTHROMBIN TIME: CPT

## 2024-07-22 PROCEDURE — 36415 COLL VENOUS BLD VENIPUNCTURE: CPT

## 2024-07-22 NOTE — TELEPHONE ENCOUNTER
----- Message from Monica Akers MD sent at 7/22/2024 12:32 PM EDT -----  Same dose, recheck one month.

## 2024-07-22 NOTE — TELEPHONE ENCOUNTER
Left message on machine for a call back. Please confirm current dose 6mg and give providers message.  Steph Callejas, CMA

## 2024-07-23 ENCOUNTER — ANTICOAG VISIT (OUTPATIENT)
Dept: FAMILY MEDICINE CLINIC | Facility: CLINIC | Age: 54
End: 2024-07-23

## 2024-08-19 ENCOUNTER — ANTICOAG VISIT (OUTPATIENT)
Dept: FAMILY MEDICINE CLINIC | Facility: CLINIC | Age: 54
End: 2024-08-19

## 2024-08-19 ENCOUNTER — LAB (OUTPATIENT)
Dept: LAB | Facility: HOSPITAL | Age: 54
End: 2024-08-19
Payer: COMMERCIAL

## 2024-08-19 DIAGNOSIS — D68.51 FACTOR 5 LEIDEN MUTATION, HETEROZYGOUS (HCC): ICD-10-CM

## 2024-08-19 LAB
INR PPP: 2.73 (ref 0.85–1.19)
PROTHROMBIN TIME: 29.2 SECONDS (ref 12.3–15)

## 2024-08-19 PROCEDURE — 36415 COLL VENOUS BLD VENIPUNCTURE: CPT

## 2024-08-19 PROCEDURE — 85610 PROTHROMBIN TIME: CPT

## 2024-09-24 ENCOUNTER — LAB (OUTPATIENT)
Dept: LAB | Facility: HOSPITAL | Age: 54
End: 2024-09-24
Payer: COMMERCIAL

## 2024-09-24 ENCOUNTER — ANTICOAG VISIT (OUTPATIENT)
Dept: FAMILY MEDICINE CLINIC | Facility: CLINIC | Age: 54
End: 2024-09-24

## 2024-09-24 DIAGNOSIS — D68.51 FACTOR 5 LEIDEN MUTATION, HETEROZYGOUS (HCC): ICD-10-CM

## 2024-09-24 LAB
INR PPP: 2.59 (ref 0.85–1.19)
PROTHROMBIN TIME: 28 SECONDS (ref 12.3–15)

## 2024-09-24 PROCEDURE — 36415 COLL VENOUS BLD VENIPUNCTURE: CPT

## 2024-09-24 PROCEDURE — 85610 PROTHROMBIN TIME: CPT

## 2024-10-01 ENCOUNTER — TELEPHONE (OUTPATIENT)
Age: 54
End: 2024-10-01

## 2024-10-01 NOTE — TELEPHONE ENCOUNTER
Scheduled date of EGD(as of today):1/20/2025  Physician performing EGD:Dr. Valdovinos  Location of EGD:[facility]  Instructions reviewed with patient by:CB-prep instructions sent to [employer] and email  Clearances: Coumadin- Dr. Evelyn Tang  [phone]

## 2024-10-01 NOTE — LETTER
Hello,    Attached are your prep instructions for your upcoming procedure on 1/20/2025. If you have any questions, please give us a call at [phone].    Thank you,    [facility]    Medicine Instructions for Adults with Diabetes (NO Bowel Prep)      Follow these instructions when a BOWEL PREP is NOT required for your procedure or surgery!    NOTE:  GLP-1 Agonists taken weekly: do not take in the 7 days before your procedure  SGLT-2 Inhibitors: do not take in the 4 days before your procedure    On the Day Before Surgery/Procedure  If you are having a procedure (e.g. Colonoscopy) or surgery which DOES NOT require a bowel prep, follow the directions below based on the type of medicine you take for your diabetes.    Type of Medicine You Take Examples What to Do   Pre-Mixed Insulin - Intermediate Acting Humalog® 75/25, Humulin® 70/30, Novolog® 70/30, Regular Insulin Take ½ your regular dose the evening before your procedure.   Rapid/Fast Acting Insulin/Long-Acting Insulin Humalog® U200, NovoLog®, Apidra®, Lantus®, Levemir®, Tresiba®, Toujeo®, Fiasp®, Basaglar® Take your FULL regular dose the day before procedure.   Oral Diabetic Medicines (sulfonylurea) Glipizide/Glimepiride/Glucotrol® Take your regular dose with dinner the evening before your procedure.   Other Oral Diabetic Medicines   Metformin®, Glucophage®, Glucophage XR®, Riomet®, Glumetza®), Actose®, Avandia®, Glyset®, Prandin® Take your regular dose with dinner the evening before your procedure   GLP-1 Agonists Adlyxin®, Byetta®, Bydureon®, Ozempic®, Soliqua®, Tanzeum®, Trulicity®, Victoza®, Saxenda®, Rybelsus® If taken daily, take as normal    If taken weekly, do not take this medicine for 7 days before your procedure including the day of the procedure (resume taking after the procedure)   SGLT-2 Inhibitors Jardiance®, Invokana®, Farxiga®,   Steglatro®, Brenzavvy®, Qtern®, Segluromet®, Glyxambi®, Synjardy®,  Synjardy XR®, Invokamet®, Invokamet XR®, Trijary XR®, Xigduo XR®, Steglujan® Do not take for 4 days before your procedure including the day of the procedure (resume taking after the procedure)                 More information continued on back                  Medicine Instructions for Adults with Diabetes (No Bowel Prep)   Page 2      On the Day of Surgery/Procedure  Follow the directions below based on the type of medicine you take for your diabetes.    Type of Medicine You Take Examples What to Do   Long-Acting Insulin Lantus®, Levemir®, Tresiba®, Toujeo®, Basaglar®, Semglee® If you normally take your Long-Acting Insulin in the morning, take the full dose as scheduled.   GLP-1 Agonists AdlyxinÒ, ByettaÒ, BydureonÒ, OzempicÒ, SoliquaÒ, TanzeumÒ, TrulicityÒ, VictozaÒ, Saxenda®, Rybelsus® Do NOT take this medicine on the day of your procedure (resume taking after the procedure)       On the Day of Surgery/Procedure (continued)  Except for the morning Long-Acting Insulin, DO NOT take ANY diabetic medicine on the day of your procedure unless you were instructed by the doctor who manages your diabetes medicines.    Continue to check your blood sugars.  If you have an insulin pump, ask your endocrinologist for instructions at least 3 days before your procedure. NOTE: If you are not able to ask your endocrinologist in advance, on the day of the procedure set your insulin pump to your basal rate only. Bring your insulin pump supplies to the hospital.     If you have any questions about taking your diabetes medicines prior to your procedure, please contact the doctor who manages your diabetes medicines.  DATE OF PROCEDURE:  __________________   TIME OF PROCEDURE: _____________      The OR/GI Lab will contact you the evening prior to your procedure with your exact arrival time.    We kindly ask that you immediately notify us of any need to cancel or reschedule your procedure.      DIETARY INSTRUCTIONS:    Day prior to the  procedure:    You may consume your regular diet  Nothing to eat after midnight  Day of the procedure:  Clear liquids only including:  Soda  Water  Broth Gatorade  Jell-O  Popsicles Coffee/tea without milk/creamer   YOU MAY NOT HAVE:  Solid foods   Milk and milk products    Juice with pulp  Nothing to drink beginning 4 hours prior to the procedure time    NOTHING TO EAT AFTER MIDNIGHT & NOTHING TO DRINK 4 HOURS PRIOR TO PROCEDURE TIME    DAY OF THE PROCEDURE:  You may brush your teeth.  Leave all jewelry at home.  Take out any facial piercings, if able.   Please arrive for your procedure as indicated by the OR / GI Lab / Endoscopy Unit. The hospital will contact you the day before with your exact arrival time.   Make sure you have arranged ahead of time for a responsible adult (18 or older) to accompany and drive you home after the procedure.  Please discuss any transportation concerns with our staff prior to your procedure.    The effects of the anesthesia can persist for 24 hours.  After receiving the sedation, you must exercise caution before engaging in any activity that could harm yourself and others (such as driving a car).  Do not make any important decisions or do not drink any alcoholic beverages during this time period.  After your procedure, you may have anything you'd like to eat or drink.  You will probably want to start with something light.  Please include plenty of fluids.  Avoid items that cause gas such as sodas and salads.      SPECIAL INSTRUCTIONS:  Blood thinner (i.e. - Coumadin, Pradaxa, Lovenox, Xarelto, Eliquis)  ?  Continue (Do Not Stop)  ? Stop______________for_____________days prior to the procedure.  ?  Please discuss with your PCP or Cardiologist and forward instructions to our office    Antiplatelet (i.e. - Plavix, Aggrenox, Effient, Brilinta)  ?  Continue (Do Not Stop)  ? Stop______________for_____________days prior to the procedure.  ?  Please discuss with your prescribing physician  and forward instructions to our office     Diabetes:   If you are Diabetic please see separate Diabetic Instruction Sheet          Prescribed medications:  Do not stop your aspirin, or any of your other medications.  Take the rest of your prescribed medications with sips of water at least 2 hours prior to your appointment.     For any questions or concerns related to your pre-procedure instructions, please contact our office.  Thank you for choosing [facility]!

## 2024-10-16 DIAGNOSIS — Z79.899 ENCOUNTER FOR LONG-TERM CURRENT USE OF MEDICATION: ICD-10-CM

## 2024-10-16 DIAGNOSIS — R73.03 PREDIABETES: Primary | ICD-10-CM

## 2024-10-16 DIAGNOSIS — Z12.5 SCREENING FOR PROSTATE CANCER: ICD-10-CM

## 2024-10-16 DIAGNOSIS — E78.5 DYSLIPIDEMIA: ICD-10-CM

## 2024-10-28 ENCOUNTER — TELEPHONE (OUTPATIENT)
Dept: GASTROENTEROLOGY | Facility: CLINIC | Age: 54
End: 2024-10-28

## 2024-10-28 ENCOUNTER — APPOINTMENT (OUTPATIENT)
Dept: LAB | Facility: HOSPITAL | Age: 54
End: 2024-10-28
Payer: COMMERCIAL

## 2024-10-28 ENCOUNTER — TELEPHONE (OUTPATIENT)
Dept: FAMILY MEDICINE CLINIC | Facility: CLINIC | Age: 54
End: 2024-10-28

## 2024-10-28 ENCOUNTER — ANTICOAG VISIT (OUTPATIENT)
Dept: FAMILY MEDICINE CLINIC | Facility: CLINIC | Age: 54
End: 2024-10-28

## 2024-10-28 DIAGNOSIS — E78.5 DYSLIPIDEMIA: ICD-10-CM

## 2024-10-28 DIAGNOSIS — Z12.5 SCREENING FOR PROSTATE CANCER: ICD-10-CM

## 2024-10-28 DIAGNOSIS — D68.51 FACTOR 5 LEIDEN MUTATION, HETEROZYGOUS (HCC): ICD-10-CM

## 2024-10-28 DIAGNOSIS — Z79.899 ENCOUNTER FOR LONG-TERM CURRENT USE OF MEDICATION: ICD-10-CM

## 2024-10-28 DIAGNOSIS — D64.9 ANEMIA, UNSPECIFIED TYPE: Primary | ICD-10-CM

## 2024-10-28 DIAGNOSIS — R73.03 PREDIABETES: ICD-10-CM

## 2024-10-28 LAB
ALBUMIN SERPL BCG-MCNC: 4 G/DL (ref 3.5–5)
ALP SERPL-CCNC: 55 U/L (ref 34–104)
ALT SERPL W P-5'-P-CCNC: 23 U/L (ref 7–52)
ANION GAP SERPL CALCULATED.3IONS-SCNC: 5 MMOL/L (ref 4–13)
AST SERPL W P-5'-P-CCNC: 22 U/L (ref 13–39)
BILIRUB SERPL-MCNC: 0.53 MG/DL (ref 0.2–1)
BUN SERPL-MCNC: 22 MG/DL (ref 5–25)
CALCIUM SERPL-MCNC: 8.4 MG/DL (ref 8.4–10.2)
CHLORIDE SERPL-SCNC: 104 MMOL/L (ref 96–108)
CHOLEST SERPL-MCNC: 161 MG/DL
CO2 SERPL-SCNC: 27 MMOL/L (ref 21–32)
CREAT SERPL-MCNC: 0.94 MG/DL (ref 0.6–1.3)
ERYTHROCYTE [DISTWIDTH] IN BLOOD BY AUTOMATED COUNT: 17 % (ref 11.6–15.1)
EST. AVERAGE GLUCOSE BLD GHB EST-MCNC: 128 MG/DL
GFR SERPL CREATININE-BSD FRML MDRD: 90 ML/MIN/1.73SQ M
GLUCOSE P FAST SERPL-MCNC: 110 MG/DL (ref 65–99)
HBA1C MFR BLD: 6.1 %
HCT VFR BLD AUTO: 28.6 % (ref 36.5–49.3)
HDLC SERPL-MCNC: 56 MG/DL
HGB BLD-MCNC: 8.3 G/DL (ref 12–17)
INR PPP: 1.81 (ref 0.85–1.19)
LDLC SERPL CALC-MCNC: 89 MG/DL (ref 0–100)
MCH RBC QN AUTO: 23 PG (ref 26.8–34.3)
MCHC RBC AUTO-ENTMCNC: 29 G/DL (ref 31.4–37.4)
MCV RBC AUTO: 79 FL (ref 82–98)
PLATELET # BLD AUTO: 206 THOUSANDS/UL (ref 149–390)
PMV BLD AUTO: 10.1 FL (ref 8.9–12.7)
POTASSIUM SERPL-SCNC: 3.8 MMOL/L (ref 3.5–5.3)
PROT SERPL-MCNC: 6.6 G/DL (ref 6.4–8.4)
PROTHROMBIN TIME: 21.4 SECONDS (ref 12.3–15)
PSA SERPL-MCNC: 0.66 NG/ML (ref 0–4)
RBC # BLD AUTO: 3.61 MILLION/UL (ref 3.88–5.62)
SODIUM SERPL-SCNC: 136 MMOL/L (ref 135–147)
TRIGL SERPL-MCNC: 79 MG/DL
WBC # BLD AUTO: 6 THOUSAND/UL (ref 4.31–10.16)

## 2024-10-28 PROCEDURE — G0103 PSA SCREENING: HCPCS

## 2024-10-28 PROCEDURE — 85027 COMPLETE CBC AUTOMATED: CPT

## 2024-10-28 PROCEDURE — 36415 COLL VENOUS BLD VENIPUNCTURE: CPT

## 2024-10-28 PROCEDURE — 80061 LIPID PANEL: CPT

## 2024-10-28 PROCEDURE — 80053 COMPREHEN METABOLIC PANEL: CPT

## 2024-10-28 PROCEDURE — 85610 PROTHROMBIN TIME: CPT

## 2024-10-28 PROCEDURE — 83036 HEMOGLOBIN GLYCOSYLATED A1C: CPT

## 2024-10-28 RX ORDER — FERROUS SULFATE 324(65)MG
324 TABLET, DELAYED RELEASE (ENTERIC COATED) ORAL
Start: 2024-10-28

## 2024-10-28 NOTE — TELEPHONE ENCOUNTER
10/28/2024 8:12 AM called Edward regarding his lab results    He is feeling well.  He has not noticed any overt bleeding.  He has not had any change in his stool color.  He notes that he has had an issue with send anemia in the past and they were not able to find the cause.  He is not having shortness of breath and is not having any chest pain    Recommended that he start over the counter iron 325 mg daily with vitamin C.  He will get repeat labs done in 3 to 4 weeks.  I also sent his gastroenterologist a message.  He already has an endoscopy scheduled.  He is going to need a colonoscopy as well.    He has started eating more salad.       INR results reviewed with patient   He has been taking 6 mg a day    Will increase to 7 mg Monday/Wednesday/Friday and 6 mg on all other days  He will recheck in 1 week.    Evelyn Tang,

## 2024-10-28 NOTE — TELEPHONE ENCOUNTER
----- Message from Crystal Valdovinos MD sent at 10/28/2024  4:10 PM EDT -----  Regarding: RE: anemia  Thank you Evelyn, I agree that he needs both EGD and colonoscopy    Evelyn Patrick-please add colonoscopy to the EGD and also try to schedule him sooner  ----- Message -----  From: Evelyn Tang DO  Sent: 10/28/2024   8:18 AM EDT  To: Crystal Valdovinos MD  Subject: anemia                                           Good morning,  His hemoglobin has dropped to 8.3 from 12  I asked him to start taking oral iron   He already has an EGD scheduled with you, but I think he now needs a colonoscopy as well.  I'd appreciate you taking a look  Thanks,  Evelyn

## 2024-10-28 NOTE — TELEPHONE ENCOUNTER
Clinical- I spoke with him regarding his INR  Please update the calendar  Dose increased to 7 mg Monday Wednesday Friday with 6 mg on other days  He will recheck his INR in 1 week  Evelyn Tang, DO

## 2024-10-29 DIAGNOSIS — D50.9 IRON DEFICIENCY ANEMIA, UNSPECIFIED IRON DEFICIENCY ANEMIA TYPE: ICD-10-CM

## 2024-10-29 DIAGNOSIS — D64.9 ANEMIA, UNSPECIFIED TYPE: Primary | ICD-10-CM

## 2024-10-29 NOTE — TELEPHONE ENCOUNTER
I lmom informing pt that Dr Valdovinos would like pt to have a colonoscopy with the EGD scheduled on 1/20/25.  Informed of instructions and that a bowel prep would be sent into his pharmacy and that I would be sending instructions via my chart. I asked pt to please call back to let me know that he received my message. Will call again if do not hear back from pt.

## 2024-10-30 NOTE — TELEPHONE ENCOUNTER
Dr Tang~    Our mutual patient is scheduled for procedure: Colonoscopy and EGD at [facility].    On: _11___/_27    _/_24    _      With: Dr. Dominguez________    He/She is taking the following blood thinner:    Coumadin      Can this be stopped ___5___ days prior to the procedure?      Physician Approving clearance: ________________________

## 2024-11-04 ENCOUNTER — RA CDI HCC (OUTPATIENT)
Dept: OTHER | Facility: HOSPITAL | Age: 54
End: 2024-11-04

## 2024-11-04 NOTE — PROGRESS NOTES
HCC coding opportunities       Chart reviewed, no opportunity found: CHART REVIEWED, NO OPPORTUNITY FOUND        Patients Insurance        Commercial Insurance: [employer]

## 2024-11-06 NOTE — TELEPHONE ENCOUNTER
Called and spoke to pt and informed to hold Coumadin 5 days prior to the procedure. He will do so.

## 2024-11-11 ENCOUNTER — OFFICE VISIT (OUTPATIENT)
Dept: FAMILY MEDICINE CLINIC | Facility: CLINIC | Age: 54
End: 2024-11-11
Payer: COMMERCIAL

## 2024-11-11 ENCOUNTER — APPOINTMENT (OUTPATIENT)
Dept: LAB | Facility: HOSPITAL | Age: 54
End: 2024-11-11
Payer: COMMERCIAL

## 2024-11-11 VITALS
HEART RATE: 70 BPM | RESPIRATION RATE: 16 BRPM | OXYGEN SATURATION: 98 % | WEIGHT: 231 LBS | DIASTOLIC BLOOD PRESSURE: 84 MMHG | HEIGHT: 69 IN | BODY MASS INDEX: 34.21 KG/M2 | SYSTOLIC BLOOD PRESSURE: 120 MMHG | TEMPERATURE: 97.2 F

## 2024-11-11 DIAGNOSIS — Z23 NEED FOR TDAP VACCINATION: ICD-10-CM

## 2024-11-11 DIAGNOSIS — I82.502 CHRONIC DEEP VEIN THROMBOSIS (DVT) OF LEFT LOWER EXTREMITY (HCC): ICD-10-CM

## 2024-11-11 DIAGNOSIS — Z00.00 ANNUAL PHYSICAL EXAM: Primary | ICD-10-CM

## 2024-11-11 DIAGNOSIS — D68.51 FACTOR 5 LEIDEN MUTATION, HETEROZYGOUS (HCC): ICD-10-CM

## 2024-11-11 DIAGNOSIS — D64.9 ANEMIA, UNSPECIFIED TYPE: ICD-10-CM

## 2024-11-11 DIAGNOSIS — Z86.718 HISTORY OF DVT (DEEP VEIN THROMBOSIS): ICD-10-CM

## 2024-11-11 DIAGNOSIS — I82.592 CHRONIC DEEP VEIN THROMBOSIS (DVT) OF OTHER VEIN OF LEFT LOWER EXTREMITY (HCC): ICD-10-CM

## 2024-11-11 LAB
ERYTHROCYTE [DISTWIDTH] IN BLOOD BY AUTOMATED COUNT: 21.8 % (ref 11.6–15.1)
HCT VFR BLD AUTO: 34.3 % (ref 36.5–49.3)
HGB BLD-MCNC: 9.9 G/DL (ref 12–17)
INR PPP: 2.15 (ref 0.85–1.19)
IRON SATN MFR SERPL: 6 % (ref 15–50)
IRON SERPL-MCNC: 26 UG/DL (ref 50–212)
MCH RBC QN AUTO: 23.7 PG (ref 26.8–34.3)
MCHC RBC AUTO-ENTMCNC: 28.9 G/DL (ref 31.4–37.4)
MCV RBC AUTO: 82 FL (ref 82–98)
PLATELET # BLD AUTO: 262 THOUSANDS/UL (ref 149–390)
PMV BLD AUTO: 10.1 FL (ref 8.9–12.7)
PROTHROMBIN TIME: 24.3 SECONDS (ref 12.3–15)
RBC # BLD AUTO: 4.18 MILLION/UL (ref 3.88–5.62)
TIBC SERPL-MCNC: 446 UG/DL (ref 250–450)
UIBC SERPL-MCNC: 420 UG/DL (ref 155–355)
WBC # BLD AUTO: 4.52 THOUSAND/UL (ref 4.31–10.16)

## 2024-11-11 PROCEDURE — 85610 PROTHROMBIN TIME: CPT

## 2024-11-11 PROCEDURE — 99396 PREV VISIT EST AGE 40-64: CPT | Performed by: FAMILY MEDICINE

## 2024-11-11 PROCEDURE — 90715 TDAP VACCINE 7 YRS/> IM: CPT

## 2024-11-11 PROCEDURE — 90471 IMMUNIZATION ADMIN: CPT

## 2024-11-11 PROCEDURE — 85027 COMPLETE CBC AUTOMATED: CPT

## 2024-11-11 PROCEDURE — 36415 COLL VENOUS BLD VENIPUNCTURE: CPT

## 2024-11-11 PROCEDURE — 83550 IRON BINDING TEST: CPT

## 2024-11-11 PROCEDURE — 83540 ASSAY OF IRON: CPT

## 2024-11-11 RX ORDER — WARFARIN SODIUM 5 MG/1
TABLET ORAL
Qty: 100 TABLET | Refills: 3 | Status: SHIPPED | OUTPATIENT
Start: 2024-11-11

## 2024-11-11 RX ORDER — WARFARIN SODIUM 1 MG/1
TABLET ORAL
Qty: 200 TABLET | Refills: 1 | Status: SHIPPED | OUTPATIENT
Start: 2024-11-11 | End: 2024-11-12

## 2024-11-11 NOTE — ASSESSMENT & PLAN NOTE
Orders:    warfarin (Coumadin) 1 mg tablet; Take  2 mg tablet with 5 mg once a day-total dose 7 mg daily

## 2024-11-11 NOTE — ASSESSMENT & PLAN NOTE
INR is therapeutic  Will continue same dose and recheck in 1 month  Advised he can hold his coumadin for 5 days before his EGD/colonoscopy

## 2024-11-11 NOTE — PATIENT INSTRUCTIONS
"Patient Education     Routine physical for adults   The Basics   Written by the doctors and editors at [facility]   What is a physical? -- A physical is a routine visit, or \"check-up,\" with your doctor. You might also hear it called a \"wellness visit\" or \"preventive visit.\"  During each visit, the doctor will:   Ask about your physical and mental health   Ask about your habits, behaviors, and lifestyle   Do an exam   Give you vaccines if needed   Talk to you about any medicines you take   Give advice about your health   Answer your questions  Getting regular check-ups is an important part of taking care of your health. It can help your doctor find and treat any problems you have. But it's also important for preventing health problems.  A routine physical is different from a \"sick visit.\" A sick visit is when you see a doctor because of a health concern or problem. Since physicals are scheduled ahead of time, you can think about what you want to ask the doctor.  How often should I get a physical? -- It depends on your age and health. In general, for people age 21 years and older:   If you are younger than 50 years, you might be able to get a physical every 3 years.   If you are 50 years or older, your doctor might recommend a physical every year.  If you have an ongoing health condition, like diabetes or high blood pressure, your doctor will probably want to see you more often.  What happens during a physical? -- In general, each visit will include:   Physical exam - The doctor or nurse will check your height, weight, heart rate, and blood pressure. They will also look at your eyes and ears. They will ask about how you are feeling and whether you have any symptoms that bother you.   Medicines - It's a good idea to bring a list of all the medicines you take to each doctor visit. Your doctor will talk to you about your medicines and answer any questions. Tell them if you are having any side effects that bother you. You " "should also tell them if you are having trouble paying for any of your medicines.   Habits and behaviors - This includes:   Your diet   Your exercise habits   Whether you smoke, drink alcohol, or use drugs   Whether you are sexually active   Whether you feel safe at home  Your doctor will talk to you about things you can do to improve your health and lower your risk of health problems. They will also offer help and support. For example, if you want to quit smoking, they can give you advice and might prescribe medicines. If you want to improve your diet or get more physical activity, they can help you with this, too.   Lab tests, if needed - The tests you get will depend on your age and situation. For example, your doctor might want to check your:   Cholesterol   Blood sugar   Iron level   Vaccines - The recommended vaccines will depend on your age, health, and what vaccines you already had. Vaccines are very important because they can prevent certain serious or deadly infections.   Discussion of screening - \"Screening\" means checking for diseases or other health problems before they cause symptoms. Your doctor can recommend screening based on your age, risk, and preferences. This might include tests to check for:   Cancer, such as breast, prostate, cervical, ovarian, colorectal, prostate, lung, or skin cancer   Sexually transmitted infections, such as chlamydia and gonorrhea   Mental health conditions like depression and anxiety  Your doctor will talk to you about the different types of screening tests. They can help you decide which screenings to have. They can also explain what the results might mean.   Answering questions - The physical is a good time to ask the doctor or nurse questions about your health. If needed, they can refer you to other doctors or specialists, too.  Adults older than 65 years often need other care, too. As you get older, your doctor will talk to you about:   How to prevent falling at " home   Hearing or vision tests   Memory testing   How to take your medicines safely   Making sure that you have the help and support you need at home  All topics are updated as new evidence becomes available and our peer review process is complete.  This topic retrieved from [employer] on: May 02, 2024.  Topic 344851 Version 1.0  Release: 32.4.3 - C32.122  © 2024 UpToDate, Inc. and/or its affiliates. All rights reserved.  Consumer Information Use and Disclaimer   Disclaimer: This generalized information is a limited summary of diagnosis, treatment, and/or medication information. It is not meant to be comprehensive and should be used as a tool to help the user understand and/or assess potential diagnostic and treatment options. It does NOT include all information about conditions, treatments, medications, side effects, or risks that may apply to a specific patient. It is not intended to be medical advice or a substitute for the medical advice, diagnosis, or treatment of a health care provider based on the health care provider's examination and assessment of a patient's specific and unique circumstances. Patients must speak with a health care provider for complete information about their health, medical questions, and treatment options, including any risks or benefits regarding use of medications. This information does not endorse any treatments or medications as safe, effective, or approved for treating a specific patient. UpToDate, Inc. and its affiliates disclaim any warranty or liability relating to this information or the use thereof.The use of this information is governed by the Terms of Use, available at https://www.woltersItandiuwer.com/en/know/clinical-effectiveness-terms. 2024© UpToDate, Inc. and its affiliates and/or licensors. All rights reserved.  Copyright   © 2024 UpToDate, Inc. and/or its affiliates. All rights reserved.

## 2024-11-11 NOTE — PROGRESS NOTES
Adult Annual Physical  Name: Edward Conrad      : 1970      MRN: [id number]  Encounter Provider: Evelyn Tang DO  Encounter Date: 2024   Encounter department: [facility]    Assessment & Plan  Annual physical exam         Factor 5 Leiden mutation, heterozygous (HCC)  INR is therapeutic  Will continue same dose and recheck in 1 month  Advised he can hold his coumadin for 5 days before his EGD/colonoscopy        Need for Tdap vaccination    Orders:    TDAP VACCINE GREATER THAN OR EQUAL TO 8YO IM    Anemia, unspecified type    Orders:    CBC; Future    Iron; Future    History of DVT (deep vein thrombosis)    Orders:    warfarin (Coumadin) 1 mg tablet; Take  2 mg tablet with 5 mg once a day-total dose 7 mg daily    Chronic deep vein thrombosis (DVT) of left lower extremity (HCC)    Orders:    warfarin (Coumadin) 1 mg tablet; Take  2 mg tablet with 5 mg once a day-total dose 7 mg daily    Chronic deep vein thrombosis (DVT) of other vein of left lower extremity (HCC)    Orders:    warfarin (Coumadin) 5 mg tablet; Take 1(5mg) tablet daily with 1(1mg) tablet daily.      Immunizations and preventive care screenings were discussed with patient today. Appropriate education was printed on patient's after visit summary.        Counseling:  Dental Health: discussed importance of regular tooth brushing, flossing, and dental visits.  Exercise: the importance of regular exercise/physical activity was discussed. Recommend exercise 3-5 times per week for at least 30 minutes.   Shingles vaccine recommended          Return in about 6 months (around 2025) for Next scheduled follow up.    History of Present Illness     Adult Annual Physical:  Patient presents for annual physical.     Diet and Physical Activity:  - Diet/Nutrition: well balanced diet.  - Exercise: walking.    General Health:  - Sleep: sleeps well.  - Hearing: normal hearing bilateral ears.  - Vision: goes for regular eye exams.  - Dental: no  "dental visits for > 1 year.     Health:    - Urinary symptoms: none.     Review of Systems      Objective     /84   Pulse 70   Temp (!) 97.2 °F (36.2 °C) (Tympanic)   Resp 16   Ht 5' 9\" (1.753 m)   Wt 105 kg (231 lb)   SpO2 98%   BMI 34.11 kg/m²     Physical Exam  Vitals reviewed.   Constitutional:       Appearance: He is well-developed.   HENT:      Head: Normocephalic and atraumatic.      Right Ear: External ear normal.      Left Ear: External ear normal.   Cardiovascular:      Rate and Rhythm: Normal rate and regular rhythm.      Heart sounds: Normal heart sounds. No murmur heard.  Pulmonary:      Effort: Pulmonary effort is normal. No respiratory distress.      Breath sounds: Normal breath sounds. No wheezing.   Abdominal:      Palpations: Abdomen is soft.      Tenderness: There is no abdominal tenderness.   Musculoskeletal:         General: Normal range of motion.   Skin:     General: Skin is warm and dry.   Neurological:      Mental Status: He is alert and oriented to person, place, and time.        Vision Screening    Right eye Left eye Both eyes   Without correction 20/20 20/20 20/20   With correction          "

## 2024-11-11 NOTE — ASSESSMENT & PLAN NOTE
Orders:    warfarin (Coumadin) 5 mg tablet; Take 1(5mg) tablet daily with 1(1mg) tablet daily.

## 2024-11-12 ENCOUNTER — ANTICOAG VISIT (OUTPATIENT)
Dept: FAMILY MEDICINE CLINIC | Facility: CLINIC | Age: 54
End: 2024-11-12

## 2024-11-12 RX ORDER — WARFARIN SODIUM 1 MG/1
TABLET ORAL
Qty: 200 TABLET | Refills: 1 | Status: SHIPPED | OUTPATIENT
Start: 2024-11-12

## 2024-11-14 ENCOUNTER — ANESTHESIA (OUTPATIENT)
Dept: ANESTHESIOLOGY | Facility: HOSPITAL | Age: 54
End: 2024-11-14

## 2024-11-14 ENCOUNTER — ANESTHESIA EVENT (OUTPATIENT)
Dept: ANESTHESIOLOGY | Facility: HOSPITAL | Age: 54
End: 2024-11-14

## 2024-11-15 ENCOUNTER — TELEPHONE (OUTPATIENT)
Age: 54
End: 2024-11-15

## 2024-11-15 NOTE — LETTER
Attached are your prep instructions for your upcoming procedure on 11/27/2024. If you have any questions or concerns please contact us at [phone].            Thank you,      [facility]

## 2024-11-20 ENCOUNTER — PATIENT MESSAGE (OUTPATIENT)
Dept: GASTROENTEROLOGY | Facility: CLINIC | Age: 54
End: 2024-11-20

## 2024-11-22 ENCOUNTER — TELEPHONE (OUTPATIENT)
Dept: GASTROENTEROLOGY | Facility: CLINIC | Age: 54
End: 2024-11-22

## 2024-11-22 NOTE — TELEPHONE ENCOUNTER
Spoke to pt confirming pt's colonoscopy scheduled on 11/27/24 at [facility] with Dr Valdovinos.  Informed [facility] would be calling the day prior with the arrival time. Pt is holding the Coumadin for 5 days. Pt has instructions and did not have any questions.  Pt aware needs a .

## 2024-11-27 ENCOUNTER — ANESTHESIA EVENT (OUTPATIENT)
Dept: GASTROENTEROLOGY | Facility: AMBULARY SURGERY CENTER | Age: 54
End: 2024-11-27
Payer: COMMERCIAL

## 2024-11-27 ENCOUNTER — HOSPITAL ENCOUNTER (OUTPATIENT)
Dept: GASTROENTEROLOGY | Facility: AMBULARY SURGERY CENTER | Age: 54
Setting detail: OUTPATIENT SURGERY
Discharge: HOME | End: 2024-11-27
Attending: INTERNAL MEDICINE
Payer: COMMERCIAL

## 2024-11-27 ENCOUNTER — PREP FOR PROCEDURE (OUTPATIENT)
Dept: GASTROENTEROLOGY | Facility: AMBULARY SURGERY CENTER | Age: 54
End: 2024-11-27

## 2024-11-27 VITALS
OXYGEN SATURATION: 98 % | DIASTOLIC BLOOD PRESSURE: 79 MMHG | SYSTOLIC BLOOD PRESSURE: 125 MMHG | HEART RATE: 61 BPM | TEMPERATURE: 98.2 F | RESPIRATION RATE: 18 BRPM

## 2024-11-27 DIAGNOSIS — K22.70 BARRETT'S ESOPHAGUS WITHOUT DYSPLASIA: ICD-10-CM

## 2024-11-27 DIAGNOSIS — K44.9 HIATAL HERNIA: Primary | ICD-10-CM

## 2024-11-27 DIAGNOSIS — D64.9 ANEMIA OF UNKNOWN ETIOLOGY: ICD-10-CM

## 2024-11-27 DIAGNOSIS — D50.9 IRON DEFICIENCY ANEMIA, UNSPECIFIED IRON DEFICIENCY ANEMIA TYPE: Primary | ICD-10-CM

## 2024-11-27 DIAGNOSIS — K21.9 GERD WITHOUT ESOPHAGITIS: ICD-10-CM

## 2024-11-27 DIAGNOSIS — D50.9 IRON DEFICIENCY ANEMIA, UNSPECIFIED IRON DEFICIENCY ANEMIA TYPE: ICD-10-CM

## 2024-11-27 PROCEDURE — 45385 COLONOSCOPY W/LESION REMOVAL: CPT | Performed by: INTERNAL MEDICINE

## 2024-11-27 PROCEDURE — 88305 TISSUE EXAM BY PATHOLOGIST: CPT | Performed by: PATHOLOGY

## 2024-11-27 PROCEDURE — 43239 EGD BIOPSY SINGLE/MULTIPLE: CPT | Performed by: INTERNAL MEDICINE

## 2024-11-27 RX ORDER — LIDOCAINE HYDROCHLORIDE 10 MG/ML
INJECTION, SOLUTION EPIDURAL; INFILTRATION; INTRACAUDAL; PERINEURAL AS NEEDED
Status: DISCONTINUED | OUTPATIENT
Start: 2024-11-27 | End: 2024-11-27

## 2024-11-27 RX ORDER — ESOMEPRAZOLE MAGNESIUM 40 MG/1
40 CAPSULE, DELAYED RELEASE ORAL
Qty: 90 CAPSULE | Refills: 3 | Status: SHIPPED | OUTPATIENT
Start: 2024-11-27

## 2024-11-27 RX ORDER — PROPOFOL 10 MG/ML
INJECTION, EMULSION INTRAVENOUS CONTINUOUS PRN
Status: DISCONTINUED | OUTPATIENT
Start: 2024-11-27 | End: 2024-11-27

## 2024-11-27 RX ORDER — SODIUM CHLORIDE, SODIUM LACTATE, POTASSIUM CHLORIDE, CALCIUM CHLORIDE 600; 310; 30; 20 MG/100ML; MG/100ML; MG/100ML; MG/100ML
125 INJECTION, SOLUTION INTRAVENOUS CONTINUOUS
OUTPATIENT
Start: 2024-11-27

## 2024-11-27 RX ORDER — PROPOFOL 10 MG/ML
INJECTION, EMULSION INTRAVENOUS AS NEEDED
Status: DISCONTINUED | OUTPATIENT
Start: 2024-11-27 | End: 2024-11-27

## 2024-11-27 RX ORDER — SODIUM CHLORIDE, SODIUM LACTATE, POTASSIUM CHLORIDE, CALCIUM CHLORIDE 600; 310; 30; 20 MG/100ML; MG/100ML; MG/100ML; MG/100ML
INJECTION, SOLUTION INTRAVENOUS CONTINUOUS PRN
Status: DISCONTINUED | OUTPATIENT
Start: 2024-11-27 | End: 2024-11-27

## 2024-11-27 RX ADMIN — LIDOCAINE HYDROCHLORIDE 50 MG: 10 INJECTION, SOLUTION EPIDURAL; INFILTRATION; INTRACAUDAL; PERINEURAL at 11:25

## 2024-11-27 RX ADMIN — PROPOFOL 140 MG: 10 INJECTION, EMULSION INTRAVENOUS at 11:25

## 2024-11-27 RX ADMIN — PROPOFOL 20 MG: 10 INJECTION, EMULSION INTRAVENOUS at 11:44

## 2024-11-27 RX ADMIN — PROPOFOL 100 MCG/KG/MIN: 10 INJECTION, EMULSION INTRAVENOUS at 11:36

## 2024-11-27 RX ADMIN — SODIUM CHLORIDE, SODIUM LACTATE, POTASSIUM CHLORIDE, AND CALCIUM CHLORIDE: .6; .31; .03; .02 INJECTION, SOLUTION INTRAVENOUS at 11:20

## 2024-11-27 RX ADMIN — PROPOFOL 30 MG: 10 INJECTION, EMULSION INTRAVENOUS at 11:26

## 2024-11-27 RX ADMIN — PROPOFOL 40 MG: 10 INJECTION, EMULSION INTRAVENOUS at 11:30

## 2024-11-27 NOTE — ANESTHESIA POSTPROCEDURE EVALUATION
Post-Op Assessment Note    CV Status:  Stable  Pain Score: 0    Pain management: adequate       Mental Status:  Sleepy   Hydration Status:  Stable   PONV Controlled:  None   Airway Patency:  Patent     Post Op Vitals Reviewed: Yes    No anethesia notable event occurred.    Staff: Anesthesiologist, CRNA           Last Filed PACU Vitals:  Vitals Value Taken Time   Temp     Pulse 70    /70    Resp 14    SpO2 98        Modified Bri:  Activity: 2 (11/27/2024 10:33 AM)  Respiration: 2 (11/27/2024 10:33 AM)  Circulation: 2 (11/27/2024 10:33 AM)  Consciousness: 2 (11/27/2024 10:33 AM)  Oxygen Saturation: 2 (11/27/2024 10:33 AM)  Modified Bri Score: 10 (11/27/2024 10:33 AM)

## 2024-11-27 NOTE — H&P
History and Physical - [facility]  Edward Conrad 54 y.o. male MRN: [id number]        HPI: 54-year-old male with history of Nj's esophagus and family history of colon cancer in his father was referred because of anemia.    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) 2018    BL legs    Dysphagia     Last Assessed: 8/12/2014     Factor 5 Leiden mutation, heterozygous ([facility])     GERD (gastroesophageal reflux disease)     Globus sensation     Last Assessed: 6/4/2014     H/O neoplasm of uncertain behavior of skin 06/06/2006    Hyperlipidemia     Hypertension 02/14/2006    Lateral epicondylitis of right elbow     Last Assessed: 10/23/2015     Pes planus     last assessed: 10/23/2013     Plantar fascial fibromatosis     Last Assessed: 10/23/2013     Pulmonary embolism ([facility]) 2018    Right patellofemoral syndrome     Last Assessed: 4/15/2016     Sebaceous cyst 11/03/2007     Past Surgical History:   Procedure Laterality Date    COLONOSCOPY      ESOPHAGOGASTRODUODENOSCOPY N/A 10/7/2016    Procedure: ESOPHAGOGASTRODUODENOSCOPY (EGD);  Surgeon: Crystal Valdovinos MD;  Location: [facility] GI LAB;  Service:     NASAL SEPTUM SURGERY  1995    MD ESOPHAGOGASTRODUODENOSCOPY TRANSORAL DIAGNOSTIC N/A 12/6/2018    Procedure: ESOPHAGOGASTRODUODENOSCOPY (EGD);  Surgeon: Crystal Valdovinos MD;  Location: [facility] GI LAB;  Service: Gastroenterology    MD SURGICAL ARTHROSCOPY SHOULDER W/ROTATOR CUFF RPR Right 1/26/2023    Procedure: Shoulder Arthroscopy with Rotator Cuff Repair, Subacromial Decompression, and Limited Debridement;  Surgeon: Johnnie Barcenas MD;  Location: [facility];  Service: Orthopedics     Social History   Social History     Substance and Sexual Activity   Alcohol Use Yes    Alcohol/week: 4.0 standard drinks of alcohol    Types: 4 Cans of beer per week    Comment: socially     Social  History     Substance and Sexual Activity   Drug Use No     Social History     Tobacco Use   Smoking Status Never   Smokeless Tobacco Never     Family History   Problem Relation Age of Onset    Heart disease Mother         valve 70s    Hypertension Mother     Cancer Father         colon    Colon cancer Father     Hypertension Father     Coronary artery disease Father         CABG    Heart disease Brother         MI exp age 45    Colon cancer Family     Cancer Brother         esophageal CA exp age 44    Cancer Brother        Meds/Allergies     Not in a hospital admission.    No Known Allergies    Objective     Blood pressure (!) 161/103, pulse 61, temperature 98.2 °F (36.8 °C), temperature source Skin, resp. rate 16, SpO2 96%.    Physical Exam:    Chest- CTA  Heart- RRR  Abdomen- NT/ND  Extremities- No edema    ASSESSMENT:     History of Nj's esophagus and family history of colon cancer    PLAN:    EGD and colonoscopy

## 2024-11-27 NOTE — ANESTHESIA PREPROCEDURE EVALUATION
Procedure:  EGD  COLONOSCOPY    Relevant Problems   CARDIO   (+) Chronic deep vein thrombosis (DVT) of left lower extremity (HCC)      GI/HEPATIC   (+) GERD without esophagitis   (+) Hiatal hernia      /RENAL   (+) Calcium oxalate kidney stones      MUSCULOSKELETAL   (+) Hiatal hernia        Physical Exam    Airway    Mallampati score: II  TM Distance: >3 FB  Neck ROM: full     Dental   No notable dental hx     Cardiovascular  Cardiovascular exam normal    Pulmonary  Pulmonary exam normal     Other Findings        Anesthesia Plan  ASA Score- 2     Anesthesia Type- IV sedation with anesthesia with ASA Monitors.         Additional Monitors:     Airway Plan:            Plan Factors-Exercise tolerance (METS): >4 METS.    Chart reviewed.   Existing labs reviewed. Patient summary reviewed.    Patient is not a current smoker.      Obstructive sleep apnea risk education given perioperatively.        Induction-     Postoperative Plan-     Perioperative Resuscitation Plan - Level 1 - Full Code.       Informed Consent- Anesthetic plan and risks discussed with patient.  I personally reviewed this patient with the CRNA. Discussed and agreed on the Anesthesia Plan with the CRNA..

## 2024-12-02 PROCEDURE — 88305 TISSUE EXAM BY PATHOLOGIST: CPT | Performed by: PATHOLOGY

## 2024-12-05 ENCOUNTER — RESULTS FOLLOW-UP (OUTPATIENT)
Dept: GASTROENTEROLOGY | Facility: CLINIC | Age: 54
End: 2024-12-05

## 2024-12-06 ENCOUNTER — TELEPHONE (OUTPATIENT)
Age: 54
End: 2024-12-06

## 2024-12-06 NOTE — TELEPHONE ENCOUNTER
Patient called because we called him about a referral. Wants to see Dr. Izaguirre instead. Transferred.

## 2024-12-24 ENCOUNTER — OFFICE VISIT (OUTPATIENT)
Dept: CARDIAC SURGERY | Facility: CLINIC | Age: 54
End: 2024-12-24
Payer: COMMERCIAL

## 2024-12-24 ENCOUNTER — ANTICOAG VISIT (OUTPATIENT)
Dept: FAMILY MEDICINE CLINIC | Facility: CLINIC | Age: 54
End: 2024-12-24

## 2024-12-24 ENCOUNTER — APPOINTMENT (OUTPATIENT)
Dept: LAB | Facility: HOSPITAL | Age: 54
End: 2024-12-24
Payer: COMMERCIAL

## 2024-12-24 ENCOUNTER — OFFICE VISIT (OUTPATIENT)
Dept: HEMATOLOGY ONCOLOGY | Facility: MEDICAL CENTER | Age: 54
End: 2024-12-24
Payer: COMMERCIAL

## 2024-12-24 ENCOUNTER — RESULTS FOLLOW-UP (OUTPATIENT)
Dept: FAMILY MEDICINE CLINIC | Facility: CLINIC | Age: 54
End: 2024-12-24

## 2024-12-24 ENCOUNTER — TELEPHONE (OUTPATIENT)
Age: 54
End: 2024-12-24

## 2024-12-24 VITALS
WEIGHT: 223.11 LBS | OXYGEN SATURATION: 95 % | TEMPERATURE: 98.7 F | BODY MASS INDEX: 33.04 KG/M2 | SYSTOLIC BLOOD PRESSURE: 122 MMHG | DIASTOLIC BLOOD PRESSURE: 80 MMHG | RESPIRATION RATE: 15 BRPM | HEART RATE: 83 BPM | HEIGHT: 69 IN

## 2024-12-24 VITALS
SYSTOLIC BLOOD PRESSURE: 115 MMHG | HEIGHT: 69 IN | RESPIRATION RATE: 17 BRPM | TEMPERATURE: 99.9 F | HEART RATE: 84 BPM | BODY MASS INDEX: 34.07 KG/M2 | DIASTOLIC BLOOD PRESSURE: 78 MMHG | OXYGEN SATURATION: 97 % | WEIGHT: 230 LBS

## 2024-12-24 DIAGNOSIS — E53.8 FOLATE DEFICIENCY: ICD-10-CM

## 2024-12-24 DIAGNOSIS — I82.592 CHRONIC DEEP VEIN THROMBOSIS (DVT) OF OTHER VEIN OF LEFT LOWER EXTREMITY (HCC): Primary | ICD-10-CM

## 2024-12-24 DIAGNOSIS — K44.9 HIATAL HERNIA: Primary | ICD-10-CM

## 2024-12-24 DIAGNOSIS — D64.9 ANEMIA, UNSPECIFIED TYPE: ICD-10-CM

## 2024-12-24 DIAGNOSIS — E53.8 VITAMIN B12 DEFICIENCY: ICD-10-CM

## 2024-12-24 DIAGNOSIS — D68.59 HYPERCOAGULABLE STATE (HCC): ICD-10-CM

## 2024-12-24 DIAGNOSIS — D64.9 ANEMIA OF UNKNOWN ETIOLOGY: ICD-10-CM

## 2024-12-24 DIAGNOSIS — D50.8 OTHER IRON DEFICIENCY ANEMIA: ICD-10-CM

## 2024-12-24 DIAGNOSIS — D68.51 FACTOR 5 LEIDEN MUTATION, HETEROZYGOUS (HCC): ICD-10-CM

## 2024-12-24 LAB
CRP SERPL QL: 107.5 MG/L
FERRITIN SERPL-MCNC: 45 NG/ML (ref 24–336)
FOLATE SERPL-MCNC: >22.3 NG/ML
INR PPP: 2.18 (ref 0.85–1.19)
IRON SATN MFR SERPL: 6 % (ref 15–50)
IRON SERPL-MCNC: 27 UG/DL (ref 50–212)
LDH SERPL-CCNC: 153 U/L (ref 140–271)
PROTHROMBIN TIME: 24.6 SECONDS (ref 12.3–15)
TIBC SERPL-MCNC: 470.4 UG/DL (ref 250–450)
TRANSFERRIN SERPL-MCNC: 336 MG/DL (ref 203–362)
TSH SERPL DL<=0.05 MIU/L-ACNC: 0.87 UIU/ML (ref 0.45–4.5)
UIBC SERPL-MCNC: 443 UG/DL (ref 155–355)
VIT B12 SERPL-MCNC: 489 PG/ML (ref 180–914)

## 2024-12-24 PROCEDURE — 99214 OFFICE O/P EST MOD 30 MIN: CPT | Performed by: PHYSICIAN ASSISTANT

## 2024-12-24 PROCEDURE — 84165 PROTEIN E-PHORESIS SERUM: CPT

## 2024-12-24 PROCEDURE — 99214 OFFICE O/P EST MOD 30 MIN: CPT | Performed by: THORACIC SURGERY (CARDIOTHORACIC VASCULAR SURGERY)

## 2024-12-24 PROCEDURE — 82607 VITAMIN B-12: CPT

## 2024-12-24 PROCEDURE — 85610 PROTHROMBIN TIME: CPT

## 2024-12-24 PROCEDURE — 83010 ASSAY OF HAPTOGLOBIN QUANT: CPT

## 2024-12-24 PROCEDURE — 84443 ASSAY THYROID STIM HORMONE: CPT

## 2024-12-24 PROCEDURE — 86140 C-REACTIVE PROTEIN: CPT

## 2024-12-24 PROCEDURE — 83615 LACTATE (LD) (LDH) ENZYME: CPT

## 2024-12-24 PROCEDURE — 82728 ASSAY OF FERRITIN: CPT

## 2024-12-24 PROCEDURE — 83521 IG LIGHT CHAINS FREE EACH: CPT

## 2024-12-24 PROCEDURE — 36415 COLL VENOUS BLD VENIPUNCTURE: CPT

## 2024-12-24 PROCEDURE — 83540 ASSAY OF IRON: CPT

## 2024-12-24 PROCEDURE — 82746 ASSAY OF FOLIC ACID SERUM: CPT

## 2024-12-24 PROCEDURE — 83550 IRON BINDING TEST: CPT

## 2024-12-24 NOTE — PROGRESS NOTES
Name: Edward Conrad      : 1970      MRN: [id number]  Encounter Provider: Glaindo Izaguirre MD  Encounter Date: 2024   Encounter [facility]  :  Assessment & Plan  Hiatal hernia  Mr. Arreola hiatal hernia has enlarged from 5 to 8 cm over the last 2 years.  He has had some underlying blood loss anemia without clear evidence of Dav's lesions.  He is interested in pursuing surgical correction of this hernia.  He will obtain a esophageal manometry and then return to the office to discuss laparoscopic hiatal hernia repair with fundoplication.    Orders:    Esophageal manometry; Future        Thoracic History   Diagnosis: Large hiatal hernia  Procedures/Surgeries:     Problem   Hiatal Hernia    Procedure/Onset: 2007          History of Present Illness   HPI  Edward Conrad is a 54 y.o. male who returns to the office to discuss his hiatal hernia.  He was seen in 2022 where he was noted to have a 8 cm predominantly sliding but likely has some paraesophageal component hiatal hernia.  He was relatively asymptomatic at the time and chose to follow this expectantly.  He has a family history of esophageal cancer and states that he had a history of Nj's.  His most recent endoscopy on 2024 demonstrates an 8 cm hiatal hernia with the GE junction 32 cm from the incisors and the diaphragmatic compression 40 cm from the incisors.  There were some small linear erosions at the GE junction but no definitive Dav's lesions.  There was a biopsy taken from close to his GE junction which demonstrated some intestinal metaplasia without dysplasia.  Several years ago the hiatal hernia was measured at approximately 5 cm.  He remains largely asymptomatic with respect to this hernia.  He states that he takes his Nexium perhaps 10 days/month.  He denies dysphagia.  If he eats chocolate late in the evening he does get some heartburn.  He denies  "regurgitant symptoms.  The patient has a past medical history notable for factor V Leiden and DVT.  He is on lifelong Coumadin at this point.  Review of Systems   Constitutional:  Negative for activity change, appetite change, chills, fever and unexpected weight change.   HENT:  Negative for voice change.    Respiratory:  Negative for cough, shortness of breath and wheezing.    Cardiovascular:  Negative for chest pain, palpitations and leg swelling.   Gastrointestinal:  Negative for abdominal pain, constipation, diarrhea, nausea and vomiting.   Musculoskeletal:  Negative for arthralgias and myalgias.   Skin:  Negative for rash.   Neurological:  Negative for dizziness, seizures, weakness, numbness and headaches.   Hematological:  Negative for adenopathy.   Psychiatric/Behavioral:  Negative for confusion.            Objective   /80 (BP Location: Left arm, Patient Position: Sitting, Cuff Size: Standard)   Pulse 83   Temp 98.7 °F (37.1 °C) (Temporal)   Resp 15   Ht 5' 9\" (1.753 m)   Wt 101 kg (223 lb 1.7 oz)   SpO2 95%   BMI 32.95 kg/m²     ECOG ECOG Performance Status: 0 - Fully active, able to carry on all pre-disease performance without restriction  Physical Exam  Constitutional:       General: He is not in acute distress.     Appearance: Normal appearance.   HENT:      Head: Normocephalic and atraumatic.   Eyes:      General: No scleral icterus.     Conjunctiva/sclera: Conjunctivae normal.   Pulmonary:      Effort: Pulmonary effort is normal.   Abdominal:      General: Abdomen is flat.   Musculoskeletal:      Right lower leg: No edema.      Left lower leg: No edema.   Skin:     General: Skin is warm and dry.   Neurological:      General: No focal deficit present.      Mental Status: He is alert.   Psychiatric:         Mood and Affect: Mood normal.         Behavior: Behavior normal.         Labs:     Radiology Results Review : I have reviewed images/report studies in PACS as described above (in the " HPI).  Pathology: I have reviewed pathology reports described above.

## 2024-12-24 NOTE — ASSESSMENT & PLAN NOTE
Patient is a 53-year-old male with prior lower extremity DVTs, PE recently admitted with left knee pain.  CTA/chest did not demonstrate any evidence of a new PE. Additionally, the recent lower extremity Dopplers did not demonstrate any evidence of acute/new DVT.  Patient has evidence of chronic nonocclusive left lower extremity thromboses.  D-dimer was not elevated.     Unclear if this was truly a Xarelto failure as there was no evidence of an acute DVT. Ultimately, however, Edward was changed from Xarelto to heparin/now coumadin. His INR is being managed by his PCP.

## 2024-12-24 NOTE — PROGRESS NOTES
Name: Edward Conrad      : 1970      MRN: [id number]  Encounter Provider: Shaye Esteban PA-C  Encounter Date: 2024   Encounter department: [facility]  :  Assessment & Plan  Anemia of unknown etiology  Patient presents for evaluation of anemia.    He had routine lab work drawn on 10/28/2024.  At that time hemoglobin was 8.3, MCV was 79, RDW 17.0.  His renal function is normal.  GFR is 90.  LFTs are normal.  Prior to this, on 2024, his hemoglobin was 12.3.    He had iron studies drawn on 2024.  His iron saturation was 6%, total iron was 26.  He is taking oral iron 1 tablet daily.    He denies any obvious GI bleeding.  He had EGD completed on 2024.  This showed 8 cm hiatal hernia (he is scheduled for consultation today to discuss repair).  There were subtle linear healing erosions on the folds at the diaphragmatic impression.  Stomach and duodenum are normal.  He had subcentimeter polyps removed from the colon.  Hemorrhoids noted.    Patient needs to have additional lab work completed to determine whether there is any other cause of the anemia.    As it stands anemia is related to iron deficiency.  Edward is aware that iron deficiency is either related to bleeding or decreased absorption.  He has no obvious reason to have decreased absorption.  The concern of course is some source of GI blood loss.  Patient is scheduled for capsule endoscopy next week.    We discussed options for correcting the iron deficiency.  Patient prefers to go for additional blood work today.  We will discuss options by telephone after review of lab work.    Patient will return to clinic in 3 months time, sooner if needed.    Orders:  •  Ambulatory referral to Hematology / Oncology    Chronic deep vein thrombosis (DVT) of other vein of left lower extremity (HCC)  Patient is a 53-year-old male with prior lower extremity DVTs, PE recently admitted with left knee pain.   CTA/chest did not demonstrate any evidence of a new PE. Additionally, the recent lower extremity Dopplers did not demonstrate any evidence of acute/new DVT.  Patient has evidence of chronic nonocclusive left lower extremity thromboses.  D-dimer was not elevated.     Unclear if this was truly a Xarelto failure as there was no evidence of an acute DVT. Ultimately, however, Edward was changed from Xarelto to heparin/now coumadin. His INR is being managed by his PCP.        Anemia, unspecified type  Patient will complete a full anemia panel of labs today.  Orders:  •  Iron Panel (Includes Ferritin, Iron Sat%, Iron, and TIBC); Future  •  Protein electrophoresis, serum; Future  •  Immunoglobulin free LT chains blood; Future  •  CBC and differential; Future  •  TSH, 3rd generation with Free T4 reflex; Future  •  C-reactive protein; Future  •  LD,Blood; Future  •  Haptoglobin; Future    Vitamin B12 deficiency  Check vitamin B12 with lab work today.  Orders:  •  Vitamin B12; Future  •  Vitamin B12; Future    Folate deficiency  Check folate with lab work today.  Orders:  •  Folate; Future  •  Folate; Future    Other iron deficiency anemia  Will repeat iron studies today.  Orders:  •  CBC and differential; Future  •  Comprehensive metabolic panel; Future  •  Iron, TIBC and Ferritin Panel; Future    Hypercoagulable state (HCC)  Besides the heterozygous factor V Leiden mutation, patient had an elevated anticardiolipin IgM and an elevated antiphosphatidylserine IgM.  These were never serialized as patient understood that he required lifelong anticoagulation and did not feel that the tests needed to be repeated.  The concern was that patient had antiphospholipid antibody syndrome besides the factor V Leiden mutation.  Given that Edward is now on longterm anticoagulation with coumadin we can defer repeating anticardiolipin panel and antiphosphatidylserine panel.            History of Present Illness {?Quick Links Encounters * My Last  Note * Last Note in Specialty * Snapshot * Since Last Visit * History :72116}  Chief Complaint   Patient presents with   • Follow-up   Edward Conrad is a 54 y.o. male who was previously seen/evaluated for bilateral DVTs and bilateral PE.  There was no clear provoking incident.  Patient was placed on Xarelto.  Thrombophilia workup demonstrated heterozygous factor V Leiden, slightly elevated anticardiolipin IgM level and an elevated antiphosphatidylserine IgM level. Coumadin was discussed with the patient at that time; however, he did not want to deal with the INR checks.  Up until just recently patient has done well with the Xarelto.     Mr. Conrad was admitted on February 28, 2024 with acute left knee pain.  We were asked to evaluate him during that hospital stay. Dopplers from February 28, 2024 did not demonstrate any evidence of thrombus in the right lower extremity; patient had evidence of chronic nonocclusive deep vein thrombosis in the left distal femoral, popliteal and peroneal veins. CTA/chest did not demonstrate any evidence of a new PE.  D-dimer was within normal limits.  Patient was started on heparin, eventually Coumadin.    Interval History:    Mr. Conrad presents today for evaluation of new anemia.  Edward had recent finding on routine lab work of anemia with hemoglobin 8.3.  He was referred for EGD and colonoscopy which she completed on 11/27/2024.  He had finding on EGD of an 8 cm hiatal hernia.  He has been referred to CT surgery who he will be seeing later today.  He denies obvious bleeding.  No obvious evidence of GI bleeding.  No hematuria, epistaxis.    No issues with the coumadin. This is being managed by his PCP.    Patient seems quite asymptomatic with regard to the anemia.  He denies significant fatigue.  No nausea, vomiting, bowel changes, chest pain.  He reports shortness of breath with exertion which is chronic.    Review of Systems   Constitutional:  Negative for activity change, appetite  "change, fatigue and fever.   HENT:  Negative for nosebleeds.    Respiratory:  Positive for shortness of breath (LOWRY). Negative for cough and choking.    Cardiovascular:  Negative for chest pain, palpitations and leg swelling.   Gastrointestinal:  Negative for abdominal distention, abdominal pain, anal bleeding, blood in stool, constipation, diarrhea, nausea and vomiting.   Endocrine: Negative for cold intolerance.   Genitourinary:  Negative for hematuria.   Musculoskeletal:  Negative for myalgias.   Skin:  Negative for color change, pallor and rash.   Allergic/Immunologic: Negative for immunocompromised state.   Neurological:  Negative for headaches.   Hematological:  Negative for adenopathy. Does not bruise/bleed easily.   All other systems reviewed and are negative.        Objective {?Quick Links Trend Vitals * Enter New Vitals * Results Review * Timeline (Adult) * Labs * Imaging * Cardiology * Procedures * Lung Cancer Screening * Surgical eConsent :[id number]}  /78 (BP Location: Left arm, Patient Position: Sitting, Cuff Size: Adult)   Pulse 84   Temp 99.9 °F (37.7 °C) (Temporal)   Resp 17   Ht 5' 9\" (1.753 m)   Wt 104 kg (230 lb)   SpO2 97%   BMI 33.97 kg/m²     Physical Exam  Constitutional:       General: He is not in acute distress.     Appearance: Normal appearance. He is well-developed.   HENT:      Head: Normocephalic and atraumatic.      Right Ear: External ear normal.      Left Ear: External ear normal.      Nose: Nose normal.   Eyes:      General: No scleral icterus.     Conjunctiva/sclera: Conjunctivae normal.   Cardiovascular:      Rate and Rhythm: Normal rate and regular rhythm.   Pulmonary:      Effort: Pulmonary effort is normal. No respiratory distress.      Breath sounds: Normal breath sounds.   Abdominal:      General: Abdomen is flat. There is no distension.      Palpations: Abdomen is soft.      Tenderness: There is no abdominal tenderness.   Skin:     Findings: No rash (on exposed " skin.).   Neurological:      Mental Status: He is alert and oriented to person, place, and time.   Psychiatric:         Mood and Affect: Mood normal.         Thought Content: Thought content normal.         Judgment: Judgment normal.       Labs: I have reviewed the following labs:  Results for orders placed or performed during the hospital encounter of 11/27/24   Tissue Exam   Result Value Ref Range    Case Report       Surgical Pathology Report                         Case: [id number]                                  Authorizing Provider:  Crystal Valdovinos MD          Collected:           11/27/2024 1128              Ordering Location:     [facility]   Received:            11/27/2024 1656                                     Center                                                                       Pathologist:           Gael Colon DO                                                            Specimens:   A) - Esophagus, distal esophagus bx                                                                 B) - Duodenum, duodenum bx                                                                          C) - Large Intestine, Transverse Colon, transverse colon polyp bx- cold snare                       D) - Large Intestine, Left/Descending Colon, descending colon polyp bx- cold snare                  E) - Large Intestine, Sigmoid Colon, sigmoid colon polyp bx- cold snare                              F) - Rectum, rectal polyp bx- cold snare                                                   Final Diagnosis       A. Esophagus, distal, biopsy:  -Squamocolumnar junctional mucosa with intestinal metaplasia.  -Negative for dysplasia.    Comment: The diagnosis in this case depends on the location of this biopsy.  If this biopsy was taken from the tubular esophagus at least 1 cm above the gastric folds, it represents Nj mucosa of the distinctive type.  If this biopsy was taken from the gastric cardia, it  represents intestinal metaplasia of the gastric cardia.     Reference: Eugenio NJ, Estefany GW, Liya DG, Carlos LB; American College of Gastroenterology.  AGC Clinical Guideline: Diagnosis and Management of Nj's Esophagus. Am J Gastroenterol. 2016 Jan;111(1)30-50.     B. Duodenum, biopsy:  -Duodenal mucosa with no significant pathologic change.  -Negative for increased intraepithelial lymphocytes.      C. Transverse colon, polyp, cold snare:  -Tubular adenoma, negative for high grade dysplasia.     D. Descending colon, polyp, cold snare:  -Tubular adenoma, negative for high grade dysplasia.     E. Sigmoid colon, polyp, cold snare:  -Tubular adenoma, negative for high grade dysplasia.     F. Rectum, polyp, cold snare:  -Hyperplastic polyp.        Additional Information       All reported additional testing was performed with appropriately reactive controls.  These tests were developed and their performance characteristics determined by [facility] or appropriate performing facility, though some tests may be performed on tissues which have not been validated for performance characteristics (such as staining performed on alcohol exposed cell blocks and decalcified tissues).  Results should be interpreted with caution and in the context of the patients’ clinical condition. These tests may not be cleared or approved by the U.S. Food and Drug Administration, though the FDA has determined that such clearance or approval is not necessary. These tests are used for clinical purposes and they should not be regarded as investigational or for research. This laboratory has been approved by CLIA 88, designated as a high-complexity laboratory and is qualified to perform these tests.    Interpretation performed at [facility], [address] [facility] [address]      Synoptic Checklist          COLON/RECTUM POLYP FORM - GI - All Specimens          :    Adenoma(s)      Gross Description       A. The specimen is  "received in formalin, labeled with the patient's name and hospital number, and is designated \" distal esophagus biopsy\".  The specimen consists of multiple tan-pink irregularly-shaped tissue fragments measuring in aggregate of 1.0 x 0.4 x 0.3 cm.  Entirely submitted.  One screened cassette.    B. The specimen is received in formalin, labeled with the patient's name and hospital number, and is designated \" duodenum biopsy\".  The specimen consists of 3 tan-pink tissue fragments measuring 0.3-0.4 cm in greatest dimension.  Entirely submitted.  One screened cassette.    C. The specimen is received in formalin, labeled with the patient's name and hospital number, and is designated \" transverse colon polyp biopsy\".  The specimen consists of 4 tan-pink tissue fragments measuring 0.2-0.4 cm in greatest dimension.  Entirely submitted.  One screened cassette.    D. The specimen is received in formalin, labeled with the patient's name and hospital number, and is designated \" left descending colon polyp biopsy\".  The specimen consists of a single tan tissue fragment measuring 0.3 cm in greatest dimension.  Entirely submitted.  One screened cassette.    E. The specimen is received in formalin, labeled with the patient's name and hospital number, and is designated \" sigmoid colon polyp biopsy\".  The specimen consists of a single tan polypoid tissue fragments measuring 0.5 x 0.5 x 0.3 cm.  The margin of resection is inked blue and the specimen is bisected revealing grossly unremarkable cut surfaces.  Entirely submitted.  One screened cassette.    F. The specimen is received in formalin, labeled with the patient's name and hospital number, and is designated \" rectal polyp biopsy\".  The specimen consists of a single tan tissue fragment measuring 0.2 cm in greatest dimension.  The specimen is entirely submitted in a screened cassette.    Note: The estimated total formalin fixation time based upon information provided by the " submitting clinician and the standard processing schedule is under 72 hours. Amy Fulton

## 2024-12-24 NOTE — TELEPHONE ENCOUNTER
Left message on machine for a call back. Please inform patient and confirm inr dose  Steph Callejas, CMA

## 2024-12-24 NOTE — TELEPHONE ENCOUNTER
Patient contacted office with capsule questions. All questions answered. Patient expressed understanding.

## 2024-12-24 NOTE — ASSESSMENT & PLAN NOTE
Mr. Conrad's hiatal hernia has enlarged from 5 to 8 cm over the last 2 years.  He has had some underlying blood loss anemia without clear evidence of Dav's lesions.  He is interested in pursuing surgical correction of this hernia.  He will obtain a esophageal manometry and then return to the office to discuss laparoscopic hiatal hernia repair with fundoplication.    Orders:    Esophageal manometry; Future

## 2024-12-25 LAB — HAPTOGLOB SERPL-MCNC: 201 MG/DL (ref 29–370)

## 2024-12-26 ENCOUNTER — RESULTS FOLLOW-UP (OUTPATIENT)
Dept: HEMATOLOGY ONCOLOGY | Facility: MEDICAL CENTER | Age: 54
End: 2024-12-26

## 2024-12-26 ENCOUNTER — TELEPHONE (OUTPATIENT)
Dept: HEMATOLOGY ONCOLOGY | Facility: MEDICAL CENTER | Age: 54
End: 2024-12-26

## 2024-12-26 DIAGNOSIS — Z86.2 HISTORY OF IRON DEFICIENCY ANEMIA: Primary | ICD-10-CM

## 2024-12-26 RX ORDER — SODIUM CHLORIDE 9 MG/ML
20 INJECTION, SOLUTION INTRAVENOUS ONCE
OUTPATIENT
Start: 2025-01-02

## 2024-12-26 NOTE — TELEPHONE ENCOUNTER
Discussed lab work with patient by telephone.  Lab work is consistent with iron deficiency.  Orders placed for Feraheme weekly x 2.  Please schedule.

## 2024-12-27 ENCOUNTER — TELEPHONE (OUTPATIENT)
Dept: GASTROENTEROLOGY | Facility: HOSPITAL | Age: 54
End: 2024-12-27

## 2024-12-27 LAB
ALBUMIN SERPL ELPH-MCNC: 4.2 G/DL (ref 3.2–5.1)
ALBUMIN SERPL ELPH-MCNC: 58.3 % (ref 48–70)
ALPHA1 GLOB SERPL ELPH-MCNC: 0.35 G/DL (ref 0.15–0.47)
ALPHA1 GLOB SERPL ELPH-MCNC: 4.8 % (ref 1.8–7)
ALPHA2 GLOB SERPL ELPH-MCNC: 0.7 G/DL (ref 0.42–1.04)
ALPHA2 GLOB SERPL ELPH-MCNC: 9.7 % (ref 5.9–14.9)
BETA GLOB ABNORMAL SERPL ELPH-MCNC: 0.51 G/DL (ref 0.31–0.57)
BETA1 GLOB SERPL ELPH-MCNC: 7.1 % (ref 4.7–7.7)
BETA2 GLOB SERPL ELPH-MCNC: 6.5 % (ref 3.1–7.9)
BETA2+GAMMA GLOB SERPL ELPH-MCNC: 0.47 G/DL (ref 0.2–0.58)
GAMMA GLOB ABNORMAL SERPL ELPH-MCNC: 0.98 G/DL (ref 0.4–1.66)
GAMMA GLOB SERPL ELPH-MCNC: 13.6 % (ref 6.9–22.3)
IGG/ALB SER: 1.4 {RATIO} (ref 1.1–1.8)
KAPPA LC FREE SER-MCNC: 20.8 MG/L (ref 3.3–19.4)
KAPPA LC FREE/LAMBDA FREE SER: 1.09 {RATIO} (ref 0.26–1.65)
LAMBDA LC FREE SERPL-MCNC: 19.1 MG/L (ref 5.7–26.3)
PROT PATTERN SERPL ELPH-IMP: NORMAL
PROT SERPL-MCNC: 7.2 G/DL (ref 6.4–8.2)

## 2024-12-27 PROCEDURE — 84165 PROTEIN E-PHORESIS SERUM: CPT | Performed by: STUDENT IN AN ORGANIZED HEALTH CARE EDUCATION/TRAINING PROGRAM

## 2024-12-30 ENCOUNTER — HOSPITAL ENCOUNTER (OUTPATIENT)
Dept: GASTROENTEROLOGY | Facility: HOSPITAL | Age: 54
Discharge: HOME | End: 2024-12-30
Attending: INTERNAL MEDICINE
Payer: COMMERCIAL

## 2024-12-30 DIAGNOSIS — D50.9 IRON DEFICIENCY ANEMIA, UNSPECIFIED IRON DEFICIENCY ANEMIA TYPE: ICD-10-CM

## 2024-12-30 PROCEDURE — 91110 GI TRC IMG INTRAL ESOPH-ILE: CPT

## 2025-01-04 PROCEDURE — 91110 GI TRC IMG INTRAL ESOPH-ILE: CPT | Performed by: INTERNAL MEDICINE

## 2025-01-07 ENCOUNTER — RESULTS FOLLOW-UP (OUTPATIENT)
Dept: GASTROENTEROLOGY | Facility: AMBULARY SURGERY CENTER | Age: 55
End: 2025-01-07

## 2025-01-08 ENCOUNTER — TELEPHONE (OUTPATIENT)
Dept: GASTROENTEROLOGY | Facility: HOSPITAL | Age: 55
End: 2025-01-08

## 2025-01-10 ENCOUNTER — HOSPITAL ENCOUNTER (OUTPATIENT)
Dept: GASTROENTEROLOGY | Facility: HOSPITAL | Age: 55
End: 2025-01-10
Attending: THORACIC SURGERY (CARDIOTHORACIC VASCULAR SURGERY)
Payer: COMMERCIAL

## 2025-01-10 ENCOUNTER — TELEPHONE (OUTPATIENT)
Dept: INFUSION CENTER | Facility: CLINIC | Age: 55
End: 2025-01-10

## 2025-01-10 VITALS
TEMPERATURE: 98 F | RESPIRATION RATE: 16 BRPM | DIASTOLIC BLOOD PRESSURE: 95 MMHG | OXYGEN SATURATION: 97 % | SYSTOLIC BLOOD PRESSURE: 141 MMHG | HEART RATE: 68 BPM

## 2025-01-10 DIAGNOSIS — K44.9 HIATAL HERNIA: ICD-10-CM

## 2025-01-10 PROCEDURE — 91020 GASTRIC MOTILITY STUDIES: CPT | Performed by: INTERNAL MEDICINE

## 2025-01-10 PROCEDURE — 91010 ESOPHAGUS MOTILITY STUDY: CPT

## 2025-01-10 NOTE — PERIOPERATIVE NURSING NOTE
Patient brought in the room and explained the esophageal manometry procedure. After the confirmation of allergies, lidocaine 2 % viscous given via nostrils and  a transnasal insertion of the High Resolution esophageal manometry catheter was inserted via left nostril. Patient given water to drink during the insertion and once the catheter inserted pressure bands of both Upper esophageal sphincter  (UES) and Lower esophageal sphincter ( LES) were observed on the color contour. Patient instructed to take a deep breath to verify placement of the catheter, diaphragmatic pinch noted on inspiration. Catheter was secured to left cheek. Patient was assisted to supine position .Patient was instructed to relax  while acclimating the catheter for about 5 minutes. A 30 second baseline resting pressure was obtained to identify the UES and LES followed by a series of 10 liquid swallows using 5 cc room temperature normal saline to assess esophageal motility and bolus transit. Patient administered 10 viscous swallows using 5 cc viscous solution, 1 multiple rapid drink swallow using 2 cc room temperature normal saline given a total of 5 drinks. Patient instructed to sit up at the edge of the stretcher and given 5 upright liquid swallows using 5 cc room temperature normal saline and 1 rapid drink challenge using 200 cc room temperature water.Catheter withdrawn and UES resting down while patient laying down. 4 UES swallows done suing 5 cc room temperature normal saline . At the end of the procedure the high resolution esophageal manometry catheter was removed from the nostril intact. Patient given discharge instructions and patient left in stable condition.

## 2025-01-10 NOTE — TELEPHONE ENCOUNTER
Patient contacted for new patient phone call. Patient confirms date, time, location and visitor policy, denies further questions at this time.

## 2025-01-14 ENCOUNTER — RESULTS FOLLOW-UP (OUTPATIENT)
Dept: GASTROENTEROLOGY | Facility: AMBULARY SURGERY CENTER | Age: 55
End: 2025-01-14

## 2025-01-14 ENCOUNTER — HOSPITAL ENCOUNTER (OUTPATIENT)
Dept: INFUSION CENTER | Facility: CLINIC | Age: 55
Discharge: HOME | End: 2025-01-14
Payer: COMMERCIAL

## 2025-01-14 VITALS
SYSTOLIC BLOOD PRESSURE: 155 MMHG | OXYGEN SATURATION: 96 % | TEMPERATURE: 97.5 F | HEART RATE: 74 BPM | DIASTOLIC BLOOD PRESSURE: 89 MMHG | RESPIRATION RATE: 16 BRPM

## 2025-01-14 DIAGNOSIS — Z86.2 HISTORY OF IRON DEFICIENCY ANEMIA: Primary | ICD-10-CM

## 2025-01-14 RX ORDER — SODIUM CHLORIDE 9 MG/ML
20 INJECTION, SOLUTION INTRAVENOUS ONCE
Status: COMPLETED | OUTPATIENT
Start: 2025-01-14 | End: 2025-01-14

## 2025-01-14 RX ORDER — SODIUM CHLORIDE 9 MG/ML
20 INJECTION, SOLUTION INTRAVENOUS ONCE
Status: CANCELLED | OUTPATIENT
Start: 2025-01-22

## 2025-01-14 RX ADMIN — FERUMOXYTOL 510 MG: 510 INJECTION INTRAVENOUS at 16:03

## 2025-01-14 RX ADMIN — SODIUM CHLORIDE 20 ML/HR: 0.9 INJECTION, SOLUTION INTRAVENOUS at 16:04

## 2025-01-14 NOTE — PROGRESS NOTES
Pt to clinic for feraheme infusion. Pt tolerated without complications. Next appointment Jan 22 at 4:00

## 2025-01-21 ENCOUNTER — TELEPHONE (OUTPATIENT)
Dept: CARDIAC SURGERY | Facility: CLINIC | Age: 55
End: 2025-01-21

## 2025-01-21 NOTE — TELEPHONE ENCOUNTER
Called patient in needing to reschedule his appointment with Dr. Izaguirre. Patient verbally confirmed of being rescheduled to February 4, 2025 @ 4 pm.       Thank you

## 2025-01-22 ENCOUNTER — HOSPITAL ENCOUNTER (OUTPATIENT)
Dept: INFUSION CENTER | Facility: CLINIC | Age: 55
Discharge: HOME | End: 2025-01-22
Payer: COMMERCIAL

## 2025-01-22 VITALS
RESPIRATION RATE: 16 BRPM | HEART RATE: 68 BPM | SYSTOLIC BLOOD PRESSURE: 142 MMHG | DIASTOLIC BLOOD PRESSURE: 92 MMHG | OXYGEN SATURATION: 98 % | TEMPERATURE: 97.8 F

## 2025-01-22 DIAGNOSIS — Z86.2 HISTORY OF IRON DEFICIENCY ANEMIA: Primary | ICD-10-CM

## 2025-01-22 RX ORDER — SODIUM CHLORIDE 9 MG/ML
20 INJECTION, SOLUTION INTRAVENOUS ONCE
Status: CANCELLED | OUTPATIENT
Start: 2025-01-22

## 2025-01-22 RX ORDER — SODIUM CHLORIDE 9 MG/ML
20 INJECTION, SOLUTION INTRAVENOUS ONCE
Status: COMPLETED | OUTPATIENT
Start: 2025-01-22 | End: 2025-01-22

## 2025-01-22 RX ADMIN — SODIUM CHLORIDE 20 ML/HR: 9 INJECTION, SOLUTION INTRAVENOUS at 15:57

## 2025-01-22 RX ADMIN — FERUMOXYTOL 510 MG: 510 INJECTION INTRAVENOUS at 16:11

## 2025-01-22 NOTE — PROGRESS NOTES
Pt here for feraheme, tolerated well with no complaints. No further infusion appts needed at this time. Pt to follow up with provider and knows to get labs prior to appt. Declined AVS.

## 2025-01-27 PROBLEM — D50.0 IRON DEFICIENCY ANEMIA DUE TO CHRONIC BLOOD LOSS: Status: ACTIVE | Noted: 2025-01-27

## 2025-02-04 ENCOUNTER — OFFICE VISIT (OUTPATIENT)
Dept: CARDIAC SURGERY | Facility: CLINIC | Age: 55
End: 2025-02-04
Payer: COMMERCIAL

## 2025-02-04 VITALS
RESPIRATION RATE: 15 BRPM | HEART RATE: 78 BPM | DIASTOLIC BLOOD PRESSURE: 92 MMHG | WEIGHT: 226.41 LBS | TEMPERATURE: 98.7 F | SYSTOLIC BLOOD PRESSURE: 144 MMHG | OXYGEN SATURATION: 96 % | HEIGHT: 69 IN | BODY MASS INDEX: 33.53 KG/M2

## 2025-02-04 DIAGNOSIS — K44.9 HIATAL HERNIA: Primary | ICD-10-CM

## 2025-02-04 PROCEDURE — 99214 OFFICE O/P EST MOD 30 MIN: CPT | Performed by: THORACIC SURGERY (CARDIOTHORACIC VASCULAR SURGERY)

## 2025-02-04 RX ORDER — CEFAZOLIN SODIUM 2 G/50ML
2000 SOLUTION INTRAVENOUS ONCE
OUTPATIENT
Start: 2025-02-04

## 2025-02-04 RX ORDER — METRONIDAZOLE 500 MG/100ML
500 INJECTION, SOLUTION INTRAVENOUS ONCE
OUTPATIENT
Start: 2025-02-04

## 2025-02-04 NOTE — PROGRESS NOTES
Name: Edward Conrad      : 1970      MRN: [id number]  Encounter Provider: Galindo Izaguirre MD  Encounter Date: 2025   Encounter department: [facility]  :  Assessment & Plan  Hiatal hernia  Mr. Conrad has a large type III hiatal hernia and also has some element of ineffective esophageal motility.  We discussed management of his hiatal hernia including a laparoscopic hiatal hernia repair possibly using mesh, possible Luis gastroplasty, and a partial fundoplication.  I discussed the risks and benefits associated with this procedure.  He is interested in proceeding but wants to do it in May.  We will see him back within 30 days of his chosen OR date to update his H&P.  He tells us that he is able to come off his Coumadin for procedures 5 days before without a bridge but will also reach out to his AP who manages his anticoagulation to confirm.    Orders:    Case request operating room: REPAIR HERNIA PARAESOPHAGEAL  LAPAROSCOPIC WITH PARTIAL FUNDOPLICATION, POSSIBLE LUIS GASTROPLASTY, ESOPHAGOGASTRODUODENOSCOPY (EGD); Standing    Basic metabolic panel; Future    CBC and Platelet; Future    APTT; Future    Protime-INR; Future        Thoracic History   Diagnosis: Hiatal hernia, ineffective esophageal motility    Problem   Hiatal Hernia    Procedure/Onset: 2007          History of Present Illness   HPI  Edward Conrad is a 54 y.o. male who returns to the office to discuss his hiatal hernia.  He was seen in 2022 where he was noted to have a 8 cm predominantly sliding but likely has some paraesophageal component hiatal hernia.  He was relatively asymptomatic at the time and chose to follow this expectantly.  He has a family history of esophageal cancer and states that he had a history of N[place]'s.  His most recent endoscopy on 2024 demonstrates an 8 cm hiatal hernia with the GE junction 32 cm from the incisors and the diaphragmatic compression  40 cm from the incisors.  There were some small linear erosions at the GE junction but no definitive Dav's lesions.  There was a biopsy taken from close to his GE junction which demonstrated some intestinal metaplasia without dysplasia.  Several years ago the hiatal hernia was measured at approximately 5 cm.  He remains largely asymptomatic with respect to this hernia.  He states that he takes his Nexium perhaps 10 days/month.  He denies dysphagia.  If he eats chocolate late in the evening he does get some heartburn.  He denies regurgitant symptoms.  The patient has a past medical history notable for factor V Leiden and DVT.  He is on lifelong Coumadin at this point.  He underwent esophageal manometry on January 10, 2025:  Manometry Findings: 10 out of the 10 completed swallows weakened. However, intact contractile pattern with normal DL. Median IRP 10 mmHg and mean  mmHg.s.cm. 8.2 cm hernia appreciated.     Rapid Swallow Index is normal greater (2.36) than 1    Impedence Findings: 90% of the liquid swallows and 60% of the viscous swallows completely cleared.    Graettinger Classification: Ineffective Esophageal Motility without any failed swallows  manometry likely coiled within the stomach but this would not impact motility     Review of Systems   Constitutional:  Negative for activity change, appetite change, chills, fever and unexpected weight change.   HENT:  Negative for voice change.    Respiratory:  Negative for cough, shortness of breath and wheezing.    Cardiovascular:  Negative for chest pain, palpitations and leg swelling.   Gastrointestinal:  Negative for abdominal pain, constipation, diarrhea, nausea and vomiting.   Musculoskeletal:  Negative for arthralgias and myalgias.   Skin:  Negative for rash.   Neurological:  Negative for dizziness, seizures, weakness, numbness and headaches.   Hematological:  Negative for adenopathy.   Psychiatric/Behavioral:  Negative for confusion.            Objective  "  /92 (BP Location: Left arm, Patient Position: Sitting, Cuff Size: Standard)   Pulse 78   Temp 98.7 °F (37.1 °C) (Temporal)   Resp 15   Ht 5' 9\" (1.753 m)   Wt 103 kg (226 lb 6.6 oz)   SpO2 96%   BMI 33.44 kg/m²     Pain Screening:  Pain Score: 0-No pain  ECOG ECOG Performance Status: 0 - Fully active, able to carry on all pre-disease performance without restriction  Physical Exam  Vitals reviewed.   Constitutional:       General: He is not in acute distress.     Appearance: Normal appearance. He is well-developed.   HENT:      Head: Normocephalic and atraumatic.   Eyes:      General: No scleral icterus.     Conjunctiva/sclera: Conjunctivae normal.      Pupils: Pupils are equal, round, and reactive to light.   Neck:      Trachea: No tracheal deviation.   Cardiovascular:      Rate and Rhythm: Normal rate and regular rhythm.      Heart sounds: Normal heart sounds.   Pulmonary:      Effort: Pulmonary effort is normal. No respiratory distress.      Breath sounds: Normal breath sounds. No wheezing or rales.   Abdominal:      General: Bowel sounds are normal. There is no distension.      Palpations: Abdomen is soft.      Tenderness: There is no abdominal tenderness.   Musculoskeletal:      Cervical back: Normal range of motion and neck supple.      Right lower leg: No edema.      Left lower leg: No edema.   Lymphadenopathy:      Cervical: No cervical adenopathy.   Skin:     General: Skin is warm and dry.   Neurological:      Mental Status: He is alert and oriented to person, place, and time.      Cranial Nerves: No cranial nerve deficit.   Psychiatric:         Behavior: Behavior normal.         Thought Content: Thought content normal.         Judgment: Judgment normal.         Labs:     Radiology Results Review : I have reviewed images/report studies in PACS as described above (in the HPI).  Pathology: I have reviewed pathology reports described above.      "

## 2025-02-04 NOTE — ASSESSMENT & PLAN NOTE
Mr. Conrad has a large type III hiatal hernia and also has some element of ineffective esophageal motility.  We discussed management of his hiatal hernia including a laparoscopic hiatal hernia repair possibly using mesh, possible Luis gastroplasty, and a partial fundoplication.  I discussed the risks and benefits associated with this procedure.  He is interested in proceeding but wants to do it in May.  We will see him back within 30 days of his chosen OR date to update his H&P.  He tells us that he is able to come off his Coumadin for procedures 5 days before without a bridge but will also reach out to his AP who manages his anticoagulation to confirm.    Orders:    Case request operating room: REPAIR HERNIA PARAESOPHAGEAL  LAPAROSCOPIC WITH PARTIAL FUNDOPLICATION, POSSIBLE LUIS GASTROPLASTY, ESOPHAGOGASTRODUODENOSCOPY (EGD); Standing    Basic metabolic panel; Future    CBC and Platelet; Future    APTT; Future    Protime-INR; Future

## 2025-02-06 ENCOUNTER — APPOINTMENT (OUTPATIENT)
Dept: LAB | Facility: HOSPITAL | Age: 55
End: 2025-02-06
Payer: COMMERCIAL

## 2025-02-06 DIAGNOSIS — D68.51 FACTOR 5 LEIDEN MUTATION, HETEROZYGOUS (HCC): ICD-10-CM

## 2025-02-06 DIAGNOSIS — D64.9 ANEMIA, UNSPECIFIED TYPE: ICD-10-CM

## 2025-02-06 LAB
ERYTHROCYTE [DISTWIDTH] IN BLOOD BY AUTOMATED COUNT: 16.8 % (ref 11.6–15.1)
HCT VFR BLD AUTO: 42 % (ref 36.5–49.3)
HGB BLD-MCNC: 13.9 G/DL (ref 12–17)
INR PPP: 1.74 (ref 0.85–1.19)
IRON SERPL-MCNC: 68 UG/DL (ref 50–212)
MCH RBC QN AUTO: 28.9 PG (ref 26.8–34.3)
MCHC RBC AUTO-ENTMCNC: 33.1 G/DL (ref 31.4–37.4)
MCV RBC AUTO: 87 FL (ref 82–98)
PLATELET # BLD AUTO: 194 THOUSANDS/UL (ref 149–390)
PMV BLD AUTO: 9.8 FL (ref 8.9–12.7)
PROTHROMBIN TIME: 20.7 SECONDS (ref 12.3–15)
RBC # BLD AUTO: 4.81 MILLION/UL (ref 3.88–5.62)
WBC # BLD AUTO: 5.2 THOUSAND/UL (ref 4.31–10.16)

## 2025-02-06 PROCEDURE — 36415 COLL VENOUS BLD VENIPUNCTURE: CPT

## 2025-02-06 PROCEDURE — 83540 ASSAY OF IRON: CPT

## 2025-02-06 PROCEDURE — 85027 COMPLETE CBC AUTOMATED: CPT

## 2025-02-06 PROCEDURE — 85610 PROTHROMBIN TIME: CPT

## 2025-02-10 ENCOUNTER — APPOINTMENT (OUTPATIENT)
Dept: LAB | Facility: HOSPITAL | Age: 55
End: 2025-02-10
Payer: COMMERCIAL

## 2025-02-10 DIAGNOSIS — D68.51 FACTOR 5 LEIDEN MUTATION, HETEROZYGOUS (HCC): ICD-10-CM

## 2025-02-10 LAB
INR PPP: 2.87 (ref 0.85–1.19)
PROTHROMBIN TIME: 30.2 SECONDS (ref 12.3–15)

## 2025-02-10 PROCEDURE — 36415 COLL VENOUS BLD VENIPUNCTURE: CPT

## 2025-02-10 PROCEDURE — 85610 PROTHROMBIN TIME: CPT

## 2025-02-13 ENCOUNTER — TELEPHONE (OUTPATIENT)
Dept: CARDIAC SURGERY | Facility: CLINIC | Age: 55
End: 2025-02-13

## 2025-02-13 ENCOUNTER — ANTICOAG VISIT (OUTPATIENT)
Dept: FAMILY MEDICINE CLINIC | Facility: CLINIC | Age: 55
End: 2025-02-13

## 2025-02-13 NOTE — TELEPHONE ENCOUNTER
Patient called and I spoke with him. He called to request where to send FMLA papers to be completed prior to his surgery. I provided him our [employer] office fax number and explained that our MA is responsible to complete forms and further explained this process to him. I advised that these will be uploaded to his chart here and faxed to his employer upon completion. He advised that these most likely will not be received today and would not be able to fax these over until end of next week. I advised that I will make our team aware and he said he will call me to let me know when these are faxed over. He is in agreement of this plan and voiced understanding of the process.

## 2025-02-21 ENCOUNTER — TELEPHONE (OUTPATIENT)
Dept: CARDIAC SURGERY | Facility: CLINIC | Age: 55
End: 2025-02-21

## 2025-02-21 NOTE — TELEPHONE ENCOUNTER
I called patients wife, Natalie and I spoke with her. I confirmed with her that we did receive patients FMLA forms she faxed over. She requested a call once these are completed prior to faxing back to her to ensure she is watching for them to come through. I advised patient did ask to provide updates to her directly as we have any. She verbalized understanding and voiced appreciation throughout our call.

## 2025-02-21 NOTE — TELEPHONE ENCOUNTER
Patient called and I spoke with him. He called to inform that he is going to have his wife, Natalie fax his FMLA papers to our office this afternoon. He explained and gave verbal permission for our office to contact his wife directly with any updates or information related to his FMLA papers, that she will be making sure these are completed. He informed that he would like the completed forms to be faxed back to his wife and he then will forward these to his work. He confirmed his wife can be contacted at her phone number, [phone]. I advised I will make my team aware of this information and again provided our [facility] office fax number for her to send the forms to. I again reviewed the process of what to expect and advised that these will not be completed today, but we will complete these as soon as possible as we have to ensure provider availability and coordination to complete correctly. He verbalized understanding of this and agreement with all information throughout our call. He voiced great appreciation.

## 2025-02-24 ENCOUNTER — ANTICOAG VISIT (OUTPATIENT)
Dept: FAMILY MEDICINE CLINIC | Facility: CLINIC | Age: 55
End: 2025-02-24

## 2025-02-24 ENCOUNTER — APPOINTMENT (OUTPATIENT)
Dept: LAB | Facility: HOSPITAL | Age: 55
End: 2025-02-24
Payer: COMMERCIAL

## 2025-02-24 ENCOUNTER — TELEPHONE (OUTPATIENT)
Age: 55
End: 2025-02-24

## 2025-02-24 DIAGNOSIS — D68.51 FACTOR 5 LEIDEN MUTATION, HETEROZYGOUS (HCC): ICD-10-CM

## 2025-02-24 LAB
INR PPP: 2.98 (ref 0.85–1.19)
PROTHROMBIN TIME: 31.1 SECONDS (ref 12.3–15)

## 2025-02-24 PROCEDURE — 36415 COLL VENOUS BLD VENIPUNCTURE: CPT

## 2025-02-24 PROCEDURE — 85610 PROTHROMBIN TIME: CPT

## 2025-02-24 NOTE — TELEPHONE ENCOUNTER
Billie from Dr Izaguirre's office called to say the pt does not need a pre-op clearance. They just need to confirm that Dr Tang agrees with their anticoag hold instructions that they faxed to us (under media on 2/13/25). Please complete and fax back.

## 2025-03-19 ENCOUNTER — APPOINTMENT (OUTPATIENT)
Dept: LAB | Facility: HOSPITAL | Age: 55
End: 2025-03-19
Payer: COMMERCIAL

## 2025-03-19 ENCOUNTER — ANTICOAG VISIT (OUTPATIENT)
Dept: FAMILY MEDICINE CLINIC | Facility: CLINIC | Age: 55
End: 2025-03-19

## 2025-03-19 DIAGNOSIS — E53.8 FOLATE DEFICIENCY: ICD-10-CM

## 2025-03-19 DIAGNOSIS — D50.9 IRON DEFICIENCY ANEMIA, UNSPECIFIED IRON DEFICIENCY ANEMIA TYPE: ICD-10-CM

## 2025-03-19 DIAGNOSIS — R29.898 SHOULDER WEAKNESS: ICD-10-CM

## 2025-03-19 DIAGNOSIS — M25.511 ACUTE PAIN OF RIGHT SHOULDER: ICD-10-CM

## 2025-03-19 DIAGNOSIS — D50.8 OTHER IRON DEFICIENCY ANEMIA: ICD-10-CM

## 2025-03-19 DIAGNOSIS — D68.51 FACTOR 5 LEIDEN MUTATION, HETEROZYGOUS (HCC): ICD-10-CM

## 2025-03-19 DIAGNOSIS — E53.8 VITAMIN B12 DEFICIENCY: ICD-10-CM

## 2025-03-19 LAB
ALBUMIN SERPL BCG-MCNC: 4.5 G/DL (ref 3.5–5)
ALP SERPL-CCNC: 64 U/L (ref 34–104)
ALT SERPL W P-5'-P-CCNC: 28 U/L (ref 7–52)
ANION GAP SERPL CALCULATED.3IONS-SCNC: 12 MMOL/L (ref 4–13)
AST SERPL W P-5'-P-CCNC: 22 U/L (ref 13–39)
BASOPHILS # BLD AUTO: 0.04 THOUSANDS/ÂΜL (ref 0–0.1)
BASOPHILS NFR BLD AUTO: 1 % (ref 0–1)
BILIRUB SERPL-MCNC: 0.51 MG/DL (ref 0.2–1)
BUN SERPL-MCNC: 20 MG/DL (ref 5–25)
CALCIUM SERPL-MCNC: 9.1 MG/DL (ref 8.4–10.2)
CHLORIDE SERPL-SCNC: 103 MMOL/L (ref 96–108)
CO2 SERPL-SCNC: 22 MMOL/L (ref 21–32)
CREAT SERPL-MCNC: 0.99 MG/DL (ref 0.6–1.3)
EOSINOPHIL # BLD AUTO: 0.21 THOUSAND/ÂΜL (ref 0–0.61)
EOSINOPHIL NFR BLD AUTO: 3 % (ref 0–6)
ERYTHROCYTE [DISTWIDTH] IN BLOOD BY AUTOMATED COUNT: 14.7 % (ref 11.6–15.1)
FERRITIN SERPL-MCNC: 23 NG/ML (ref 24–336)
FOLATE SERPL-MCNC: >22.3 NG/ML
GFR SERPL CREATININE-BSD FRML MDRD: 85 ML/MIN/1.73SQ M
GLUCOSE P FAST SERPL-MCNC: 91 MG/DL (ref 65–99)
HCT VFR BLD AUTO: 42.7 % (ref 36.5–49.3)
HGB BLD-MCNC: 14.1 G/DL (ref 12–17)
IMM GRANULOCYTES # BLD AUTO: 0.02 THOUSAND/UL (ref 0–0.2)
IMM GRANULOCYTES NFR BLD AUTO: 0 % (ref 0–2)
INR PPP: 2.18 (ref 0.85–1.19)
LYMPHOCYTES # BLD AUTO: 1.7 THOUSANDS/ÂΜL (ref 0.6–4.47)
LYMPHOCYTES NFR BLD AUTO: 27 % (ref 14–44)
MCH RBC QN AUTO: 29.5 PG (ref 26.8–34.3)
MCHC RBC AUTO-ENTMCNC: 33 G/DL (ref 31.4–37.4)
MCV RBC AUTO: 89 FL (ref 82–98)
MONOCYTES # BLD AUTO: 0.8 THOUSAND/ÂΜL (ref 0.17–1.22)
MONOCYTES NFR BLD AUTO: 13 % (ref 4–12)
NEUTROPHILS # BLD AUTO: 3.56 THOUSANDS/ÂΜL (ref 1.85–7.62)
NEUTS SEG NFR BLD AUTO: 56 % (ref 43–75)
NRBC BLD AUTO-RTO: 0 /100 WBCS
PLATELET # BLD AUTO: 243 THOUSANDS/UL (ref 149–390)
PMV BLD AUTO: 10.2 FL (ref 8.9–12.7)
POTASSIUM SERPL-SCNC: 4.1 MMOL/L (ref 3.5–5.3)
PROT SERPL-MCNC: 7.5 G/DL (ref 6.4–8.4)
PROTHROMBIN TIME: 24.7 SECONDS (ref 12.3–15)
RBC # BLD AUTO: 4.78 MILLION/UL (ref 3.88–5.62)
SODIUM SERPL-SCNC: 137 MMOL/L (ref 135–147)
VIT B12 SERPL-MCNC: 731 PG/ML (ref 180–914)
WBC # BLD AUTO: 6.33 THOUSAND/UL (ref 4.31–10.16)

## 2025-03-19 PROCEDURE — 83540 ASSAY OF IRON: CPT

## 2025-03-19 PROCEDURE — 80053 COMPREHEN METABOLIC PANEL: CPT

## 2025-03-19 PROCEDURE — 82746 ASSAY OF FOLIC ACID SERUM: CPT

## 2025-03-19 PROCEDURE — 83550 IRON BINDING TEST: CPT

## 2025-03-19 PROCEDURE — 85025 COMPLETE CBC W/AUTO DIFF WBC: CPT

## 2025-03-19 PROCEDURE — 82607 VITAMIN B-12: CPT

## 2025-03-19 PROCEDURE — 36415 COLL VENOUS BLD VENIPUNCTURE: CPT

## 2025-03-19 PROCEDURE — 85610 PROTHROMBIN TIME: CPT

## 2025-03-19 PROCEDURE — 82728 ASSAY OF FERRITIN: CPT

## 2025-03-20 LAB
IRON SATN MFR SERPL: 15 % (ref 15–50)
IRON SERPL-MCNC: 58 UG/DL (ref 50–212)
TIBC SERPL-MCNC: 399 UG/DL (ref 250–450)
TRANSFERRIN SERPL-MCNC: 285 MG/DL (ref 203–362)
UIBC SERPL-MCNC: 341 UG/DL (ref 155–355)

## 2025-03-25 ENCOUNTER — OFFICE VISIT (OUTPATIENT)
Dept: HEMATOLOGY ONCOLOGY | Facility: MEDICAL CENTER | Age: 55
End: 2025-03-25
Payer: COMMERCIAL

## 2025-03-25 VITALS
BODY MASS INDEX: 33.03 KG/M2 | HEIGHT: 69 IN | RESPIRATION RATE: 17 BRPM | OXYGEN SATURATION: 97 % | WEIGHT: 223 LBS | TEMPERATURE: 98.1 F | DIASTOLIC BLOOD PRESSURE: 87 MMHG | SYSTOLIC BLOOD PRESSURE: 133 MMHG | HEART RATE: 87 BPM

## 2025-03-25 DIAGNOSIS — I82.502 CHRONIC DEEP VEIN THROMBOSIS (DVT) OF LEFT LOWER EXTREMITY (HCC): ICD-10-CM

## 2025-03-25 DIAGNOSIS — D50.8 OTHER IRON DEFICIENCY ANEMIA: ICD-10-CM

## 2025-03-25 DIAGNOSIS — I82.592 CHRONIC DEEP VEIN THROMBOSIS (DVT) OF OTHER VEIN OF LEFT LOWER EXTREMITY (HCC): ICD-10-CM

## 2025-03-25 DIAGNOSIS — Z86.718 HISTORY OF DVT (DEEP VEIN THROMBOSIS): ICD-10-CM

## 2025-03-25 DIAGNOSIS — I82.592 CHRONIC DEEP VEIN THROMBOSIS (DVT) OF OTHER VEIN OF LEFT LOWER EXTREMITY (HCC): Primary | ICD-10-CM

## 2025-03-25 DIAGNOSIS — D68.59 HYPERCOAGULABLE STATE (HCC): ICD-10-CM

## 2025-03-25 PROCEDURE — 99214 OFFICE O/P EST MOD 30 MIN: CPT | Performed by: PHYSICIAN ASSISTANT

## 2025-03-25 RX ORDER — WARFARIN SODIUM 5 MG/1
TABLET ORAL
Qty: 100 TABLET | Refills: 3 | Status: SHIPPED | OUTPATIENT
Start: 2025-03-25

## 2025-03-25 RX ORDER — WARFARIN SODIUM 1 MG/1
TABLET ORAL
Qty: 200 TABLET | Refills: 1 | Status: SHIPPED | OUTPATIENT
Start: 2025-03-25

## 2025-03-25 NOTE — ASSESSMENT & PLAN NOTE
Patient is a 54-year-old male with prior lower extremity DVTs, PE recently admitted with left knee pain.  CTA/chest did not demonstrate any evidence of a new PE. Additionally, the recent lower extremity Dopplers did not demonstrate any evidence of acute/new DVT.  Patient has evidence of chronic nonocclusive left lower extremity thromboses.  D-dimer was not elevated.     Unclear if this was truly a Xarelto failure as there was no evidence of an acute DVT. Ultimately, however, Edward was changed from Xarelto to heparin/now coumadin. His INR is being managed by his PCP.       He is scheduled for hiatal hernia repair on 5/15/2025.  He is aware that his Coumadin will need to be held and he will need to be placed on Lovenox.  He is aware that this will be deferred to his PCP.

## 2025-03-25 NOTE — PROGRESS NOTES
Name: Edward Conrad      : 1970      MRN: [id number]  Encounter Provider: Shaye Esteban PA-C  Encounter Date: 3/25/2025   Encounter department: [facility]  :  Assessment & Plan  Chronic deep vein thrombosis (DVT) of other vein of left lower extremity (HCC)  Patient is a 54-year-old male with prior lower extremity DVTs, PE recently admitted with left knee pain.  CTA/chest did not demonstrate any evidence of a new PE. Additionally, the recent lower extremity Dopplers did not demonstrate any evidence of acute/new DVT.  Patient has evidence of chronic nonocclusive left lower extremity thromboses.  D-dimer was not elevated.     Unclear if this was truly a Xarelto failure as there was no evidence of an acute DVT. Ultimately, however, Edward was changed from Xarelto to heparin/now coumadin. His INR is being managed by his PCP.       He is scheduled for hiatal hernia repair on 5/15/2025.  He is aware that his Coumadin will need to be held and he will need to be placed on Lovenox.  He is aware that this will be deferred to his PCP.  Hypercoagulable state (HCC)  Besides the heterozygous factor V Leiden mutation, patient had an elevated anticardiolipin IgM and an elevated antiphosphatidylserine IgM.  These were never serialized as patient understood that he required lifelong anticoagulation and did not feel that the tests needed to be repeated.  The concern was that patient had antiphospholipid antibody syndrome besides the factor V Leiden mutation.  Given that Edward is now on longterm anticoagulation with coumadin we can defer repeating anticardiolipin panel and antiphosphatidylserine panel.           Other iron deficiency anemia  Patient also with recent finding of iron deficiency anemia.     He had routine lab work drawn on 10/28/2024.  At that time hemoglobin was 8.3, MCV was 79, RDW 17.0.  His renal function is normal.  GFR is 90.  LFTs are normal.  Prior to this, on  "4/22/2024, his hemoglobin was 12.3.     He had iron studies drawn on 12/24/2024.  Total iron was 27, ferritin 45, iron saturation 6%.    He was treated with Feraheme 510 mg weekly x 2 1/14/2025 and 1/22/2025.    He denies any obvious GI bleeding.  He had EGD completed on 11/27/2024.  This showed 8 cm hiatal hernia.  There were subtle linear healing erosions on the folds at the diaphragmatic impression.  Stomach and duodenum are normal.  He had subcentimeter polyps removed from the colon.  Hemorrhoids noted.      As it stands anemia is related to iron deficiency.  Edward is aware that iron deficiency is either related to bleeding or decreased absorption.  He has no obvious reason to have decreased absorption.  The concern of course is some source of GI blood loss.      He had capsule endoscopy on 12/30/2024.  He had finding of \"linear ulcer in esophagus which could represent erosive reflux esophagitis which could be contributing to iron deficiency anemia.  Otherwise no evidence of ulceration, inflammation, or active bleeding.  Treat with PPI twice daily and repeat EGD in 2 to 3 months time.  If iron deficiency anemia persist after healing of esophagitis suggest further evaluation.\"    He is scheduled for hiatal hernia repair in May.     He had repeat lab work on 3/19/2025.  WBC 6.33, hemoglobin 14.1, MCV 89, platelets 243.  Total iron 58, ferritin 23, iron saturation 15%.  Vitamin B12 731, folate greater than 22.3.    Anemia has resolved.  Iron levels are still slightly low.  He prefers to begin taking oral iron 1 tablet daily.  He will be made PRN.  His CBC and iron studies should be followed twice a year or so by his PCP.        History of Present Illness   Chief Complaint   Patient presents with    Follow-up   Edward Conrad is a 54 y.o. male who was previously seen/evaluated for bilateral DVTs and bilateral PE.  There was no clear provoking incident.  Patient was placed on Xarelto.  Thrombophilia workup demonstrated " heterozygous factor V Leiden, slightly elevated anticardiolipin IgM level and an elevated antiphosphatidylserine IgM level. Coumadin was discussed with the patient at that time; however, he did not want to deal with the INR checks.  Up until just recently patient has done well with the Xarelto.     Mr. Conrad was admitted on February 28, 2024 with acute left knee pain.  We were asked to evaluate him during that hospital stay. Dopplers from February 28, 2024 did not demonstrate any evidence of thrombus in the right lower extremity; patient had evidence of chronic nonocclusive deep vein thrombosis in the left distal femoral, popliteal and peroneal veins. CTA/chest did not demonstrate any evidence of a new PE.  D-dimer was within normal limits.  Patient was started on heparin, eventually Coumadin.     Interval History:    Mr. Conrad presents today for follow-up of anemia. Edward had recent finding on routine lab work of anemia with hemoglobin 8.3.  He was referred for EGD and colonoscopy which he completed on 11/27/2024.  He had finding on EGD of an 8 cm hiatal hernia.  He has been referred to CT surgery and is scheduled for hiatal hernia repair in May.  He denies obvious bleeding.  No obvious evidence of GI bleeding.  No hematuria, epistaxis.     No issues with the coumadin. This is being managed by his PCP.     Patient feels well.  No nausea, vomiting, bowel changes, chest pain.  He reports shortness of breath with exertion which is chronic.     Review of Systems   Constitutional:  Negative for activity change, appetite change, fatigue and fever.   HENT:  Negative for nosebleeds.    Respiratory:  Positive for shortness of breath (On exertion.). Negative for cough and choking.    Cardiovascular:  Negative for chest pain, palpitations and leg swelling.   Gastrointestinal:  Negative for abdominal distention, abdominal pain, anal bleeding, blood in stool, constipation, diarrhea, nausea and vomiting.   Endocrine: Negative  "for cold intolerance.   Genitourinary:  Negative for hematuria.   Musculoskeletal:  Negative for myalgias.   Skin:  Negative for color change, pallor and rash.   Allergic/Immunologic: Negative for immunocompromised state.   Neurological:  Negative for headaches.   Hematological:  Negative for adenopathy. Does not bruise/bleed easily.   All other systems reviewed and are negative.     Objective   /87 (BP Location: Left arm, Patient Position: Sitting, Cuff Size: Adult)   Pulse 87   Temp 98.1 °F (36.7 °C) (Temporal)   Resp 17   Ht 5' 9\" (1.753 m)   Wt 101 kg (223 lb)   SpO2 97%   BMI 32.93 kg/m²     Physical Exam  Constitutional:       General: He is not in acute distress.     Appearance: Normal appearance. He is well-developed.   HENT:      Head: Normocephalic and atraumatic.      Right Ear: External ear normal.      Left Ear: External ear normal.      Nose: Nose normal.   Eyes:      General: No scleral icterus.     Conjunctiva/sclera: Conjunctivae normal.   Cardiovascular:      Rate and Rhythm: Normal rate and regular rhythm.   Pulmonary:      Effort: Pulmonary effort is normal. No respiratory distress.      Breath sounds: Normal breath sounds.   Abdominal:      General: Abdomen is flat. There is no distension.      Palpations: Abdomen is soft.      Tenderness: There is no abdominal tenderness.   Skin:     Findings: No rash (on exposed skin.).   Neurological:      Mental Status: He is alert and oriented to person, place, and time.   Psychiatric:         Mood and Affect: Mood normal.         Thought Content: Thought content normal.         Judgment: Judgment normal.     Labs: I have reviewed the following labs:  Results for orders placed or performed in visit on 03/19/25   Protime-INR   Result Value Ref Range    Protime 24.7 (H) 12.3 - 15.0 seconds    INR 2.18 (H) 0.85 - 1.19   CBC and differential   Result Value Ref Range    WBC 6.33 4.31 - 10.16 Thousand/uL    RBC 4.78 3.88 - 5.62 Million/uL    " Hemoglobin 14.1 12.0 - 17.0 g/dL    Hematocrit 42.7 36.5 - 49.3 %    MCV 89 82 - 98 fL    MCH 29.5 26.8 - 34.3 pg    MCHC 33.0 31.4 - 37.4 g/dL    RDW 14.7 11.6 - 15.1 %    MPV 10.2 8.9 - 12.7 fL    Platelets 243 149 - 390 Thousands/uL    nRBC 0 /100 WBCs    Segmented % 56 43 - 75 %    Immature Grans % 0 0 - 2 %    Lymphocytes % 27 14 - 44 %    Monocytes % 13 (H) 4 - 12 %    Eosinophils Relative 3 0 - 6 %    Basophils Relative 1 0 - 1 %    Absolute Neutrophils 3.56 1.85 - 7.62 Thousands/µL    Absolute Immature Grans 0.02 0.00 - 0.20 Thousand/uL    Absolute Lymphocytes 1.70 0.60 - 4.47 Thousands/µL    Absolute Monocytes 0.80 0.17 - 1.22 Thousand/µL    Eosinophils Absolute 0.21 0.00 - 0.61 Thousand/µL    Basophils Absolute 0.04 0.00 - 0.10 Thousands/µL   Comprehensive metabolic panel   Result Value Ref Range    Sodium 137 135 - 147 mmol/L    Potassium 4.1 3.5 - 5.3 mmol/L    Chloride 103 96 - 108 mmol/L    CO2 22 21 - 32 mmol/L    ANION GAP 12 4 - 13 mmol/L    BUN 20 5 - 25 mg/dL    Creatinine 0.99 0.60 - 1.30 mg/dL    Glucose, Fasting 91 65 - 99 mg/dL    Calcium 9.1 8.4 - 10.2 mg/dL    AST 22 13 - 39 U/L    ALT 28 7 - 52 U/L    Alkaline Phosphatase 64 34 - 104 U/L    Total Protein 7.5 6.4 - 8.4 g/dL    Albumin 4.5 3.5 - 5.0 g/dL    Total Bilirubin 0.51 0.20 - 1.00 mg/dL    eGFR 85 ml/min/1.73sq m   Vitamin B12   Result Value Ref Range    Vitamin B-12 731 180 - 914 pg/mL   Result Value Ref Range    Folate >22.3 >5.9 ng/mL   TIBC Panel (incl. Iron, TIBC, % Iron Saturation)   Result Value Ref Range    Iron Saturation 15 15 - 50 %    TIBC 399 250 - 450 ug/dL    Iron 58 50 - 212 ug/dL    Transferrin 285 203 - 362 mg/dL    UIBC 341 155 - 355 ug/dL   Result Value Ref Range    Ferritin 23 (L) 24 - 336 ng/mL

## 2025-04-11 ENCOUNTER — ANTICOAG VISIT (OUTPATIENT)
Dept: FAMILY MEDICINE CLINIC | Facility: CLINIC | Age: 55
End: 2025-04-11

## 2025-04-11 ENCOUNTER — APPOINTMENT (OUTPATIENT)
Dept: LAB | Facility: HOSPITAL | Age: 55
End: 2025-04-11
Attending: THORACIC SURGERY (CARDIOTHORACIC VASCULAR SURGERY)
Payer: COMMERCIAL

## 2025-04-11 ENCOUNTER — RESULTS FOLLOW-UP (OUTPATIENT)
Dept: FAMILY MEDICINE CLINIC | Facility: CLINIC | Age: 55
End: 2025-04-11

## 2025-04-11 DIAGNOSIS — K44.9 HIATAL HERNIA: ICD-10-CM

## 2025-04-11 DIAGNOSIS — I82.592 CHRONIC DEEP VEIN THROMBOSIS (DVT) OF OTHER VEIN OF LEFT LOWER EXTREMITY (HCC): Primary | ICD-10-CM

## 2025-04-11 LAB
ANION GAP SERPL CALCULATED.3IONS-SCNC: 10 MMOL/L (ref 4–13)
APTT PPP: 33 SECONDS (ref 23–34)
BUN SERPL-MCNC: 17 MG/DL (ref 5–25)
CALCIUM SERPL-MCNC: 9.5 MG/DL (ref 8.4–10.2)
CHLORIDE SERPL-SCNC: 104 MMOL/L (ref 96–108)
CO2 SERPL-SCNC: 24 MMOL/L (ref 21–32)
CREAT SERPL-MCNC: 0.93 MG/DL (ref 0.6–1.3)
ERYTHROCYTE [DISTWIDTH] IN BLOOD BY AUTOMATED COUNT: 14.6 % (ref 11.6–15.1)
GFR SERPL CREATININE-BSD FRML MDRD: 92 ML/MIN/1.73SQ M
GLUCOSE P FAST SERPL-MCNC: 117 MG/DL (ref 65–99)
HCT VFR BLD AUTO: 44.5 % (ref 36.5–49.3)
HGB BLD-MCNC: 14.7 G/DL (ref 12–17)
INR PPP: 2.48 (ref 0.85–1.19)
MCH RBC QN AUTO: 29.9 PG (ref 26.8–34.3)
MCHC RBC AUTO-ENTMCNC: 33 G/DL (ref 31.4–37.4)
MCV RBC AUTO: 90 FL (ref 82–98)
PLATELET # BLD AUTO: 223 THOUSANDS/UL (ref 149–390)
PMV BLD AUTO: 9.8 FL (ref 8.9–12.7)
POTASSIUM SERPL-SCNC: 4.4 MMOL/L (ref 3.5–5.3)
PROTHROMBIN TIME: 27.1 SECONDS (ref 12.3–15)
RBC # BLD AUTO: 4.92 MILLION/UL (ref 3.88–5.62)
SODIUM SERPL-SCNC: 138 MMOL/L (ref 135–147)
WBC # BLD AUTO: 5.54 THOUSAND/UL (ref 4.31–10.16)

## 2025-04-11 PROCEDURE — 36415 COLL VENOUS BLD VENIPUNCTURE: CPT

## 2025-04-11 PROCEDURE — 85027 COMPLETE CBC AUTOMATED: CPT

## 2025-04-11 PROCEDURE — 85610 PROTHROMBIN TIME: CPT

## 2025-04-11 PROCEDURE — 85730 THROMBOPLASTIN TIME PARTIAL: CPT

## 2025-04-11 PROCEDURE — 80048 BASIC METABOLIC PNL TOTAL CA: CPT

## 2025-04-16 ENCOUNTER — CONSULT (OUTPATIENT)
Dept: FAMILY MEDICINE CLINIC | Facility: CLINIC | Age: 55
End: 2025-04-16
Payer: COMMERCIAL

## 2025-04-16 ENCOUNTER — TELEPHONE (OUTPATIENT)
Dept: FAMILY MEDICINE CLINIC | Facility: CLINIC | Age: 55
End: 2025-04-16

## 2025-04-16 VITALS
BODY MASS INDEX: 33.37 KG/M2 | SYSTOLIC BLOOD PRESSURE: 130 MMHG | DIASTOLIC BLOOD PRESSURE: 80 MMHG | HEIGHT: 69 IN | HEART RATE: 76 BPM | WEIGHT: 225.3 LBS

## 2025-04-16 DIAGNOSIS — D68.51 FACTOR 5 LEIDEN MUTATION, HETEROZYGOUS (HCC): ICD-10-CM

## 2025-04-16 DIAGNOSIS — I82.502 CHRONIC DEEP VEIN THROMBOSIS (DVT) OF LEFT LOWER EXTREMITY, UNSPECIFIED VEIN (HCC): ICD-10-CM

## 2025-04-16 DIAGNOSIS — Z79.01 ANTICOAGULANT LONG-TERM USE: ICD-10-CM

## 2025-04-16 DIAGNOSIS — K44.9 HIATAL HERNIA: ICD-10-CM

## 2025-04-16 DIAGNOSIS — Z01.818 PRE-OP EXAM: Primary | ICD-10-CM

## 2025-04-16 DIAGNOSIS — Z86.711 HISTORY OF PULMONARY EMBOLUS (PE): ICD-10-CM

## 2025-04-16 DIAGNOSIS — D68.51 FACTOR 5 LEIDEN MUTATION, HETEROZYGOUS (HCC): Primary | ICD-10-CM

## 2025-04-16 PROCEDURE — 99213 OFFICE O/P EST LOW 20 MIN: CPT | Performed by: NURSE PRACTITIONER

## 2025-04-16 PROCEDURE — 93000 ELECTROCARDIOGRAM COMPLETE: CPT | Performed by: NURSE PRACTITIONER

## 2025-04-16 NOTE — PROGRESS NOTES
Pre-operative Clearance  Name: Edward Conrad      : 1970      MRN: [id number]  Encounter Provider: MIRNA Anderson  Encounter Date: 2025   Encounter department: [facility]    :  Assessment & Plan  Pre-op exam    Orders:    POCT ECG    Hiatal hernia         Anticoagulant long-term use         History of pulmonary embolus (PE)         Factor 5 Leiden mutation, heterozygous (HCC)         Chronic deep vein thrombosis (DVT) of left lower extremity, unspecified vein (HCC)  On coumadin           Pre-operative Clearance:     Clearance:  Patient is medically optimized (CLEARED) for proposed surgery without any additional cardiac testing.      Medication Instructions:   - Avoid herbs or non-directed vitamins one week prior to surgery    - Avoid aspirin containing medications or non-steroidal anti-inflammatory drugs one week preceding surgery    - May take tylenol for pain up until the night before surgery    - Corticosteroids: Continue to take this medication on your normal schedule.  - Warfarin: Stop medication 5 days prior to procedure/surgery. Bridging anticoagulation may be needed if patient has artificial heart valve or if at high risk for stroke.       History of Present Illness     Pre-Op Examination     Surgery: LAPAROSCOPIC PARAESOPHAGEAL HERNIA REPAIR, POSSIBLE MESH, POSSIBLE LUIS GASTROPLASTY (Chest)      ESOPHAGOGASTRODUODENOSCOPY (EGD) (Esophagus)   Anticipated Date of Surgery: 05/15/25   Surgeon: Galindo Izaguirre MD     Patient here for preop clearance.  Denies any chest pain and dizziness  Stated that has chronic sob after PE and iron deficiency and nothing changed.    Patient is on coumadin and as per hematologist note needs bridging before surgery but deferred to PCP. As per patient and previous notes, patient coumadin was stopped 5 days before colonoscopy  without bridging by Dr. Tang and surgeon would like this to be addressed by PCP. Patient would like this  addressed by Dr. Tang and how he should proceed and will message PCP and she will contact patient on this.      Previous history of bleeding disorders or clots?: Yes    Previous Anesthesia reaction?: No    Prolonged steroid use in the last 6 months?: No      Assessment of Cardiac Risk:   - Unstable or severe angina or MI in the last 6 weeks or history of stent placement in the last year?: No    - Decompensated heart failure (e.g. New onset heart failure, NYHA  Class IV heart failure, or worsening existing heart failure)?: No    - Significant arrhythmias such as high grade AV block, symptomatic ventricular arrhythmia, newly recognized ventricular tachycardia, supraventricular tachycardia with resting heart rate >100, or symptomatic bradycardia?: No    - Severe heart valve disease including aortic stenosis or symptomatic mitral stenosis?: No      Pre-operative Risk Factors:    - History of cerebrovascular disease: No    - History of ischemic heart disease: No    - History of congestive heart failure: No    - Pre-operative treatment with insulin: No    - Pre-operative creatinine >2 mg/dL: No      Review of Systems   HENT: Negative.     Respiratory:  Positive for shortness of breath (chronic). Negative for apnea, cough, choking, chest tightness, wheezing and stridor.    Cardiovascular: Negative.    Gastrointestinal: Negative.    Neurological: Negative.      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 07/12/2006    DVT (deep venous thrombosis) ([facility]) 2018    BL legs    Dysphagia     Last Assessed: 8/12/2014     Factor 5 Leiden mutation, heterozygous ([facility])     GERD (gastroesophageal reflux disease)     Globus sensation     Last Assessed: 6/4/2014     H/O neoplasm of uncertain behavior of skin 06/06/2006    Hyperlipidemia     Hypertension 02/14/2006    Lateral epicondylitis of right elbow     Last Assessed: 10/23/2015     Pes planus     last assessed:  10/23/2013     Plantar fascial fibromatosis     Last Assessed: 10/23/2013     Pulmonary embolism (HCC) 2018    Right patellofemoral syndrome     Last Assessed: 4/15/2016     Sebaceous cyst 11/03/2007     Past Surgical History:   Procedure Laterality Date    COLONOSCOPY      ESOPHAGOGASTRODUODENOSCOPY N/A 10/7/2016    Procedure: ESOPHAGOGASTRODUODENOSCOPY (EGD);  Surgeon: Crystal Valdovinos MD;  Location: [facility] GI LAB;  Service:     NASAL SEPTUM SURGERY  1995    DE ESOPHAGOGASTRODUODENOSCOPY TRANSORAL DIAGNOSTIC N/A 12/6/2018    Procedure: ESOPHAGOGASTRODUODENOSCOPY (EGD);  Surgeon: Crystal Valdovinos MD;  Location: [facility] GI LAB;  Service: Gastroenterology    DE SURGICAL ARTHROSCOPY SHOULDER W/ROTATOR CUFF RPR Right 1/26/2023    Procedure: Shoulder Arthroscopy with Rotator Cuff Repair, Subacromial Decompression, and Limited Debridement;  Surgeon: Johnnie Barcenas MD;  Location: [facility] MAIN OR;  Service: Orthopedics     Family History   Problem Relation Age of Onset    Heart disease Mother         valve 70s    Hypertension Mother     Cancer Father         colon    Colon cancer Father     Hypertension Father     Coronary artery disease Father         CABG    Heart disease Brother         MI exp age 45    Colon cancer Family     Cancer Brother         esophageal CA exp age 44    Cancer Brother      Social History     Tobacco Use    Smoking status: Never    Smokeless tobacco: Never   Vaping Use    Vaping status: Never Used   Substance and Sexual Activity    Alcohol use: Yes     Alcohol/week: 4.0 standard drinks of alcohol     Types: 4 Cans of beer per week     Comment: socially    Drug use: No    Sexual activity: Yes     Partners: Female     Current Outpatient Medications on File Prior to Visit   Medication Sig    esomeprazole (NexIUM) 40 MG capsule Take 1 capsule (40 mg total) by mouth daily before breakfast    warfarin (COUMADIN) 1 mg tablet Take 2 tablets by mouth once daily. Take with 5 mg tablet.    warfarin  "(Coumadin) 5 mg tablet Take 1(5mg) tablet daily with 1(1mg) tablet daily.    ferrous sulfate 324 (65 Fe) mg Take 1 tablet (324 mg total) by mouth daily before breakfast (Patient not taking: Reported on 4/16/2025)     No Known Allergies  Objective   /80 (BP Location: Left arm, Patient Position: Sitting, Cuff Size: Large)   Pulse 76   Ht 5' 9\" (1.753 m)   Wt 102 kg (225 lb 4.8 oz)   BMI 33.27 kg/m²     Physical Exam  HENT:      Head: Normocephalic.   Eyes:      Conjunctiva/sclera: Conjunctivae normal.   Cardiovascular:      Rate and Rhythm: Normal rate and regular rhythm.      Pulses: Normal pulses.      Heart sounds: Normal heart sounds.   Pulmonary:      Effort: Pulmonary effort is normal.      Breath sounds: Normal breath sounds.   Skin:     General: Skin is warm and dry.   Neurological:      Mental Status: He is alert and oriented to person, place, and time.   Psychiatric:         Mood and Affect: Mood normal.         Behavior: Behavior normal.         Thought Content: Thought content normal.         Judgment: Judgment normal.           MIRNA Anderson  "

## 2025-04-16 NOTE — TELEPHONE ENCOUNTER
Dr. Tang,   I saw patient for medical clearance yesterday.  Patient is on coumadin and as per hematologist note needs bridging before surgery but deferred to PCP. As per patient and previous notes, patient coumadin was stopped 5 days before colonoscopy by you without bridging and surgeon would like this to be addressed by Dr. Tang as she is been managing his coumadin . Patient would like this addressed by you and how he should proceed as does not want any issues with it    He would like you to make decision on his anticoagulation before surgery and he would like you to call him with all instructions.    He also needs standing order for PT/INR.    Please call patient with instructions.  Thanks  MIRNA Anderson

## 2025-04-16 NOTE — PATIENT INSTRUCTIONS
Pre-operative Medication Instructions    Avoid herbs or non-directed vitamins one week prior to surgery  Avoid aspirin containing medications or non-steroidal anti-inflammatory drugs one week preceding surgery  May take tylenol for pain up until the night before surgery    Steroid Medications       Medication Name     triamcinolone acetonide (KENALOG-10) 10 mg/mL injection 10 mg        Continue to take this medication on your normal schedule.  If this is an oral medication and you take it in the morning, then you may take this medicine with a sip of water.    Warfarin (Coumadin/Jantoven)       Medication Name     warfarin (COUMADIN) 1 mg tablet     warfarin (Coumadin) 5 mg tablet        Dr. Tang will be getting in touch with you for coumadin management before surgery

## 2025-04-17 RX ORDER — ENOXAPARIN SODIUM 100 MG/ML
1 INJECTION SUBCUTANEOUS EVERY 12 HOURS
Qty: 8 ML | Refills: 0 | Status: SHIPPED | OUTPATIENT
Start: 2025-04-17

## 2025-04-17 NOTE — TELEPHONE ENCOUNTER
4/17/2025 11:09 AM Called Edward to discuss his preop Coumadin    Bridging:   Last coumadin on May 9th  Start lovenox shots morning of May 10th, take dose every 12 hours.  Last dose of lovenox evening May 13th.     Evelyn Tang, DO

## 2025-04-28 ENCOUNTER — TELEPHONE (OUTPATIENT)
Age: 55
End: 2025-04-28

## 2025-04-28 NOTE — TELEPHONE ENCOUNTER
Caller: Natalie Conrad    Doctor: Dr. Sylvester    Reason for call: appt/checked chart for date last seen/2024/scheduled w/ Dr. Moreno as Dr. Sylvester had nothing available     Call back#: NA

## 2025-05-02 ENCOUNTER — ANESTHESIA EVENT (OUTPATIENT)
Dept: PERIOP | Facility: HOSPITAL | Age: 55
End: 2025-05-02
Payer: COMMERCIAL

## 2025-05-05 ENCOUNTER — APPOINTMENT (OUTPATIENT)
Dept: RADIOLOGY | Facility: CLINIC | Age: 55
End: 2025-05-05
Attending: STUDENT IN AN ORGANIZED HEALTH CARE EDUCATION/TRAINING PROGRAM
Payer: COMMERCIAL

## 2025-05-05 ENCOUNTER — OFFICE VISIT (OUTPATIENT)
Age: 55
End: 2025-05-05
Payer: COMMERCIAL

## 2025-05-05 VITALS — WEIGHT: 225 LBS | HEIGHT: 69 IN | BODY MASS INDEX: 33.33 KG/M2

## 2025-05-05 DIAGNOSIS — M79.673 PAIN OF FOOT, UNSPECIFIED LATERALITY: Primary | ICD-10-CM

## 2025-05-05 DIAGNOSIS — M76.72 PERONEAL TENDINITIS OF LEFT LOWER EXTREMITY: ICD-10-CM

## 2025-05-05 DIAGNOSIS — M79.673 PAIN OF FOOT, UNSPECIFIED LATERALITY: ICD-10-CM

## 2025-05-05 PROBLEM — M21.962 ACQUIRED DEFORMITY OF LEFT FOOT: Status: ACTIVE | Noted: 2025-05-05

## 2025-05-05 PROCEDURE — 99213 OFFICE O/P EST LOW 20 MIN: CPT | Performed by: STUDENT IN AN ORGANIZED HEALTH CARE EDUCATION/TRAINING PROGRAM

## 2025-05-05 PROCEDURE — 73630 X-RAY EXAM OF FOOT: CPT

## 2025-05-05 NOTE — PATIENT INSTRUCTIONS
Purchase a supportive pair of sneakers such as Navarro, Hoka, Saucony, New Balance, On Cloud, Altra.  Some qualities to look for is that the shoe should bend only where the toes bend, the sole should not be too flimsy, it should have a wide toe box and a somewhat stiff heel support. Purchase a supportive over-the-counter pair of inserts such as Superfeet, powersteps, tread labs.    [employer]  [address] [address]  [phone]     [employer]  [address], [place] [address]  [phone]     [employer]  [address] [place] [address] [phone]     [employer]   [address]  [phone]     [employer]  [address], [place], [place] [address] [phone]     [employer]  [address] [place], [place] [id number] [phone]     [facility] [employer] [facility]   [address] [place] [address]  [phone]     [employer]  [address] [place], [place]  [phone]     [employer] [address], [address]  [phone]     [employer]  [address] [place] [address], [address], [place] [address] [phone]

## 2025-05-05 NOTE — PROGRESS NOTES
"[facility] Podiatric Medicine and Surgery Office Visit    ASSESSMENT     Diagnoses and all orders for this visit:    Pain of foot, unspecified laterality  -     XR foot 3+ vw left; Future    Peroneal tendinitis of left lower extremity  -     Ankle Cude ankle/Ankle Brace         Problem List Items Addressed This Visit          Orthopedic/Musculoskeletal    Acquired deformity of left foot     Other Visit Diagnoses         Pain of foot, unspecified laterality    -  Primary    Relevant Orders    XR foot 3+ vw left          PLAN  -The patient and I thoroughly discussed their ankle pain  -Recommend Voltaren gel to be applied 4 times daily  -Rx ASO brace  -Recommend purchase of supportive sneakers with supportive over-the-counter orthotics  -Recommend limiting exercise for now  -Return to clinic 4 weeks, will consider formal course of physical therapy at this time if no improvements are made    X-ray of the left foot from  5/5/2025  interpreted independently: No acute osseous abnormality noted.  No abnormalities noted within the soft tissues.    SUBJECTIVE    Chief Complaint:  Left foot pain     Patient ID: Edward Leon is a pleasant 55 year old male who presents today for left foot pain. He states that he was on vacation in [place] about 2 weeks ago. While walking on a flat surface and he heard his foot pop. When this happened his foot became very swollen.         The following portions of the patient's history were reviewed and updated as appropriate: allergies, current medications, past family history, past medical history, past social history, past surgical history and problem list.    Review of Systems   Constitutional: Negative.    Respiratory: Negative.     Cardiovascular: Negative.    Gastrointestinal: Negative.    Genitourinary: Negative.    Musculoskeletal:  Positive for arthralgias.   Skin: Negative.          OBJECTIVE      Ht 5' 9\" (1.753 m)   Wt 102 kg (225 lb)   BMI 33.23 kg/m²        Physical " Exam  Constitutional:       Appearance: Normal appearance.   HENT:      Head: Normocephalic and atraumatic.   Eyes:      General:         Right eye: No discharge.         Left eye: No discharge.   Cardiovascular:      Rate and Rhythm: Normal rate and regular rhythm.      Pulses:           Dorsalis pedis pulses are 2+ on the right side and 2+ on the left side.        Posterior tibial pulses are 2+ on the right side and 2+ on the left side.   Pulmonary:      Effort: Pulmonary effort is normal.      Breath sounds: Normal breath sounds.   Skin:     General: Skin is warm.      Capillary Refill: Capillary refill takes less than 2 seconds.   Neurological:      Mental Status: He is alert and oriented to person, place, and time.      Sensory: Sensation is intact. No sensory deficit.   Psychiatric:         Mood and Affect: Mood normal.           Vascular:  -DP and PT pulses intact b/l  -Capillary refill time <2 sec b/l  -Digital hair growth: Present  -Skin temp: WNL    MSK:  -Pain on palpation to the left ankle just inferior to the lateral malleolus continuing distally towards the fifth metatarsal base at the insertion site of the peroneus brevis  -Pain with plantarflexion and eversion against resistance  -No gross deformities noted   -MMT is 5/5 to all muscle compartments of the lower extremity  -Ankle dorsiflexion >10 degrees with knee extended and knee flexed b/l    Neuro:  -Light sensation intact bilaterally  -Protective sensation intact bilaterally    Derm:  -No lesions, abrasions, or open wounds noted  -No noted interdigital maceration, peeling, malodor  -No callus formation noted on exam

## 2025-05-06 ENCOUNTER — OFFICE VISIT (OUTPATIENT)
Dept: CARDIAC SURGERY | Facility: CLINIC | Age: 55
End: 2025-05-06
Payer: COMMERCIAL

## 2025-05-06 VITALS
OXYGEN SATURATION: 96 % | DIASTOLIC BLOOD PRESSURE: 84 MMHG | BODY MASS INDEX: 33.63 KG/M2 | HEIGHT: 69 IN | RESPIRATION RATE: 16 BRPM | WEIGHT: 227.07 LBS | TEMPERATURE: 97.8 F | SYSTOLIC BLOOD PRESSURE: 128 MMHG | HEART RATE: 62 BPM

## 2025-05-06 DIAGNOSIS — K44.9 HIATAL HERNIA: Primary | ICD-10-CM

## 2025-05-06 PROCEDURE — 99213 OFFICE O/P EST LOW 20 MIN: CPT | Performed by: THORACIC SURGERY (CARDIOTHORACIC VASCULAR SURGERY)

## 2025-05-06 RX ORDER — MULTIVIT WITH MINERALS/LUTEIN
250 TABLET ORAL DAILY
COMMUNITY

## 2025-05-06 NOTE — PRE-PROCEDURE INSTRUCTIONS
Pre-Surgery Instructions:   Medication Instructions    ascorbic acid (VITAMIN C) 250 mg tablet Hold day of surgery.    esomeprazole (NexIUM) 40 MG capsule Take day of surgery.    ferrous sulfate 324 (65 Fe) mg Hold day of surgery.    warfarin (COUMADIN) 1 mg tablet Instructions provided by MD    warfarin (Coumadin) 5 mg tablet Instructions provided by MD    Medication instructions for day of surgery reviewed. Please take all instructed medications with only a sip of water.  Has coumadin hold instructions from PCP w/ last dose may 9th , then lovenox bridging instructions . Pt verb understanding of those instructions from PCP.      You will receive a call one business day prior to surgery with an arrival time and hospital directions. If your surgery is scheduled on a Monday, the hospital will be calling you on the Friday prior to your surgery. If you have not heard from anyone by 8pm, please call the hospital supervisor through the hospital  at [phone].      Do not eat or drink anything after midnight the night before your surgery, including candy, mints, lifesavers, or chewing gum. Do not drink alcohol 24hrs before your surgery. Try not to smoke at least 24hrs before your surgery.       Follow the pre surgery showering instructions as listed in the “My Surgical Experience Booklet” or otherwise provided by your surgeon's office. Do not use a blade to shave the surgical area 1 week before surgery. It is okay to use a clean electric clippers up to 24 hours before surgery. Do not apply any lotions, creams, including makeup, cologne, deodorant, or perfumes after showering on the day of your surgery. Do not use dry shampoo, hair spray, hair gel, or any type of hair products.     No contact lenses, eye make-up, or artificial eyelashes. Remove nail polish, including gel polish, and any artificial, gel, or acrylic nails if possible. Remove all jewelry including rings and body piercing jewelry.     Wear causal  clothing that is easy to take on and off. Consider your type of surgery.    Keep any valuables, jewelry, piercings at home. Please bring any specially ordered equipment (sling, braces) if indicated.    Arrange for a responsible person to drive you to and from the hospital on the day of your surgery. Please confirm the visitor policy for the day of your procedure when you receive your phone call with an arrival time.     Call the surgeon's office with any new illnesses, exposures, or additional questions prior to surgery.    Please reference your “My Surgical Experience Booklet” for additional information to prepare for your upcoming surgery.    .

## 2025-05-06 NOTE — H&P (VIEW-ONLY)
Name: Edward Conrad      : 1970      MRN: [id number]  Encounter Provider: Galindo Izaguirre MD  Encounter Date: 2025   Encounter department: [facility] [place]  :  Assessment & Plan  Hiatal hernia  Edward Conrad is a 55 y.o. male who presents today for an updated history and physical exam before planned laparoscopic hiatal hernia repair with partial fundoplication, possible mesh, possible Attila gastroplasty.  Since last being seen in the office, patient has not had any changes to his medications. He did sustain a left ankle injury and is following with podiatry for tendinitis. He is wearing an ankle brace but is still walking up to 6 miles a day without much difficulty so I anticipate he also will be able to be in lithotomy position for the operation. I will confirm this with Dr. Izaguirre. Reviewed patient preoperative, intraoperative, hospital, and postoperative course.  Consent was signed at the time of last office visit.  Patient has completed preoperative labs and EKG.  Patient has surgical scrub. Patient is on coumadin for history of DVTs. Patient to hold coumadin 5 days before operation and have Lovenox bridging as prescribed by PCP. I have advised patient that if he gives Lovenox injections on abdomen that they be delivered as low as possible below umbilicus. All questions answered.  Patient to reach out with any questions, concerns, or changes to medical history prior to planned surgery.            Thoracic History   Diagnosis: Hiatal hernia    Problem   Hiatal Hernia    Procedure/Onset: 2007          History of Present Illness     HPI    Edward Conrad is a 55 y.o. male with PMH significant for factor V leiden, history of DVT on coumadin, type III hiatal hernia and some degree of ineffective esophageal motility who presents today to the office for an updated history and physical exam prior to planned laparoscopic hiatal hernia repair with partial  fundoplication, possible mesh, possible Attila gastroplasty.  At time of last visit on on 2/4/25, surgery was described with discussion of risks and benefits. Consent was signed. It was discussed that he would hold coumadin 5 days before operation and pending discretion of PCP and hematology would consider Lovenox bridge.    On discussion today, patient reports he has occasional heartburn. Notes some shortness of breath with walking uphill. He does not always consistently take Nexium but has been taking Nexium daily for the last 2 weeks. Patient's most recent EGD in November 2024 demonstrated squamocolumnar junctional mucosa with intestinal metaplasia. EGD showed subtle linear healing erosions and patient underwent iron infusions in March 2025.    Changes in medical history since last being seen in the office: Left ankle injury and is following with podiatry for this peroneal tendinitis. Reports he felt a pop while walking. Never had any skin changes to the area. Reports that he is wearing a brace and has continued to walk 6 miles most days despite the injury. Reports the pain is improving.     Reports having iron infusions in March for effects of possible Dav ulcers.    Changes to medications: None     Cardiac history: Reports DVT(s) in 2018 for which he was placed on Xarelto. He then had blood clots again and PE while on Xarelto so he was switched to coumadin.    Anticoagulation: Coumadin - Patient  to hold 5 days prior to surgery and is going to be bridged with Lovenox. Patient has held anticoagulation in the past prior to EGD or colonscopy.   Smoking history: Does not smoke    Pre-operative EKG: Completed.   Pre-operative bloodwork: Completed on 4/11/25.   Consent: Signed at time of last office visit on 2/4/25.       Review of Systems   Constitutional:  Negative for chills and fever.   Respiratory:  Positive for shortness of breath (when walking uphill).    Gastrointestinal:         Occasional heartburn.    Musculoskeletal:  Positive for arthralgias (left ankle tendinitis).      Medical History Reviewed by provider this encounter:  Tobacco  Allergies  Meds  Problems  Med Hx  Surg Hx  Fam Hx     .  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 07/12/2006    DVT (deep venous thrombosis) ([facility]) 2018    BL legs    Dysphagia     Last Assessed: 8/12/2014     Factor 5 Leiden mutation, heterozygous ([facility])     GERD (gastroesophageal reflux disease)     Globus sensation     Last Assessed: 6/4/2014     H/O neoplasm of uncertain behavior of skin 06/06/2006    Hyperlipidemia     Hypertension 02/14/2006    problem resolved by PCP entry    Iron deficiency anemia     Kidney stone     Lateral epicondylitis of right elbow     Last Assessed: 10/23/2015     Pes planus     last assessed: 10/23/2013     Plantar fascial fibromatosis     Last Assessed: 10/23/2013     Prediabetes     Pulmonary embolism ([facility]) 2018    Right patellofemoral syndrome     Last Assessed: 4/15/2016     Sebaceous cyst 11/03/2007     Past Surgical History:   Procedure Laterality Date    COLONOSCOPY      ESOPHAGOGASTRODUODENOSCOPY N/A 10/7/2016    Procedure: ESOPHAGOGASTRODUODENOSCOPY (EGD);  Surgeon: Crystal Valdovinos MD;  Location: [facility];  Service:     NASAL SEPTUM SURGERY  1995    MD ESOPHAGOGASTRODUODENOSCOPY TRANSORAL DIAGNOSTIC N/A 12/6/2018    Procedure: ESOPHAGOGASTRODUODENOSCOPY (EGD);  Surgeon: Crystal Valdovinos MD;  Location: [facility];  Service: Gastroenterology    MD SURGICAL ARTHROSCOPY SHOULDER W/ROTATOR CUFF RPR Right 1/26/2023    Procedure: Shoulder Arthroscopy with Rotator Cuff Repair, Subacromial Decompression, and Limited Debridement;  Surgeon: Johnnie Barcenas MD;  Location: [facility];  Service: Orthopedics     Family History   Problem Relation Age of Onset    Heart disease Mother         valve 70s    Hypertension Mother     Cancer Father         colon    Colon  cancer Father     Hypertension Father     Coronary artery disease Father         CABG    Heart disease Brother         MI exp age 45    Colon cancer Family     Cancer Brother         esophageal CA exp age 44    Cancer Brother       reports that he has never smoked. He has never used smokeless tobacco. He reports current alcohol use of about 4.0 standard drinks of alcohol per week. He reports that he does not use drugs.  Current Outpatient Medications   Medication Instructions    ascorbic acid (VITAMIN C) 250 mg, Daily    enoxaparin (LOVENOX) 1 mg/kg, Subcutaneous, Every 12 hours    esomeprazole (NEXIUM) 40 mg, Oral, Daily before breakfast    ferrous sulfate 324 mg, Oral, Daily before breakfast    warfarin (COUMADIN) 1 mg tablet Take 2 tablets by mouth once daily. Take with 5 mg tablet.    warfarin (Coumadin) 5 mg tablet Take 1(5mg) tablet daily with 1(1mg) tablet daily.   No Known Allergies   Current Outpatient Medications on File Prior to Visit   Medication Sig Dispense Refill    ascorbic acid (VITAMIN C) 250 mg tablet Take 250 mg by mouth daily      enoxaparin (Lovenox) 100 mg/mL Inject 1 mL (100 mg total) under the skin every 12 (twelve) hours 8 mL 0    esomeprazole (NexIUM) 40 MG capsule Take 1 capsule (40 mg total) by mouth daily before breakfast 90 capsule 3    ferrous sulfate 324 (65 Fe) mg Take 1 tablet (324 mg total) by mouth daily before breakfast      warfarin (COUMADIN) 1 mg tablet Take 2 tablets by mouth once daily. Take with 5 mg tablet. 200 tablet 1    warfarin (Coumadin) 5 mg tablet Take 1(5mg) tablet daily with 1(1mg) tablet daily. 100 tablet 3     Current Facility-Administered Medications on File Prior to Visit   Medication Dose Route Frequency Provider Last Rate Last Admin    triamcinolone acetonide (KENALOG-10) 10 mg/mL injection 10 mg  10 mg Intra-articular     10 mg at 04/08/24 1500      Social History     Tobacco Use    Smoking status: Never    Smokeless tobacco: Never   Vaping Use    Vaping  "status: Never Used   Substance and Sexual Activity    Alcohol use: Yes     Alcohol/week: 4.0 standard drinks of alcohol     Types: 4 Cans of beer per week     Comment: socially    Drug use: No    Sexual activity: Yes     Partners: Female        Objective   /84 (Patient Position: Sitting, Cuff Size: Large)   Pulse 62   Temp 97.8 °F (36.6 °C) (Temporal)   Resp 16   Ht 5' 9\" (1.753 m)   Wt 103 kg (227 lb 1.2 oz)   SpO2 96%   BMI 33.53 kg/m²     Pain Screening:     ECOG    Physical Exam  Constitutional:       General: He is not in acute distress.  HENT:      Head: Normocephalic and atraumatic.      Nose: Nose normal.   Eyes:      Extraocular Movements: Extraocular movements intact.   Cardiovascular:      Rate and Rhythm: Normal rate and regular rhythm.   Pulmonary:      Effort: Pulmonary effort is normal.      Breath sounds: Normal breath sounds.   Abdominal:      Palpations: Abdomen is soft.      Tenderness: There is no abdominal tenderness.   Musculoskeletal:      Right lower leg: No edema (wearing compression socks).      Left lower leg: No edema (wearing compression socks; ankle brace in place).   Skin:     General: Skin is warm and dry.         "

## 2025-05-06 NOTE — PROGRESS NOTES
Name: Edward Conrad      : 1970      MRN: [id number]  Encounter Provider: Galindo Izaguirre MD  Encounter Date: 2025   Encounter department: [facility] [place]  :  Assessment & Plan  Hiatal hernia  Edward Conrad is a 55 y.o. male who presents today for an updated history and physical exam before planned laparoscopic hiatal hernia repair with partial fundoplication, possible mesh, possible Attila gastroplasty.  Since last being seen in the office, patient has not had any changes to his medications. He did sustain a left ankle injury and is following with podiatry for tendinitis. He is wearing an ankle brace but is still walking up to 6 miles a day without much difficulty so I anticipate he also will be able to be in lithotomy position for the operation. I will confirm this with Dr. Izaguirre. Reviewed patient preoperative, intraoperative, hospital, and postoperative course.  Consent was signed at the time of last office visit.  Patient has completed preoperative labs and EKG.  Patient has surgical scrub. Patient is on coumadin for history of DVTs. Patient to hold coumadin 5 days before operation and have Lovenox bridging as prescribed by PCP. I have advised patient that if he gives Lovenox injections on abdomen that they be delivered as low as possible below umbilicus. All questions answered.  Patient to reach out with any questions, concerns, or changes to medical history prior to planned surgery.            Thoracic History   Diagnosis: Hiatal hernia    Problem   Hiatal Hernia    Procedure/Onset: 2007          History of Present Illness     HPI    Edward Conrad is a 55 y.o. male with PMH significant for factor V leiden, history of DVT on coumadin, type III hiatal hernia and some degree of ineffective esophageal motility who presents today to the office for an updated history and physical exam prior to planned laparoscopic hiatal hernia repair with partial  fundoplication, possible mesh, possible Attila gastroplasty.  At time of last visit on on 2/4/25, surgery was described with discussion of risks and benefits. Consent was signed. It was discussed that he would hold coumadin 5 days before operation and pending discretion of PCP and hematology would consider Lovenox bridge.    On discussion today, patient reports he has occasional heartburn. Notes some shortness of breath with walking uphill. He does not always consistently take Nexium but has been taking Nexium daily for the last 2 weeks. Patient's most recent EGD in November 2024 demonstrated squamocolumnar junctional mucosa with intestinal metaplasia. EGD showed subtle linear healing erosions and patient underwent iron infusions in March 2025.    Changes in medical history since last being seen in the office: Left ankle injury and is following with podiatry for this peroneal tendinitis. Reports he felt a pop while walking. Never had any skin changes to the area. Reports that he is wearing a brace and has continued to walk 6 miles most days despite the injury. Reports the pain is improving.     Reports having iron infusions in March for effects of possible Dav ulcers.    Changes to medications: None     Cardiac history: Reports DVT(s) in 2018 for which he was placed on Xarelto. He then had blood clots again and PE while on Xarelto so he was switched to coumadin.    Anticoagulation: Coumadin - Patient  to hold 5 days prior to surgery and is going to be bridged with Lovenox. Patient has held anticoagulation in the past prior to EGD or colonscopy.   Smoking history: Does not smoke    Pre-operative EKG: Completed.   Pre-operative bloodwork: Completed on 4/11/25.   Consent: Signed at time of last office visit on 2/4/25.       Review of Systems   Constitutional:  Negative for chills and fever.   Respiratory:  Positive for shortness of breath (when walking uphill).    Gastrointestinal:         Occasional heartburn.    Musculoskeletal:  Positive for arthralgias (left ankle tendinitis).      Medical History Reviewed by provider this encounter:  Tobacco  Allergies  Meds  Problems  Med Hx  Surg Hx  Fam Hx     .  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 07/12/2006    DVT (deep venous thrombosis) ([facility]) 2018    BL legs    Dysphagia     Last Assessed: 8/12/2014     Factor 5 Leiden mutation, heterozygous ([facility])     GERD (gastroesophageal reflux disease)     Globus sensation     Last Assessed: 6/4/2014     H/O neoplasm of uncertain behavior of skin 06/06/2006    Hyperlipidemia     Hypertension 02/14/2006    problem resolved by PCP entry    Iron deficiency anemia     Kidney stone     Lateral epicondylitis of right elbow     Last Assessed: 10/23/2015     Pes planus     last assessed: 10/23/2013     Plantar fascial fibromatosis     Last Assessed: 10/23/2013     Prediabetes     Pulmonary embolism ([facility]) 2018    Right patellofemoral syndrome     Last Assessed: 4/15/2016     Sebaceous cyst 11/03/2007     Past Surgical History:   Procedure Laterality Date    COLONOSCOPY      ESOPHAGOGASTRODUODENOSCOPY N/A 10/7/2016    Procedure: ESOPHAGOGASTRODUODENOSCOPY (EGD);  Surgeon: Crystal Valdovinos MD;  Location: [facility];  Service:     NASAL SEPTUM SURGERY  1995    SD ESOPHAGOGASTRODUODENOSCOPY TRANSORAL DIAGNOSTIC N/A 12/6/2018    Procedure: ESOPHAGOGASTRODUODENOSCOPY (EGD);  Surgeon: Crystal Valdovinos MD;  Location: [facility];  Service: Gastroenterology    SD SURGICAL ARTHROSCOPY SHOULDER W/ROTATOR CUFF RPR Right 1/26/2023    Procedure: Shoulder Arthroscopy with Rotator Cuff Repair, Subacromial Decompression, and Limited Debridement;  Surgeon: Johnnie Barcenas MD;  Location: [facility];  Service: Orthopedics     Family History   Problem Relation Age of Onset    Heart disease Mother         valve 70s    Hypertension Mother     Cancer Father         colon    Colon  cancer Father     Hypertension Father     Coronary artery disease Father         CABG    Heart disease Brother         MI exp age 45    Colon cancer Family     Cancer Brother         esophageal CA exp age 44    Cancer Brother       reports that he has never smoked. He has never used smokeless tobacco. He reports current alcohol use of about 4.0 standard drinks of alcohol per week. He reports that he does not use drugs.  Current Outpatient Medications   Medication Instructions    ascorbic acid (VITAMIN C) 250 mg, Daily    enoxaparin (LOVENOX) 1 mg/kg, Subcutaneous, Every 12 hours    esomeprazole (NEXIUM) 40 mg, Oral, Daily before breakfast    ferrous sulfate 324 mg, Oral, Daily before breakfast    warfarin (COUMADIN) 1 mg tablet Take 2 tablets by mouth once daily. Take with 5 mg tablet.    warfarin (Coumadin) 5 mg tablet Take 1(5mg) tablet daily with 1(1mg) tablet daily.   No Known Allergies   Current Outpatient Medications on File Prior to Visit   Medication Sig Dispense Refill    ascorbic acid (VITAMIN C) 250 mg tablet Take 250 mg by mouth daily      enoxaparin (Lovenox) 100 mg/mL Inject 1 mL (100 mg total) under the skin every 12 (twelve) hours 8 mL 0    esomeprazole (NexIUM) 40 MG capsule Take 1 capsule (40 mg total) by mouth daily before breakfast 90 capsule 3    ferrous sulfate 324 (65 Fe) mg Take 1 tablet (324 mg total) by mouth daily before breakfast      warfarin (COUMADIN) 1 mg tablet Take 2 tablets by mouth once daily. Take with 5 mg tablet. 200 tablet 1    warfarin (Coumadin) 5 mg tablet Take 1(5mg) tablet daily with 1(1mg) tablet daily. 100 tablet 3     Current Facility-Administered Medications on File Prior to Visit   Medication Dose Route Frequency Provider Last Rate Last Admin    triamcinolone acetonide (KENALOG-10) 10 mg/mL injection 10 mg  10 mg Intra-articular     10 mg at 04/08/24 1500      Social History     Tobacco Use    Smoking status: Never    Smokeless tobacco: Never   Vaping Use    Vaping  "status: Never Used   Substance and Sexual Activity    Alcohol use: Yes     Alcohol/week: 4.0 standard drinks of alcohol     Types: 4 Cans of beer per week     Comment: socially    Drug use: No    Sexual activity: Yes     Partners: Female        Objective   /84 (Patient Position: Sitting, Cuff Size: Large)   Pulse 62   Temp 97.8 °F (36.6 °C) (Temporal)   Resp 16   Ht 5' 9\" (1.753 m)   Wt 103 kg (227 lb 1.2 oz)   SpO2 96%   BMI 33.53 kg/m²     Pain Screening:     ECOG    Physical Exam  Constitutional:       General: He is not in acute distress.  HENT:      Head: Normocephalic and atraumatic.      Nose: Nose normal.   Eyes:      Extraocular Movements: Extraocular movements intact.   Cardiovascular:      Rate and Rhythm: Normal rate and regular rhythm.   Pulmonary:      Effort: Pulmonary effort is normal.      Breath sounds: Normal breath sounds.   Abdominal:      Palpations: Abdomen is soft.      Tenderness: There is no abdominal tenderness.   Musculoskeletal:      Right lower leg: No edema (wearing compression socks).      Left lower leg: No edema (wearing compression socks; ankle brace in place).   Skin:     General: Skin is warm and dry.         "

## 2025-05-12 NOTE — ASSESSMENT & PLAN NOTE
Edward Conrad is a 55 y.o. male who presents today for an updated history and physical exam before planned laparoscopic hiatal hernia repair with partial fundoplication, possible mesh, possible Attila gastroplasty.  Since last being seen in the office, patient has not had any changes to his medications. He did sustain a left ankle injury and is following with podiatry for tendinitis. He is wearing an ankle brace but is still walking up to 6 miles a day without much difficulty so I anticipate he also will be able to be in lithotomy position for the operation. I will confirm this with Dr. Izaguirre. Reviewed patient preoperative, intraoperative, hospital, and postoperative course.  Consent was signed at the time of last office visit.  Patient has completed preoperative labs and EKG.  Patient has surgical scrub. Patient is on coumadin for history of DVTs. Patient to hold coumadin 5 days before operation and have Lovenox bridging as prescribed by PCP. I have advised patient that if he gives Lovenox injections on abdomen that they be delivered as low as possible below umbilicus. All questions answered.  Patient to reach out with any questions, concerns, or changes to medical history prior to planned surgery.

## 2025-05-14 NOTE — ANESTHESIA PREPROCEDURE EVALUATION
Procedure:  LAPAROSCOPIC PARAESOPHAGEAL HERNIA REPAIR, POSSIBLE MESH, POSSIBLE LUIS GASTROPLASTY (Chest)  ESOPHAGOGASTRODUODENOSCOPY (EGD) (Esophagus)    Relevant Problems   ANESTHESIA (within normal limits)      CARDIO   (+) Chronic deep vein thrombosis (DVT) of left lower extremity (HCC)      ENDO (within normal limits)      GI/HEPATIC   (+) GERD without esophagitis   (+) Hiatal hernia      /RENAL   (+) Calcium oxalate kidney stones      HEMATOLOGY   (+) Iron deficiency anemia due to chronic blood loss      MUSCULOSKELETAL (within normal limits)      NEURO/PSYCH (within normal limits)      PULMONARY (within normal limits)      Blood   (+) Factor 5 Leiden mutation, heterozygous (HCC)      Other   (+) Anticoagulant long-term use   (+) Dyslipidemia   (+) History of DVT (deep vein thrombosis)   (+) History of pulmonary embolus (PE)   (+) Obesity (BMI 30-39.9)        Physical Exam    Airway     Mallampati score: II  TM Distance: >3 FB  Neck ROM: full      Cardiovascular  Rhythm: regular, Rate: normal, Pulse is palpable. Cardiovascular exam normal    Dental   No notable dental hx     Pulmonary  Pulmonary exam normal Breath sounds clear to auscultation    Neurological  - normal exam  He appears awake, alert and oriented x3.      Other Findings        Anesthesia Plan  ASA Score- 3     Anesthesia Type- general with ASA Monitors.         Additional Monitors:     Airway Plan: oral.           Plan Factors-Exercise tolerance (METS): >4 METS.    Chart reviewed. EKG reviewed. Imaging results reviewed. Existing labs reviewed. Patient summary reviewed.                  Induction- intravenous.    Postoperative Plan- Plan for postoperative opioid use.   Monitoring Plan - Monitoring plan - standard ASA monitoring  Post Operative Pain Plan - non-opiod analgesics and plan for postoperative opioid use    Perioperative Resuscitation Plan - Level 1 - Full Code.       Informed Consent- Anesthetic plan and risks discussed with  patient.  I personally reviewed this patient with the CRNA. Discussed and agreed on the Anesthesia Plan with the CRNA..      NPO Status:  No vitals data found for the desired time range.      Recent labs personally reviewed:  Lab Results   Component Value Date    WBC 5.54 04/11/2025    HGB 14.7 04/11/2025     04/11/2025     Lab Results   Component Value Date    K 4.4 04/11/2025    BUN 17 04/11/2025    CREATININE 0.93 04/11/2025     Lab Results   Component Value Date    PTT 33 04/11/2025      Lab Results   Component Value Date    INR 2.48 (H) 04/11/2025     Lab Results   Component Value Date    HGBA1C 6.1 (H) 10/28/2024       I, Dylon Laws MD, have personally seen and evaluated the patient prior to anesthetic care.  I have reviewed the pre-anesthetic record, and other medical records if appropriate to the anesthetic care.  If a CRNA is involved in the case, I have reviewed the CRNA assessment, if present, and agree. Risks/benefits and alternatives discussed with patient including possible PONV, sore throat, and possibility of rare anesthetic and surgical emergencies.

## 2025-05-15 ENCOUNTER — HOSPITAL ENCOUNTER (OUTPATIENT)
Facility: HOSPITAL | Age: 55
Setting detail: OUTPATIENT SURGERY
Discharge: HOME | End: 2025-05-17
Attending: THORACIC SURGERY (CARDIOTHORACIC VASCULAR SURGERY) | Admitting: THORACIC SURGERY (CARDIOTHORACIC VASCULAR SURGERY)
Payer: COMMERCIAL

## 2025-05-15 ENCOUNTER — ANESTHESIA (OUTPATIENT)
Dept: PERIOP | Facility: HOSPITAL | Age: 55
End: 2025-05-15
Payer: COMMERCIAL

## 2025-05-15 DIAGNOSIS — D68.51 FACTOR 5 LEIDEN MUTATION, HETEROZYGOUS (HCC): ICD-10-CM

## 2025-05-15 DIAGNOSIS — K44.9 HIATAL HERNIA: Primary | ICD-10-CM

## 2025-05-15 LAB
INR PPP: 1.1 (ref 0.85–1.19)
PROTHROMBIN TIME: 14.5 SECONDS (ref 12.3–15)

## 2025-05-15 PROCEDURE — 94760 N-INVAS EAR/PLS OXIMETRY 1: CPT

## 2025-05-15 PROCEDURE — 43281 LAP PARAESOPHAG HERN REPAIR: CPT | Performed by: THORACIC SURGERY (CARDIOTHORACIC VASCULAR SURGERY)

## 2025-05-15 PROCEDURE — 85610 PROTHROMBIN TIME: CPT | Performed by: ANESTHESIOLOGY

## 2025-05-15 DEVICE — TK® QUICK LOAD® UNIT
Type: IMPLANTABLE DEVICE | Site: ESOPHAGUS | Status: FUNCTIONAL
Brand: TK® QUICK LOAD®

## 2025-05-15 RX ORDER — PROPOFOL 10 MG/ML
INJECTION, EMULSION INTRAVENOUS AS NEEDED
Status: DISCONTINUED | OUTPATIENT
Start: 2025-05-15 | End: 2025-05-15

## 2025-05-15 RX ORDER — LIDOCAINE HYDROCHLORIDE AND EPINEPHRINE 10; 10 MG/ML; UG/ML
INJECTION, SOLUTION INFILTRATION; PERINEURAL AS NEEDED
Status: DISCONTINUED | OUTPATIENT
Start: 2025-05-15 | End: 2025-05-15 | Stop reason: HOSPADM

## 2025-05-15 RX ORDER — HYDROMORPHONE HCL/PF 1 MG/ML
0.5 SYRINGE (ML) INJECTION
Status: DISCONTINUED | OUTPATIENT
Start: 2025-05-15 | End: 2025-05-15 | Stop reason: HOSPADM

## 2025-05-15 RX ORDER — MIDAZOLAM HYDROCHLORIDE 2 MG/2ML
INJECTION, SOLUTION INTRAMUSCULAR; INTRAVENOUS AS NEEDED
Status: DISCONTINUED | OUTPATIENT
Start: 2025-05-15 | End: 2025-05-15

## 2025-05-15 RX ORDER — ENOXAPARIN SODIUM 100 MG/ML
40 INJECTION SUBCUTANEOUS DAILY
Status: DISCONTINUED | OUTPATIENT
Start: 2025-05-15 | End: 2025-05-16

## 2025-05-15 RX ORDER — ONDANSETRON 2 MG/ML
4 INJECTION INTRAMUSCULAR; INTRAVENOUS ONCE AS NEEDED
Status: DISCONTINUED | OUTPATIENT
Start: 2025-05-15 | End: 2025-05-15 | Stop reason: HOSPADM

## 2025-05-15 RX ORDER — LIDOCAINE HYDROCHLORIDE 10 MG/ML
0.5 INJECTION, SOLUTION EPIDURAL; INFILTRATION; INTRACAUDAL; PERINEURAL ONCE AS NEEDED
Status: DISCONTINUED | OUTPATIENT
Start: 2025-05-15 | End: 2025-05-15 | Stop reason: HOSPADM

## 2025-05-15 RX ORDER — ONDANSETRON 2 MG/ML
INJECTION INTRAMUSCULAR; INTRAVENOUS AS NEEDED
Status: DISCONTINUED | OUTPATIENT
Start: 2025-05-15 | End: 2025-05-15

## 2025-05-15 RX ORDER — CEFAZOLIN SODIUM 2 G/50ML
2000 SOLUTION INTRAVENOUS ONCE
Status: COMPLETED | OUTPATIENT
Start: 2025-05-15 | End: 2025-05-15

## 2025-05-15 RX ORDER — OXYCODONE HCL 5 MG/5 ML
5 SOLUTION, ORAL ORAL EVERY 4 HOURS PRN
Refills: 0 | Status: DISCONTINUED | OUTPATIENT
Start: 2025-05-15 | End: 2025-05-17 | Stop reason: HOSPADM

## 2025-05-15 RX ORDER — SODIUM CHLORIDE 9 MG/ML
INJECTION, SOLUTION INTRAVENOUS CONTINUOUS PRN
Status: DISCONTINUED | OUTPATIENT
Start: 2025-05-15 | End: 2025-05-15

## 2025-05-15 RX ORDER — METRONIDAZOLE 500 MG/100ML
500 INJECTION, SOLUTION INTRAVENOUS ONCE
Status: COMPLETED | OUTPATIENT
Start: 2025-05-15 | End: 2025-05-15

## 2025-05-15 RX ORDER — DOCUSATE SODIUM 100 MG/1
100 CAPSULE, LIQUID FILLED ORAL 2 TIMES DAILY
Status: DISCONTINUED | OUTPATIENT
Start: 2025-05-15 | End: 2025-05-17 | Stop reason: HOSPADM

## 2025-05-15 RX ORDER — SODIUM CHLORIDE, SODIUM LACTATE, POTASSIUM CHLORIDE, CALCIUM CHLORIDE 600; 310; 30; 20 MG/100ML; MG/100ML; MG/100ML; MG/100ML
75 INJECTION, SOLUTION INTRAVENOUS CONTINUOUS
Status: DISCONTINUED | OUTPATIENT
Start: 2025-05-15 | End: 2025-05-16

## 2025-05-15 RX ORDER — METOCLOPRAMIDE HYDROCHLORIDE 5 MG/ML
10 INJECTION INTRAMUSCULAR; INTRAVENOUS ONCE AS NEEDED
Status: DISCONTINUED | OUTPATIENT
Start: 2025-05-15 | End: 2025-05-15 | Stop reason: HOSPADM

## 2025-05-15 RX ORDER — LIDOCAINE HYDROCHLORIDE 10 MG/ML
INJECTION, SOLUTION EPIDURAL; INFILTRATION; INTRACAUDAL; PERINEURAL AS NEEDED
Status: DISCONTINUED | OUTPATIENT
Start: 2025-05-15 | End: 2025-05-15

## 2025-05-15 RX ORDER — PROPOFOL 10 MG/ML
INJECTION, EMULSION INTRAVENOUS CONTINUOUS PRN
Status: DISCONTINUED | OUTPATIENT
Start: 2025-05-15 | End: 2025-05-15

## 2025-05-15 RX ORDER — ACETAMINOPHEN 10 MG/ML
INJECTION, SOLUTION INTRAVENOUS AS NEEDED
Status: DISCONTINUED | OUTPATIENT
Start: 2025-05-15 | End: 2025-05-15

## 2025-05-15 RX ORDER — BUPIVACAINE HYDROCHLORIDE AND EPINEPHRINE 2.5; 5 MG/ML; UG/ML
INJECTION, SOLUTION EPIDURAL; INFILTRATION; INTRACAUDAL; PERINEURAL AS NEEDED
Status: DISCONTINUED | OUTPATIENT
Start: 2025-05-15 | End: 2025-05-15 | Stop reason: HOSPADM

## 2025-05-15 RX ORDER — SODIUM CHLORIDE, SODIUM LACTATE, POTASSIUM CHLORIDE, CALCIUM CHLORIDE 600; 310; 30; 20 MG/100ML; MG/100ML; MG/100ML; MG/100ML
125 INJECTION, SOLUTION INTRAVENOUS CONTINUOUS
Status: DISCONTINUED | OUTPATIENT
Start: 2025-05-15 | End: 2025-05-15

## 2025-05-15 RX ORDER — HYDROMORPHONE HCL/PF 1 MG/ML
SYRINGE (ML) INJECTION AS NEEDED
Status: DISCONTINUED | OUTPATIENT
Start: 2025-05-15 | End: 2025-05-15

## 2025-05-15 RX ORDER — HEPARIN SODIUM 5000 [USP'U]/ML
INJECTION, SOLUTION INTRAVENOUS; SUBCUTANEOUS AS NEEDED
Status: DISCONTINUED | OUTPATIENT
Start: 2025-05-15 | End: 2025-05-15

## 2025-05-15 RX ORDER — POLYETHYLENE GLYCOL 3350 17 G/17G
17 POWDER, FOR SOLUTION ORAL DAILY PRN
Status: DISCONTINUED | OUTPATIENT
Start: 2025-05-15 | End: 2025-05-17 | Stop reason: HOSPADM

## 2025-05-15 RX ORDER — DEXAMETHASONE SODIUM PHOSPHATE 10 MG/ML
INJECTION, SOLUTION INTRAMUSCULAR; INTRAVENOUS AS NEEDED
Status: DISCONTINUED | OUTPATIENT
Start: 2025-05-15 | End: 2025-05-15

## 2025-05-15 RX ORDER — DIPHENHYDRAMINE HYDROCHLORIDE 50 MG/ML
12.5 INJECTION, SOLUTION INTRAMUSCULAR; INTRAVENOUS ONCE AS NEEDED
Status: DISCONTINUED | OUTPATIENT
Start: 2025-05-15 | End: 2025-05-15 | Stop reason: HOSPADM

## 2025-05-15 RX ORDER — HYDROMORPHONE HCL IN WATER/PF 6 MG/30 ML
0.2 PATIENT CONTROLLED ANALGESIA SYRINGE INTRAVENOUS
Status: DISCONTINUED | OUTPATIENT
Start: 2025-05-15 | End: 2025-05-15 | Stop reason: HOSPADM

## 2025-05-15 RX ORDER — HYDROMORPHONE HCL/PF 1 MG/ML
0.5 SYRINGE (ML) INJECTION
Refills: 0 | Status: DISCONTINUED | OUTPATIENT
Start: 2025-05-15 | End: 2025-05-17 | Stop reason: HOSPADM

## 2025-05-15 RX ORDER — ONDANSETRON 2 MG/ML
4 INJECTION INTRAMUSCULAR; INTRAVENOUS EVERY 6 HOURS PRN
Status: DISCONTINUED | OUTPATIENT
Start: 2025-05-15 | End: 2025-05-17 | Stop reason: HOSPADM

## 2025-05-15 RX ORDER — ACETAMINOPHEN 325 MG/1
975 TABLET ORAL EVERY 6 HOURS
Status: DISCONTINUED | OUTPATIENT
Start: 2025-05-15 | End: 2025-05-17 | Stop reason: HOSPADM

## 2025-05-15 RX ORDER — SENNOSIDES 8.6 MG
1 TABLET ORAL DAILY
Status: DISCONTINUED | OUTPATIENT
Start: 2025-05-15 | End: 2025-05-17 | Stop reason: HOSPADM

## 2025-05-15 RX ORDER — FENTANYL CITRATE/PF 50 MCG/ML
50 SYRINGE (ML) INJECTION
Status: DISCONTINUED | OUTPATIENT
Start: 2025-05-15 | End: 2025-05-15 | Stop reason: HOSPADM

## 2025-05-15 RX ORDER — ROCURONIUM BROMIDE 10 MG/ML
INJECTION, SOLUTION INTRAVENOUS AS NEEDED
Status: DISCONTINUED | OUTPATIENT
Start: 2025-05-15 | End: 2025-05-15

## 2025-05-15 RX ORDER — MAGNESIUM HYDROXIDE 1200 MG/15ML
LIQUID ORAL AS NEEDED
Status: DISCONTINUED | OUTPATIENT
Start: 2025-05-15 | End: 2025-05-15 | Stop reason: HOSPADM

## 2025-05-15 RX ORDER — PANTOPRAZOLE SODIUM 40 MG/1
40 TABLET, DELAYED RELEASE ORAL
Status: DISCONTINUED | OUTPATIENT
Start: 2025-05-15 | End: 2025-05-17 | Stop reason: HOSPADM

## 2025-05-15 RX ORDER — FENTANYL CITRATE 50 UG/ML
INJECTION, SOLUTION INTRAMUSCULAR; INTRAVENOUS AS NEEDED
Status: DISCONTINUED | OUTPATIENT
Start: 2025-05-15 | End: 2025-05-15

## 2025-05-15 RX ORDER — OXYCODONE HCL 5 MG/5 ML
2.5 SOLUTION, ORAL ORAL EVERY 4 HOURS PRN
Refills: 0 | Status: DISCONTINUED | OUTPATIENT
Start: 2025-05-15 | End: 2025-05-17 | Stop reason: HOSPADM

## 2025-05-15 RX ORDER — LABETALOL HYDROCHLORIDE 5 MG/ML
10 INJECTION, SOLUTION INTRAVENOUS EVERY 4 HOURS PRN
Status: DISCONTINUED | OUTPATIENT
Start: 2025-05-15 | End: 2025-05-17 | Stop reason: HOSPADM

## 2025-05-15 RX ADMIN — HEPARIN SODIUM 5000 UNITS: 5000 INJECTION, SOLUTION INTRAVENOUS; SUBCUTANEOUS at 09:15

## 2025-05-15 RX ADMIN — ROCURONIUM BROMIDE 10 MG: 10 INJECTION, SOLUTION INTRAVENOUS at 09:11

## 2025-05-15 RX ADMIN — FENTANYL CITRATE 50 MCG: 50 INJECTION INTRAMUSCULAR; INTRAVENOUS at 13:56

## 2025-05-15 RX ADMIN — CEFAZOLIN SODIUM 2000 MG: 2 SOLUTION INTRAVENOUS at 08:49

## 2025-05-15 RX ADMIN — MIDAZOLAM 1 MG: 1 INJECTION INTRAMUSCULAR; INTRAVENOUS at 08:33

## 2025-05-15 RX ADMIN — PANTOPRAZOLE SODIUM 40 MG: 40 TABLET, DELAYED RELEASE ORAL at 16:54

## 2025-05-15 RX ADMIN — PROPOFOL 150 MG: 10 INJECTION, EMULSION INTRAVENOUS at 08:45

## 2025-05-15 RX ADMIN — METRONIDAZOLE: 500 SOLUTION INTRAVENOUS at 08:49

## 2025-05-15 RX ADMIN — SODIUM CHLORIDE, SODIUM LACTATE, POTASSIUM CHLORIDE, AND CALCIUM CHLORIDE 125 ML/HR: .6; .31; .03; .02 INJECTION, SOLUTION INTRAVENOUS at 06:43

## 2025-05-15 RX ADMIN — HYDROMORPHONE HYDROCHLORIDE 0.5 MG: 1 INJECTION, SOLUTION INTRAMUSCULAR; INTRAVENOUS; SUBCUTANEOUS at 09:27

## 2025-05-15 RX ADMIN — HYDROMORPHONE HYDROCHLORIDE 1 MG: 1 INJECTION, SOLUTION INTRAMUSCULAR; INTRAVENOUS; SUBCUTANEOUS at 09:57

## 2025-05-15 RX ADMIN — ONDANSETRON 4 MG: 2 INJECTION INTRAMUSCULAR; INTRAVENOUS at 08:46

## 2025-05-15 RX ADMIN — ACETAMINOPHEN 1000 MG: 10 INJECTION, SOLUTION INTRAVENOUS at 11:14

## 2025-05-15 RX ADMIN — LIDOCAINE HYDROCHLORIDE 50 MG: 10 INJECTION, SOLUTION EPIDURAL; INFILTRATION; INTRACAUDAL; PERINEURAL at 08:45

## 2025-05-15 RX ADMIN — SODIUM CHLORIDE: 0.9 INJECTION, SOLUTION INTRAVENOUS at 08:50

## 2025-05-15 RX ADMIN — ACETAMINOPHEN 975 MG: 325 TABLET, FILM COATED ORAL at 20:28

## 2025-05-15 RX ADMIN — ROCURONIUM BROMIDE 20 MG: 10 INJECTION, SOLUTION INTRAVENOUS at 10:42

## 2025-05-15 RX ADMIN — MIDAZOLAM 1 MG: 1 INJECTION INTRAMUSCULAR; INTRAVENOUS at 08:23

## 2025-05-15 RX ADMIN — ENOXAPARIN SODIUM 40 MG: 40 INJECTION SUBCUTANEOUS at 14:40

## 2025-05-15 RX ADMIN — DEXAMETHASONE SODIUM PHOSPHATE 10 MG: 10 INJECTION, SOLUTION INTRAMUSCULAR; INTRAVENOUS at 08:45

## 2025-05-15 RX ADMIN — SENNOSIDES 8.6 MG: 8.6 TABLET, FILM COATED ORAL at 14:40

## 2025-05-15 RX ADMIN — SUGAMMADEX 200 MG: 100 INJECTION, SOLUTION INTRAVENOUS at 12:07

## 2025-05-15 RX ADMIN — HYDROMORPHONE HYDROCHLORIDE 0.5 MG: 1 INJECTION, SOLUTION INTRAMUSCULAR; INTRAVENOUS; SUBCUTANEOUS at 09:25

## 2025-05-15 RX ADMIN — OXYCODONE HYDROCHLORIDE 5 MG: 5 SOLUTION ORAL at 14:40

## 2025-05-15 RX ADMIN — ROCURONIUM BROMIDE 10 MG: 10 INJECTION, SOLUTION INTRAVENOUS at 11:18

## 2025-05-15 RX ADMIN — FENTANYL CITRATE 50 MCG: 50 INJECTION INTRAMUSCULAR; INTRAVENOUS at 14:04

## 2025-05-15 RX ADMIN — PROPOFOL 50 MCG/KG/MIN: 10 INJECTION, EMULSION INTRAVENOUS at 08:51

## 2025-05-15 RX ADMIN — SODIUM CHLORIDE, SODIUM LACTATE, POTASSIUM CHLORIDE, AND CALCIUM CHLORIDE: .6; .31; .03; .02 INJECTION, SOLUTION INTRAVENOUS at 10:47

## 2025-05-15 RX ADMIN — ROCURONIUM BROMIDE 30 MG: 10 INJECTION, SOLUTION INTRAVENOUS at 09:57

## 2025-05-15 RX ADMIN — ROCURONIUM BROMIDE 50 MG: 10 INJECTION, SOLUTION INTRAVENOUS at 08:46

## 2025-05-15 RX ADMIN — DOCUSATE SODIUM 100 MG: 100 CAPSULE, LIQUID FILLED ORAL at 16:55

## 2025-05-15 RX ADMIN — FENTANYL CITRATE 100 MCG: 50 INJECTION INTRAMUSCULAR; INTRAVENOUS at 08:45

## 2025-05-15 RX ADMIN — SODIUM CHLORIDE, SODIUM LACTATE, POTASSIUM CHLORIDE, AND CALCIUM CHLORIDE 75 ML/HR: .6; .31; .03; .02 INJECTION, SOLUTION INTRAVENOUS at 14:34

## 2025-05-15 RX ADMIN — OXYCODONE HYDROCHLORIDE 5 MG: 5 SOLUTION ORAL at 18:45

## 2025-05-15 RX ADMIN — ROCURONIUM BROMIDE 10 MG: 10 INJECTION, SOLUTION INTRAVENOUS at 09:39

## 2025-05-15 NOTE — DISCHARGE INSTR - AVS FIRST PAGE
"Hiatal Hernia Repair    During this hospitalization you underwent a minimally invasive hiatal hernia repair with a fundoplication. Your discharge instructions are below.     Incision Care:  - Your incisions were closed with stitches underneath of the skin which will dissolve. There is purple surgical glue on top of the incisions. The surgical glue will flake off over the next few weeks.   - You do not need dressings over your other incisions.  Do not apply any cream or ointments to the incisions unless instructed by your surgeon.  - You may shower daily - no soaking in a tub bath. Wash your incisions gently with soap and water and pat dry. Do not scrub the incisions.  - Bruising at your incisions is normal. This will resolve over the next few weeks.     Activity  - No lifting greater than 10lbs until you are evaluated in the office.   - Walking and light activity is encouraged. Recommend that you are active during the day.  - No driving while you are using narcotic pain medications. If you are no longer taking narcotics and considering driving, you must make sure you can quickly react to changes on the road. This can be challenging in the first few weeks after surgery.     Pain:  - Some degree of pain or discomfort after surgery is expected.    - Your pain regiment includes the following:   - Tylenol 650 mg tablet. Take 1 tablet, every 6 hours for 1 week.    - Oxycodone (Narcotic) 5mg tablet. Take 1 tablet, every 4-6 hours as needed for pain.     Diet:  - Refer to the \"Diet after Paraesophageal Hernia Repair\" diet instructions you were given preoperatively.   - It is normal to have difficulty with fluids and food in the postoperative period. This is due to swelling and will improve with time.     Medications:  - Continue on your home medications including any blood thinner and aspirin, as instructed.   - For your blood thinning medication, you are to continue with Lovenox bridging medication and continue Coumadin.  " INR should be checked within approximately 5 days of discharge.  Please contact your PCP for further instructions.  As discussed, you have been prescribed 5 days worth of Lovenox 100 mg/mL to take every 12 hours.  Have your INR checked on Monday, 5/19/2025 to decide whether to continue to take further dosing of Lovenox.  You will also continue your Coumadin upon hospital discharge.  - Do not take vitamins or supplements for two weeks after surgery as these large pills can be difficult to swallow    Bowel Regiment:  - Constipation after surgery while on narcotic pain medications is normal.  - You should continue taking a bowel regiment while on a narcotic pain medication to keep your bowel movements soft. Your discharge bowel regiment:   - Docusate (Colace) 100mg tablet. Take 1 tablet, twice a day.    - Senna 8.6mg tablet. Take 1 tablet daily.   - If your bowel movements become lose, stop taking the bowel regiment above.     Follow-up:  - You have a scheduled follow-up appointment on: 6/3/25 at 1:40 pm   - A couple of days after discharge our office will call you to check in with how you are recovering at home.     Concerns:  - Call the office for any questions or concerns. Many postoperative issues can be sorted out over the phone.   - Call the office or go to the Emergency Department if you develop a fever greater than 101, persistent chills, persistent nausea/vomiting, inability to take in sufficient food or fluids, worsening or uncontrolled pain, and/or increasing redness or foul smelling drainage from an incision.     Thoracic Surgery Office: [phone]    Dr. William Burfeind, MD Rachael Hart, PA-C Dr. Meredith Harrison, MD Amylyn Mortimer, PA-C Dr. Dustin Manchester, MD Dr. Stephen Dingley, DO

## 2025-05-15 NOTE — INTERVAL H&P NOTE
H&P reviewed. After examining the patient I find no changes in the patients condition since the H&P had been written.    Vitals:    05/15/25 0630   BP: 132/92   Pulse: 63   Resp: 16   Temp: (!) 97 °F (36.1 °C)   SpO2: 96%

## 2025-05-15 NOTE — ANESTHESIA POSTPROCEDURE EVALUATION
Post-Op Assessment Note    CV Status:  Stable  Pain Score: 0    Pain management: adequate       Mental Status:  Sleepy and awake   Hydration Status:  Stable   PONV Controlled:  None   Airway Patency:  Patent     Post Op Vitals Reviewed: Yes    No anethesia notable event occurred.    Staff: Anesthesiologist, CRNA           Last Filed PACU Vitals:  Vitals Value Taken Time   Temp     Pulse     BP     Resp     SpO2

## 2025-05-15 NOTE — QUICK NOTE
"Postop Note - Thoracic Surgery  Edward Conrad 55 y.o. male MRN: [id number]  [facility] Encounter: [id number]    Assessment:  55 y.o. male now s/p Procedure(s) (LRB):  LAPAROSCOPIC PARAESOPHAGEAL HERNIA REPAIR, partial fundoplication (N/A)  ESOPHAGOGASTRODUODENOSCOPY (EGD) (N/A)    Plan:  - Diet Surgical; Clear Liquid; No Carbonation  - Pain and Nausea control PRN  - Incentive spirometry  - OOB, ambulate  - Plan for UGI study 5/16  - prophylactic lvx today and then restart warfarin tomorrow   - Anticipate advancing to full liquid diet with discharge home tomorrow if UGI is negative  - Please message the Thoracic surgery resident role with any concerns    Subjective/Objective     Subjective: Patient feels sore.  Denies any nausea or SOB..      Objective:   Vitals: Blood pressure (!) 174/118, pulse 85, temperature (!) 97.4 °F (36.3 °C), resp. rate 20, height 5' 9\" (1.753 m), weight 102 kg (225 lb), SpO2 98%.,Body mass index is 33.23 kg/m².    I/O         05/13 0701  05/14 0700 05/14 0701  05/15 0700 05/15 0701  05/16 0700    I.V. (mL/kg)   1700 (16.7)    IV Piggyback   200    Total Intake(mL/kg)   1900 (18.6)    Urine (mL/kg/hr)   75 (0.1)    Total Output   75    Net   +1825                   Physical Exam:  Gen: NAD, Aox3, Comfortable in bed  Chest: Normal work of breathing, no respiratory distress  Abd: Soft, ND, appropriately tender. Surgical sites are clean, dry, and intact   Ext: No Edema  Skin: Warm, Dry, Intact      Natasha Woo MD  5/15/2025  3:16 PM      "

## 2025-05-15 NOTE — ANESTHESIA POSTPROCEDURE EVALUATION
Post-Op Assessment Note    CV Status:  Stable  Pain Score: 0    Pain management: adequate       Mental Status:  Alert and awake   Hydration Status:  Euvolemic   PONV Controlled:  Controlled   Airway Patency:  Patent     Post Op Vitals Reviewed: Yes    No anethesia notable event occurred.    Staff: Anesthesiologist, with CRNAs           Last Filed PACU Vitals:  Vitals Value Taken Time   Temp 97.4 °F (36.3 °C) 05/15/25 12:28   Pulse 80 05/15/25 13:18   /94 05/15/25 13:18   Resp 35 05/15/25 13:18   SpO2 93 % 05/15/25 13:18   Vitals shown include unfiled device data.    Modified Bri:     Vitals Value Taken Time   Activity 2 05/15/25 13:00   Respiration 2 05/15/25 13:00   Circulation 2 05/15/25 13:00   Consciousness 2 05/15/25 13:00   Oxygen Saturation 2 05/15/25 13:00     Modified Bri Score: 10

## 2025-05-15 NOTE — PLAN OF CARE
Problem: PAIN - ADULT  Goal: Verbalizes/displays adequate comfort level or baseline comfort level  Description: Interventions:  - Encourage patient to monitor pain and request assistance  - Assess pain using appropriate pain scale  - Administer analgesics as ordered based on type and severity of pain and evaluate response  - Implement non-pharmacological measures as appropriate and evaluate response  - Consider cultural and social influences on pain and pain management  - Notify physician/advanced practitioner if interventions unsuccessful or patient reports new pain  - Educate patient/family on pain management process including their role and importance of  reporting pain   - Provide non-pharmacologic/complimentary pain relief interventions  Outcome: Progressing     Problem: INFECTION - ADULT  Goal: Absence or prevention of progression during hospitalization  Description: INTERVENTIONS:  - Assess and monitor for signs and symptoms of infection  - Monitor lab/diagnostic results  - Monitor all insertion sites, i.e. indwelling lines, tubes, and drains  - Monitor endotracheal if appropriate and nasal secretions for changes in amount and color  - [place] appropriate cooling/warming therapies per order  - Administer medications as ordered  - Instruct and encourage patient and family to use good hand hygiene technique  - Identify and instruct in appropriate isolation precautions for identified infection/condition  Outcome: Progressing  Goal: Absence of fever/infection during neutropenic period  Description: INTERVENTIONS:  - Monitor WBC  - Perform strict hand hygiene  - Limit to healthy visitors only  - No plants, dried, fresh or silk flowers with drew in patient room  Outcome: Progressing     Problem: DISCHARGE PLANNING  Goal: Discharge to home or other facility with appropriate resources  Description: INTERVENTIONS:  - Identify barriers to discharge w/patient and caregiver  - Arrange for needed discharge resources  and transportation as appropriate  - Identify discharge learning needs (meds, wound care, etc.)  - Arrange for interpretive services to assist at discharge as needed  - Refer to Case Management Department for coordinating discharge planning if the patient needs post-hospital services based on physician/advanced practitioner order or complex needs related to functional status, cognitive ability, or social support system  Outcome: Progressing     Problem: SKIN/TISSUE INTEGRITY - ADULT  Goal: Incision(s), wounds(s) or drain site(s) healing without S/S of infection  Description: INTERVENTIONS  - Assess and document dressing, incision, wound bed, drain sites and surrounding tissue  - Provide patient and family education  - Perform skin care/dressing changes every  Outcome: Progressing

## 2025-05-15 NOTE — OP NOTE
OPERATIVE REPORT  PATIENT NAME: Edward Conrad    :  1970  MRN: [id number]  Pt Location:  OR ROOM 08    SURGERY DATE: 5/15/2025    Surgeons and Role:     * Galindo Izaguirre MD - Primary     * Shanta Long PA-C - Assisting     * Shaka Licona MD - Assisting    Preop Diagnosis:  Type III paraesophageal hernia    Post-Op Diagnosis Codes:  Type III paraesophageal hernia    Procedure(s):  LAPAROSCOPIC PARAESOPHAGEAL HERNIA REPAIR.  PARTIAL FUNDOPLICATION (TOUPET)  ESOPHAGOGASTRODUODENOSCOPY (EGD)    Specimen(s):  * No specimens in log *    Estimated Blood Loss:   Minimal    Drains:  [REMOVED] Urethral Catheter Latex 16 Fr. (Removed)   Number of days: 0       Anesthesia Type:   General    Operative Indications:  Type III paraesophageal hernia  Mr. Conrad has a symptomatic type III paraesophageal hernia with early signs of [place] without dysplasia.  He has ineffective esophageal motility though his impedance was fairly normal.  He is brought to the operating room for hernia repair and partial fundoplication.    Operative Findings:  Large paraesophageal hernia      Complications:   None    Procedure and Technique:  The patient was brought to the operating room placed in the supine position.  After institution of adequate general anesthesia he was placed in the modified lithotomy position with his thighs bolstered to allow for reverse Trendelenburg.  His entire abdomen was prepped and draped into a sterile field.  His abdomen was insufflated with CO2 using a Veress needle at Padilla's point.  An 11 mm port was placed 3 fingerbreadths above the umbilicus and just off to the left using a Visiport like technique.  A 5 mm port was placed in the left lateral abdomen for retraction.  A liver retractor was placed through a 5 mm subxiphoid incision.  An 11 mm port was placed in the midline for the surgeon's left hand and in the left upper quadrant for the surgeons right hand.  The patient was then  placed in reverse Trendelenburg.  There is an obvious paraesophageal hernia of moderate size visible.  The gastrohepatic ligament over the caudate lobe in its clear area was divided with the LigaSure.  This exposes the underlying right simone and the hernia sac is dissected away from the right simone in the avascular plane.  This dissection was carried down to the base and then anteriorly up and over the top of the crura onto the left side.  Further dissection is carried down to the base of the left simone to separate the hernia sac from the crura.  The fundus which had largely been reduced into the abdomen at this point is freed by dividing the short gastric vessels with the LigaSure.  This dissection is carried down to the base of the left simone.  The hernia sac is then grasped and retracted down to the abdomen and it is dissected away from its mediastinal attachments to completely reduce.  From the right side the GE junction is elevated and a retroesophageal plane created.  A Penrose drain is placed around the distal esophagus.  The esophagus is then circumferentially dissected up into the mediastinum to the level of the inferior pulmonary veins bilaterally.  Care was taken to identify both the anterior and posterior vagus nerves and to protect them.  Intraoperative endoscopy was then performed and the scope inserted down to identify the Z-line.  The light from the endoscope could be seen laparoscopically and there appeared to be approximately 3 cm of intra-abdominal length of esophagus.  The esophageal hiatus was then recalibrated using 4 separate horizontal mattress sutures of pledgeted 0 Ethibond.  At the completion of this the crura touched the esophagus circumferentially but a grasper could easily be placed up into the mediastinum alongside the esophagus.  The fundus was then wrapped posterior to the stomach and a partial fundoplication of approximately 220 degrees was performed.  The stomach was sutured to either  side of the esophagus using 2 different 0 Ethibond sutures on either side.  The wrap was nicely centered and there was no undue torque.  Hemostasis was excellent.  The Penrose drain was removed.  The liver retractor was removed.  The 11 mm port sites had their fascial defects repaired with 0 Vicryl.  The abdomen was then desufflated and the skin incisions closed with 4-0 Monocryl and skin glue.  The patient was extubated on the table and brought to the recovery unit in stable condition having tolerated the procedure well.  Sponge and instrument counts were correct.   I was present for the entire procedure.    Patient Disposition:  PACU              SIGNATURE: Galindo Izaguirre MD  DATE: May 15, 2025  TIME: 12:20 PM

## 2025-05-16 ENCOUNTER — APPOINTMENT (OUTPATIENT)
Dept: RADIOLOGY | Facility: HOSPITAL | Age: 55
End: 2025-05-16
Payer: COMMERCIAL

## 2025-05-16 LAB
ANION GAP SERPL CALCULATED.3IONS-SCNC: 7 MMOL/L (ref 4–13)
BUN SERPL-MCNC: 16 MG/DL (ref 5–25)
CALCIUM SERPL-MCNC: 8.9 MG/DL (ref 8.4–10.2)
CHLORIDE SERPL-SCNC: 102 MMOL/L (ref 96–108)
CO2 SERPL-SCNC: 27 MMOL/L (ref 21–32)
CREAT SERPL-MCNC: 0.87 MG/DL (ref 0.6–1.3)
ERYTHROCYTE [DISTWIDTH] IN BLOOD BY AUTOMATED COUNT: 14.4 % (ref 11.6–15.1)
GFR SERPL CREATININE-BSD FRML MDRD: 96 ML/MIN/1.73SQ M
GLUCOSE SERPL-MCNC: 114 MG/DL (ref 65–140)
HCT VFR BLD AUTO: 39.7 % (ref 36.5–49.3)
HGB BLD-MCNC: 13.2 G/DL (ref 12–17)
MAGNESIUM SERPL-MCNC: 2 MG/DL (ref 1.9–2.7)
MCH RBC QN AUTO: 31.2 PG (ref 26.8–34.3)
MCHC RBC AUTO-ENTMCNC: 33.2 G/DL (ref 31.4–37.4)
MCV RBC AUTO: 94 FL (ref 82–98)
PLATELET # BLD AUTO: 222 THOUSANDS/UL (ref 149–390)
PMV BLD AUTO: 9.6 FL (ref 8.9–12.7)
POTASSIUM SERPL-SCNC: 3.9 MMOL/L (ref 3.5–5.3)
RBC # BLD AUTO: 4.23 MILLION/UL (ref 3.88–5.62)
SODIUM SERPL-SCNC: 136 MMOL/L (ref 135–147)
WBC # BLD AUTO: 8.5 THOUSAND/UL (ref 4.31–10.16)

## 2025-05-16 PROCEDURE — 99024 POSTOP FOLLOW-UP VISIT: CPT | Performed by: THORACIC SURGERY (CARDIOTHORACIC VASCULAR SURGERY)

## 2025-05-16 PROCEDURE — 97166 OT EVAL MOD COMPLEX 45 MIN: CPT

## 2025-05-16 PROCEDURE — 97163 PT EVAL HIGH COMPLEX 45 MIN: CPT

## 2025-05-16 PROCEDURE — 74220 X-RAY XM ESOPHAGUS 1CNTRST: CPT

## 2025-05-16 PROCEDURE — 80048 BASIC METABOLIC PNL TOTAL CA: CPT

## 2025-05-16 PROCEDURE — 83735 ASSAY OF MAGNESIUM: CPT

## 2025-05-16 PROCEDURE — 85027 COMPLETE CBC AUTOMATED: CPT

## 2025-05-16 RX ORDER — ENOXAPARIN SODIUM 100 MG/ML
1 INJECTION SUBCUTANEOUS EVERY 12 HOURS
Qty: 10 ML | Refills: 0 | Status: SHIPPED | OUTPATIENT
Start: 2025-05-16 | End: 2025-05-21

## 2025-05-16 RX ORDER — OXYCODONE HYDROCHLORIDE 5 MG/1
5 TABLET ORAL EVERY 4 HOURS PRN
Qty: 15 TABLET | Refills: 0 | Status: SHIPPED | OUTPATIENT
Start: 2025-05-16

## 2025-05-16 RX ORDER — ENOXAPARIN SODIUM 100 MG/ML
1 INJECTION SUBCUTANEOUS EVERY 12 HOURS SCHEDULED
Status: DISCONTINUED | OUTPATIENT
Start: 2025-05-16 | End: 2025-05-17 | Stop reason: HOSPADM

## 2025-05-16 RX ORDER — ACETAMINOPHEN 325 MG/1
650 TABLET ORAL EVERY 6 HOURS
Qty: 56 TABLET | Refills: 0 | Status: SHIPPED | OUTPATIENT
Start: 2025-05-16 | End: 2025-05-23

## 2025-05-16 RX ADMIN — OXYCODONE HYDROCHLORIDE 5 MG: 5 SOLUTION ORAL at 17:38

## 2025-05-16 RX ADMIN — LABETALOL HYDROCHLORIDE 10 MG: 5 INJECTION, SOLUTION INTRAVENOUS at 11:37

## 2025-05-16 RX ADMIN — ACETAMINOPHEN 975 MG: 325 TABLET, FILM COATED ORAL at 05:22

## 2025-05-16 RX ADMIN — IOHEXOL 50 ML: 350 INJECTION, SOLUTION INTRAVENOUS at 09:01

## 2025-05-16 RX ADMIN — WARFARIN SODIUM 7 MG: 5 TABLET ORAL at 09:27

## 2025-05-16 RX ADMIN — PANTOPRAZOLE SODIUM 40 MG: 40 TABLET, DELAYED RELEASE ORAL at 17:31

## 2025-05-16 RX ADMIN — ENOXAPARIN SODIUM 100 MG: 100 INJECTION SUBCUTANEOUS at 11:35

## 2025-05-16 RX ADMIN — SENNOSIDES 8.6 MG: 8.6 TABLET, FILM COATED ORAL at 09:25

## 2025-05-16 RX ADMIN — ACETAMINOPHEN 975 MG: 325 TABLET, FILM COATED ORAL at 17:38

## 2025-05-16 RX ADMIN — OXYCODONE HYDROCHLORIDE 5 MG: 5 SOLUTION ORAL at 09:27

## 2025-05-16 RX ADMIN — ACETAMINOPHEN 975 MG: 325 TABLET, FILM COATED ORAL at 12:00

## 2025-05-16 RX ADMIN — SODIUM CHLORIDE, SODIUM LACTATE, POTASSIUM CHLORIDE, AND CALCIUM CHLORIDE 75 ML/HR: .6; .31; .03; .02 INJECTION, SOLUTION INTRAVENOUS at 04:31

## 2025-05-16 RX ADMIN — PANTOPRAZOLE SODIUM 40 MG: 40 TABLET, DELAYED RELEASE ORAL at 05:22

## 2025-05-16 RX ADMIN — DOCUSATE SODIUM 100 MG: 100 CAPSULE, LIQUID FILLED ORAL at 09:26

## 2025-05-16 RX ADMIN — DOCUSATE SODIUM 100 MG: 100 CAPSULE, LIQUID FILLED ORAL at 17:31

## 2025-05-16 RX ADMIN — ENOXAPARIN SODIUM 100 MG: 100 INJECTION SUBCUTANEOUS at 20:14

## 2025-05-16 NOTE — PROGRESS NOTES
Progress Note - Surgery-General   Name: Edward Conrad 55 y.o. male I MRN: [id number]  Unit/Bed#: [facility] 508-01 I Date of Admission: 5/15/2025   Date of Service: 5/16/2025 I Hospital Day: 0    Assessment & Plan  Hiatal hernia  S/p laparoscopic paraesophageal repair with hiwot fundoplication on 5/15    - Clear liquid diet; plan for upper GI and advancement to full liquids  - DC IV fluids  - Restart warfarin and therapeutic Lovenox bridge  - Follow-up a.m. labs  - Pain and nausea control as needed  - Encourage ambulation and incentive spirometry  - Possible discharge home today versus tomorrow        Subjective   No acute events overnight.  Patient reports that pain is relatively well-controlled and made worse with exertion.  Tolerating clear liquid diets and pills.  Passing flatus.  Voiding freely.    Objective :  Temp:  [97 °F (36.1 °C)-98.2 °F (36.8 °C)] 98.2 °F (36.8 °C)  HR:  [63-94] 85  BP: (132-175)/() 162/93  Resp:  [16-20] 20  SpO2:  [92 %-98 %] 95 %  O2 Device: None (Room air)    I/O         05/14 0701  05/15 0700 05/15 0701  05/16 0700    I.V. (mL/kg)  2746.3 (26.9)    IV Piggyback  200    Total Intake(mL/kg)  2946.3 (28.9)    Urine (mL/kg/hr)  1275 (0.5)    Total Output  1275    Net  +1671.3                  Physical Exam  General: NAD  Skin: Warm, dry, anicteric  HEENT: Normocephalic, atraumatic  CV: RRR  Pulm:  Nonlabored breathing on room air  Abd: Soft, appropriately tender, mildly distended; incisions clean dry and intact  MSK: Symmetric, no edema, no tenderness, no deformity  Neuro: AOx3, GCS 15     Lab Results: I have reviewed the following results:  Recent Labs     05/15/25  0643   INR 1.10

## 2025-05-16 NOTE — ASSESSMENT & PLAN NOTE
S/p laparoscopic paraesophageal repair with hiwot fundoplication on 5/15     - Clear liquid diet; plan for upper GI and advancement to full liquids  - DC IV fluids  - Restart warfarin and therapeutic Lovenox bridge  - Follow-up a.m. labs  - Pain and nausea control as needed  - Encourage ambulation and incentive spirometry  - Possible discharge home today versus tomorrow

## 2025-05-16 NOTE — PROGRESS NOTES
Progress Note - Thoracic    Name: Edward Conrad 55 y.o. male I MRN: [id number]  Unit/Bed#: [facility] 508-01 I Date of Admission: 5/15/2025   Date of Service: 5/16/2025 I Hospital Day: 0    Assessment & Plan  Hiatal hernia  S/p laparoscopic paraesophageal repair with hiwot fundoplication on 5/15     - Clear liquid diet; plan for upper GI and advancement to full liquids  - DC IV fluids  - Restart warfarin and therapeutic Lovenox bridge  - Follow-up a.m. labs  - Pain and nausea control as needed  - Encourage ambulation and incentive spirometry  - Possible discharge home today versus tomorrow           Subjective   No acute events overnight.  Patient reports that pain is relatively well-controlled and made worse with exertion.  Tolerating clear liquid diets and pills.  Passing flatus.  Voiding freely.    Objective :  Temp:  [97 °F (36.1 °C)-98.2 °F (36.8 °C)] 98.2 °F (36.8 °C)  HR:  [63-94] 85  BP: (132-175)/() 162/93  Resp:  [16-20] 20  SpO2:  [92 %-98 %] 95 %  O2 Device: None (Room air)    I/O         05/14 0701  05/15 0700 05/15 0701  05/16 0700    I.V. (mL/kg)  2746.3 (26.9)    IV Piggyback  200    Total Intake(mL/kg)  2946.3 (28.9)    Urine (mL/kg/hr)  1275 (0.5)    Total Output  1275    Net  +1671.3                  Physical Exam  General: NAD  Skin: Warm, dry, anicteric  HEENT: Normocephalic, atraumatic  CV: RRR  Pulm:  Nonlabored breathing on room air  Abd: Soft, appropriately tender, mildly distended; incisions clean dry and intact  MSK: Symmetric, no edema, no tenderness, no deformity  Neuro: AOx3, GCS 15     Lab Results: I have reviewed the following results:  Recent Labs     05/15/25  0643   INR 1.10

## 2025-05-16 NOTE — PLAN OF CARE
Problem: PHYSICAL THERAPY ADULT  Goal: Performs mobility at highest level of function for planned discharge setting.  See evaluation for individualized goals.  Description: Treatment/Interventions: ADL retraining, Functional transfer training, LE strengthening/ROM, Therapeutic exercise, Endurance training, Patient/family training, Equipment eval/education, Bed mobility, Gait training, Spoke to nursing, Spoke to case management, OT, Elevations          See flowsheet documentation for full assessment, interventions and recommendations.  Outcome: Progressing  Note: Prognosis: Good  Problem List: Decreased endurance, Decreased mobility, Pain  Assessment: Pt is a 55 y.o. male seen for a high complexity PT evaluation due to Ongoing medical management for primary dx, Decreased activity tolerance compared to baseline, Increased assistance needed from caregiver at current time, s/p surgical intervention. Patient is s/p admit to [facility] on 5/15/2025 for Hiatal hernia (K44.9). 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     PT now consulted to assess functional mobility and needs for safe d/c planning. Prior to admission, pt independent with functional mobility, independent ADLs, and independent IADLs. Personal factors affecting status include fall risk, steps to enter home, steps to negotiate within home, and medical status     Currently pt requires supervision for functional transfers with no AD ; supervision for ambulation with no AD. Pt  presents functioning below baseline and w/ overall mobility deficits 2* to: decreased strength, decreased endurance, decreased mobility, impaired balance. These impairments place pt at risk for falls.     Pt will continue to benefit from skilled PT interventions to address stated impairments; to maximize functional potential; for ongoing pt/family education; and DME needs. The patient's AM-PAC Basic Mobility Inpatient Short Form Raw Score Is 18. PT is currently recommending no post acute rehab needs on d/c from hospital. Will continue to follow as able.        Rehab Resource Intensity Level, PT: No post-acute rehabilitation needs    See flowsheet documentation for full assessment.

## 2025-05-16 NOTE — OCCUPATIONAL THERAPY NOTE
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 07/12/2006    DVT (deep venous thrombosis) ([facility]) 2018    BL legs    Dysphagia     Last Assessed: 8/12/2014     Factor 5 Leiden mutation, heterozygous ([facility])     GERD (gastroesophageal reflux disease)     Globus sensation     Last Assessed: 6/4/2014     H/O neoplasm of uncertain behavior of skin 06/06/2006    Hyperlipidemia     Hypertension 02/14/2006    problem resolved by PCP entry    Iron deficiency anemia     Kidney stone     Lateral epicondylitis of right elbow     Last Assessed: 10/23/2015     Pes planus     last assessed: 10/23/2013     Plantar fascial fibromatosis     Last Assessed: 10/23/2013     Prediabetes     Pulmonary embolism ([facility]) 2018    Right patellofemoral syndrome     Last Assessed: 4/15/2016     Sebaceous cyst 11/03/2007     Past Surgical History  Past Surgical History:   Procedure Laterality Date    COLONOSCOPY      ESOPHAGOGASTRODUODENOSCOPY N/A 10/7/2016    Procedure: ESOPHAGOGASTRODUODENOSCOPY (EGD);  Surgeon: Crystal Valdovinos MD;  Location: [facility];  Service:     NASAL SEPTUM SURGERY  1995    OR ESOPHAGOGASTRODUODENOSCOPY TRANSORAL DIAGNOSTIC N/A 12/6/2018    Procedure: ESOPHAGOGASTRODUODENOSCOPY (EGD);  Surgeon: Crystal Valdovinos MD;  Location: [facility];  Service: Gastroenterology    OR ESOPHAGOGASTRODUODENOSCOPY TRANSORAL DIAGNOSTIC N/A 5/15/2025    Procedure: ESOPHAGOGASTRODUODENOSCOPY (EGD);  Surgeon: Galindo Izaguirre MD;  [facility];  Service: Thoracic    OR LAPS RPR PARAESPHGL HRNA INCL FUNDPLSTY W/O MESH N/A 5/15/2025    Procedure: LAPAROSCOPIC PARAESOPHAGEAL HERNIA REPAIR, partial fundoplication;  Surgeon: Galindo Izaguirre MD;  [facility];  Service: Thoracic    OR SURGICAL ARTHROSCOPY  SHOULDER W/ROTATOR CUFF RPR Right 1/26/2023    Procedure: Shoulder Arthroscopy with Rotator Cuff Repair, Subacromial Decompression, and Limited Debridement;  Surgeon: Johnnie Barcenas MD;  Location: [facility];  Service: Orthopedics          05/16/25 1000   OT Last Visit   OT Visit Date 05/16/25   Note Type   Note type Evaluation   Pain Assessment   Pain Assessment Tool 0-10   Pain Score 8   Pain Location/Orientation   (neck)   Patient's Stated Pain Goal No pain   Hospital Pain Intervention(s) Repositioned;Ambulation/increased activity;Emotional support   Restrictions/Precautions   Weight Bearing Precautions Per Order No   Other Precautions Multiple lines   Home Living   Type of Home House   Home Layout Two level;Bed/bath upstairs;Stairs to enter with rails  (2 mark)   Bathroom Shower/Tub Tub/shower unit   Bathroom Toilet Standard   Bathroom Accessibility Accessible   Prior Function   Level of [place] Independent with ADLs;Independent with functional mobility;Independent with IADLS   Lives With Spouse   Receives Help From Family   IADLs Independent with driving;Independent with meal prep;Independent with medication management   Falls in the last 6 months 0   Vocational Full time employment   Lifestyle   Autonomy ind w adl/iadl, no ad mob. +    Reciprocal Relationships supportive spouse   Service to Others fte   Intrinsic Gratification traveling   ADL   Where Assessed Edge of bed   Eating Assistance 6  Modified independent   Grooming Assistance 6  Modified Independent   UB Bathing Assistance 6  Modified Independent   LB Bathing Assistance 5  Supervision/Setup   UB Dressing Assistance 6  Modified independent   LB Dressing Assistance 5  Supervision/Setup   Toileting Assistance  5  Supervision/Setup   Functional Assistance 5  Supervision/Setup   Transfers   Sit to Stand 5  Supervision   Stand to Sit 5  Supervision   Additional Comments no ad   Functional Mobility   Functional Mobility 5  Supervision    Additional Comments household distance w/o ad   Balance   Static Sitting Normal   Dynamic Sitting Good   Static Standing Fair +   Dynamic Standing Fair   Ambulatory Fair -   Activity Tolerance   Activity Tolerance Patient tolerated treatment well   Medical Staff Made Aware dpt 2' pts med complexity, comorbidities and regression from baseline.   Nurse Made Aware clearead   RUE Assessment   RUE Assessment WFL   LUE Assessment   LUE Assessment WFL   Hand Function   Gross Motor Coordination Functional   Fine Motor Coordination Functional   Cognition   Overall Cognitive Status WFL   Arousal/Participation Alert;Responsive   Attention Within functional limits   Orientation Level Oriented X4   Memory Within functional limits   Following Commands Follows all commands and directions without difficulty   Assessment   Prognosis Good   Assessment Pt is a 55 y.o. male admitted 5/15/25 w hiatal hernia. Pt underwent paraesophageal repair 5/15/25, pod 1  w active OT eval and treat orders. PMH includes  has a past medical history of Anemia, 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, DVT (deep venous thrombosis) (HCC), Dysphagia, Factor 5 Leiden mutation, heterozygous (HCC), GERD (gastroesophageal reflux disease), Globus sensation, H/O neoplasm of uncertain behavior of skin, Hyperlipidemia, Hypertension, Iron deficiency anemia, Kidney stone, Lateral epicondylitis of right elbow, Pes planus, Plantar fascial fibromatosis, Prediabetes, Pulmonary embolism (HCC), Right patellofemoral syndrome, and Sebaceous cyst. Pt lives w spouse in a 2 sh,bed/bath upstairs. Pta, pt was independent w/ ADL/IADL and functional mobility, was  driving and was not using any DME at baseline. Currently, pt is mod I for ub adl, s for lb adl and completed transfers/fm w s w/o ad. From OT standpoint, pt is functioning at baseline level and does not appear to require additional acute OT at this time. Discussed pt's comfort with d/c from OT and any concerns with  returning to previous environment; pt does not report any at this time. The patient's raw score on the AM-PAC Daily Activity inpatient short form is 23, standardized score is 51.12, greater than 39.4. Patients at this level are likely to benefit from discharge to home. Please refer to the recommendation of the Occupational Therapist for safe discharge planning. From OT perspective, pt should D/C HOME when medically stable. Acute OT no longer rq at this time, D/C OT.   Goals   Patient Goals go home   Discharge Recommendation   Rehab Resource Intensity Level, OT No post-acute rehabilitation needs   AM-PAC Daily Activity Inpatient   Lower Body Dressing 3   Bathing 4   Toileting 4   Upper Body Dressing 4   Grooming 4   Eating 4   Daily Activity Raw Score 23   Daily Activity Standardized Score (Calc for Raw Score >=11) 51.12   AM-PAC Applied Cognition Inpatient   Following a Speech/Presentation 4   Understanding Ordinary Conversation 4   Taking Medications 4   Remembering Where Things Are Placed or Put Away 4   Remembering List of 4-5 Errands 4   Taking Care of Complicated Tasks 4   Applied Cognition Raw Score 24   Applied Cognition Standardized Score 62.21   Modified [place] Scale   Modified [place] Scale 2   End of Consult   Education Provided Yes   Patient Position at End of Consult Bedside chair;All needs within reach   Nurse Communication Nurse aware of consult       SATISH Alarcon, OTR/L

## 2025-05-16 NOTE — PHYSICAL THERAPY NOTE
Physical Therapy Evaluation     Patient's Name: Edward Conrad    Admitting Diagnosis  Hiatal hernia [K44.9]    Problem List  Problem List[1]    Past Medical History  Past Medical History:   Diagnosis Date    Anemia     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 07/12/2006    DVT (deep venous thrombosis) ([facility]) 2018    BL legs    Dysphagia     Last Assessed: 8/12/2014     Factor 5 Leiden mutation, heterozygous ([facility])     GERD (gastroesophageal reflux disease)     Globus sensation     Last Assessed: 6/4/2014     H/O neoplasm of uncertain behavior of skin 06/06/2006    Hyperlipidemia     Hypertension 02/14/2006    problem resolved by PCP entry    Iron deficiency anemia     Kidney stone     Lateral epicondylitis of right elbow     Last Assessed: 10/23/2015     Pes planus     last assessed: 10/23/2013     Plantar fascial fibromatosis     Last Assessed: 10/23/2013     Prediabetes     Pulmonary embolism ([facility]) 2018    Right patellofemoral syndrome     Last Assessed: 4/15/2016     Sebaceous cyst 11/03/2007       Past Surgical History  Past Surgical History:   Procedure Laterality Date    COLONOSCOPY      ESOPHAGOGASTRODUODENOSCOPY N/A 10/7/2016    Procedure: ESOPHAGOGASTRODUODENOSCOPY (EGD);  Surgeon: Crystal Valdovinos MD;  Location: [facility] GI LAB;  Service:     NASAL SEPTUM SURGERY  1995    [place] ESOPHAGOGASTRODUODENOSCOPY TRANSORAL DIAGNOSTIC N/A 12/6/2018    Procedure: ESOPHAGOGASTRODUODENOSCOPY (EGD);  Surgeon: Crystal Valdovinos MD;  Location: [facility] LAB;  Service: Gastroenterology    [place] ESOPHAGOGASTRODUODENOSCOPY TRANSORAL DIAGNOSTIC N/A 5/15/2025    Procedure: ESOPHAGOGASTRODUODENOSCOPY (EGD);  Surgeon: Galindo Izaguirre MD;  [facility];  Service: Thoracic    [place] LAPS RPR PARAESPHGL HRNA INCL FUNDPLSTY W/O MESH N/A 5/15/2025    Procedure: LAPAROSCOPIC PARAESOPHAGEAL HERNIA REPAIR, partial fundoplication;  Surgeon: Galindo Izaguirre MD;  [facility];  Service: Thoracic    [place] SURGICAL ARTHROSCOPY  SHOULDER W/ROTATOR CUFF RPR Right 1/26/2023    Procedure: Shoulder Arthroscopy with Rotator Cuff Repair, Subacromial Decompression, and Limited Debridement;  Surgeon: Johnnie Barcenas MD;  Location: [facility];  Service: Orthopedics          05/16/25 1001   PT Last Visit   PT Visit Date 05/16/25   Note Type   Note type Evaluation   Pain Assessment   Pain Assessment Tool 0-10   Pain Score 8   Pain Location/Orientation Location: Neck   Pain Onset/Description Onset: Ongoing;Frequency: Constant/Continuous;Descriptor: Discomfort   Effect of Pain on Daily Activities limits comfort and mobility   Patient's Stated Pain Goal No pain   Hospital Pain Intervention(s) Repositioned;Ambulation/increased activity;Emotional support   Restrictions/Precautions   Weight Bearing Precautions Per Order No   Other Precautions Multiple lines   Home Living   Type of Home House   Home Layout Two level;Bed/bath upstairs;Stairs to enter with rails  (2STE)   Bathroom Shower/Tub Tub/shower unit   Bathroom Toilet Standard   Bathroom Accessibility Accessible   Prior Function   Level of [place] Independent with ADLs;Independent with functional mobility;Independent with IADLS   Lives With Spouse   Receives Help From Family   IADLs Independent with driving;Independent with meal prep;Independent with medication management   Falls in the last 6 months 0   Vocational Full time employment   General   Family/Caregiver Present No   Cognition   Overall Cognitive Status WFL   Arousal/Participation Alert   Attention Within functional limits   Orientation Level Oriented X4   Memory Within functional limits   Following Commands Follows all commands and directions without difficulty   Comments Patient pleasant and cooperative   Subjective   Subjective Patient agreeable to PT eval   RUE Assessment   RUE Assessment WFL   LUE Assessment   LUE Assessment WFL   RLE Assessment   RLE Assessment WFL   LLE Assessment   LLE Assessment WFL   Transfers   Sit to  Stand 5  Supervision   Stand to Sit 5  Supervision   Additional Comments no AD   Ambulation/Elevation   Gait pattern Decreased foot clearance;Short stride   Gait Assistance 5  Supervision   Additional items Verbal cues   Assistive Device None   Distance 100'x2   Ambulation/Elevation Additional Comments intermittent rest periods 2* pain   Balance   Static Sitting Normal   Dynamic Sitting Good   Static Standing Fair +   Dynamic Standing Fair   Ambulatory Fair -   Endurance Deficit   Endurance Deficit Yes   Endurance Deficit Description fatigue, weakness   Activity Tolerance   Activity Tolerance Patient tolerated treatment well;Patient limited by pain   Medical Staff Made Aware OT   Nurse Made Aware RN cleared   Assessment   Prognosis Good   Problem List Decreased endurance;Decreased mobility;Pain   Assessment Pt is a 55 y.o. male seen for a high complexity PT evaluation due to Ongoing medical management for primary dx, Decreased activity tolerance compared to baseline, Increased assistance needed from caregiver at current time, s/p surgical intervention. Patient is s/p admit to [facility] on 5/15/2025 for Hiatal hernia (K44.9). Patient  has a past medical history of Anemia, 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, DVT (deep venous thrombosis) ([facility]), Dysphagia, Factor 5 Leiden mutation, heterozygous ([facility]), GERD (gastroesophageal reflux disease), Globus sensation, H/O neoplasm of uncertain behavior of skin, Hyperlipidemia, Hypertension, Iron deficiency anemia, Kidney stone, Lateral epicondylitis of right elbow, Pes planus, Plantar fascial fibromatosis, Prediabetes, Pulmonary embolism ([facility]), Right patellofemoral syndrome, and Sebaceous cyst..     PT now consulted to assess functional mobility and needs for safe d/c planning. Prior to admission, pt independent with functional mobility, independent ADLs, and independent IADLs. Personal factors affecting status include fall risk, steps to enter home, steps to  negotiate within home, and medical status     Currently pt requires supervision for functional transfers with no AD ; supervision for ambulation with no AD. Pt presents functioning below baseline and w/ overall mobility deficits 2* to: decreased strength, decreased endurance, decreased mobility, impaired balance. These impairments place pt at risk for falls.     Pt will continue to benefit from skilled PT interventions to address stated impairments; to maximize functional potential; for ongoing pt/family education; and DME needs. The patient's AM-PAC Basic Mobility Inpatient Short Form Raw Score Is 18. PT is currently recommending no post acute rehab needs on d/c from hospital. Will continue to follow as able.   Goals   Patient Goals to go home   STG Expiration Date 05/30/25   Short Term Goal #1 In 14 days, patient will 1) increase functional transfers to MI for improved safety and functional mobility 2) ambulate 250ft with MI using no AD for increased endurance and safety ambulating home and community environments 3) improve balance by 1 grade for improved safety and stability and decreased risk for falls. 4) ascend/descend at least 12 stairs using HR with MI in order to safely access home environment   PT Treatment Day 0   Plan   Treatment/Interventions ADL retraining;Functional transfer training;LE strengthening/ROM;Therapeutic exercise;Endurance training;Patient/family training;Equipment eval/education;Bed mobility;Gait training;Spoke to nursing;Spoke to case management;OT;Elevations   PT Frequency 3-5x/wk   Discharge Recommendation   Rehab Resource Intensity Level, PT No post-acute rehabilitation needs   AM-[facility] Basic Mobility Inpatient   Turning in Flat Bed Without Bedrails 3   Lying on Back to Sitting on Edge of Flat Bed Without Bedrails 3   Moving Bed to Chair 3   Standing Up From Chair Using Arms 3   Walk in Room 3   Climb 3-5 Stairs With Railing 3   Basic Mobility Inpatient Raw Score 18   Basic Mobility  Standardized Score 41.05   [facility] Highest Level Of Mobility   -HL Goal 6: Walk 10 steps or more   -HLM Achieved 7: Walk 25 feet or more   Modified [place] Scale   Modified [place] Scale 3   End of Consult   Patient Position at End of Consult All needs within reach;Bedside chair         Doreen Bills, PT, DPT         [1]   Patient Active Problem List  Diagnosis    Allergic rhinitis    Hiatal hernia    GERD without esophagitis    Prediabetes    Obesity (BMI 30-39.9)    [place] cardiac risk <10% in next 10 years    Dyslipidemia    History of pulmonary embolus (PE)    History of DVT (deep vein thrombosis)    Factor 5 Leiden mutation, heterozygous (HCC)    Family history of colon cancer    Anticoagulant long-term use    Spermatocele    Acquired bladder diverticulum    Calcium oxalate kidney stones    Left leg pain    History of iron deficiency anemia    Long-term use of high-risk medication    Acute pain of left knee    Chronic deep vein thrombosis (DVT) of left lower extremity (HCC)    Internal derangement of left knee    Pain of left calf    Iron deficiency anemia due to chronic blood loss    Acquired deformity of left foot

## 2025-05-17 VITALS
HEART RATE: 75 BPM | OXYGEN SATURATION: 93 % | SYSTOLIC BLOOD PRESSURE: 133 MMHG | RESPIRATION RATE: 16 BRPM | DIASTOLIC BLOOD PRESSURE: 89 MMHG | WEIGHT: 225 LBS | TEMPERATURE: 98.5 F | BODY MASS INDEX: 33.33 KG/M2 | HEIGHT: 69 IN

## 2025-05-17 PROCEDURE — 99024 POSTOP FOLLOW-UP VISIT: CPT | Performed by: THORACIC SURGERY (CARDIOTHORACIC VASCULAR SURGERY)

## 2025-05-17 PROCEDURE — NC001 PR NO CHARGE: Performed by: THORACIC SURGERY (CARDIOTHORACIC VASCULAR SURGERY)

## 2025-05-17 RX ORDER — METHOCARBAMOL 500 MG/1
500 TABLET, FILM COATED ORAL EVERY 6 HOURS PRN
Qty: 28 TABLET | Refills: 0 | Status: SHIPPED | OUTPATIENT
Start: 2025-05-17 | End: 2025-05-24

## 2025-05-17 RX ORDER — METHOCARBAMOL 500 MG/1
500 TABLET, FILM COATED ORAL EVERY 6 HOURS SCHEDULED
Status: DISCONTINUED | OUTPATIENT
Start: 2025-05-17 | End: 2025-05-17 | Stop reason: HOSPADM

## 2025-05-17 RX ADMIN — ACETAMINOPHEN 975 MG: 325 TABLET, FILM COATED ORAL at 01:04

## 2025-05-17 RX ADMIN — ACETAMINOPHEN 975 MG: 325 TABLET, FILM COATED ORAL at 06:03

## 2025-05-17 RX ADMIN — SENNOSIDES 8.6 MG: 8.6 TABLET, FILM COATED ORAL at 08:19

## 2025-05-17 RX ADMIN — PANTOPRAZOLE SODIUM 40 MG: 40 TABLET, DELAYED RELEASE ORAL at 06:03

## 2025-05-17 RX ADMIN — DOCUSATE SODIUM 100 MG: 100 CAPSULE, LIQUID FILLED ORAL at 08:19

## 2025-05-17 RX ADMIN — ENOXAPARIN SODIUM 100 MG: 100 INJECTION SUBCUTANEOUS at 08:19

## 2025-05-17 RX ADMIN — METHOCARBAMOL 500 MG: 500 TABLET ORAL at 06:03

## 2025-05-17 NOTE — PROGRESS NOTES
Progress Note - Thoracic    Name: Edward Conrad 55 y.o. male I MRN: [id number]  Unit/Bed#: [facility] 508-01 I Date of Admission: 5/15/2025   Date of Service: 5/17/2025 I Hospital Day: 0    Assessment & Plan  Hiatal hernia  S/p laparoscopic paraesophageal repair with hiwot fundoplication on 5/15     - FLD  - Start robaxin  - Restart warfarin and therapeutic Lovenox bridge  - Multimodal analgesia  - Encourage ambulation and incentive spirometry  - DC home today       Please contact the SecureChat role for the Thoracic  service with any questions/concerns.    Subjective   No acute events overnight. Afebrile, hemodynamically stable. Tolerating diet. Mild globus sensation. No nausea, or vomiting, fevers or chills. Pain controlled, biggest complaint is neck pain, added robaxin      Objective :  Temp:  [97.9 °F (36.6 °C)-98.6 °F (37 °C)] 98.5 °F (36.9 °C)  HR:  [72-79] 75  BP: (126-180)/() 133/89  Resp:  [16-20] 16  SpO2:  [93 %-95 %] 93 %  O2 Device: None (Room air)    I/O         05/15 0701  05/16 0700 05/16 0701  05/17 0700 05/17 0701  05/18 0700    P.O.  0     I.V. (mL/kg) 2746.3 (26.9) 362.5 (3.6)     IV Piggyback 200      Total Intake(mL/kg) 2946.3 (28.9) 362.5 (3.6)     Urine (mL/kg/hr) 1975 (0.8) 0 (0)     Total Output 1975 0     Net +971.3 +362.5            Unmeasured Urine Occurrence  1 x               Physical Exam:  General: No acute distress, alert and oriented  CV: Well perfused, regular rate  Lungs: Normal work of breathing, no increased respiratory effort  Abdomen: Soft, appropriately-tender, non-distended. Incision(s) clean, dry and intact.  Extremities: No edema, clubbing or cyanosis  Skin: Warm, dry      Lab Results: I have reviewed the following results:  Recent Labs     05/15/25  0643 05/16/25  0558   WBC  --  8.50   HGB  --  13.2   HCT  --  39.7   PLT  --  222   SODIUM  --  136   K  --  3.9   CL  --  102   CO2  --  27   BUN  --  16   CREATININE  --  0.87   GLUC  --  114   MG  --  2.0   INR 1.10  --         Imaging Results Review: No pertinent imaging studies reviewed.  Other Study Results Review: No additional pertinent studies reviewed.    VTE Pharmacologic Prophylaxis: VTE covered by:  enoxaparin, Subcutaneous, 100 mg at 05/16/25 2014  warfarin, Oral     VTE Mechanical Prophylaxis: sequential compression device

## 2025-05-17 NOTE — DISCHARGE SUMMARY
Discharge Summary - Thoracic    Name: Edward Conrad 55 y.o. male I MRN: [id number]  [facility] I Date of Admission: 5/15/2025   Date of Service: 5/17/2025 I Hospital Day: 0    Admission Date: 5/15/2025 0602  Discharge Date: 05/17/25  Admitting Diagnosis: Hiatal hernia [K44.9]  Discharge Diagnosis:   Medical Problems       Resolved Problems  Date Reviewed: 5/14/2025   None         HPI: 55 y.o. male with PMH significant for factor V leiden, history of DVT on coumadin, type III hiatal hernia and some degree of ineffective esophageal motility who presents today to the office for an updated history and physical exam prior to planned laparoscopic hiatal hernia repair with partial fundoplication, possible mesh, possible Attila gastroplasty.  At time of last visit on on 2/4/25, surgery was described with discussion of risks and benefits. Consent was signed. It was discussed that he would hold coumadin 5 days before operation and pending discretion of PCP and hematology would consider Lovenox bridge.     Procedures Performed:  5/15/2025: Laparoscopic paraesophageal hernia repair with toupee fundoplication, and EGD    Summary of Hospital Course: Patient presented to the hospital electively for repair of a paraesophageal hernia.  On 5/15 he underwent laparoscopic paraesophageal hernia repair with toupee fundoplication and EGD.  Postoperatively he was started on clear liquid diet.  On POD 1 a upper GI study was performed showing no leak.  Following his study results, he was advanced to a full liquid diet.  Patient was monitored an extra day for pain control, and transition with Lovenox bridge to Coumadin for his factor V [place] deficiency.  At the time of discharge patient understood all discharge instructions and management for follow-up.    Significant Findings, Care, Treatment and Services Provided:   Barium Swallow Esophagus Post-Op Day 1  Result Date: 5/16/2025  Impression: No evidence of a leak. Workstation  performed: [id number]         Complications: None    Condition at Discharge: good       Discharge instructions/Information to patient and family:   See After Visit Summary (AVS) for information provided to patient and family.      Provisions for Follow-Up Care:  See after visit summary for information related to follow-up care and any pertinent home health orders.      PCP: Evelyn Tang DO    Disposition: Home    Planned Readmission: No     Discharge Medications:  See after visit summary for reconciled discharge medications provided to patient and family.      Discharge Statement:  I have spent a total time of 30 minutes in caring for this patient on the day of the visit/encounter. .

## 2025-05-19 ENCOUNTER — APPOINTMENT (OUTPATIENT)
Dept: LAB | Facility: HOSPITAL | Age: 55
End: 2025-05-19
Payer: COMMERCIAL

## 2025-05-19 DIAGNOSIS — D68.51 FACTOR 5 LEIDEN MUTATION, HETEROZYGOUS (HCC): ICD-10-CM

## 2025-05-19 DIAGNOSIS — I82.592 CHRONIC DEEP VEIN THROMBOSIS (DVT) OF OTHER VEIN OF LEFT LOWER EXTREMITY (HCC): ICD-10-CM

## 2025-05-19 LAB
INR PPP: 1.19 (ref 0.85–1.19)
PROTHROMBIN TIME: 15.6 SECONDS (ref 12.3–15)

## 2025-05-19 PROCEDURE — 85610 PROTHROMBIN TIME: CPT

## 2025-05-19 PROCEDURE — 36415 COLL VENOUS BLD VENIPUNCTURE: CPT

## 2025-05-20 ENCOUNTER — RESULTS FOLLOW-UP (OUTPATIENT)
Dept: FAMILY MEDICINE CLINIC | Facility: CLINIC | Age: 55
End: 2025-05-20

## 2025-05-20 ENCOUNTER — ANTICOAG VISIT (OUTPATIENT)
Dept: FAMILY MEDICINE CLINIC | Facility: CLINIC | Age: 55
End: 2025-05-20

## 2025-05-20 ENCOUNTER — TELEPHONE (OUTPATIENT)
Dept: FAMILY MEDICINE CLINIC | Facility: CLINIC | Age: 55
End: 2025-05-20

## 2025-05-25 ENCOUNTER — APPOINTMENT (OUTPATIENT)
Dept: LAB | Facility: HOSPITAL | Age: 55
End: 2025-05-25
Payer: COMMERCIAL

## 2025-05-25 DIAGNOSIS — I82.592 CHRONIC DEEP VEIN THROMBOSIS (DVT) OF OTHER VEIN OF LEFT LOWER EXTREMITY (HCC): ICD-10-CM

## 2025-05-25 DIAGNOSIS — D64.9 ANEMIA, UNSPECIFIED TYPE: ICD-10-CM

## 2025-05-25 LAB
INR PPP: 1.76 (ref 0.85–1.19)
PROTHROMBIN TIME: 21 SECONDS (ref 12.3–15)

## 2025-05-25 PROCEDURE — 36415 COLL VENOUS BLD VENIPUNCTURE: CPT

## 2025-05-25 PROCEDURE — 85610 PROTHROMBIN TIME: CPT

## 2025-05-26 ENCOUNTER — RESULTS FOLLOW-UP (OUTPATIENT)
Dept: FAMILY MEDICINE CLINIC | Facility: CLINIC | Age: 55
End: 2025-05-26

## 2025-05-27 ENCOUNTER — ANTICOAG VISIT (OUTPATIENT)
Dept: FAMILY MEDICINE CLINIC | Facility: CLINIC | Age: 55
End: 2025-05-27

## 2025-05-28 ENCOUNTER — APPOINTMENT (OUTPATIENT)
Dept: LAB | Facility: HOSPITAL | Age: 55
End: 2025-05-28
Attending: FAMILY MEDICINE
Payer: COMMERCIAL

## 2025-05-28 DIAGNOSIS — I82.592 CHRONIC DEEP VEIN THROMBOSIS (DVT) OF OTHER VEIN OF LEFT LOWER EXTREMITY (HCC): ICD-10-CM

## 2025-05-28 LAB
INR PPP: 2.45 (ref 0.85–1.19)
PROTHROMBIN TIME: 26.8 SECONDS (ref 12.3–15)

## 2025-05-28 PROCEDURE — 85610 PROTHROMBIN TIME: CPT

## 2025-05-28 PROCEDURE — 36415 COLL VENOUS BLD VENIPUNCTURE: CPT

## 2025-05-29 ENCOUNTER — RESULTS FOLLOW-UP (OUTPATIENT)
Dept: FAMILY MEDICINE CLINIC | Facility: CLINIC | Age: 55
End: 2025-05-29

## 2025-05-29 ENCOUNTER — ANTICOAG VISIT (OUTPATIENT)
Dept: FAMILY MEDICINE CLINIC | Facility: CLINIC | Age: 55
End: 2025-05-29

## 2025-06-02 ENCOUNTER — OFFICE VISIT (OUTPATIENT)
Age: 55
End: 2025-06-02
Payer: COMMERCIAL

## 2025-06-02 VITALS — WEIGHT: 225 LBS | BODY MASS INDEX: 33.33 KG/M2 | HEIGHT: 69 IN

## 2025-06-02 DIAGNOSIS — M76.72 PERONEAL TENDINITIS OF LEFT LOWER EXTREMITY: Primary | ICD-10-CM

## 2025-06-02 PROCEDURE — 99213 OFFICE O/P EST LOW 20 MIN: CPT | Performed by: STUDENT IN AN ORGANIZED HEALTH CARE EDUCATION/TRAINING PROGRAM

## 2025-06-02 NOTE — PROGRESS NOTES
"[facility] Office Visit    ASSESSMENT     Diagnoses and all orders for this visit:    Peroneal tendinitis of left lower extremity  -     Ambulatory referral to Physical Therapy; Future           Problem List Items Addressed This Visit    None  Visit Diagnoses         Peroneal tendinitis of left lower extremity    -  Primary    Relevant Orders    Ambulatory referral to Physical Therapy            PLAN  -The patient and I thoroughly discussed their ankle pain  -Continue ASO, Voltaren gel, supportive sneakers with inserts at this time  - Rx physical therapy  - Return to clinic 6 weeks, will consider corticosteroid injection versus MRI at this time    SUBJECTIVE    Chief Complaint:  Left foot pain     Patient ID: Edward Leon is a pleasant 55 year old male who presents today for left foot pain. He states that he was on vacation in [place] about 2 weeks ago. While walking on a flat surface and he heard his foot pop. When this happened his foot became very swollen.     6/2/2025: Edward is overall doing well today. He states that his left foot has been feeling a little better. He does mention that he still has occasional sharp, stabbing pain.             The following portions of the patient's history were reviewed and updated as appropriate: allergies, current medications, past family history, past medical history, past social history, past surgical history and problem list.    Review of Systems   Constitutional: Negative.    Respiratory: Negative.     Cardiovascular: Negative.    Gastrointestinal: Negative.    Genitourinary: Negative.    Musculoskeletal:  Positive for arthralgias.   Skin: Negative.          OBJECTIVE      Ht 5' 9\" (1.753 m)   Wt 102 kg (225 lb)   BMI 33.23 kg/m²        Physical Exam  Constitutional:       Appearance: Normal appearance.   HENT:      Head: Normocephalic and atraumatic.     Eyes:      General:         Right eye: No discharge.         Left eye: No " discharge.       Cardiovascular:      Rate and Rhythm: Normal rate and regular rhythm.      Pulses:           Dorsalis pedis pulses are 2+ on the right side and 2+ on the left side.        Posterior tibial pulses are 2+ on the right side and 2+ on the left side.   Pulmonary:      Effort: Pulmonary effort is normal.      Breath sounds: Normal breath sounds.     Skin:     General: Skin is warm.      Capillary Refill: Capillary refill takes less than 2 seconds.     Neurological:      Mental Status: He is alert and oriented to person, place, and time.      Sensory: Sensation is intact. No sensory deficit.     Psychiatric:         Mood and Affect: Mood normal.           Vascular:  -DP and PT pulses intact b/l  -Capillary refill time <2 sec b/l  -Digital hair growth: Present  -Skin temp: WNL    MSK:  -Pain on palpation to the left ankle just inferior to the lateral malleolus continuing distally towards the fifth metatarsal base at the insertion site of the peroneus brevis  -Pain with plantarflexion and eversion against resistance  -No gross deformities noted   -MMT is 5/5 to all muscle compartments of the lower extremity  -Ankle dorsiflexion >10 degrees with knee extended and knee flexed b/l    Neuro:  -Light sensation intact bilaterally  -Protective sensation intact bilaterally    Derm:  -No lesions, abrasions, or open wounds noted  -No noted interdigital maceration, peeling, malodor  -No callus formation noted on exam

## 2025-06-03 ENCOUNTER — OFFICE VISIT (OUTPATIENT)
Dept: CARDIAC SURGERY | Facility: CLINIC | Age: 55
End: 2025-06-03

## 2025-06-03 VITALS
RESPIRATION RATE: 18 BRPM | DIASTOLIC BLOOD PRESSURE: 80 MMHG | HEIGHT: 69 IN | SYSTOLIC BLOOD PRESSURE: 126 MMHG | BODY MASS INDEX: 31.48 KG/M2 | OXYGEN SATURATION: 98 % | TEMPERATURE: 97.7 F | HEART RATE: 80 BPM | WEIGHT: 212.52 LBS

## 2025-06-03 DIAGNOSIS — K44.9 HIATAL HERNIA: Primary | ICD-10-CM

## 2025-06-03 PROCEDURE — 99024 POSTOP FOLLOW-UP VISIT: CPT | Performed by: THORACIC SURGERY (CARDIOTHORACIC VASCULAR SURGERY)

## 2025-06-03 NOTE — ASSESSMENT & PLAN NOTE
Mr. Conrad is doing very well after paraesophageal hernia repair and partial fundoplication.  He will continue to liberalize his diet and he can start trying breads and larger meats.  He will continue to avoid carbonated beverages.  I have cleared him to return to work on June 13.  We will see him back in 4 weeks for a standard postoperative appointment.

## 2025-06-03 NOTE — PROGRESS NOTES
"Name: Edward Conrad      : 1970      MRN: [id number]  Encounter Provider: Galindo Izaguirre MD  Encounter Date: 6/3/2025   Encounter department: [facility]  :  Assessment & Plan  Hiatal hernia  Mr. Conrad is doing very well after paraesophageal hernia repair and partial fundoplication.  He will continue to liberalize his diet and he can start trying breads and larger meats.  He will continue to avoid carbonated beverages.  I have cleared him to return to work on .  We will see him back in 4 weeks for a standard postoperative appointment.           Thoracic History     Problem   Hiatal Hernia    Procedure/Onset: 2007          History of Present Illness   HPI  Edward Conrad is a 55 y.o. male who returns to the office for routine postsurgical care.  He is now approximately 2 weeks out from a laparoscopic repair of a type III paraesophageal hernia with a toupet fundoplication.  Overall, he has done very well.  He is been eating spaghetti, ground beef, cooked vegetables without dysphagia.  He has been focusing on smaller portions.  He has noted a gassy feeling with both abdominal rumbling and flatulence.  He denies heartburn or regurgitant symptoms.  He denies fever    Review of Systems        Objective   /80 (Patient Position: Sitting, Cuff Size: Large)   Pulse 80   Temp 97.7 °F (36.5 °C) (Temporal)   Resp 18   Ht 5' 9\" (1.753 m)   Wt 96.4 kg (212 lb 8.4 oz)   SpO2 98%   BMI 31.38 kg/m²     Pain Screening:     ECOG ECOG Performance Status: 1 - Restricted in physically strenuous activity but ambulatory and able to carry out work of a light or sedentary nature, e.g., light house work, office work  Physical Exam  Pulmonary:      Effort: Pulmonary effort is normal.   Abdominal:      General: Abdomen is flat.      Palpations: Abdomen is soft.      Tenderness: There is no abdominal tenderness.      Comments: Incisions are healing well         Labs: "       Pathology: I have reviewed pathology reports described above.

## 2025-06-09 ENCOUNTER — EVALUATION (OUTPATIENT)
Dept: PHYSICAL THERAPY | Facility: CLINIC | Age: 55
End: 2025-06-09
Attending: STUDENT IN AN ORGANIZED HEALTH CARE EDUCATION/TRAINING PROGRAM
Payer: COMMERCIAL

## 2025-06-09 DIAGNOSIS — R26.81 UNSTEADY GAIT: ICD-10-CM

## 2025-06-09 DIAGNOSIS — M25.572 ACUTE LEFT ANKLE PAIN: Primary | ICD-10-CM

## 2025-06-09 PROCEDURE — 97140 MANUAL THERAPY 1/> REGIONS: CPT

## 2025-06-09 PROCEDURE — 97161 PT EVAL LOW COMPLEX 20 MIN: CPT

## 2025-06-09 NOTE — PROGRESS NOTES
PT Evaluation     Today's date: 2025  Patient name: Edward Conrad  : 1970  MRN: [id number]  Referring provider: Dayday Moreno DPM  Dx:   Encounter Diagnosis     ICD-10-CM    1. Acute left ankle pain  M25.572       2. Unsteady gait  R26.81                      Assessment  Impairments: abnormal gait, activity intolerance, impaired physical strength, pain with function and activity limitations  Symptom irritability: low    Assessment details: Edward presented with lateral left foot pain at the base of the 5th metatarsal and volarly of lateral ankle for the last month. He presented with point tenderness at the base of the 5th met with hypomobility of the joint. He has increased irritation to his ATFL and lateral peroneal musculature that looks to be a result of joint irritation to the lateral foot. Edward responded well to KT taping and mobilization to the area. He has abnormalities with push off on gait and difficulties with balance that could place his foot in an abnormal position during ambulation. At this time Edward can benefit from skilled PT to address the aforementioned impairments and return to their prior level of function.    Understanding of Dx/Px/POC: good     Prognosis: good    Goals  STG within 4 weeks  1. Decrease pain at worst to 2/10  2. Able to ambulate on unstable surfaces without sharp pain  3.Demonstrate I with HEP     LTG by D/C  1. Meet all personal goals regarding foot symptoms  2. Increase FOTO score to 80/100  3. No difficulty with ambulation and work/recreational activities.       Plan  Patient would benefit from: skilled physical therapy    Planned therapy interventions: therapeutic exercise, therapeutic activities, stretching, strengthening, self care, nerve gliding, manual therapy, kinesiology taping, IASTM, home exercise program, graded exercise, balance/weight bearing training and activity modification    Frequency: 2x week  Duration in weeks: 6  Plan of Care beginning date:  2025  Plan of Care expiration date: 2025  Treatment plan discussed with: patient  Plan details: Edward can benefit from Therapeutic exercises/activities and manual therapy to return and improve on their prior level of function.           Subjective Evaluation    History of Present Illness  Date of onset: 2025  Mechanism of injury: Edward was away on vacation while he was in [place] when he was on an unstable surface when he heard and felt his left ankle pop with pain that shot up his lateral leg. He continued to ambulate on it for the rest of the vacation. When he returned home he had surgery for another issue and was able to rest his foot with no change in symptoms.           Not a recurrent problem   Quality of life: good    Patient Goals  Patient goals for therapy: decreased pain and improved balance    Pain  Current pain ratin  At best pain ratin  At worst pain ratin  Quality: needle-like and sharp  Relieving factors: relaxation and rest  Progression: no change      Diagnostic Tests  X-ray: normal  Treatments  Current treatment: physical therapy        Objective     Observations   Left Ankle/Foot   Positive for edema and spasms.     Palpation     Additional Palpation Details  Increased tenderness to peroneus longus and to the base of the 5th metatarsal. Decreased joint mobility to 5th metatarsal.     Neurological Testing     Sensation     Ankle/Foot   Left Ankle/Foot   Intact: light touch    Right Ankle/Foot   Intact: light touch     Active Range of Motion   Left Ankle/Foot   Normal active range of motion    Right Ankle/Foot   Normal active range of motion    Passive Range of Motion   Left Ankle/Foot  Normal passive range of motion    Right Ankle/Foot  Normal passive range of motion    Joint Play   Left Ankle/Foot  Joints within functional limits are the fibular head, proximal tibiofibular joint, distal tibiofibular joint, talocrural joint and subtalar joint. Hypomobile in the midfoot and  forefoot.     Strength/Myotome Testing     Left Ankle/Foot   Normal strength    Right Ankle/Foot   Normal strength    Additional Strength Details  Normal strength with increased pain in lateral foot with resisted eversion.     Tests   Left Ankle/Foot   Positive for navicular drop and forefoot varus.   Abnormality of gait with decreased push off and increased pronation in midstance with navicular drop.     SLS 4 seconds B/L max duration with no increased pain.           Insurance Eval/ Re-eval POC expires Auth Status Total visits  Start date  Expiration date Misc                           Date 6-9-25        Visit Number 1        Auth                  Manual          KT Tape, 5th Met Mobs                                            TherEx                                                                                 TherAct

## 2025-06-09 NOTE — LETTER
2025    Dayday Moreno DPM  [address] [facility] [address]    Patient: Edward Conrad   YOB: 1970   Date of Visit: 2025     Encounter Diagnosis     ICD-10-CM    1. Acute left ankle pain  M25.572       2. Unsteady gait  R26.81           Dear Dr. Dayday Moreno DPM:    Thank you for your recent referral of Edward Conrad. Please review the attached evaluation summary from Edward's recent visit.     Please verify that you agree with the plan of care by signing the attached order.     If you have any questions or concerns, please do not hesitate to call.     I sincerely appreciate the opportunity to share in the care of one of your patients and hope to have another opportunity to work with you in the near future.       Sincerely,    Bennie Fowler, PT      Referring Provider:      I certify that I have read the below Plan of Care and certify the need for these services furnished under this plan of treatment while under my care.                    Dayday Moreno DPM  [address] [place] [id number]  Via In Basket          PT Evaluation     Today's date: 2025  Patient name: Edward Conrad  : 1970  MRN: [id number]  Referring provider: Dayday Moreno DPM  Dx:   Encounter Diagnosis     ICD-10-CM    1. Acute left ankle pain  M25.572       2. Unsteady gait  R26.81                      Assessment  Impairments: abnormal gait, activity intolerance, impaired physical strength, pain with function and activity limitations  Symptom irritability: low    Assessment details: Edward presented with lateral left foot pain at the base of the 5th metatarsal and volarly of lateral ankle for the last month. He presented with point tenderness at the base of the 5th met with hypomobility of the joint. He has increased irritation to his ATFL and lateral peroneal musculature that looks to be a result of joint irritation to the lateral foot. Edward responded well to KT taping and mobilization  to the area. He has abnormalities with push off on gait and difficulties with balance that could place his foot in an abnormal position during ambulation. At this time Edward can benefit from skilled PT to address the aforementioned impairments and return to their prior level of function.    Understanding of Dx/Px/POC: good     Prognosis: good    Goals  STG within 4 weeks  1. Decrease pain at worst to 2/10  2. Able to ambulate on unstable surfaces without sharp pain  3.Demonstrate I with HEP     LTG by D/C  1. Meet all personal goals regarding foot symptoms  2. Increase FOTO score to 80/100  3. No difficulty with ambulation and work/recreational activities.       Plan  Patient would benefit from: skilled physical therapy    Planned therapy interventions: therapeutic exercise, therapeutic activities, stretching, strengthening, self care, nerve gliding, manual therapy, kinesiology taping, IASTM, home exercise program, graded exercise, balance/weight bearing training and activity modification    Frequency: 2x week  Duration in weeks: 6  Plan of Care beginning date: 2025  Plan of Care expiration date: 2025  Treatment plan discussed with: patient  Plan details: Edward can benefit from Therapeutic exercises/activities and manual therapy to return and improve on their prior level of function.           Subjective Evaluation    History of Present Illness  Date of onset: 2025  Mechanism of injury: Edward was away on vacation while he was in [place] when he was on an unstable surface when he heard and felt his left ankle pop with pain that shot up his lateral leg. He continued to ambulate on it for the rest of the vacation. When he returned home he had surgery for another issue and was able to rest his foot with no change in symptoms.           Not a recurrent problem   Quality of life: good    Patient Goals  Patient goals for therapy: decreased pain and improved balance    Pain  Current pain ratin  At best pain  ratin  At worst pain ratin  Quality: needle-like and sharp  Relieving factors: relaxation and rest  Progression: no change      Diagnostic Tests  X-ray: normal  Treatments  Current treatment: physical therapy        Objective     Observations   Left Ankle/Foot   Positive for edema and spasms.     Palpation     Additional Palpation Details  Increased tenderness to peroneus longus and to the base of the 5th metatarsal. Decreased joint mobility to 5th metatarsal.     Neurological Testing     Sensation     Ankle/Foot   Left Ankle/Foot   Intact: light touch    Right Ankle/Foot   Intact: light touch     Active Range of Motion   Left Ankle/Foot   Normal active range of motion    Right Ankle/Foot   Normal active range of motion    Passive Range of Motion   Left Ankle/Foot  Normal passive range of motion    Right Ankle/Foot  Normal passive range of motion    Joint Play   Left Ankle/Foot  Joints within functional limits are the fibular head, proximal tibiofibular joint, distal tibiofibular joint, talocrural joint and subtalar joint. Hypomobile in the midfoot and forefoot.     Strength/Myotome Testing     Left Ankle/Foot   Normal strength    Right Ankle/Foot   Normal strength    Additional Strength Details  Normal strength with increased pain in lateral foot with resisted eversion.     Tests   Left Ankle/Foot   Positive for navicular drop and forefoot varus.   Abnormality of gait with decreased push off and increased pronation in midstance with navicular drop.     SLS 4 seconds B/L max duration with no increased pain.           Insurance Eval/ Re-eval POC expires Auth Status Total visits  Start date  Expiration date Misc                           Date 25        Visit Number 1        Auth                  Manual          KT Tape, 5th Met Vivian                                            TherEx                                                                                 TherAct

## 2025-06-11 ENCOUNTER — OFFICE VISIT (OUTPATIENT)
Dept: PHYSICAL THERAPY | Facility: CLINIC | Age: 55
End: 2025-06-11
Attending: STUDENT IN AN ORGANIZED HEALTH CARE EDUCATION/TRAINING PROGRAM
Payer: COMMERCIAL

## 2025-06-11 DIAGNOSIS — R26.81 UNSTEADY GAIT: ICD-10-CM

## 2025-06-11 DIAGNOSIS — M25.572 ACUTE LEFT ANKLE PAIN: Primary | ICD-10-CM

## 2025-06-11 PROCEDURE — 97140 MANUAL THERAPY 1/> REGIONS: CPT

## 2025-06-11 PROCEDURE — 97110 THERAPEUTIC EXERCISES: CPT

## 2025-06-12 ENCOUNTER — RESULTS FOLLOW-UP (OUTPATIENT)
Dept: FAMILY MEDICINE CLINIC | Facility: CLINIC | Age: 55
End: 2025-06-12

## 2025-06-12 ENCOUNTER — ANTICOAG VISIT (OUTPATIENT)
Dept: FAMILY MEDICINE CLINIC | Facility: CLINIC | Age: 55
End: 2025-06-12

## 2025-06-12 ENCOUNTER — APPOINTMENT (OUTPATIENT)
Dept: LAB | Facility: HOSPITAL | Age: 55
End: 2025-06-12
Attending: PHYSICIAN ASSISTANT
Payer: COMMERCIAL

## 2025-06-12 DIAGNOSIS — I82.592 CHRONIC DEEP VEIN THROMBOSIS (DVT) OF OTHER VEIN OF LEFT LOWER EXTREMITY (HCC): ICD-10-CM

## 2025-06-12 LAB
BASOPHILS # BLD AUTO: 0.03 THOUSANDS/ÂΜL (ref 0–0.1)
BASOPHILS NFR BLD AUTO: 1 % (ref 0–1)
EOSINOPHIL # BLD AUTO: 0.35 THOUSAND/ÂΜL (ref 0–0.61)
EOSINOPHIL NFR BLD AUTO: 6 % (ref 0–6)
ERYTHROCYTE [DISTWIDTH] IN BLOOD BY AUTOMATED COUNT: 12.9 % (ref 11.6–15.1)
HCT VFR BLD AUTO: 43.5 % (ref 36.5–49.3)
HGB BLD-MCNC: 14.3 G/DL (ref 12–17)
IMM GRANULOCYTES # BLD AUTO: 0.01 THOUSAND/UL (ref 0–0.2)
IMM GRANULOCYTES NFR BLD AUTO: 0 % (ref 0–2)
INR PPP: 3.48 (ref 0.85–1.19)
LYMPHOCYTES # BLD AUTO: 1.86 THOUSANDS/ÂΜL (ref 0.6–4.47)
LYMPHOCYTES NFR BLD AUTO: 32 % (ref 14–44)
MCH RBC QN AUTO: 29.9 PG (ref 26.8–34.3)
MCHC RBC AUTO-ENTMCNC: 32.9 G/DL (ref 31.4–37.4)
MCV RBC AUTO: 91 FL (ref 82–98)
MONOCYTES # BLD AUTO: 0.64 THOUSAND/ÂΜL (ref 0.17–1.22)
MONOCYTES NFR BLD AUTO: 11 % (ref 4–12)
NEUTROPHILS # BLD AUTO: 2.85 THOUSANDS/ÂΜL (ref 1.85–7.62)
NEUTS SEG NFR BLD AUTO: 50 % (ref 43–75)
NRBC BLD AUTO-RTO: 0 /100 WBCS
PLATELET # BLD AUTO: 197 THOUSANDS/UL (ref 149–390)
PMV BLD AUTO: 9.7 FL (ref 8.9–12.7)
PROTHROMBIN TIME: 35 SECONDS (ref 12.3–15)
RBC # BLD AUTO: 4.79 MILLION/UL (ref 3.88–5.62)
WBC # BLD AUTO: 5.74 THOUSAND/UL (ref 4.31–10.16)

## 2025-06-12 PROCEDURE — 85610 PROTHROMBIN TIME: CPT

## 2025-06-12 PROCEDURE — 85025 COMPLETE CBC W/AUTO DIFF WBC: CPT

## 2025-06-12 PROCEDURE — 36415 COLL VENOUS BLD VENIPUNCTURE: CPT

## 2025-06-12 NOTE — PROGRESS NOTES
"Daily Note     Today's date: 2025  Patient name: Edward Conrad  : 1970  MRN: [id number]  Referring provider: Dayday Moreno DPM  Dx:   Encounter Diagnosis     ICD-10-CM    1. Acute left ankle pain  M25.572       2. Unsteady gait  R26.81                      Subjective: Edward said that the taping of his foot didn't make much of a difference, but that he also hasn't been having much of the sharp pain on the side of his foot.      Objective: See treatment diary below      Assessment: Good initial tolerance of exercises and balance with no increase in pain. He had good lateral mobility of his ankle with sidestepping. MTT with good 5th met mobility as well as good tolerance of mobs to talocrural/subtalar joints.       Plan: Continue per plan of care.        Insurance Eval/ Re-eval POC expires Auth Status Total visits  Start date  Expiration date Misc                           Date 25       Visit Number 1 2       Auth                  Manual          KT Tape, 5th Met Mobs 5th met and ankle mobs                                           TherEx           Sidestepping 12.5# x 5    Retrowalking 37# x 10    SLS airex 20 seconds x 5    Step forwards x30    Step downs 4\" x 20           TherAct                                  "

## 2025-06-16 ENCOUNTER — OFFICE VISIT (OUTPATIENT)
Dept: PHYSICAL THERAPY | Facility: CLINIC | Age: 55
End: 2025-06-16
Attending: STUDENT IN AN ORGANIZED HEALTH CARE EDUCATION/TRAINING PROGRAM
Payer: COMMERCIAL

## 2025-06-16 DIAGNOSIS — M25.572 ACUTE LEFT ANKLE PAIN: Primary | ICD-10-CM

## 2025-06-16 DIAGNOSIS — R26.81 UNSTEADY GAIT: ICD-10-CM

## 2025-06-16 PROCEDURE — 97140 MANUAL THERAPY 1/> REGIONS: CPT

## 2025-06-16 PROCEDURE — 97110 THERAPEUTIC EXERCISES: CPT

## 2025-06-16 NOTE — PROGRESS NOTES
"Daily Note     Today's date: 2025  Patient name: Edward Conrad  : 1970  MRN: [id number]  Referring provider: Dayday Moreno DPM  Dx:   Encounter Diagnosis     ICD-10-CM    1. Acute left ankle pain  M25.572       2. Unsteady gait  R26.81                      Subjective: Edward said that he was really sore on the bottom of his foot after last time and that he had a lot of pain while he was at work.       Objective: See treatment diary below      Assessment: Good response to cuboid whip with reduction of sharp symptoms in the bottom of his foot. Good control of multidirectional stepping and kneeling with good pressure through his foot and no increased pain.      Plan: Continue per plan of care.        Insurance Eval/ Re-eval POC expires Auth Status Total visits  Start date  Expiration date Misc                           Date 25      Visit Number 1 2 3      Auth                  Manual          KT Tape, 5th Met Mobs 5th met and ankle mobs Cuboid Whip                                          TherEx           Sidestepping 12.5# x 5    Retrowalking 37# x 10    SLS airex 20 seconds x 5    Step forwards x30    Step downs 4\" x 20     Sidestepping 12.5# x 5    Retrowalking 37# x 10    SLS airex 20 seconds x 5    TRX Kneeling x 20    Step up, over and back x 40    Rockerboard x 40      TherAct                                  "

## 2025-06-18 ENCOUNTER — OFFICE VISIT (OUTPATIENT)
Dept: PHYSICAL THERAPY | Facility: CLINIC | Age: 55
End: 2025-06-18
Attending: STUDENT IN AN ORGANIZED HEALTH CARE EDUCATION/TRAINING PROGRAM
Payer: COMMERCIAL

## 2025-06-18 DIAGNOSIS — R26.81 UNSTEADY GAIT: ICD-10-CM

## 2025-06-18 DIAGNOSIS — M25.572 ACUTE LEFT ANKLE PAIN: Primary | ICD-10-CM

## 2025-06-18 PROCEDURE — 97140 MANUAL THERAPY 1/> REGIONS: CPT

## 2025-06-18 PROCEDURE — 97110 THERAPEUTIC EXERCISES: CPT

## 2025-06-18 NOTE — PROGRESS NOTES
"Daily Note     Today's date: 2025  Patient name: Edward Conrad  : 1970  MRN: [id number]  Referring provider: Dayday Moreno DPM  Dx:   Encounter Diagnosis     ICD-10-CM    1. Acute left ankle pain  M25.572       2. Unsteady gait  R26.81                      Subjective: Edward said that he feels a lot better after last session with the only pain remaining while doing SLS barefoot.      Objective: See treatment diary below      Assessment: Good response to cuboid whip with reduction of remaining sharp symptoms in the bottom of his foot. Good control of multidirectional stepping and kneeling with good pressure through his foot and no increased pain.      Plan: Continue per plan of care.        Insurance Eval/ Re-eval POC expires Auth Status Total visits  Start date  Expiration date Misc                           Date 25     Visit Number 1 2 3 4     Auth                  Manual          KT Tape, 5th Met Mobs 5th met and ankle mobs Cuboid Whip Cuboid Whip                                         TherEx           Sidestepping 12.5# x 5    Retrowalking 37# x 10    SLS airex 20 seconds x 5    Step forwards x30    Step downs 4\" x 20     Sidestepping 12.5# x 5    Retrowalking 37# x 10    SLS airex 20 seconds x 5    TRX Kneeling x 20    Step up, over and back x 40    Rockerboard x 40 Sidestepping 12.5# x 5    Retrowalking 37# x 10    SLS airex 20 seconds x 5    TRX Kneeling x 20    Step up, over and back x 40    Rockerboard x 40    Pro stretch x 40    Step down heel tap x 20     TherAct                                  "

## 2025-06-19 ENCOUNTER — ANTICOAG VISIT (OUTPATIENT)
Dept: FAMILY MEDICINE CLINIC | Facility: CLINIC | Age: 55
End: 2025-06-19

## 2025-06-19 ENCOUNTER — RESULTS FOLLOW-UP (OUTPATIENT)
Dept: FAMILY MEDICINE CLINIC | Facility: CLINIC | Age: 55
End: 2025-06-19

## 2025-06-19 ENCOUNTER — APPOINTMENT (OUTPATIENT)
Dept: LAB | Facility: HOSPITAL | Age: 55
End: 2025-06-19
Attending: FAMILY MEDICINE
Payer: COMMERCIAL

## 2025-06-19 DIAGNOSIS — I82.592 CHRONIC DEEP VEIN THROMBOSIS (DVT) OF OTHER VEIN OF LEFT LOWER EXTREMITY (HCC): ICD-10-CM

## 2025-06-19 LAB
INR PPP: 2.33 (ref 0.85–1.19)
PROTHROMBIN TIME: 25.9 SECONDS (ref 12.3–15)

## 2025-06-19 PROCEDURE — 85610 PROTHROMBIN TIME: CPT

## 2025-06-19 PROCEDURE — 36415 COLL VENOUS BLD VENIPUNCTURE: CPT

## 2025-06-23 ENCOUNTER — OFFICE VISIT (OUTPATIENT)
Dept: PHYSICAL THERAPY | Facility: CLINIC | Age: 55
End: 2025-06-23
Attending: STUDENT IN AN ORGANIZED HEALTH CARE EDUCATION/TRAINING PROGRAM
Payer: COMMERCIAL

## 2025-06-23 DIAGNOSIS — R26.81 UNSTEADY GAIT: ICD-10-CM

## 2025-06-23 DIAGNOSIS — M25.572 ACUTE LEFT ANKLE PAIN: Primary | ICD-10-CM

## 2025-06-23 PROCEDURE — 97110 THERAPEUTIC EXERCISES: CPT

## 2025-06-23 PROCEDURE — 97140 MANUAL THERAPY 1/> REGIONS: CPT

## 2025-06-23 NOTE — PROGRESS NOTES
"Daily Note     Today's date: 2025  Patient name: Edward Conrad  : 1970  MRN: [id number]  Referring provider: Dayday Moreno DPM  Dx:   Encounter Diagnosis     ICD-10-CM    1. Acute left ankle pain  M25.572       2. Unsteady gait  R26.81           Start Time: 1745  Stop Time: 1830  Total time in clinic (min): 45 minutes    Subjective: Edward states the cuboid whip helps to reduce his symptoms. Reports intermittent sharp pain in his L plantar foot with ambulation, especially on uneven surfaces and when walking barefoot. Reports some increased soreness post last session which resolved after a few days. However, explains symptoms continue to reduce in frequency.       Objective: See treatment diary below    L cuboid TTP      Assessment: Session focused on mid-foot mobility and L foot/ankle strengthening. Session tolerated well without an increase in reported symptoms. Cues to maintain good foot posture during strengthening. Today's session ended with appropriate muscular fatigue. Will continue to strengthen as able to maximize patient function.       Plan: Continue per plan of care.        Insurance Eval/ Re-eval POC expires Auth Status Total visits  Start date  Expiration date Misc                           Date 25    Visit Number 1 2 3 4 5    Auth                  Manual          KT Tape, 5th Met Mobs 5th met and ankle mobs Cuboid Whip Cuboid Whip Cuboid Whip    Mid foot joint mobilizations grades 2-4                                        TherEx           Sidestepping 12.5# x 5    Retrowalking 37# x 10    SLS airex 20 seconds x 5    Step forwards x30    Step downs 4\" x 20     Sidestepping 12.5# x 5    Retrowalking 37# x 10    SLS airex 20 seconds x 5    TRX Kneeling x 20    Step up, over and back x 40    Rockerboard x 40 Sidestepping 12.5# x 5    Retrowalking 37# x 10    SLS airex 20 seconds x 5    TRX Kneeling x 20    Step up, over and back x 40    Rockerboard x " 40    Pro stretch x 40    Step down heel tap x 20 Sidestepping 12.5# x 5    Retrowalking 37# x 10    Forward resisted walking 37# x 10    SLS airex 20 seconds x 5    TRX Kneeling x 20    Step up, over and back x 40    Rockerboard x 40    Rocker-board balance 30s x 2    Pro stretch x 40    Step down heel tap x 20    TherAct

## 2025-06-25 ENCOUNTER — OFFICE VISIT (OUTPATIENT)
Dept: PHYSICAL THERAPY | Facility: CLINIC | Age: 55
End: 2025-06-25
Attending: STUDENT IN AN ORGANIZED HEALTH CARE EDUCATION/TRAINING PROGRAM
Payer: COMMERCIAL

## 2025-06-25 DIAGNOSIS — M25.572 ACUTE LEFT ANKLE PAIN: Primary | ICD-10-CM

## 2025-06-25 DIAGNOSIS — R26.81 UNSTEADY GAIT: ICD-10-CM

## 2025-06-25 PROCEDURE — 97140 MANUAL THERAPY 1/> REGIONS: CPT

## 2025-06-25 PROCEDURE — 97110 THERAPEUTIC EXERCISES: CPT

## 2025-06-25 NOTE — PROGRESS NOTES
Daily Note     Today's date: 2025  Patient name: Edward Conrad  : 1970  MRN: [id number]  Referring provider: Dayday Moreno DPM  Dx:   Encounter Diagnosis     ICD-10-CM    1. Acute left ankle pain  M25.572       2. Unsteady gait  R26.81       Start Time: 1610  Stop Time: 1655  Total time in clinic (min): 45 minutes    Subjective: Reports an one episode of pain in his L foot yesterday when walking in his pool, reports his pain lasted for few seconds. Reports mild discomfort currently, reports it started when sitting in his truck about 45 minutes ago. Explains his symptoms keep coming and going at random with ambulation. Reports symptoms are reducing in frequency and intensity. Denies soreness post last session.      Objective: See treatment diary below    L cuboid TTP, reduced pain with palpation post manual therapy       Assessment: Session focused on mid-foot mobility and L foot/ankle strengthening. Session tolerated well with intermittent symptoms during side stepping and the rocker-board which mitigated immediately with rest. Continues to require moderate verbal cues to maintain good foot posture during strengthening. Continues to respond well to manual therapy. Today's session ended with appropriate muscular fatigue. Will continue to strengthen as able to maximize patient function.       Plan: Continue per plan of care.        Insurance Eval/ Re-eval POC expires Auth Status Total visits  Start date  Expiration date Misc                           Date 25   Visit Number 1 2 3 4 5 6   Auth                  Manual          KT Tape, 5th Met Mobs 5th met and ankle mobs Cuboid Whip Cuboid Whip Cuboid Whip    Mid foot joint mobilizations grades 2-4 Cuboid Whip    Mid foot joint mobilizations grades 2-4                                       TherEx           Sidestepping 12.5# x 5    Retrowalking 37# x 10    SLS airex 20 seconds x 5    Step forwards x30    Step  "downs 4\" x 20     Sidestepping 12.5# x 5    Retrowalking 37# x 10    SLS airex 20 seconds x 5    TRX Kneeling x 20    Step up, over and back x 40    Rockerboard x 40 Sidestepping 12.5# x 5    Retrowalking 37# x 10    SLS airex 20 seconds x 5    TRX Kneeling x 20    Step up, over and back x 40    Rockerboard x 40    Pro stretch x 40    Step down heel tap x 20 Sidestepping 12.5# x 5    Retrowalking 37# x 10    Forward resisted walking 37# x 10    SLS airex 20 seconds x 5    TRX Kneeling x 20    Step up, over and back x 40    Rockerboard x 40    Rocker-board balance 30s x 2    Pro stretch x 40    Step down heel tap x 20 Sidestepping 12.5# x 5    Retrowalking 37# x 10    Forward resisted walking 37# x 10    SLS airex 20 seconds x 5    TRX Kneeling x 20    Step up, over and back x 40    Rockerboard x 40    Rocker-board balance 30s x 2    Pro stretch x 40    Step down heel tap x 20    Single calf raise edge of step 2 x 10   TherAct                                  "

## 2025-06-30 ENCOUNTER — OFFICE VISIT (OUTPATIENT)
Dept: PHYSICAL THERAPY | Facility: CLINIC | Age: 55
End: 2025-06-30
Attending: STUDENT IN AN ORGANIZED HEALTH CARE EDUCATION/TRAINING PROGRAM
Payer: COMMERCIAL

## 2025-06-30 DIAGNOSIS — M25.572 ACUTE LEFT ANKLE PAIN: Primary | ICD-10-CM

## 2025-06-30 DIAGNOSIS — R26.81 UNSTEADY GAIT: ICD-10-CM

## 2025-06-30 DIAGNOSIS — D64.9 ANEMIA, UNSPECIFIED TYPE: Primary | ICD-10-CM

## 2025-06-30 PROCEDURE — 97110 THERAPEUTIC EXERCISES: CPT

## 2025-06-30 PROCEDURE — 97140 MANUAL THERAPY 1/> REGIONS: CPT

## 2025-06-30 NOTE — PROGRESS NOTES
Daily Note     Today's date: 2025  Patient name: Edward Conrad  : 1970  MRN: [id number]  Referring provider: Dayday Moreno DPM  Dx:   Encounter Diagnosis     ICD-10-CM    1. Acute left ankle pain  M25.572       2. Unsteady gait  R26.81                  Subjective: Edward said that he has been sore and that he had the sharp pain while he was resting in the pool yesterday.      Objective: See treatment diary below      Assessment: Cuboid whip resolved issues today with no sharp pain in the bottom of his foot. Good balance and hip control during exercises today. Increased stride length and balance with no pain today.      Plan: Continue per plan of care.        Insurance Eval/ Re-eval POC expires Auth Status Total visits  Start date  Expiration date Misc                           Date 25   Visit Number 5 [phone]   Auth            Manual       Cuboid Whip    Mid foot joint mobilizations grades 2-4 Cuboid Whip    Mid foot joint mobilizations grades 2-4 Cuboid Whip                           TherEx       Sidestepping 12.5# x 5    Retrowalking 37# x 10    Forward resisted walking 37# x 10    SLS airex 20 seconds x 5    TRX Kneeling x 20    Step up, over and back x 40    Rockerboard x 40    Rocker-board balance 30s x 2    Pro stretch x 40    Step down heel tap x 20 Sidestepping 12.5# x 5    Retrowalking 37# x 10    Forward resisted walking 37# x 10    SLS airex 20 seconds x 5    TRX Kneeling x 20    Step up, over and back x 40    Rockerboard x 40    Rocker-board balance 30s x 2    Pro stretch x 40    Step down heel tap x 20    Single calf raise edge of step 2 x 10 Sidestepping 12.5# x 5    Retrowalking 37# x 10    Forward resisted walking 37# x 10    SLS airex 20 seconds x 5    TRX Kneeling x 20    Step up, over and back x 40    Rockerboard x 40    Rocker-board balance 30s x 2    Pro stretch x 40    Step down heel tap x 20    Single calf raise edge of step 2 x 10   TherAct

## 2025-07-03 ENCOUNTER — APPOINTMENT (OUTPATIENT)
Dept: LAB | Facility: HOSPITAL | Age: 55
End: 2025-07-03
Attending: FAMILY MEDICINE
Payer: COMMERCIAL

## 2025-07-03 ENCOUNTER — ANTICOAG VISIT (OUTPATIENT)
Dept: FAMILY MEDICINE CLINIC | Facility: CLINIC | Age: 55
End: 2025-07-03

## 2025-07-03 DIAGNOSIS — I82.592 CHRONIC DEEP VEIN THROMBOSIS (DVT) OF OTHER VEIN OF LEFT LOWER EXTREMITY (HCC): ICD-10-CM

## 2025-07-03 DIAGNOSIS — D64.9 ANEMIA, UNSPECIFIED TYPE: ICD-10-CM

## 2025-07-03 LAB
BASOPHILS # BLD AUTO: 0.05 THOUSANDS/ÂΜL (ref 0–0.1)
BASOPHILS NFR BLD AUTO: 1 % (ref 0–1)
EOSINOPHIL # BLD AUTO: 0.18 THOUSAND/ÂΜL (ref 0–0.61)
EOSINOPHIL NFR BLD AUTO: 3 % (ref 0–6)
ERYTHROCYTE [DISTWIDTH] IN BLOOD BY AUTOMATED COUNT: 13 % (ref 11.6–15.1)
FERRITIN SERPL-MCNC: 39 NG/ML (ref 30–336)
HCT VFR BLD AUTO: 43.6 % (ref 36.5–49.3)
HGB BLD-MCNC: 14.5 G/DL (ref 12–17)
IMM GRANULOCYTES # BLD AUTO: 0.02 THOUSAND/UL (ref 0–0.2)
IMM GRANULOCYTES NFR BLD AUTO: 0 % (ref 0–2)
INR PPP: 2.43 (ref 0.85–1.19)
IRON SATN MFR SERPL: 22 % (ref 15–50)
IRON SERPL-MCNC: 91 UG/DL (ref 50–212)
LYMPHOCYTES # BLD AUTO: 1.63 THOUSANDS/ÂΜL (ref 0.6–4.47)
LYMPHOCYTES NFR BLD AUTO: 23 % (ref 14–44)
MCH RBC QN AUTO: 30.3 PG (ref 26.8–34.3)
MCHC RBC AUTO-ENTMCNC: 33.3 G/DL (ref 31.4–37.4)
MCV RBC AUTO: 91 FL (ref 82–98)
MONOCYTES # BLD AUTO: 0.83 THOUSAND/ÂΜL (ref 0.17–1.22)
MONOCYTES NFR BLD AUTO: 11 % (ref 4–12)
NEUTROPHILS # BLD AUTO: 4.55 THOUSANDS/ÂΜL (ref 1.85–7.62)
NEUTS SEG NFR BLD AUTO: 62 % (ref 43–75)
NRBC BLD AUTO-RTO: 0 /100 WBCS
PLATELET # BLD AUTO: 232 THOUSANDS/UL (ref 149–390)
PMV BLD AUTO: 9.7 FL (ref 8.9–12.7)
PROTHROMBIN TIME: 26.7 SECONDS (ref 12.3–15)
RBC # BLD AUTO: 4.78 MILLION/UL (ref 3.88–5.62)
TIBC SERPL-MCNC: 414.4 UG/DL (ref 250–450)
TRANSFERRIN SERPL-MCNC: 296 MG/DL (ref 203–362)
UIBC SERPL-MCNC: 323 UG/DL (ref 155–355)
WBC # BLD AUTO: 7.26 THOUSAND/UL (ref 4.31–10.16)

## 2025-07-03 PROCEDURE — 36415 COLL VENOUS BLD VENIPUNCTURE: CPT

## 2025-07-03 PROCEDURE — 82728 ASSAY OF FERRITIN: CPT

## 2025-07-03 PROCEDURE — 85610 PROTHROMBIN TIME: CPT

## 2025-07-03 PROCEDURE — 83550 IRON BINDING TEST: CPT

## 2025-07-03 PROCEDURE — 83540 ASSAY OF IRON: CPT

## 2025-07-03 PROCEDURE — 85025 COMPLETE CBC W/AUTO DIFF WBC: CPT

## 2025-07-15 NOTE — TELEPHONE ENCOUNTER
Patient's blood pressure range in the last 24 hours was: BP  Min: 108/77  Max: 136/83.The patient's inpatient anti-hypertensive regimen is listed below:  Current Antihypertensives  amLODIPine tablet 5 mg, Daily, Oral  losartan tablet 50 mg, Daily, Oral  hydroCHLOROthiazide tablet 25 mg, Daily, Oral    Plan  - BP is controlled, no changes needed to their regimen     Yes, he is clear to stop the coumadin for 5 days prior to the colonoscopy  Thank you for moving up his testing,  Evelyn Tang, DO

## 2025-07-22 ENCOUNTER — OFFICE VISIT (OUTPATIENT)
Dept: CARDIAC SURGERY | Facility: CLINIC | Age: 55
End: 2025-07-22

## 2025-07-22 VITALS
OXYGEN SATURATION: 98 % | HEIGHT: 69 IN | BODY MASS INDEX: 31.31 KG/M2 | DIASTOLIC BLOOD PRESSURE: 82 MMHG | SYSTOLIC BLOOD PRESSURE: 139 MMHG | RESPIRATION RATE: 14 BRPM | TEMPERATURE: 97.7 F | HEART RATE: 65 BPM | WEIGHT: 211.42 LBS

## 2025-07-22 DIAGNOSIS — K44.9 HIATAL HERNIA: Primary | ICD-10-CM

## 2025-07-22 PROCEDURE — 99024 POSTOP FOLLOW-UP VISIT: CPT | Performed by: THORACIC SURGERY (CARDIOTHORACIC VASCULAR SURGERY)

## 2025-07-22 NOTE — ASSESSMENT & PLAN NOTE
Mr. Conrad is now 6 weeks out from laparoscopic repair of his paraesophageal hernia with a partial fundoplication.  He will continue to liberalize his diet.  He will increase his activity level.  He knows to not lift very heavy weights for a total of 3 months or so.  We will plan to see him back in 6 months for his final postsurgical appointment.  He knows that he can call the office at anytime with questions or concerns.

## 2025-07-22 NOTE — PROGRESS NOTES
"Name: Edward Conrad      : 1970      MRN: [id number]  Encounter Provider: Galindo Izaguirre MD  Encounter Date: 2025   Encounter department: [facility]  :  Assessment & Plan  Hiatal hernia  Mr. Conrad is now 6 weeks out from laparoscopic repair of his paraesophageal hernia with a partial fundoplication.  He will continue to liberalize his diet.  He will increase his activity level.  He knows to not lift very heavy weights for a total of 3 months or so.  We will plan to see him back in 6 months for his final postsurgical appointment.  He knows that he can call the office at anytime with questions or concerns.           Thoracic History   Diagnosis: Paraesophageal hernia  Procedures/Surgeries: Laparoscopic paraesophageal hernia repair with partial fundoplication on May 15, 2025    No problems updated.     History of Present Illness   HPI  Edward Conrad is a 55 y.o. male returns the office for routine postsurgical care.  He is now approximately 6 weeks out from a laparoscopic paraesophageal hernia repair with partial fundoplication.  Overall, he has been doing fairly well.  He has no complaints of dysphagia or heartburn.  He has tried hot wings and Pasta sauces without difficulty.  He notices some burping and flatulence.  He denies any regurgitation symptoms.  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  He has been limiting very heavy lifting.    Review of Systems        Objective   /82 (BP Location: Right arm, Patient Position: Sitting, Cuff Size: Standard)   Pulse 65   Temp 97.7 °F (36.5 °C) (Temporal)   Resp 14   Ht 5' 9\" (1.753 m)   Wt 95.9 kg (211 lb 6.7 oz)   SpO2 98%   BMI 31.22 kg/m²     Pain Screening:  Pain Score: 0-No pain  ECOG ECOG Performance Status: 0 - Fully active, able to carry on all pre-disease performance without restriction  Physical Exam  Pulmonary:      Effort: Pulmonary effort is normal.   Abdominal:      " General: Abdomen is flat.      Palpations: Abdomen is soft.      Comments: Port sites appear to be healing well         Labs:       Pathology: I have reviewed pathology reports described above.

## 2025-08-15 ENCOUNTER — APPOINTMENT (OUTPATIENT)
Dept: LAB | Facility: HOSPITAL | Age: 55
End: 2025-08-15
Payer: COMMERCIAL

## 2025-08-18 ENCOUNTER — ANTICOAG VISIT (OUTPATIENT)
Dept: FAMILY MEDICINE CLINIC | Facility: CLINIC | Age: 55
End: 2025-08-18

## 2025-08-20 DIAGNOSIS — Z86.718 HISTORY OF DVT (DEEP VEIN THROMBOSIS): ICD-10-CM

## 2025-08-20 DIAGNOSIS — I82.592 CHRONIC DEEP VEIN THROMBOSIS (DVT) OF OTHER VEIN OF LEFT LOWER EXTREMITY (HCC): ICD-10-CM

## 2025-08-20 DIAGNOSIS — I82.502 CHRONIC DEEP VEIN THROMBOSIS (DVT) OF LEFT LOWER EXTREMITY (HCC): ICD-10-CM

## 2025-08-21 DIAGNOSIS — I82.502 CHRONIC DEEP VEIN THROMBOSIS (DVT) OF LEFT LOWER EXTREMITY (HCC): ICD-10-CM

## 2025-08-21 DIAGNOSIS — Z86.718 HISTORY OF DVT (DEEP VEIN THROMBOSIS): ICD-10-CM

## 2025-08-21 RX ORDER — WARFARIN SODIUM 1 MG/1
TABLET ORAL
Qty: 200 TABLET | Refills: 1 | Status: SHIPPED | OUTPATIENT
Start: 2025-08-21

## 2025-08-21 RX ORDER — WARFARIN SODIUM 1 MG/1
TABLET ORAL
Qty: 200 TABLET | Refills: 0 | OUTPATIENT
Start: 2025-08-21

## 2025-08-21 RX ORDER — WARFARIN SODIUM 5 MG/1
TABLET ORAL
Qty: 100 TABLET | Refills: 1 | Status: SHIPPED | OUTPATIENT
Start: 2025-08-21

## (undated) DEVICE — GAUZE SPONGES,USP TYPE VII GAUZE, 12 PLY: Brand: CURITY

## (undated) DEVICE — SINGLE-USE BIOPSY FORCEPS: Brand: RADIAL JAW 4

## (undated) DEVICE — TUBING SUCTION 5MM X 12 FT

## (undated) DEVICE — TK® TI-KNOT® DEVICE: Brand: TK® TI-KNOT®

## (undated) DEVICE — BURR  OVAL 4MM 13CM 8 FLUTE COOLCUT

## (undated) DEVICE — DEFENDO AIR WATER SUCTION AND BIOPSY VALVE KIT FOR  OLYMPUS: Brand: DEFENDO AIR/WATER/SUCTION AND BIOPSY VALVE

## (undated) DEVICE — DISPOSABLE BIOPSY VALVE MAJ-1555: Brand: SINGLE USE BIOPSY VALVE (STERILE)

## (undated) DEVICE — MARYLAND JAW LAPAROSCOPIC SEALER/DIVIDER COATED: Brand: LIGASURE

## (undated) DEVICE — GLOVE SRG BIOGEL 7.5

## (undated) DEVICE — SUT VICRYL 0 REEL 54 IN J287G

## (undated) DEVICE — GLOVE INDICATOR PI UNDERGLOVE SZ 7.5 BLUE

## (undated) DEVICE — 40595 XL TRENDELENBURG POSITIONING KIT: Brand: 40595 XL TRENDELENBURG POSITIONING KIT

## (undated) DEVICE — 3M™ TEGADERM™ TRANSPARENT FILM DRESSING FRAME STYLE, 1628, 6 IN X 8 IN (15 CM X 20 CM), 10/CT 8CT/CASE: Brand: 3M™ TEGADERM™

## (undated) DEVICE — GLOVE INDICATOR PI UNDERGLOVE SZ 8 BLUE

## (undated) DEVICE — GLOVE SRG BIOGEL 6.5

## (undated) DEVICE — 1200CC GUARDIAN II: Brand: GUARDIAN

## (undated) DEVICE — "MH-438 A/W VLVE F/140 EVIS-140": Brand: AIR/WATER VALVE

## (undated) DEVICE — FIBERTAPE 2MM X 7IN AR-7237-7

## (undated) DEVICE — GLOVE SRG BIOGEL ECLIPSE 7.5

## (undated) DEVICE — INTENDED FOR TISSUE SEPARATION, AND OTHER PROCEDURES THAT REQUIRE A SHARP SURGICAL BLADE TO PUNCTURE OR CUT.: Brand: BARD-PARKER SAFETY BLADES SIZE 11, STERILE

## (undated) DEVICE — REM POLYHESIVE ADULT PATIENT RETURN ELECTRODE: Brand: VALLEYLAB

## (undated) DEVICE — TRAVELKIT CONTAINS FIRST STEP KIT (200ML EP-4 KIT) AND SOILED SCOPE BAG - 1 KIT: Brand: TRAVELKIT CONTAINS FIRST STEP KIT AND SOILED SCOPE BAG

## (undated) DEVICE — PENROSE DRAIN, 18 X 3 8: Brand: CARDINAL HEALTH

## (undated) DEVICE — GAUZE SPONGES,16 PLY: Brand: CURITY

## (undated) DEVICE — AIRLIFE™  ADULT CUSHION NASAL CANNULA WITH 7 FOOT (2.1 M) CRUSH-RESISTANT OXYGEN TUBING, AND U/CONNECT-IT ADAPTER: Brand: AIRLIFE™

## (undated) DEVICE — GLOVE INDICATOR PI UNDERGLOVE SZ 6.5 BLUE

## (undated) DEVICE — SUT ETHIBOND 0 SH/SH 36 IN X524H

## (undated) DEVICE — GLOVE SRG BIOGEL 8

## (undated) DEVICE — BRUSH ENDO CLEANING DBL-HEADER

## (undated) DEVICE — CANNULA BUTTON 8 X 30MM PASSPORT

## (undated) DEVICE — INSUFLATION TUBING INSUFLOW (LEXION)

## (undated) DEVICE — 3M™ IOBAN™ 2 ANTIMICROBIAL INCISE DRAPE 6650EZ: Brand: IOBAN™ 2

## (undated) DEVICE — "MAJ-901 WATER CONTAINER SET CV-160/140": Brand: WATER CONTAINER

## (undated) DEVICE — Device: Brand: OMNICLOSE TROCAR SITE CLOSURE DEVICE

## (undated) DEVICE — TROCAR: Brand: KII® SLEEVE

## (undated) DEVICE — TROCAR: Brand: KII FIOS FIRST ENTRY

## (undated) DEVICE — OCCLUSIVE GAUZE STRIP,3% BISMUTH TRIBROMOPHENATE IN PETROLATUM BLEND: Brand: XEROFORM

## (undated) DEVICE — BITE BLOCK MAXI 60FR LF STRAP

## (undated) DEVICE — TRAY FOLEY 16FR URIMETER SURESTEP

## (undated) DEVICE — ASTOUND IMPERVIOUS SURGICAL GOWN: Brand: CONVERTORS

## (undated) DEVICE — METZENBAUM ADTEC SINGLE USE DISSECTING SCISSORS, SHAFT ONLY, MONOPOLAR, CURVED TO LEFT, WORKING LENGTH: 12 1/4", (310 MM), DIAM. 5 MM, INSULATED, DOUBLE ACTION, STERILE, DISPOSABLE, PACKAGE OF 10 PIECES: Brand: AESCULAP

## (undated) DEVICE — "MH-443 SUCTION VALVE F/EVIS140 EVIS160": Brand: SUCTION VALVE

## (undated) DEVICE — CHLORAPREP HI-LITE 26ML ORANGE

## (undated) DEVICE — FIRST STEP BEDSIDE KIT - STAND-UP POUCH, ENDOSCOPIC CLEANING PAD - 1 POUCH: Brand: FIRST STEP BEDSIDE KIT - STAND-UP POUCH, ENDOSCOPIC CLEANING PAD

## (undated) DEVICE — PACK ARTHROSCOPY

## (undated) DEVICE — SOLIDIFIER FLUID WASTE CONTROL 1500ML

## (undated) DEVICE — TIBURON BEACH CHAIR SHOULDER DRAPE: Brand: CONVERTORS

## (undated) DEVICE — 60 ML SYRINGE,REGULAR TIP: Brand: MONOJECT

## (undated) DEVICE — POSITIONER TRIMANO LIMB BEACH CHAIR

## (undated) DEVICE — SYRINGE 10ML LL

## (undated) DEVICE — 3M™ TEGADERM™ TRANSPARENT FILM DRESSING FRAME STYLE, 1626W, 4 IN X 4-3/4 IN (10 CM X 12 CM), 50/CT 4CT/CASE: Brand: 3M™ TEGADERM™

## (undated) DEVICE — EXOFIN PRECISION PEN HIGH VISCOSITY TOPICAL SKIN ADHESIVE: Brand: EXOFIN PRECISION PEN, 1G

## (undated) DEVICE — SYRINGE 50ML LL

## (undated) DEVICE — BLADE SHAVER TORPEDO 4MM 13CM  COOLCUT

## (undated) DEVICE — NEEDLE 25G X 1 1/2

## (undated) DEVICE — UTILITY MARKER,BLACK WITH LABELS: Brand: DEVON

## (undated) DEVICE — TUBING ARTHROSCOPIC WAVE  MAIN PUMP

## (undated) DEVICE — DRAPE FLUID WARMER (BIRD BATH)

## (undated) DEVICE — TUBING BUBBLE CLEAR 5MM X 100 FT NS

## (undated) DEVICE — MEDI-VAC YANKAUER SUCTION HANDLE: Brand: CARDINAL HEALTH

## (undated) DEVICE — INVIEW CLEAR LEGGINGS: Brand: CONVERTORS

## (undated) DEVICE — LUBRICANT SURGILUBE TUBE 4 OZ  FLIP TOP

## (undated) DEVICE — GLOVE EXAM NON-STRL NTRL PLUS LRG PF

## (undated) DEVICE — DUAL SPIKE ADAPTER: Brand: CONMED

## (undated) DEVICE — AIR AND WATER TUBING/CAP SET FOR OLYMPUS® SCOPES: Brand: ERBE

## (undated) DEVICE — PLEDGET CARDIO PTFE 9.5 X 4.8 SOFT LF (6EA/PK)

## (undated) DEVICE — PACK PBDS LAP CHOLE RF

## (undated) DEVICE — SUT MONOCRYL 4-0 PS-2 18 IN Y496G

## (undated) DEVICE — CANNULA 5.75 X 70MM BARREL SHAPED BOWL

## (undated) DEVICE — "MB-142 MOUTHPIECE": Brand: MOUTHPIECE

## (undated) DEVICE — INSUFFLATION NEEDLE TO ESTABLISH PNEUMOPERITONEUM.: Brand: INSUFFLATION NEEDLE

## (undated) DEVICE — NEEDLE SUT SCORPION MULTIFIRE